# Patient Record
Sex: FEMALE | Race: WHITE | Employment: FULL TIME | URBAN - METROPOLITAN AREA
[De-identification: names, ages, dates, MRNs, and addresses within clinical notes are randomized per-mention and may not be internally consistent; named-entity substitution may affect disease eponyms.]

---

## 2018-12-14 ENCOUNTER — HOSPITAL ENCOUNTER (EMERGENCY)
Age: 43
Discharge: HOME | End: 2018-12-14
Payer: MEDICAID

## 2018-12-14 VITALS
WEIGHT: 293 LBS | BODY MASS INDEX: 47.09 KG/M2 | RESPIRATION RATE: 16 BRPM | SYSTOLIC BLOOD PRESSURE: 175 MMHG | HEIGHT: 66 IN | HEART RATE: 79 BPM | TEMPERATURE: 98.1 F | OXYGEN SATURATION: 96 % | DIASTOLIC BLOOD PRESSURE: 76 MMHG

## 2018-12-14 DIAGNOSIS — I10 ESSENTIAL HYPERTENSION: ICD-10-CM

## 2018-12-14 DIAGNOSIS — L02.91 ABSCESS: Primary | ICD-10-CM

## 2018-12-14 PROCEDURE — 99282 EMERGENCY DEPT VISIT SF MDM: CPT

## 2018-12-14 PROCEDURE — 6370000000 HC RX 637 (ALT 250 FOR IP): Performed by: PHYSICIAN ASSISTANT

## 2018-12-14 PROCEDURE — 10060 I&D ABSCESS SIMPLE/SINGLE: CPT

## 2018-12-14 RX ORDER — IBUPROFEN 800 MG/1
800 TABLET ORAL ONCE
Status: COMPLETED | OUTPATIENT
Start: 2018-12-14 | End: 2018-12-14

## 2018-12-14 RX ORDER — CLONIDINE HYDROCHLORIDE 0.1 MG/1
0.1 TABLET ORAL ONCE
Status: COMPLETED | OUTPATIENT
Start: 2018-12-14 | End: 2018-12-14

## 2018-12-14 RX ORDER — IBUPROFEN 800 MG/1
800 TABLET ORAL EVERY 8 HOURS PRN
Qty: 21 TABLET | Refills: 0 | Status: SHIPPED | OUTPATIENT
Start: 2018-12-14 | End: 2018-12-19

## 2018-12-14 RX ORDER — SULFAMETHOXAZOLE AND TRIMETHOPRIM 800; 160 MG/1; MG/1
1 TABLET ORAL 2 TIMES DAILY
Qty: 20 TABLET | Refills: 0 | Status: SHIPPED | OUTPATIENT
Start: 2018-12-14 | End: 2018-12-19

## 2018-12-14 RX ORDER — SULFAMETHOXAZOLE AND TRIMETHOPRIM 800; 160 MG/1; MG/1
1 TABLET ORAL ONCE
Status: COMPLETED | OUTPATIENT
Start: 2018-12-14 | End: 2018-12-14

## 2018-12-14 RX ORDER — CEPHALEXIN 500 MG/1
500 CAPSULE ORAL ONCE
Status: COMPLETED | OUTPATIENT
Start: 2018-12-14 | End: 2018-12-14

## 2018-12-14 RX ORDER — CEPHALEXIN 500 MG/1
500 CAPSULE ORAL 4 TIMES DAILY
Qty: 40 CAPSULE | Refills: 0 | Status: SHIPPED | OUTPATIENT
Start: 2018-12-14 | End: 2018-12-19

## 2018-12-14 RX ADMIN — IBUPROFEN 800 MG: 800 TABLET ORAL at 20:28

## 2018-12-14 RX ADMIN — CLONIDINE HYDROCHLORIDE 0.1 MG: 0.1 TABLET ORAL at 20:58

## 2018-12-14 RX ADMIN — CEPHALEXIN 500 MG: 500 CAPSULE ORAL at 20:28

## 2018-12-14 RX ADMIN — SULFAMETHOXAZOLE AND TRIMETHOPRIM 1 TABLET: 800; 160 TABLET ORAL at 20:28

## 2018-12-14 ASSESSMENT — PAIN DESCRIPTION - PAIN TYPE: TYPE: ACUTE PAIN

## 2018-12-14 ASSESSMENT — PAIN DESCRIPTION - LOCATION: LOCATION: FLANK

## 2018-12-14 ASSESSMENT — PAIN DESCRIPTION - ORIENTATION: ORIENTATION: RIGHT

## 2018-12-14 ASSESSMENT — PAIN DESCRIPTION - FREQUENCY: FREQUENCY: CONTINUOUS

## 2018-12-14 ASSESSMENT — PAIN DESCRIPTION - ONSET: ONSET: SUDDEN

## 2018-12-14 ASSESSMENT — PAIN SCALES - GENERAL
PAINLEVEL_OUTOF10: 9
PAINLEVEL_OUTOF10: 5

## 2018-12-15 NOTE — ED PROVIDER NOTES
[facility]  HPI:  18, Time: 8:19 PM         Nettie Sales is a 44 y.o. female presenting to the ED for  Abscess right lateral abdomen , beginning 2 Days  ago. The complaint has been persistent, mild in severity, and worsened by nothing. Patient comes in with complaint of abscess of the right lateral abdomen patient denies any fever chills. She states she is attempting to squeeze the area over the last couple of days. Patient denies any recent illness no sore throat cough no fever chills no chest pain and headache lightheaded or dizziness      Review of Systems:   Pertinent positives and negatives are stated within HPI, all other systems reviewed and are negative.          --------------------------------------------- PAST HISTORY ---------------------------------------------  Past Medical History:  has a past medical history of Acute renal injury ([facility] [address] Anxiety; Arthritis; Asthma; Depression; Diabetes mellitus ([facility] [address] GERD (gastroesophageal reflux disease); Glaucoma; Hypertension; Hypothyroidism; Irritable bowel syndrome; Obesity; Obstructive sleep apnea; Pneumonia; Polycystic ovarian syndrome; and Spinal headache. Past Surgical History:  has a past surgical history that includes Tonsillectomy (); Cholecystectomy, laparoscopic ();  section (); Foot surgery (); Ovary removal (); Dental surgery (10/06/2011); bernadette and bso (cervix removed) (3/19/2013); hernia repair (9/3/13); Hysterectomy; Echo Complete (2014); and Knee cartilage surgery. Social History:  reports that she has never smoked. She has never used smokeless tobacco. She reports that she does not drink alcohol or use drugs.     Family History: family history includes Asthma in her brother, mother, and sister; Cancer in her father and paternal grandfather; Depression in her father, maternal grandmother, and paternal grandmother; Diabetes in her brother and mother; Heart Disease in her father and maternal

## 2018-12-15 NOTE — ED NOTES
FIRST PROVIDER CONTACT ASSESSMENT NOTE      Department of Emergency Medicine   12/14/18  7:26 PM    Chief Complaint: Abscess (right side x2 days)      History of Present Illness:    Brien Ramirez is a 44 y.o. female who presents to the ED by private car for left side abscess x2 days tht is painful. Has had before. Has not tried to rah herself. Is diabetic. Has HTN and is on her meds. Focused Screening Exam:  Constitutional:  Alert, appears stated age and is in no distress.       *ALLERGIES*     Percocet [oxycodone-acetaminophen] and Tape [adhesive tape]     Vitals:    12/14/18 1926   BP: (!) 199/98   Pulse: 86   Resp: 16   Temp: 98.1 °F (36.7 °C)   TempSrc: Oral   SpO2: 96%   Weight: (!) 350 lb (158.8 kg)   Height: 5' 6\" (1.676 m)       Initial Plan of Care:  Initiate Treatment-Testing, Proceed toTreatment Area When Bed Available for ED Attending/MLP to Continue Care    -----------------END OF FIRST PROVIDER CONTACT ASSESSMENT NOTE--------------  Electronically signed by GEOVANI Guerrero CNP   DD: 12/14/18       GEOVANI Guerrero CNP  12/14/18 1929

## 2018-12-15 NOTE — ED NOTES
Bed: 29  Expected date:   Expected time:   Means of arrival:   Comments:  protocols     Sandee Kapoor RN  12/14/18 1929

## 2018-12-17 ENCOUNTER — HOSPITAL ENCOUNTER (EMERGENCY)
Age: 43
Discharge: LEFT BEFORE DISCHARGE | End: 2018-12-17
Payer: MEDICAID

## 2018-12-17 VITALS
RESPIRATION RATE: 16 BRPM | OXYGEN SATURATION: 97 % | WEIGHT: 235 LBS | DIASTOLIC BLOOD PRESSURE: 100 MMHG | HEART RATE: 79 BPM | HEIGHT: 66 IN | BODY MASS INDEX: 37.77 KG/M2 | SYSTOLIC BLOOD PRESSURE: 238 MMHG | TEMPERATURE: 98.3 F

## 2018-12-17 PROCEDURE — 99282 EMERGENCY DEPT VISIT SF MDM: CPT

## 2018-12-17 ASSESSMENT — PAIN DESCRIPTION - PAIN TYPE: TYPE: ACUTE PAIN

## 2018-12-17 ASSESSMENT — PAIN SCALES - WONG BAKER: WONGBAKER_NUMERICALRESPONSE: 2

## 2018-12-17 ASSESSMENT — PAIN DESCRIPTION - LOCATION: LOCATION: ABDOMEN

## 2018-12-19 ENCOUNTER — OFFICE VISIT (OUTPATIENT)
Dept: FAMILY MEDICINE CLINIC | Age: 43
End: 2018-12-19
Payer: MEDICAID

## 2018-12-19 VITALS
DIASTOLIC BLOOD PRESSURE: 107 MMHG | SYSTOLIC BLOOD PRESSURE: 189 MMHG | RESPIRATION RATE: 18 BRPM | WEIGHT: 293 LBS | BODY MASS INDEX: 47.09 KG/M2 | HEIGHT: 66 IN | HEART RATE: 74 BPM

## 2018-12-19 DIAGNOSIS — F33.9 EPISODE OF RECURRENT MAJOR DEPRESSIVE DISORDER, UNSPECIFIED DEPRESSION EPISODE SEVERITY (HCC): Chronic | ICD-10-CM

## 2018-12-19 DIAGNOSIS — L02.211 ABSCESS OF ABDOMINAL WALL: ICD-10-CM

## 2018-12-19 DIAGNOSIS — I10 ESSENTIAL HYPERTENSION: ICD-10-CM

## 2018-12-19 DIAGNOSIS — E11.8 TYPE 2 DIABETES MELLITUS WITH COMPLICATION, UNSPECIFIED WHETHER LONG TERM INSULIN USE: Primary | ICD-10-CM

## 2018-12-19 DIAGNOSIS — L98.9 SKIN LESION OF HAND: ICD-10-CM

## 2018-12-19 DIAGNOSIS — Z13.31 POSITIVE DEPRESSION SCREENING: ICD-10-CM

## 2018-12-19 PROBLEM — S02.5XXA FRACTURE OF TOOTH: Status: ACTIVE | Noted: 2018-12-19

## 2018-12-19 PROBLEM — D22.9 ATYPICAL NEVUS: Status: ACTIVE | Noted: 2018-12-19

## 2018-12-19 PROBLEM — K91.5 POSTCHOLECYSTECTOMY SYNDROME: Status: ACTIVE | Noted: 2018-12-19

## 2018-12-19 PROBLEM — I25.10 ARTERIOSCLEROSIS OF CORONARY ARTERY: Status: ACTIVE | Noted: 2018-12-19

## 2018-12-19 PROBLEM — J45.909 ASTHMA: Status: ACTIVE | Noted: 2018-12-19

## 2018-12-19 PROBLEM — T39.95XA ANALGESIC OVERUSE HEADACHE: Status: ACTIVE | Noted: 2018-12-19

## 2018-12-19 PROBLEM — S02.5XXA FRACTURE OF TOOTH: Status: RESOLVED | Noted: 2018-12-19 | Resolved: 2018-12-19

## 2018-12-19 PROBLEM — G44.40 ANALGESIC OVERUSE HEADACHE: Status: RESOLVED | Noted: 2018-12-19 | Resolved: 2018-12-19

## 2018-12-19 PROBLEM — A49.02 INFECTION WITH METHICILLIN-RESISTANT STAPHYLOCOCCUS AUREUS: Status: ACTIVE | Noted: 2018-12-19

## 2018-12-19 PROBLEM — G44.40 ANALGESIC OVERUSE HEADACHE: Status: ACTIVE | Noted: 2018-12-19

## 2018-12-19 PROBLEM — K91.5 POSTCHOLECYSTECTOMY SYNDROME: Status: RESOLVED | Noted: 2018-12-19 | Resolved: 2018-12-19

## 2018-12-19 PROBLEM — E55.9 UNSPECIFIED VITAMIN D DEFICIENCY: Status: ACTIVE | Noted: 2018-12-19

## 2018-12-19 PROBLEM — T39.95XA ANALGESIC OVERUSE HEADACHE: Status: RESOLVED | Noted: 2018-12-19 | Resolved: 2018-12-19

## 2018-12-19 LAB — HBA1C MFR BLD: 6.4 %

## 2018-12-19 PROCEDURE — 83036 HEMOGLOBIN GLYCOSYLATED A1C: CPT | Performed by: FAMILY MEDICINE

## 2018-12-19 PROCEDURE — G0444 DEPRESSION SCREEN ANNUAL: HCPCS | Performed by: FAMILY MEDICINE

## 2018-12-19 PROCEDURE — 99203 OFFICE O/P NEW LOW 30 MIN: CPT | Performed by: FAMILY MEDICINE

## 2018-12-19 PROCEDURE — G8431 POS CLIN DEPRES SCRN F/U DOC: HCPCS | Performed by: FAMILY MEDICINE

## 2018-12-19 RX ORDER — SULFAMETHOXAZOLE AND TRIMETHOPRIM 800; 160 MG/1; MG/1
1 TABLET ORAL 2 TIMES DAILY
COMMUNITY
End: 2019-01-11

## 2018-12-19 RX ORDER — LISINOPRIL 40 MG/1
40 TABLET ORAL DAILY
Qty: 30 TABLET | Refills: 3 | Status: SHIPPED | OUTPATIENT
Start: 2018-12-19 | End: 2019-01-11

## 2018-12-19 ASSESSMENT — PATIENT HEALTH QUESTIONNAIRE - PHQ9
7. TROUBLE CONCENTRATING ON THINGS, SUCH AS READING THE NEWSPAPER OR WATCHING TELEVISION: 3
4. FEELING TIRED OR HAVING LITTLE ENERGY: 3
2. FEELING DOWN, DEPRESSED OR HOPELESS: 3
8. MOVING OR SPEAKING SO SLOWLY THAT OTHER PEOPLE COULD HAVE NOTICED. OR THE OPPOSITE, BEING SO FIGETY OR RESTLESS THAT YOU HAVE BEEN MOVING AROUND A LOT MORE THAN USUAL: 0
6. FEELING BAD ABOUT YOURSELF - OR THAT YOU ARE A FAILURE OR HAVE LET YOURSELF OR YOUR FAMILY DOWN: 3
SUM OF ALL RESPONSES TO PHQ QUESTIONS 1-9: 21
5. POOR APPETITE OR OVEREATING: 3
1. LITTLE INTEREST OR PLEASURE IN DOING THINGS: 3
SUM OF ALL RESPONSES TO PHQ9 QUESTIONS 1 & 2: 6
10. IF YOU CHECKED OFF ANY PROBLEMS, HOW DIFFICULT HAVE THESE PROBLEMS MADE IT FOR YOU TO DO YOUR WORK, TAKE CARE OF THINGS AT HOME, OR GET ALONG WITH OTHER PEOPLE: 3
9. THOUGHTS THAT YOU WOULD BE BETTER OFF DEAD, OR OF HURTING YOURSELF: 0
3. TROUBLE FALLING OR STAYING ASLEEP: 3
SUM OF ALL RESPONSES TO PHQ QUESTIONS 1-9: 21

## 2018-12-19 ASSESSMENT — ENCOUNTER SYMPTOMS
DIARRHEA: 0
CONSTIPATION: 0
BLOOD IN STOOL: 0
VOMITING: 0
ABDOMINAL PAIN: 0
COUGH: 0
SHORTNESS OF BREATH: 0
NAUSEA: 0

## 2018-12-19 NOTE — PROGRESS NOTES
Chief Complaint   Patient presents with   Desi Hodge Doctor    Diabetes     unable to afford any of her medications, not taking     Hypertension    Depression     PHQ-21       HPI:  The patient is a 44 y.o. female who presented to the office today to establish care. Primarily here to have an abscess evaluated. Has multiple problems but states that she does not have insurance and has no money to pay for medications. She states that she would only be able to take samples because she does not have the funds for additional medication.     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, \"I ran two [employer]  - she is very frustrated with her former boss because he made her lay off people which was very emotionally stressing for her   - has 15year old boy who she states will not have fortino due to lack of funds, this stresses her  Sleep: 1-3 hours  Interests: none  Guilty: yes  Energy: low  Concentration: yes  Appetite: increased  Psychomotor: yes  Suicidal ideation: no    Hypertension  - 20 years  - Lisinopril 40 mg BID; was still high at the time  - headaches  - blurry vision; blames glaucoma    Diabetes Mellitus  - insulin since 2014  - stopped insulin 2 years ago   -  HbA1c: 6.4%    Social stressors  - doesn't have money  - has  with CHF and she spends the money on him   - has not worked with  before     Abscess  - started last week  - kept getting bigger  - I&D in ED and was started on Keflex and Bactrim, still taking, 3 more days   - no change in size  - tender   - no fever but possibly some chills     Rash on left arm over pinky and arm  - tried lotion  - utter cream  - 6 months       Past Medical History:   Diagnosis Date    Acute renal injury ([facility] [address] 9/4/2013    instructed to call if any new symptoms develop prior to next visit.        Lore Shen M.D

## 2018-12-21 ENCOUNTER — OFFICE VISIT (OUTPATIENT)
Dept: FAMILY MEDICINE CLINIC | Age: 43
End: 2018-12-21
Payer: MEDICAID

## 2018-12-21 VITALS
BODY MASS INDEX: 47.09 KG/M2 | HEART RATE: 75 BPM | WEIGHT: 293 LBS | RESPIRATION RATE: 18 BRPM | HEIGHT: 66 IN | SYSTOLIC BLOOD PRESSURE: 198 MMHG | DIASTOLIC BLOOD PRESSURE: 111 MMHG

## 2018-12-21 DIAGNOSIS — L02.211 ABDOMINAL WALL ABSCESS: Primary | ICD-10-CM

## 2018-12-21 PROCEDURE — 10060 I&D ABSCESS SIMPLE/SINGLE: CPT | Performed by: FAMILY MEDICINE

## 2019-01-11 ENCOUNTER — HOSPITAL ENCOUNTER (OUTPATIENT)
Age: 44
Discharge: HOME | End: 2019-01-13
Payer: MEDICAID

## 2019-01-11 ENCOUNTER — OFFICE VISIT (OUTPATIENT)
Dept: FAMILY MEDICINE CLINIC | Age: 44
End: 2019-01-11
Payer: MEDICAID

## 2019-01-11 VITALS
HEART RATE: 81 BPM | SYSTOLIC BLOOD PRESSURE: 166 MMHG | DIASTOLIC BLOOD PRESSURE: 85 MMHG | WEIGHT: 293 LBS | OXYGEN SATURATION: 98 % | BODY MASS INDEX: 47.09 KG/M2 | RESPIRATION RATE: 18 BRPM | HEIGHT: 66 IN

## 2019-01-11 DIAGNOSIS — E03.9 HYPOTHYROIDISM, UNSPECIFIED TYPE: Chronic | ICD-10-CM

## 2019-01-11 DIAGNOSIS — R00.2 PALPITATION: ICD-10-CM

## 2019-01-11 DIAGNOSIS — E11.65 TYPE 2 DIABETES MELLITUS WITH HYPERGLYCEMIA, WITHOUT LONG-TERM CURRENT USE OF INSULIN (HCC): ICD-10-CM

## 2019-01-11 DIAGNOSIS — I10 ESSENTIAL HYPERTENSION: ICD-10-CM

## 2019-01-11 DIAGNOSIS — I10 ESSENTIAL HYPERTENSION: Primary | ICD-10-CM

## 2019-01-11 DIAGNOSIS — F41.8 DEPRESSION WITH ANXIETY: Chronic | ICD-10-CM

## 2019-01-11 DIAGNOSIS — R07.9 EXERTIONAL CHEST PAIN: ICD-10-CM

## 2019-01-11 DIAGNOSIS — L40.9 PSORIASIS: ICD-10-CM

## 2019-01-11 LAB
ALBUMIN SERPL-MCNC: 4.2 G/DL (ref 3.5–5.2)
ALP BLD-CCNC: 92 U/L (ref 35–104)
ALT SERPL-CCNC: 24 U/L (ref 0–32)
ANION GAP SERPL CALCULATED.3IONS-SCNC: 14 MMOL/L (ref 7–16)
APPEARANCE FLUID: NORMAL
AST SERPL-CCNC: 16 U/L (ref 0–31)
BILIRUB SERPL-MCNC: 0.6 MG/DL (ref 0–1.2)
BILIRUBIN, POC: NORMAL
BLOOD URINE, POC: NORMAL
BUN BLDV-MCNC: 13 MG/DL (ref 6–20)
CALCIUM SERPL-MCNC: 9.1 MG/DL (ref 8.6–10.2)
CHLORIDE BLD-SCNC: 100 MMOL/L (ref 98–107)
CHOLESTEROL, TOTAL: 224 MG/DL (ref 0–199)
CLARITY, POC: CLEAR
CO2: 29 MMOL/L (ref 22–29)
COLOR, POC: YELLOW
CREAT SERPL-MCNC: 0.8 MG/DL (ref 0.5–1)
CREATININE URINE POCT: NORMAL
GFR AFRICAN AMERICAN: >60
GFR NON-AFRICAN AMERICAN: >60 ML/MIN/1.73
GLUCOSE BLD-MCNC: 108 MG/DL
GLUCOSE BLD-MCNC: 115 MG/DL (ref 74–99)
GLUCOSE URINE, POC: NORMAL
HDLC SERPL-MCNC: 65 MG/DL
KETONES, POC: NORMAL
LDL CHOLESTEROL CALCULATED: 135 MG/DL (ref 0–99)
LEUKOCYTE EST, POC: NORMAL
MICROALBUMIN/CREAT 24H UR: NORMAL MG/G{CREAT}
MICROALBUMIN/CREAT UR-RTO: NORMAL
NITRITE, POC: NORMAL
PH, POC: 5.5
POTASSIUM SERPL-SCNC: 3.8 MMOL/L (ref 3.5–5)
PROTEIN, POC: NORMAL
SODIUM BLD-SCNC: 143 MMOL/L (ref 132–146)
SPECIFIC GRAVITY, POC: 1.02
TOTAL PROTEIN: 7.7 G/DL (ref 6.4–8.3)
TRIGL SERPL-MCNC: 121 MG/DL (ref 0–149)
TSH SERPL DL<=0.05 MIU/L-ACNC: 5.69 UIU/ML (ref 0.27–4.2)
UROBILINOGEN, POC: 0.2
VLDLC SERPL CALC-MCNC: 24 MG/DL

## 2019-01-11 PROCEDURE — 82962 GLUCOSE BLOOD TEST: CPT | Performed by: FAMILY MEDICINE

## 2019-01-11 PROCEDURE — 99213 OFFICE O/P EST LOW 20 MIN: CPT | Performed by: FAMILY MEDICINE

## 2019-01-11 PROCEDURE — 84443 ASSAY THYROID STIM HORMONE: CPT

## 2019-01-11 PROCEDURE — 80061 LIPID PANEL: CPT

## 2019-01-11 PROCEDURE — 81002 URINALYSIS NONAUTO W/O SCOPE: CPT | Performed by: FAMILY MEDICINE

## 2019-01-11 PROCEDURE — 93000 ELECTROCARDIOGRAM COMPLETE: CPT | Performed by: FAMILY MEDICINE

## 2019-01-11 PROCEDURE — 82044 UR ALBUMIN SEMIQUANTITATIVE: CPT | Performed by: FAMILY MEDICINE

## 2019-01-11 PROCEDURE — 80053 COMPREHEN METABOLIC PANEL: CPT

## 2019-01-11 RX ORDER — CHLORTHALIDONE 25 MG/1
25 TABLET ORAL DAILY
Qty: 30 TABLET | Refills: 0 | Status: SHIPPED | OUTPATIENT
Start: 2019-01-11 | End: 2019-02-07 | Stop reason: SDUPTHER

## 2019-01-11 RX ORDER — TRIAMCINOLONE ACETONIDE 5 MG/G
CREAM TOPICAL
Qty: 45 G | Refills: 0 | Status: SHIPPED | OUTPATIENT
Start: 2019-01-11 | End: 2019-03-04 | Stop reason: SDUPTHER

## 2019-01-11 ASSESSMENT — ENCOUNTER SYMPTOMS
VOMITING: 0
COUGH: 0
SHORTNESS OF BREATH: 1
NAUSEA: 0
CONSTIPATION: 0
ABDOMINAL PAIN: 0
BLOOD IN STOOL: 0
DIARRHEA: 0

## 2019-01-14 ENCOUNTER — TELEPHONE (OUTPATIENT)
Dept: FAMILY MEDICINE CLINIC | Age: 44
End: 2019-01-14

## 2019-01-14 DIAGNOSIS — E03.9 HYPOTHYROIDISM, UNSPECIFIED TYPE: Primary | ICD-10-CM

## 2019-01-14 RX ORDER — LEVOTHYROXINE SODIUM 0.05 MG/1
50 TABLET ORAL DAILY
Qty: 30 TABLET | Refills: 2 | Status: SHIPPED | OUTPATIENT
Start: 2019-01-14 | End: 2019-03-04 | Stop reason: SDUPTHER

## 2019-01-18 ENCOUNTER — PATIENT MESSAGE (OUTPATIENT)
Dept: FAMILY MEDICINE CLINIC | Age: 44
End: 2019-01-18

## 2019-01-18 DIAGNOSIS — E11.65 TYPE 2 DIABETES MELLITUS WITH HYPERGLYCEMIA, WITHOUT LONG-TERM CURRENT USE OF INSULIN (HCC): Primary | ICD-10-CM

## 2019-01-19 RX ORDER — GLUCOSAMINE HCL/CHONDROITIN SU 500-400 MG
CAPSULE ORAL
Qty: 100 STRIP | Refills: 3 | Status: SHIPPED | OUTPATIENT
Start: 2019-01-19 | End: 2019-08-12

## 2019-01-19 RX ORDER — LANCETS 30 GAUGE
EACH MISCELLANEOUS
Qty: 100 EACH | Refills: 3 | Status: SHIPPED | OUTPATIENT
Start: 2019-01-19 | End: 2019-08-12

## 2019-01-19 RX ORDER — BLOOD PRESSURE TEST KIT
KIT MISCELLANEOUS
Qty: 100 EACH | Refills: 3 | Status: SHIPPED | OUTPATIENT
Start: 2019-01-19 | End: 2019-08-12

## 2019-01-19 RX ORDER — BLOOD-GLUCOSE METER
1 KIT MISCELLANEOUS DAILY
Qty: 1 KIT | Refills: 0 | Status: SHIPPED | OUTPATIENT
Start: 2019-01-19 | End: 2019-08-12

## 2019-02-07 DIAGNOSIS — I10 ESSENTIAL HYPERTENSION: ICD-10-CM

## 2019-02-07 RX ORDER — CHLORTHALIDONE 25 MG/1
TABLET ORAL
Qty: 30 TABLET | Refills: 0 | Status: SHIPPED | OUTPATIENT
Start: 2019-02-07 | End: 2019-03-04 | Stop reason: SDUPTHER

## 2019-02-07 RX ORDER — CHLORTHALIDONE 25 MG/1
25 TABLET ORAL DAILY
Qty: 30 TABLET | Refills: 0 | Status: SHIPPED | OUTPATIENT
Start: 2019-02-07 | End: 2019-02-07

## 2019-02-20 ENCOUNTER — HOSPITAL ENCOUNTER (OUTPATIENT)
Dept: NON INVASIVE DIAGNOSTICS | Age: 44
Discharge: HOME | End: 2019-02-20
Payer: MEDICAID

## 2019-02-20 DIAGNOSIS — I10 ESSENTIAL HYPERTENSION: ICD-10-CM

## 2019-02-20 DIAGNOSIS — R00.2 PALPITATION: ICD-10-CM

## 2019-02-20 DIAGNOSIS — R07.9 EXERTIONAL CHEST PAIN: ICD-10-CM

## 2019-02-20 LAB
LV EF: 60 %
LVEF MODALITY: NORMAL

## 2019-02-20 PROCEDURE — 93306 TTE W/DOPPLER COMPLETE: CPT

## 2019-03-04 ENCOUNTER — OFFICE VISIT (OUTPATIENT)
Dept: FAMILY MEDICINE CLINIC | Age: 44
End: 2019-03-04
Payer: MEDICAID

## 2019-03-04 VITALS
SYSTOLIC BLOOD PRESSURE: 143 MMHG | OXYGEN SATURATION: 96 % | HEIGHT: 66 IN | RESPIRATION RATE: 18 BRPM | DIASTOLIC BLOOD PRESSURE: 106 MMHG | HEART RATE: 79 BPM | WEIGHT: 293 LBS | BODY MASS INDEX: 47.09 KG/M2

## 2019-03-04 DIAGNOSIS — I10 ESSENTIAL HYPERTENSION: ICD-10-CM

## 2019-03-04 DIAGNOSIS — L40.9 PSORIASIS: ICD-10-CM

## 2019-03-04 DIAGNOSIS — E03.9 HYPOTHYROIDISM, UNSPECIFIED TYPE: ICD-10-CM

## 2019-03-04 DIAGNOSIS — F41.8 DEPRESSION WITH ANXIETY: Chronic | ICD-10-CM

## 2019-03-04 DIAGNOSIS — E11.8 TYPE 2 DIABETES MELLITUS WITH COMPLICATION, WITHOUT LONG-TERM CURRENT USE OF INSULIN (HCC): Primary | ICD-10-CM

## 2019-03-04 LAB — HBA1C MFR BLD: 7.7 %

## 2019-03-04 PROCEDURE — 1036F TOBACCO NON-USER: CPT | Performed by: FAMILY MEDICINE

## 2019-03-04 PROCEDURE — 99213 OFFICE O/P EST LOW 20 MIN: CPT | Performed by: FAMILY MEDICINE

## 2019-03-04 PROCEDURE — 3045F PR MOST RECENT HEMOGLOBIN A1C LEVEL 7.0-9.0%: CPT | Performed by: FAMILY MEDICINE

## 2019-03-04 PROCEDURE — 83036 HEMOGLOBIN GLYCOSYLATED A1C: CPT | Performed by: FAMILY MEDICINE

## 2019-03-04 PROCEDURE — G8427 DOCREV CUR MEDS BY ELIG CLIN: HCPCS | Performed by: FAMILY MEDICINE

## 2019-03-04 PROCEDURE — G8417 CALC BMI ABV UP PARAM F/U: HCPCS | Performed by: FAMILY MEDICINE

## 2019-03-04 PROCEDURE — G8599 NO ASA/ANTIPLAT THER USE RNG: HCPCS | Performed by: FAMILY MEDICINE

## 2019-03-04 PROCEDURE — 2022F DILAT RTA XM EVC RTNOPTHY: CPT | Performed by: FAMILY MEDICINE

## 2019-03-04 PROCEDURE — G8484 FLU IMMUNIZE NO ADMIN: HCPCS | Performed by: FAMILY MEDICINE

## 2019-03-04 RX ORDER — LEVOTHYROXINE SODIUM 0.05 MG/1
50 TABLET ORAL DAILY
Qty: 30 TABLET | Refills: 2 | Status: SHIPPED | OUTPATIENT
Start: 2019-03-04 | End: 2019-08-19

## 2019-03-04 RX ORDER — CHLORTHALIDONE 25 MG/1
TABLET ORAL
Qty: 30 TABLET | Refills: 3 | Status: SHIPPED | OUTPATIENT
Start: 2019-03-04 | End: 2019-07-08 | Stop reason: ALTCHOICE

## 2019-03-04 ASSESSMENT — ENCOUNTER SYMPTOMS: CHEST TIGHTNESS: 1

## 2019-03-05 RX ORDER — TRIAMCINOLONE ACETONIDE 5 MG/G
CREAM TOPICAL
Qty: 45 G | Refills: 0 | Status: SHIPPED
Start: 2019-03-05 | End: 2020-06-16 | Stop reason: SDUPTHER

## 2019-03-12 ENCOUNTER — TELEPHONE (OUTPATIENT)
Dept: FAMILY MEDICINE CLINIC | Age: 44
End: 2019-03-12

## 2019-03-12 DIAGNOSIS — F33.1 MODERATE EPISODE OF RECURRENT MAJOR DEPRESSIVE DISORDER (HCC): Primary | ICD-10-CM

## 2019-03-16 ENCOUNTER — HOSPITAL ENCOUNTER (EMERGENCY)
Age: 44
Discharge: HOME | End: 2019-03-16
Payer: MEDICAID

## 2019-03-16 VITALS
SYSTOLIC BLOOD PRESSURE: 204 MMHG | BODY MASS INDEX: 47.09 KG/M2 | WEIGHT: 293 LBS | DIASTOLIC BLOOD PRESSURE: 120 MMHG | RESPIRATION RATE: 18 BRPM | HEART RATE: 81 BPM | OXYGEN SATURATION: 95 % | HEIGHT: 66 IN | TEMPERATURE: 98.4 F

## 2019-03-16 DIAGNOSIS — H00.033 EYELID CELLULITIS, RIGHT: ICD-10-CM

## 2019-03-16 DIAGNOSIS — H00.031 ABSCESS OF RIGHT UPPER EYELID: Primary | ICD-10-CM

## 2019-03-16 PROCEDURE — 99283 EMERGENCY DEPT VISIT LOW MDM: CPT

## 2019-03-16 PROCEDURE — 6370000000 HC RX 637 (ALT 250 FOR IP)

## 2019-03-16 PROCEDURE — 6370000000 HC RX 637 (ALT 250 FOR IP): Performed by: PHYSICIAN ASSISTANT

## 2019-03-16 RX ORDER — FLUCONAZOLE 150 MG/1
150 TABLET ORAL ONCE
Qty: 1 TABLET | Refills: 0 | Status: SHIPPED | OUTPATIENT
Start: 2019-03-16 | End: 2019-03-16

## 2019-03-16 RX ORDER — CEPHALEXIN 500 MG/1
500 CAPSULE ORAL 4 TIMES DAILY
Qty: 28 CAPSULE | Refills: 0 | Status: SHIPPED | OUTPATIENT
Start: 2019-03-16 | End: 2019-07-08

## 2019-03-16 RX ORDER — POLYMYXIN B SULFATE AND TRIMETHOPRIM 1; 10000 MG/ML; [USP'U]/ML
1 SOLUTION OPHTHALMIC EVERY 4 HOURS
Qty: 10 ML | Refills: 0 | Status: SHIPPED | OUTPATIENT
Start: 2019-03-16 | End: 2019-03-26

## 2019-03-16 RX ORDER — ERYTHROMYCIN 5 MG/G
OINTMENT OPHTHALMIC
Qty: 1 TUBE | Refills: 0 | Status: SHIPPED | OUTPATIENT
Start: 2019-03-16 | End: 2019-03-16 | Stop reason: SINTOL

## 2019-03-16 RX ORDER — TETRACAINE HYDROCHLORIDE 5 MG/ML
SOLUTION OPHTHALMIC
Status: COMPLETED
Start: 2019-03-16 | End: 2019-03-16

## 2019-03-16 RX ORDER — CEPHALEXIN 500 MG/1
500 CAPSULE ORAL ONCE
Status: COMPLETED | OUTPATIENT
Start: 2019-03-16 | End: 2019-03-16

## 2019-03-16 RX ADMIN — TETRACAINE HYDROCHLORIDE: 5 SOLUTION OPHTHALMIC at 19:20

## 2019-03-16 RX ADMIN — CEPHALEXIN 500 MG: 500 CAPSULE ORAL at 19:59

## 2019-03-16 ASSESSMENT — PAIN SCALES - GENERAL: PAINLEVEL_OUTOF10: 8

## 2019-05-03 ENCOUNTER — APPOINTMENT (OUTPATIENT)
Dept: GENERAL RADIOLOGY | Age: 44
End: 2019-05-03
Payer: MEDICAID

## 2019-05-03 ENCOUNTER — HOSPITAL ENCOUNTER (EMERGENCY)
Age: 44
Discharge: HOME | End: 2019-05-03
Attending: EMERGENCY MEDICINE
Payer: MEDICAID

## 2019-05-03 VITALS
TEMPERATURE: 98.8 F | HEIGHT: 66 IN | OXYGEN SATURATION: 96 % | RESPIRATION RATE: 18 BRPM | DIASTOLIC BLOOD PRESSURE: 96 MMHG | HEART RATE: 78 BPM | BODY MASS INDEX: 47.09 KG/M2 | WEIGHT: 293 LBS | SYSTOLIC BLOOD PRESSURE: 194 MMHG

## 2019-05-03 DIAGNOSIS — I10 ESSENTIAL HYPERTENSION: ICD-10-CM

## 2019-05-03 DIAGNOSIS — M77.31 HEEL SPUR, RIGHT: ICD-10-CM

## 2019-05-03 DIAGNOSIS — L03.213 PRESEPTAL CELLULITIS OF LEFT EYE: Primary | ICD-10-CM

## 2019-05-03 PROCEDURE — 6370000000 HC RX 637 (ALT 250 FOR IP): Performed by: STUDENT IN AN ORGANIZED HEALTH CARE EDUCATION/TRAINING PROGRAM

## 2019-05-03 PROCEDURE — 99283 EMERGENCY DEPT VISIT LOW MDM: CPT

## 2019-05-03 PROCEDURE — 73630 X-RAY EXAM OF FOOT: CPT

## 2019-05-03 RX ORDER — IBUPROFEN 800 MG/1
800 TABLET ORAL ONCE
Status: COMPLETED | OUTPATIENT
Start: 2019-05-03 | End: 2019-05-03

## 2019-05-03 RX ORDER — IBUPROFEN 800 MG/1
TABLET ORAL
Status: DISCONTINUED
Start: 2019-05-03 | End: 2019-05-03 | Stop reason: HOSPADM

## 2019-05-03 RX ORDER — CLONIDINE HYDROCHLORIDE 0.1 MG/1
0.1 TABLET ORAL ONCE
Status: COMPLETED | OUTPATIENT
Start: 2019-05-03 | End: 2019-05-03

## 2019-05-03 RX ORDER — CLINDAMYCIN HYDROCHLORIDE 150 MG/1
450 CAPSULE ORAL 3 TIMES DAILY
Qty: 90 CAPSULE | Refills: 0 | Status: SHIPPED | OUTPATIENT
Start: 2019-05-03 | End: 2019-05-13

## 2019-05-03 RX ORDER — IBUPROFEN 800 MG/1
800 TABLET ORAL EVERY 8 HOURS PRN
Qty: 21 TABLET | Refills: 0 | Status: SHIPPED | OUTPATIENT
Start: 2019-05-03 | End: 2019-08-12

## 2019-05-03 RX ORDER — CLINDAMYCIN HYDROCHLORIDE 150 MG/1
450 CAPSULE ORAL ONCE
Status: COMPLETED | OUTPATIENT
Start: 2019-05-03 | End: 2019-05-03

## 2019-05-03 RX ORDER — FLUCONAZOLE 150 MG/1
150 TABLET ORAL ONCE
Qty: 2 TABLET | Refills: 0 | Status: SHIPPED | OUTPATIENT
Start: 2019-05-03 | End: 2019-05-03

## 2019-05-03 RX ORDER — POLYMYXIN B SULFATE AND TRIMETHOPRIM 1; 10000 MG/ML; [USP'U]/ML
1 SOLUTION OPHTHALMIC EVERY 4 HOURS
Qty: 1 BOTTLE | Refills: 0 | Status: SHIPPED | OUTPATIENT
Start: 2019-05-03 | End: 2019-05-13

## 2019-05-03 RX ADMIN — CLINDAMYCIN HYDROCHLORIDE 450 MG: 150 CAPSULE ORAL at 19:19

## 2019-05-03 RX ADMIN — CLONIDINE HYDROCHLORIDE 0.1 MG: 0.1 TABLET ORAL at 19:18

## 2019-05-03 RX ADMIN — IBUPROFEN 800 MG: 800 TABLET, FILM COATED ORAL at 19:18

## 2019-05-03 ASSESSMENT — ENCOUNTER SYMPTOMS
SINUS PRESSURE: 0
COLOR CHANGE: 0
RHINORRHEA: 0
DIARRHEA: 0
NAUSEA: 0
SHORTNESS OF BREATH: 0
COUGH: 0
SINUS PAIN: 0
FACIAL SWELLING: 1
VOMITING: 0
ABDOMINAL PAIN: 0
BACK PAIN: 0
SORE THROAT: 0

## 2019-05-03 ASSESSMENT — PAIN DESCRIPTION - ORIENTATION: ORIENTATION: RIGHT

## 2019-05-03 ASSESSMENT — PAIN DESCRIPTION - PAIN TYPE: TYPE: ACUTE PAIN

## 2019-05-03 ASSESSMENT — PAIN SCALES - GENERAL
PAINLEVEL_OUTOF10: 5
PAINLEVEL_OUTOF10: 5

## 2019-05-03 ASSESSMENT — PAIN DESCRIPTION - LOCATION: LOCATION: FOOT

## 2019-05-03 NOTE — ED PROVIDER NOTES
appearance. She does not have a sickly appearance. She does not appear ill. No distress. HENT:   Head: Normocephalic and atraumatic. Right Ear: Hearing and external ear normal.   Left Ear: Hearing and external ear normal.   Nose: Nose normal.   Mouth/Throat: Oropharynx is clear and moist and mucous membranes are normal. No oropharyngeal exudate, posterior oropharyngeal edema, posterior oropharyngeal erythema or tonsillar abscesses. Eyes: Pupils are equal, round, and reactive to light. Conjunctivae and EOM are normal. Right eye exhibits no discharge. Left eye exhibits no discharge. No scleral icterus. Neck: Trachea normal, normal range of motion, full passive range of motion without pain and phonation normal. Neck supple. No JVD present. No tracheal tenderness present. No tracheal deviation present. No thyroid mass and no thyromegaly present. Cardiovascular: Normal rate, regular rhythm, S1 normal, S2 normal, normal heart sounds and intact distal pulses. Exam reveals no gallop, no S3, no S4, no distant heart sounds and no friction rub. No murmur heard. Pulses:       Radial pulses are 2+ on the right side, and 2+ on the left side. Dorsalis pedis pulses are 2+ on the right side, and 2+ on the left side. Pulmonary/Chest: Effort normal and breath sounds normal. No accessory muscle usage or stridor. No tachypnea. No respiratory distress. She has no decreased breath sounds. She has no wheezes. She has no rhonchi. She has no rales. She exhibits no tenderness. Abdominal: Soft. Bowel sounds are normal. She exhibits no distension and no mass. There is no tenderness. There is no rigidity, no rebound and no guarding. Musculoskeletal: Normal range of motion. She exhibits no edema, tenderness or deformity. Lymphadenopathy:     She has no cervical adenopathy. Neurological: She is alert and oriented to person, place, and time. Skin: Skin is warm and dry. Capillary refill takes less than 2 seconds. No rash noted. She is not diaphoretic. No erythema. No pallor. Psychiatric: She has a normal mood and affect. Her speech is normal and behavior is normal. Judgment and thought content normal. Cognition and memory are normal.   Nursing note and vitals reviewed. Procedures    MDM  Patient presents to the ED for Left eyelid swelling and pain, right heel pain, and elevated blood pressure. Differential diagnoses included but not limited to Preseptal cellulitis, orbital cellulitis, blepharitis, conjunctivitis, heel fracture, musculoskeletal pain, essential hypertension. Workup in the ED revealed Elevated blood pressure, swelling of the upper and lower eyelids on the left eye consistent with preseptal cellulitis, x-ray findings consistent with a bone spur of the right heel. Patient was given Oral clindamycin, Motrin, and clonidine for their symptoms with mild improvement. Patient continues to be non-toxic on re-evaluation. Findings were discussed with the patient and reasons to immediately return to the ED were articulated to them. She was given prescriptions for oral clindamycin,  Polytrim drops, and Motrin as needed for pain. They will follow-up with their PMD, Podiatry, ophthalmology. --------------------------------------------- PAST HISTORY ---------------------------------------------  Past Medical History:  has a past medical history of Acute renal injury ([facility] [address] Analgesic overuse headache, Anxiety, Arthritis, Asthma, Depression, Diabetes mellitus ([facility] [address] Fracture of tooth, GERD (gastroesophageal reflux disease), Glaucoma, Hypertension, Hypertensive urgency, Hypothyroidism, Irritable bowel syndrome, Obesity, Obstructive sleep apnea, Pneumonia, Polycystic ovarian syndrome, Postcholecystectomy syndrome, Spinal headache, and Suicidal ideation. Past Surgical History:  has a past surgical history that includes Tonsillectomy (); Cholecystectomy, laparoscopic ();  section ();  Foot surgery (2003); Ovary removal (2006); Dental surgery (10/06/2011); bernadette and bso (cervix removed) (3/19/2013); hernia repair (9/3/13); Hysterectomy; Echo Complete (1/14/2014); and Knee cartilage surgery. Social History:  reports that she has never smoked. She has never used smokeless tobacco. She reports that she does not drink alcohol or use drugs. Family History: family history includes Asthma in her brother, mother, and sister; Cancer in her father and paternal grandfather; Depression in her father, maternal grandmother, and paternal grandmother; Diabetes in her brother and mother; Heart Disease in her father and maternal grandfather; High Blood Pressure in her father, maternal grandmother, mother, and paternal grandmother; Mental Illness in her maternal grandmother; Thyroid Disease in her maternal grandmother and sister. The patients home medications have been reviewed. Allergies: Percocet [oxycodone-acetaminophen]; Metformin and related; and Tape [adhesive tape]    -------------------------------------------------- RESULTS -------------------------------------------------  Labs:  No results found for this visit on 05/03/19. Radiology:  XR FOOT RIGHT (MIN 3 VIEWS)   Final Result      Plantar calcaneal spur. No acute fracture is identified.          ------------------------- NURSING NOTES AND VITALS REVIEWED ---------------------------  Date / Time Roomed:  5/3/2019  5:19 PM  ED Bed Assignment:  [id number]    The nursing notes within the ED encounter and vital signs as below have been reviewed.    BP (!) 194/96   Pulse 78   Temp 98.8 °F (37.1 °C) (Oral)   Resp 18   Ht 5' 6\" (1.676 m)   Wt (!) 350 lb (158.8 kg)   LMP 10/01/2012 (Approximate) Comment: 2013  SpO2 96%   BMI 56.49 kg/m²   Oxygen Saturation Interpretation: Normal      ------------------------------------------ PROGRESS NOTES ------------------------------------------  8:12 PM  I have spoken with the patient and discussed

## 2019-05-04 ENCOUNTER — HOSPITAL ENCOUNTER (EMERGENCY)
Age: 44
Discharge: HOME | End: 2019-05-04
Attending: EMERGENCY MEDICINE
Payer: MEDICAID

## 2019-05-04 VITALS
RESPIRATION RATE: 16 BRPM | OXYGEN SATURATION: 97 % | WEIGHT: 293 LBS | HEART RATE: 80 BPM | SYSTOLIC BLOOD PRESSURE: 180 MMHG | HEIGHT: 66 IN | TEMPERATURE: 98.9 F | DIASTOLIC BLOOD PRESSURE: 98 MMHG | BODY MASS INDEX: 47.09 KG/M2

## 2019-05-04 DIAGNOSIS — H00.036 EYELID CELLULITIS, LEFT: Primary | ICD-10-CM

## 2019-05-04 PROCEDURE — 99283 EMERGENCY DEPT VISIT LOW MDM: CPT

## 2019-05-04 ASSESSMENT — PAIN - FUNCTIONAL ASSESSMENT: PAIN_FUNCTIONAL_ASSESSMENT: ACTIVITIES ARE NOT PREVENTED

## 2019-05-04 ASSESSMENT — PAIN DESCRIPTION - DESCRIPTORS: DESCRIPTORS: DULL;SHARP

## 2019-05-04 ASSESSMENT — PAIN DESCRIPTION - FREQUENCY: FREQUENCY: CONTINUOUS

## 2019-05-04 ASSESSMENT — PAIN DESCRIPTION - ORIENTATION: ORIENTATION: LEFT

## 2019-05-04 ASSESSMENT — PAIN DESCRIPTION - ONSET: ONSET: SUDDEN

## 2019-05-04 ASSESSMENT — PAIN DESCRIPTION - LOCATION: LOCATION: EYE

## 2019-05-04 ASSESSMENT — PAIN SCALES - GENERAL: PAINLEVEL_OUTOF10: 7

## 2019-05-04 ASSESSMENT — PAIN DESCRIPTION - PAIN TYPE: TYPE: ACUTE PAIN

## 2019-07-08 ENCOUNTER — HOSPITAL ENCOUNTER (EMERGENCY)
Age: 44
Discharge: HOME | End: 2019-07-08
Attending: EMERGENCY MEDICINE
Payer: MEDICAID

## 2019-07-08 VITALS
TEMPERATURE: 98.8 F | HEART RATE: 85 BPM | WEIGHT: 293 LBS | SYSTOLIC BLOOD PRESSURE: 212 MMHG | OXYGEN SATURATION: 95 % | HEIGHT: 66 IN | BODY MASS INDEX: 47.09 KG/M2 | DIASTOLIC BLOOD PRESSURE: 100 MMHG | RESPIRATION RATE: 18 BRPM

## 2019-07-08 DIAGNOSIS — I10 HYPERTENSION, UNSPECIFIED TYPE: ICD-10-CM

## 2019-07-08 DIAGNOSIS — L03.032 ABSCESS OR CELLULITIS OF TOE, LEFT: Primary | ICD-10-CM

## 2019-07-08 DIAGNOSIS — L02.612 ABSCESS OR CELLULITIS OF TOE, LEFT: Primary | ICD-10-CM

## 2019-07-08 PROCEDURE — 99283 EMERGENCY DEPT VISIT LOW MDM: CPT

## 2019-07-08 PROCEDURE — 6370000000 HC RX 637 (ALT 250 FOR IP): Performed by: EMERGENCY MEDICINE

## 2019-07-08 PROCEDURE — 10060 I&D ABSCESS SIMPLE/SINGLE: CPT

## 2019-07-08 RX ORDER — CEPHALEXIN 500 MG/1
500 CAPSULE ORAL ONCE
Status: COMPLETED | OUTPATIENT
Start: 2019-07-08 | End: 2019-07-08

## 2019-07-08 RX ORDER — SULFAMETHOXAZOLE AND TRIMETHOPRIM 800; 160 MG/1; MG/1
2 TABLET ORAL 2 TIMES DAILY
Qty: 28 TABLET | Refills: 0 | Status: SHIPPED | OUTPATIENT
Start: 2019-07-08 | End: 2019-07-15

## 2019-07-08 RX ORDER — CEPHALEXIN 500 MG/1
500 CAPSULE ORAL EVERY 6 HOURS
Qty: 28 CAPSULE | Refills: 0 | Status: SHIPPED | OUTPATIENT
Start: 2019-07-08 | End: 2019-07-15

## 2019-07-08 RX ORDER — LIDOCAINE HYDROCHLORIDE 10 MG/ML
INJECTION, SOLUTION INFILTRATION; PERINEURAL
Status: DISCONTINUED
Start: 2019-07-08 | End: 2019-07-09 | Stop reason: HOSPADM

## 2019-07-08 RX ORDER — SULFAMETHOXAZOLE AND TRIMETHOPRIM 800; 160 MG/1; MG/1
2 TABLET ORAL ONCE
Status: COMPLETED | OUTPATIENT
Start: 2019-07-08 | End: 2019-07-08

## 2019-07-08 RX ORDER — CHLORTHALIDONE 25 MG/1
25 TABLET ORAL DAILY
Qty: 14 TABLET | Refills: 0 | Status: SHIPPED | OUTPATIENT
Start: 2019-07-08 | End: 2019-08-12

## 2019-07-08 RX ORDER — FLUCONAZOLE 150 MG/1
150 TABLET ORAL ONCE
Status: COMPLETED | OUTPATIENT
Start: 2019-07-08 | End: 2019-07-08

## 2019-07-08 RX ADMIN — CEPHALEXIN 500 MG: 500 CAPSULE ORAL at 21:56

## 2019-07-08 RX ADMIN — SULFAMETHOXAZOLE AND TRIMETHOPRIM 2 TABLET: 800; 160 TABLET ORAL at 21:56

## 2019-07-08 RX ADMIN — FLUCONAZOLE 150 MG: 150 TABLET ORAL at 21:56

## 2019-07-08 ASSESSMENT — PAIN DESCRIPTION - PAIN TYPE: TYPE: ACUTE PAIN

## 2019-07-08 ASSESSMENT — PAIN SCALES - GENERAL: PAINLEVEL_OUTOF10: 8

## 2019-07-09 NOTE — ED PROVIDER NOTES
discharge, no induration, no streaking up leg, palpable DP and PT pulses, ROM and sensation intact, compartments soft  Skin: warm and dry without rash  Neurologic: GCS 15,  Psych: Normal Affect      ------------------------------ ED COURSE/MEDICAL DECISION MAKING----------------------  Medications   lidocaine 1 % injection (has no administration in time range)   fluconazole (DIFLUCAN) tablet 150 mg (150 mg Oral Given 7/8/19 2156)   sulfamethoxazole-trimethoprim (BACTRIM DS;SEPTRA DS) 800-160 MG per tablet 2 tablet (2 tablets Oral Given 7/8/19 2156)   cephALEXin (KEFLEX) capsule 500 mg (500 mg Oral Given 7/8/19 2156)     Procedures:  PROCEDURE  7/8/19       Time: 9:40pm    INCISION AND DRAINAGE  Risks, benefits and alternatives (for applicable procedures below) described. Performed By: Vitor More MD.    Indication: Abscess  Informed consent obtained: The patient provided verbal consent for this procedure. .  Prep: The skin was cleansed with povidone iodine and draped in a sterile fashion. Anesthetic: The wound area was anesthetized with Lidocaine 1% without epinephrine. Incision: Soft tissue abscess of left great toe was Incised by scalpal and minimal bloody and purulent fluid was drained. A wound culture was not obtained. The wound  was not irrigated and was not packed with iodoform gauze. The wound was then covered with a sterile dressing. Patient tolerated the procedure well. Medical Decision Making/ED COURSE:    Patient is a 59-year-old female presenting for wound check to left great toe. Patient refusing labs and admission. Patient appeared to have paronychia, so drained with minimal bloody and purulent fluid. Given oral antibiotics, bactrim and keflex, due to toe cellulitis extending onto foot. Patient would like to attempt outpatient treatment with po antibiotics. Given podiatry referral. Strict ED return precautions discussed.       ED Course as of Jul 08 2226 Mon Jul 08, 2019 2130 Patient refusing labs. States she cannot be admitted due to work. [JA]   2143 Patient requesting treatment for yeast infection because she states she gets yeast infections when she takes antibiotics. Diflucan ordered. [JA]   2149 Patient hypertensive in the Ed. She is noncompliant with her medications. She does not have any antihypertensives at home. No headaches, dizziness, or neuro deficits. Will write her a prescription. Advised close follow up with her PCP for BP recheck. [BE]   5667 Patient left ED before BP recheck. [JA]      ED Course User Index  [JA] Taty Sumner MD           Counseling: The emergency provider has spoken with the patient and discussed todays results, in addition to providing specific details for the plan of care and counseling regarding the diagnosis and prognosis. Questions are answered at this time and they are agreeable with the plan.      --------------------------------- IMPRESSION AND DISPOSITION ---------------------------------    IMPRESSION  1. Abscess or cellulitis of toe, left    2. Hypertension, unspecified type        DISPOSITION  Disposition: Discharge to home  Patient condition is stable      NOTE: This report was transcribed using voice recognition software.  Every effort was made to ensure accuracy; however, inadvertent computerized transcription errors may be present       Taty Sumner MD  07/08/19 0478

## 2019-08-12 ENCOUNTER — APPOINTMENT (OUTPATIENT)
Dept: GENERAL RADIOLOGY | Age: 44
DRG: 247 | End: 2019-08-12
Payer: MEDICAID

## 2019-08-12 ENCOUNTER — HOSPITAL ENCOUNTER (INPATIENT)
Age: 44
LOS: 3 days | Discharge: HOME | DRG: 247 | End: 2019-08-15
Attending: EMERGENCY MEDICINE | Admitting: FAMILY MEDICINE
Payer: MEDICAID

## 2019-08-12 ENCOUNTER — APPOINTMENT (OUTPATIENT)
Dept: CT IMAGING | Age: 44
DRG: 247 | End: 2019-08-12
Payer: MEDICAID

## 2019-08-12 DIAGNOSIS — K56.609 SMALL BOWEL OBSTRUCTION (HCC): Primary | ICD-10-CM

## 2019-08-12 DIAGNOSIS — R82.71 BACTERIURIA: ICD-10-CM

## 2019-08-12 DIAGNOSIS — R10.84 GENERALIZED ABDOMINAL PAIN: ICD-10-CM

## 2019-08-12 DIAGNOSIS — I10 ACCELERATED HYPERTENSION: ICD-10-CM

## 2019-08-12 LAB
ALBUMIN SERPL-MCNC: 4 G/DL (ref 3.5–5.2)
ALP BLD-CCNC: 99 U/L (ref 35–104)
ALT SERPL-CCNC: 38 U/L (ref 0–32)
AMORPHOUS: ABNORMAL
ANION GAP SERPL CALCULATED.3IONS-SCNC: 12 MMOL/L (ref 7–16)
AST SERPL-CCNC: 30 U/L (ref 0–31)
BACTERIA: ABNORMAL /HPF
BASOPHILS ABSOLUTE: 0.05 E9/L (ref 0–0.2)
BASOPHILS RELATIVE PERCENT: 0.4 % (ref 0–2)
BILIRUB SERPL-MCNC: 0.8 MG/DL (ref 0–1.2)
BILIRUBIN URINE: ABNORMAL
BLOOD, URINE: NEGATIVE
BUN BLDV-MCNC: 12 MG/DL (ref 6–20)
CALCIUM SERPL-MCNC: 8.8 MG/DL (ref 8.6–10.2)
CHLORIDE BLD-SCNC: 98 MMOL/L (ref 98–107)
CHP ED QC CHECK: NORMAL
CLARITY: CLEAR
CO2: 28 MMOL/L (ref 22–29)
COLOR: YELLOW
CREAT SERPL-MCNC: 0.7 MG/DL (ref 0.5–1)
EKG ATRIAL RATE: 84 BPM
EKG P AXIS: 46 DEGREES
EKG P-R INTERVAL: 164 MS
EKG Q-T INTERVAL: 412 MS
EKG QRS DURATION: 84 MS
EKG QTC CALCULATION (BAZETT): 486 MS
EKG R AXIS: -10 DEGREES
EKG T AXIS: 50 DEGREES
EKG VENTRICULAR RATE: 84 BPM
EOSINOPHILS ABSOLUTE: 0.03 E9/L (ref 0.05–0.5)
EOSINOPHILS RELATIVE PERCENT: 0.2 % (ref 0–6)
GFR AFRICAN AMERICAN: >60
GFR NON-AFRICAN AMERICAN: >60 ML/MIN/1.73
GLUCOSE BLD-MCNC: 221 MG/DL
GLUCOSE BLD-MCNC: 221 MG/DL (ref 74–99)
GLUCOSE URINE: 100 MG/DL
HCT VFR BLD CALC: 47.4 % (ref 34–48)
HEMOGLOBIN: 15.8 G/DL (ref 11.5–15.5)
IMMATURE GRANULOCYTES #: 0.1 E9/L
IMMATURE GRANULOCYTES %: 0.8 % (ref 0–5)
KETONES, URINE: NEGATIVE MG/DL
LACTIC ACID: 2.8 MMOL/L (ref 0.5–2.2)
LEUKOCYTE ESTERASE, URINE: NEGATIVE
LIPASE: 13 U/L (ref 13–60)
LYMPHOCYTES ABSOLUTE: 1.36 E9/L (ref 1.5–4)
LYMPHOCYTES RELATIVE PERCENT: 10.9 % (ref 20–42)
MCH RBC QN AUTO: 30.4 PG (ref 26–35)
MCHC RBC AUTO-ENTMCNC: 33.3 % (ref 32–34.5)
MCV RBC AUTO: 91.3 FL (ref 80–99.9)
MONOCYTES ABSOLUTE: 0.45 E9/L (ref 0.1–0.95)
MONOCYTES RELATIVE PERCENT: 3.6 % (ref 2–12)
NEUTROPHILS ABSOLUTE: 10.53 E9/L (ref 1.8–7.3)
NEUTROPHILS RELATIVE PERCENT: 84.1 % (ref 43–80)
NITRITE, URINE: NEGATIVE
PDW BLD-RTO: 13.1 FL (ref 11.5–15)
PH UA: 5.5 (ref 5–9)
PLATELET # BLD: 240 E9/L (ref 130–450)
PMV BLD AUTO: 11.8 FL (ref 7–12)
POTASSIUM REFLEX MAGNESIUM: 4.3 MMOL/L (ref 3.5–5)
PROTEIN UA: 30 MG/DL
RBC # BLD: 5.19 E12/L (ref 3.5–5.5)
RBC UA: ABNORMAL /HPF (ref 0–2)
REASON FOR REJECTION: NORMAL
REJECTED TEST: NORMAL
SODIUM BLD-SCNC: 138 MMOL/L (ref 132–146)
SPECIFIC GRAVITY UA: >=1.03 (ref 1–1.03)
TOTAL PROTEIN: 7.2 G/DL (ref 6.4–8.3)
UROBILINOGEN, URINE: 1 E.U./DL
WBC # BLD: 12.5 E9/L (ref 4.5–11.5)
WBC UA: ABNORMAL /HPF (ref 0–5)

## 2019-08-12 PROCEDURE — 6360000002 HC RX W HCPCS: Performed by: EMERGENCY MEDICINE

## 2019-08-12 PROCEDURE — 6360000004 HC RX CONTRAST MEDICATION: Performed by: RADIOLOGY

## 2019-08-12 PROCEDURE — 80053 COMPREHEN METABOLIC PANEL: CPT

## 2019-08-12 PROCEDURE — 2580000003 HC RX 258: Performed by: STUDENT IN AN ORGANIZED HEALTH CARE EDUCATION/TRAINING PROGRAM

## 2019-08-12 PROCEDURE — 96372 THER/PROPH/DIAG INJ SC/IM: CPT

## 2019-08-12 PROCEDURE — 93005 ELECTROCARDIOGRAM TRACING: CPT | Performed by: EMERGENCY MEDICINE

## 2019-08-12 PROCEDURE — 81001 URINALYSIS AUTO W/SCOPE: CPT

## 2019-08-12 PROCEDURE — 74018 RADEX ABDOMEN 1 VIEW: CPT

## 2019-08-12 PROCEDURE — 74177 CT ABD & PELVIS W/CONTRAST: CPT

## 2019-08-12 PROCEDURE — 6370000000 HC RX 637 (ALT 250 FOR IP): Performed by: STUDENT IN AN ORGANIZED HEALTH CARE EDUCATION/TRAINING PROGRAM

## 2019-08-12 PROCEDURE — 96376 TX/PRO/DX INJ SAME DRUG ADON: CPT

## 2019-08-12 PROCEDURE — 2500000003 HC RX 250 WO HCPCS: Performed by: STUDENT IN AN ORGANIZED HEALTH CARE EDUCATION/TRAINING PROGRAM

## 2019-08-12 PROCEDURE — 99285 EMERGENCY DEPT VISIT HI MDM: CPT

## 2019-08-12 PROCEDURE — 1200000000 HC SEMI PRIVATE

## 2019-08-12 PROCEDURE — 2580000003 HC RX 258: Performed by: EMERGENCY MEDICINE

## 2019-08-12 PROCEDURE — 6360000002 HC RX W HCPCS: Performed by: STUDENT IN AN ORGANIZED HEALTH CARE EDUCATION/TRAINING PROGRAM

## 2019-08-12 PROCEDURE — 93010 ELECTROCARDIOGRAM REPORT: CPT | Performed by: INTERNAL MEDICINE

## 2019-08-12 PROCEDURE — 85025 COMPLETE CBC W/AUTO DIFF WBC: CPT

## 2019-08-12 PROCEDURE — 2580000003 HC RX 258: Performed by: RADIOLOGY

## 2019-08-12 PROCEDURE — 6360000002 HC RX W HCPCS: Performed by: FAMILY MEDICINE

## 2019-08-12 PROCEDURE — 83605 ASSAY OF LACTIC ACID: CPT

## 2019-08-12 PROCEDURE — 96374 THER/PROPH/DIAG INJ IV PUSH: CPT

## 2019-08-12 PROCEDURE — 96375 TX/PRO/DX INJ NEW DRUG ADDON: CPT

## 2019-08-12 PROCEDURE — 83690 ASSAY OF LIPASE: CPT

## 2019-08-12 RX ORDER — LABETALOL HYDROCHLORIDE 5 MG/ML
20 INJECTION, SOLUTION INTRAVENOUS ONCE
Status: COMPLETED | OUTPATIENT
Start: 2019-08-12 | End: 2019-08-12

## 2019-08-12 RX ORDER — MORPHINE SULFATE 4 MG/ML
4 INJECTION, SOLUTION INTRAMUSCULAR; INTRAVENOUS ONCE
Status: COMPLETED | OUTPATIENT
Start: 2019-08-12 | End: 2019-08-12

## 2019-08-12 RX ORDER — MORPHINE SULFATE 2 MG/ML
2 INJECTION, SOLUTION INTRAMUSCULAR; INTRAVENOUS EVERY 4 HOURS PRN
Status: DISCONTINUED | OUTPATIENT
Start: 2019-08-12 | End: 2019-08-15

## 2019-08-12 RX ORDER — LABETALOL HYDROCHLORIDE 5 MG/ML
10 INJECTION, SOLUTION INTRAVENOUS EVERY 4 HOURS PRN
Status: DISCONTINUED | OUTPATIENT
Start: 2019-08-12 | End: 2019-08-15 | Stop reason: HOSPADM

## 2019-08-12 RX ORDER — ONDANSETRON 2 MG/ML
4 INJECTION INTRAMUSCULAR; INTRAVENOUS EVERY 6 HOURS PRN
Status: DISCONTINUED | OUTPATIENT
Start: 2019-08-12 | End: 2019-08-15 | Stop reason: HOSPADM

## 2019-08-12 RX ORDER — IBUPROFEN 800 MG/1
800 TABLET ORAL EVERY 8 HOURS PRN
COMMUNITY
End: 2019-12-23 | Stop reason: ALTCHOICE

## 2019-08-12 RX ORDER — 0.9 % SODIUM CHLORIDE 0.9 %
1000 INTRAVENOUS SOLUTION INTRAVENOUS ONCE
Status: COMPLETED | OUTPATIENT
Start: 2019-08-12 | End: 2019-08-12

## 2019-08-12 RX ORDER — AMLODIPINE BESYLATE 5 MG/1
5 TABLET ORAL DAILY
Status: DISCONTINUED | OUTPATIENT
Start: 2019-08-12 | End: 2019-08-15

## 2019-08-12 RX ORDER — PROMETHAZINE HYDROCHLORIDE 25 MG/ML
12.5 INJECTION, SOLUTION INTRAMUSCULAR; INTRAVENOUS ONCE
Status: COMPLETED | OUTPATIENT
Start: 2019-08-12 | End: 2019-08-12

## 2019-08-12 RX ORDER — SODIUM CHLORIDE 0.9 % (FLUSH) 0.9 %
10 SYRINGE (ML) INJECTION PRN
Status: DISCONTINUED | OUTPATIENT
Start: 2019-08-12 | End: 2019-08-15 | Stop reason: HOSPADM

## 2019-08-12 RX ORDER — ONDANSETRON 2 MG/ML
4 INJECTION INTRAMUSCULAR; INTRAVENOUS ONCE
Status: COMPLETED | OUTPATIENT
Start: 2019-08-12 | End: 2019-08-12

## 2019-08-12 RX ORDER — SODIUM CHLORIDE 9 MG/ML
INJECTION, SOLUTION INTRAVENOUS CONTINUOUS
Status: DISCONTINUED | OUTPATIENT
Start: 2019-08-12 | End: 2019-08-15 | Stop reason: HOSPADM

## 2019-08-12 RX ORDER — LEVOTHYROXINE SODIUM 0.05 MG/1
50 TABLET ORAL DAILY
Status: DISCONTINUED | OUTPATIENT
Start: 2019-08-12 | End: 2019-08-15 | Stop reason: HOSPADM

## 2019-08-12 RX ORDER — ONDANSETRON 2 MG/ML
4 INJECTION INTRAMUSCULAR; INTRAVENOUS EVERY 6 HOURS PRN
Status: CANCELLED | OUTPATIENT
Start: 2019-08-12

## 2019-08-12 RX ORDER — SODIUM CHLORIDE 0.9 % (FLUSH) 0.9 %
SYRINGE (ML) INJECTION
Status: DISPENSED
Start: 2019-08-12 | End: 2019-08-12

## 2019-08-12 RX ORDER — CHLORTHALIDONE 25 MG/1
25 TABLET ORAL DAILY
Status: DISCONTINUED | OUTPATIENT
Start: 2019-08-12 | End: 2019-08-14

## 2019-08-12 RX ORDER — TRIAMCINOLONE ACETONIDE 1 MG/G
CREAM TOPICAL 2 TIMES DAILY
Status: DISCONTINUED | OUTPATIENT
Start: 2019-08-12 | End: 2019-08-15 | Stop reason: HOSPADM

## 2019-08-12 RX ORDER — SODIUM CHLORIDE 0.9 % (FLUSH) 0.9 %
10 SYRINGE (ML) INJECTION EVERY 12 HOURS SCHEDULED
Status: DISCONTINUED | OUTPATIENT
Start: 2019-08-12 | End: 2019-08-15 | Stop reason: HOSPADM

## 2019-08-12 RX ADMIN — ONDANSETRON 4 MG: 2 INJECTION INTRAMUSCULAR; INTRAVENOUS at 07:21

## 2019-08-12 RX ADMIN — HYDROMORPHONE HYDROCHLORIDE 0.5 MG: 1 INJECTION, SOLUTION INTRAMUSCULAR; INTRAVENOUS; SUBCUTANEOUS at 15:11

## 2019-08-12 RX ADMIN — HYDROMORPHONE HYDROCHLORIDE 0.5 MG: 1 INJECTION, SOLUTION INTRAMUSCULAR; INTRAVENOUS; SUBCUTANEOUS at 12:21

## 2019-08-12 RX ADMIN — PROMETHAZINE HYDROCHLORIDE 12.5 MG: 25 INJECTION INTRAMUSCULAR; INTRAVENOUS at 12:21

## 2019-08-12 RX ADMIN — ONDANSETRON 4 MG: 2 INJECTION INTRAMUSCULAR; INTRAVENOUS at 22:25

## 2019-08-12 RX ADMIN — MORPHINE SULFATE 4 MG: 4 INJECTION, SOLUTION INTRAMUSCULAR; INTRAVENOUS at 07:21

## 2019-08-12 RX ADMIN — LABETALOL HYDROCHLORIDE 20 MG: 5 INJECTION INTRAVENOUS at 19:43

## 2019-08-12 RX ADMIN — SODIUM CHLORIDE 1000 ML: 9 INJECTION, SOLUTION INTRAVENOUS at 07:20

## 2019-08-12 RX ADMIN — MORPHINE SULFATE 4 MG: 4 INJECTION, SOLUTION INTRAMUSCULAR; INTRAVENOUS at 09:39

## 2019-08-12 RX ADMIN — IOPAMIDOL 110 ML: 755 INJECTION, SOLUTION INTRAVENOUS at 13:06

## 2019-08-12 RX ADMIN — Medication 10 ML: at 13:06

## 2019-08-12 RX ADMIN — TRIAMCINOLONE ACETONIDE: 1 CREAM TOPICAL at 23:11

## 2019-08-12 RX ADMIN — SODIUM CHLORIDE: 9 INJECTION, SOLUTION INTRAVENOUS at 20:07

## 2019-08-12 RX ADMIN — MORPHINE SULFATE 2 MG: 2 INJECTION, SOLUTION INTRAMUSCULAR; INTRAVENOUS at 20:08

## 2019-08-12 RX ADMIN — Medication 10 ML: at 20:07

## 2019-08-12 ASSESSMENT — PAIN DESCRIPTION - PROGRESSION
CLINICAL_PROGRESSION: GRADUALLY WORSENING
CLINICAL_PROGRESSION: RAPIDLY IMPROVING
CLINICAL_PROGRESSION: GRADUALLY WORSENING

## 2019-08-12 ASSESSMENT — PAIN SCALES - GENERAL
PAINLEVEL_OUTOF10: 0
PAINLEVEL_OUTOF10: 8
PAINLEVEL_OUTOF10: 7
PAINLEVEL_OUTOF10: 8
PAINLEVEL_OUTOF10: 8

## 2019-08-12 ASSESSMENT — PAIN DESCRIPTION - DESCRIPTORS
DESCRIPTORS: ACHING;DISCOMFORT;SHARP
DESCRIPTORS: ACHING;DISCOMFORT;SHARP

## 2019-08-12 ASSESSMENT — ENCOUNTER SYMPTOMS
BACK PAIN: 0
ABDOMINAL PAIN: 1
SHORTNESS OF BREATH: 0
DIARRHEA: 0
SINUS PAIN: 0
NAUSEA: 1
SORE THROAT: 0
COUGH: 0
CHEST TIGHTNESS: 0
VOMITING: 1

## 2019-08-12 ASSESSMENT — PAIN - FUNCTIONAL ASSESSMENT
PAIN_FUNCTIONAL_ASSESSMENT: PREVENTS OR INTERFERES SOME ACTIVE ACTIVITIES AND ADLS
PAIN_FUNCTIONAL_ASSESSMENT: PREVENTS OR INTERFERES SOME ACTIVE ACTIVITIES AND ADLS
PAIN_FUNCTIONAL_ASSESSMENT: ACTIVITIES ARE NOT PREVENTED

## 2019-08-12 ASSESSMENT — PAIN DESCRIPTION - ONSET
ONSET: ON-GOING
ONSET: ON-GOING

## 2019-08-12 ASSESSMENT — PAIN DESCRIPTION - FREQUENCY
FREQUENCY: INTERMITTENT
FREQUENCY: INTERMITTENT

## 2019-08-12 ASSESSMENT — PAIN DESCRIPTION - LOCATION
LOCATION: ABDOMEN
LOCATION: ABDOMEN

## 2019-08-12 ASSESSMENT — PAIN DESCRIPTION - PAIN TYPE
TYPE: ACUTE PAIN
TYPE: ACUTE PAIN

## 2019-08-12 NOTE — ED NOTES
Name: Oscar Turner  : 1975  MRN: [id number]    Date: 2019    Benefits of immediately proceeding with Radiology exam outweigh the risks and therefore the following is being waived:  +hysterectomy  [x] Pregnancy test    [] Protocol for Iodine allergy    [] MRI questionnaire    [] BUN/Creatinine        DO Zoe Rosales DO  19 1210

## 2019-08-12 NOTE — LETTER
[facility]  [address] [place], [place] [id number]  Phone: [phone]             August 15, 2019    Patient: Fawad Hanks   YOB: 1975   Date of Visit: 8/12/2019       To Whom It May Concern:    Gume Hickman was seen and treated in our facility  beginning 8/12/2019 until 8/15/19.        Sincerely,       Divya Lemos RN         Signature:__________________________________

## 2019-08-12 NOTE — ED NOTES
Cleansed for bowel incontinence, complete bed change and gown changed.       Jannet Hirsch RN  08/12/19 3566

## 2019-08-12 NOTE — CONSULTS
Use    Smoking status: Never Smoker    Smokeless tobacco: Never Used   Substance Use Topics    Alcohol use: No     Comment: no alcohol since age 26;  drinks 2-3 glasses Coke/Pepsi daily & 2-3 glasses of iced tea daily     Drug use: Never         PHYSICAL EXAM:    Vitals:    08/12/19 1430   BP: (!) 194/106   Pulse: 88   Resp:    Temp:    SpO2:      GENERAL:  NAD. A&Ox3. Hirsutism  HEAD:  Normocephalic, atraumatic. EYES:   No scleral icterus. LUNGS:  No increased work of breathing. CARDIOVASCULAR: RR  ABDOMEN:  Obese. Softly-distended, mild to moderate diffuse tenderness. No guarding, rigidity, rebound. EXTREMITIES:   MAEx4. Atraumatic. No LE edema. SKIN:  Warm and dry    LABS:    CBC  Recent Labs     08/12/19  0715   WBC 12.5*   HGB 15.8*   HCT 47.4        BMP  Recent Labs     08/12/19  0843      K 4.3   CL 98   CO2 28   BUN 12   CREATININE 0.7   CALCIUM 8.8     Liver Function  Recent Labs     08/12/19  0843   LIPASE 13   BILITOT 0.8   AST 30   ALT 38*   ALKPHOS 99   PROT 7.2   LABALBU 4.0     No results for input(s): LACTATE in the last 72 hours. No results for input(s): INR, PTT in the last 72 hours.     Invalid input(s): PT      ASSESSMENT:  38 y.o. female with partial small bowel obstruction likely 2/2 to adhesive disease    PLAN:    NPO  NG tube  Monitor abdominal exam  IVF  Pain and nausea control    Electronically signed by Kathleen Marin MD on 8/12/19 at 3:26 PM

## 2019-08-12 NOTE — H&P
[address]  History and Physical      CHIEF COMPLAINT:  Abdominal pain and vomiting    History of Present Illness: Sera Rey is 37 yo F with PMHx of HTN, Hypothyroidism, DM2, IBS, morbid obesity, depression, and anxiety who presented to ED c/o of abdominal pain and vomiting. She states that her abdominal cramps started at 4pm yesterday after she had some food. Its localized mostly in mid to LLQ abdomen. Stabbing and sharp pain, 10/10, constant. She mentions that she was up all night vomiting multiple times, first was all her food then it was bile like green vomit. She reports that everyone in her family ate the same food and no one is sick. She has never had any symptoms like this in the past. Feels hot but denies fever, chest pain, SOB, dysuria, dizziness, or diarrhea. Pt reports that she hasn't been taking any of her meds for her medical condition because she had ran out of meds and hasn't had time to go see Dr. Jameson Ramirez because she's been very busy with work. In the ED, she was hypertensive at 197/83 and high 205/106. Labs show lactic acid 2.8, leukocytosis 12.5 BMP wnl, Glucose 221, liver panel wnl ALT slightly elevated 38, CT abdomen showed hepatosplenomegaly with small ascites, SBO with dilated proximal small bowel loop and collapased distal small bowel loop and colon. She received zofran, morphine, promethazine, dilaudid, and IVF.       Past Medical History:   Diagnosis Date    Acute renal injury ([facility] [address] 9/4/2013    Analgesic overuse headache 12/19/2018    Anxiety     Arthritis     Asthma     Depression 3/19/2013    Diabetes mellitus ([facility] [address]     A1C=6.7 on 1/14/14    Fracture of tooth 12/19/2018    GERD (gastroesophageal reflux disease)     Glaucoma     Hypertension     Hypertensive urgency 3/19/2013    Hypothyroidism     Irritable bowel syndrome     Obesity     Obstructive sleep apnea     Pneumonia     Polycystic ovarian syndrome     Postcholecystectomy syndrome 2018    Spinal headache     Suicidal ideation 3/18/2016         Past Surgical History:   Procedure Laterality Date     SECTION      Dr. Keshia Loaiza, [address], [place]      [facility] DENTAL SURGERY  10/06/2011    4 extractions    ECHO COMPLETE  2014         FOOT SURGERY      RECONSTRUCTION LEFT FOOT, Dr. Aquiles Scott, [address]  9/3/13    incisional hernia repair with mesh    HYSTERECTOMY      KNEE CARTILAGE SURGERY      OVARY REMOVAL      left due to polycystic ovary, Dr. Kunal Emmanuel, [address]  3/19/2013    Attempted [facility];RSO, Dr. Luis Gallegos, [address], [address]  [address]       Medications Prior to Admission:    Not in a hospital admission. Allergies:    Percocet [oxycodone-acetaminophen]; Metformin and related; and Tape [adhesive tape]    Social History:    reports that she has never smoked. She has never used smokeless tobacco. She reports that she does not drink alcohol or use drugs. Family History:   family history includes Asthma in her brother, mother, and sister; Cancer in her father and paternal grandfather; Depression in her father, maternal grandmother, and paternal grandmother; Diabetes in her brother and mother; Heart Disease in her father and maternal grandfather; High Blood Pressure in her father, maternal grandmother, mother, and paternal grandmother; Mental Illness in her maternal grandmother; Thyroid Disease in her maternal grandmother and sister. REVIEW OF SYSTEMS:  As above in the HPI, otherwise negative    PHYSICAL EXAM:    Vitals:  BP (!) 184/89   Pulse 89   Temp 97.9 °F (36.6 °C) (Oral)   Resp 18   Ht 5' 6\" (1.676 m)   Wt (!) 350 lb (158.8 kg)   LMP 10/01/2012 (Approximate) Comment:   SpO2 96%   BMI 56.49 kg/m²     General:  Awake, alert, oriented X 3. Morbidly obese No apparent distress. HEENT:  Normocephalic, atraumatic.   Pupils equal, with a small amount of ascites concerning for liver   disease. Small bowel obstruction with  dilated proximal small bowel loops and   collapsed distal small bowel loops and colon. .      Partial visualization of the appendix with slight prominence of the   base of the appendix. There is no significant inflammatory changes. Under proper clinical setting, early acute appendicitis is a   consideration. If clinically indicated, repeat study in 1 or 2 days   may be considered. The above findings were telephoned to the ED physician.       ALERT:  THIS IS AN ABNORMAL REPORT                    ASSESSMENT:      Active Hospital Problems    Diagnosis Date Noted    SBO (small bowel obstruction) ([facility] [address] [K56.609] 08/12/2019       PLAN:  Abdominal pain and vomiting  - pt admitted inpatient for SBO  - Surgery aware and following  - conservative management: NPO, NG tube placed  - Zofran/promethazine prn for nausea/vomit  - monitor vitas  - IVF  - lactic acid 2.8 on admission, repeat tomorrow  - morphine 2 mg q4hr prn for pain    Uncontrolled HTN  - One dose of Labetalol 20 mg ordered while in ED  - added Norvasc 5 mg  - labetalol 10mg with parameters  - monitor vitals      DVT ppx  - Lovenox 40 mg                Please note that over 50 minutes was spent in evaluating the patient, review of records and results, discussion with staff/family, etc.    Zechariah Sawyer MD  5:22 PM  8/12/2019

## 2019-08-12 NOTE — ED PROVIDER NOTES
hernia repair (9/3/13); Hysterectomy; Echo Complete (1/14/2014); and Knee cartilage surgery. Social History:  reports that she has never smoked. She has never used smokeless tobacco. She reports that she does not drink alcohol or use drugs. Family History: family history includes Asthma in her brother, mother, and sister; Cancer in her father and paternal grandfather; Depression in her father, maternal grandmother, and paternal grandmother; Diabetes in her brother and mother; Heart Disease in her father and maternal grandfather; High Blood Pressure in her father, maternal grandmother, mother, and paternal grandmother; Mental Illness in her maternal grandmother; Thyroid Disease in her maternal grandmother and sister. The patients home medications have been reviewed. Allergies: Percocet [oxycodone-acetaminophen];  Metformin and related; and Tape [adhesive tape]    ------------------------------------------------ RESULTS ---------------------------------------------------    LABS:  Results for orders placed or performed during the hospital encounter of 08/12/19   Lactic Acid, Plasma   Result Value Ref Range    Lactic Acid 2.8 (H) 0.5 - 2.2 mmol/L   CBC Auto Differential   Result Value Ref Range    WBC 12.5 (H) 4.5 - 11.5 E9/L    RBC 5.19 3.50 - 5.50 E12/L    Hemoglobin 15.8 (H) 11.5 - 15.5 g/dL    Hematocrit 47.4 34.0 - 48.0 %    MCV 91.3 80.0 - 99.9 fL    MCH 30.4 26.0 - 35.0 pg    MCHC 33.3 32.0 - 34.5 %    RDW 13.1 11.5 - 15.0 fL    Platelets 757 780 - 286 E9/L    MPV 11.8 7.0 - 12.0 fL    Neutrophils % 84.1 (H) 43.0 - 80.0 %    Immature Granulocytes % 0.8 0.0 - 5.0 %    Lymphocytes % 10.9 (L) 20.0 - 42.0 %    Monocytes % 3.6 2.0 - 12.0 %    Eosinophils % 0.2 0.0 - 6.0 %    Basophils % 0.4 0.0 - 2.0 %    Neutrophils # 10.53 (H) 1.80 - 7.30 E9/L    Immature Granulocytes # 0.10 E9/L    Lymphocytes # 1.36 (L) 1.50 - 4.00 E9/L    Monocytes # 0.45 0.10 - 0.95 E9/L    Eosinophils # 0.03 (L) 0.05 - 0.50 E9/L

## 2019-08-13 ENCOUNTER — APPOINTMENT (OUTPATIENT)
Dept: GENERAL RADIOLOGY | Age: 44
DRG: 247 | End: 2019-08-13
Payer: MEDICAID

## 2019-08-13 LAB
ALBUMIN SERPL-MCNC: 3.5 G/DL (ref 3.5–5.2)
ALP BLD-CCNC: 95 U/L (ref 35–104)
ALT SERPL-CCNC: 44 U/L (ref 0–32)
ANION GAP SERPL CALCULATED.3IONS-SCNC: 11 MMOL/L (ref 7–16)
ANION GAP SERPL CALCULATED.3IONS-SCNC: 13 MMOL/L (ref 7–16)
AST SERPL-CCNC: 28 U/L (ref 0–31)
BILIRUB SERPL-MCNC: 0.8 MG/DL (ref 0–1.2)
BILIRUBIN DIRECT: 0.3 MG/DL (ref 0–0.3)
BILIRUBIN, INDIRECT: 0.5 MG/DL (ref 0–1)
BUN BLDV-MCNC: 14 MG/DL (ref 6–20)
BUN BLDV-MCNC: 16 MG/DL (ref 6–20)
CALCIUM SERPL-MCNC: 8.5 MG/DL (ref 8.6–10.2)
CALCIUM SERPL-MCNC: 8.7 MG/DL (ref 8.6–10.2)
CHLORIDE BLD-SCNC: 99 MMOL/L (ref 98–107)
CHLORIDE BLD-SCNC: 99 MMOL/L (ref 98–107)
CO2: 28 MMOL/L (ref 22–29)
CO2: 30 MMOL/L (ref 22–29)
CREAT SERPL-MCNC: 0.8 MG/DL (ref 0.5–1)
CREAT SERPL-MCNC: 0.8 MG/DL (ref 0.5–1)
GFR AFRICAN AMERICAN: >60
GFR AFRICAN AMERICAN: >60
GFR NON-AFRICAN AMERICAN: >60 ML/MIN/1.73
GFR NON-AFRICAN AMERICAN: >60 ML/MIN/1.73
GLUCOSE BLD-MCNC: 171 MG/DL (ref 74–99)
GLUCOSE BLD-MCNC: 244 MG/DL (ref 74–99)
HBA1C MFR BLD: 7.7 % (ref 4–5.6)
HCT VFR BLD CALC: 42.8 % (ref 34–48)
HEMOGLOBIN: 13.8 G/DL (ref 11.5–15.5)
LACTIC ACID: 2.7 MMOL/L (ref 0.5–2.2)
LACTIC ACID: 4.4 MMOL/L (ref 0.5–2.2)
LACTIC ACID: 5.9 MMOL/L (ref 0.5–2.2)
MAGNESIUM: 2.1 MG/DL (ref 1.6–2.6)
MCH RBC QN AUTO: 30.9 PG (ref 26–35)
MCHC RBC AUTO-ENTMCNC: 32.2 % (ref 32–34.5)
MCV RBC AUTO: 95.7 FL (ref 80–99.9)
METER GLUCOSE: 141 MG/DL (ref 74–99)
PDW BLD-RTO: 13.6 FL (ref 11.5–15)
PLATELET # BLD: 210 E9/L (ref 130–450)
PMV BLD AUTO: 11.4 FL (ref 7–12)
POTASSIUM REFLEX MAGNESIUM: 3.7 MMOL/L (ref 3.5–5)
POTASSIUM SERPL-SCNC: 3.6 MMOL/L (ref 3.5–5)
RBC # BLD: 4.47 E12/L (ref 3.5–5.5)
SODIUM BLD-SCNC: 140 MMOL/L (ref 132–146)
SODIUM BLD-SCNC: 140 MMOL/L (ref 132–146)
TOTAL PROTEIN: 6.7 G/DL (ref 6.4–8.3)
WBC # BLD: 10.3 E9/L (ref 4.5–11.5)

## 2019-08-13 PROCEDURE — 6360000002 HC RX W HCPCS: Performed by: FAMILY MEDICINE

## 2019-08-13 PROCEDURE — 6360000002 HC RX W HCPCS: Performed by: STUDENT IN AN ORGANIZED HEALTH CARE EDUCATION/TRAINING PROGRAM

## 2019-08-13 PROCEDURE — 1200000000 HC SEMI PRIVATE

## 2019-08-13 PROCEDURE — 36415 COLL VENOUS BLD VENIPUNCTURE: CPT

## 2019-08-13 PROCEDURE — 2500000003 HC RX 250 WO HCPCS: Performed by: STUDENT IN AN ORGANIZED HEALTH CARE EDUCATION/TRAINING PROGRAM

## 2019-08-13 PROCEDURE — 80048 BASIC METABOLIC PNL TOTAL CA: CPT

## 2019-08-13 PROCEDURE — 2580000003 HC RX 258: Performed by: FAMILY MEDICINE

## 2019-08-13 PROCEDURE — 99254 IP/OBS CNSLTJ NEW/EST MOD 60: CPT | Performed by: SURGERY

## 2019-08-13 PROCEDURE — 83036 HEMOGLOBIN GLYCOSYLATED A1C: CPT

## 2019-08-13 PROCEDURE — 2580000003 HC RX 258: Performed by: STUDENT IN AN ORGANIZED HEALTH CARE EDUCATION/TRAINING PROGRAM

## 2019-08-13 PROCEDURE — 80076 HEPATIC FUNCTION PANEL: CPT

## 2019-08-13 PROCEDURE — 83605 ASSAY OF LACTIC ACID: CPT

## 2019-08-13 PROCEDURE — 82962 GLUCOSE BLOOD TEST: CPT

## 2019-08-13 PROCEDURE — 6360000004 HC RX CONTRAST MEDICATION: Performed by: RADIOLOGY

## 2019-08-13 PROCEDURE — 74250 X-RAY XM SM INT 1CNTRST STD: CPT

## 2019-08-13 PROCEDURE — 85027 COMPLETE CBC AUTOMATED: CPT

## 2019-08-13 PROCEDURE — 80053 COMPREHEN METABOLIC PANEL: CPT

## 2019-08-13 PROCEDURE — 99223 1ST HOSP IP/OBS HIGH 75: CPT | Performed by: FAMILY MEDICINE

## 2019-08-13 PROCEDURE — 83735 ASSAY OF MAGNESIUM: CPT

## 2019-08-13 RX ORDER — NICOTINE POLACRILEX 4 MG
15 LOZENGE BUCCAL PRN
Status: DISCONTINUED | OUTPATIENT
Start: 2019-08-13 | End: 2019-08-15 | Stop reason: HOSPADM

## 2019-08-13 RX ORDER — 0.9 % SODIUM CHLORIDE 0.9 %
500 INTRAVENOUS SOLUTION INTRAVENOUS ONCE
Status: COMPLETED | OUTPATIENT
Start: 2019-08-13 | End: 2019-08-13

## 2019-08-13 RX ORDER — DEXTROSE MONOHYDRATE 25 G/50ML
12.5 INJECTION, SOLUTION INTRAVENOUS PRN
Status: DISCONTINUED | OUTPATIENT
Start: 2019-08-13 | End: 2019-08-15 | Stop reason: HOSPADM

## 2019-08-13 RX ORDER — 0.9 % SODIUM CHLORIDE 0.9 %
1000 INTRAVENOUS SOLUTION INTRAVENOUS ONCE
Status: COMPLETED | OUTPATIENT
Start: 2019-08-13 | End: 2019-08-13

## 2019-08-13 RX ORDER — DEXTROSE MONOHYDRATE 50 MG/ML
100 INJECTION, SOLUTION INTRAVENOUS PRN
Status: DISCONTINUED | OUTPATIENT
Start: 2019-08-13 | End: 2019-08-15 | Stop reason: HOSPADM

## 2019-08-13 RX ADMIN — SODIUM CHLORIDE 500 ML: 9 INJECTION, SOLUTION INTRAVENOUS at 19:23

## 2019-08-13 RX ADMIN — MORPHINE SULFATE 2 MG: 2 INJECTION, SOLUTION INTRAMUSCULAR; INTRAVENOUS at 03:21

## 2019-08-13 RX ADMIN — DIATRIZOATE MEGLUMINE AND DIATRIZOATE SODIUM 240 ML: 660; 100 LIQUID ORAL; RECTAL at 11:00

## 2019-08-13 RX ADMIN — SODIUM CHLORIDE 1000 ML: 9 INJECTION, SOLUTION INTRAVENOUS at 06:49

## 2019-08-13 RX ADMIN — MORPHINE SULFATE 2 MG: 2 INJECTION, SOLUTION INTRAMUSCULAR; INTRAVENOUS at 19:22

## 2019-08-13 RX ADMIN — ONDANSETRON 4 MG: 2 INJECTION INTRAMUSCULAR; INTRAVENOUS at 12:39

## 2019-08-13 RX ADMIN — Medication 10 ML: at 18:24

## 2019-08-13 RX ADMIN — MORPHINE SULFATE 2 MG: 2 INJECTION, SOLUTION INTRAMUSCULAR; INTRAVENOUS at 09:33

## 2019-08-13 RX ADMIN — TRIAMCINOLONE ACETONIDE: 1 CREAM TOPICAL at 20:20

## 2019-08-13 RX ADMIN — LABETALOL HYDROCHLORIDE 10 MG: 5 INJECTION INTRAVENOUS at 09:33

## 2019-08-13 RX ADMIN — MORPHINE SULFATE 2 MG: 2 INJECTION, SOLUTION INTRAMUSCULAR; INTRAVENOUS at 14:58

## 2019-08-13 ASSESSMENT — PAIN SCALES - GENERAL
PAINLEVEL_OUTOF10: 0
PAINLEVEL_OUTOF10: 8
PAINLEVEL_OUTOF10: 7
PAINLEVEL_OUTOF10: 8

## 2019-08-13 ASSESSMENT — PAIN DESCRIPTION - LOCATION
LOCATION: ABDOMEN
LOCATION: ABDOMEN

## 2019-08-13 ASSESSMENT — PAIN DESCRIPTION - ORIENTATION: ORIENTATION: LOWER;MID

## 2019-08-13 ASSESSMENT — PAIN DESCRIPTION - FREQUENCY
FREQUENCY: INTERMITTENT
FREQUENCY: INTERMITTENT

## 2019-08-13 ASSESSMENT — PAIN DESCRIPTION - ONSET
ONSET: GRADUAL
ONSET: ON-GOING

## 2019-08-13 ASSESSMENT — PAIN DESCRIPTION - PAIN TYPE
TYPE: ACUTE PAIN
TYPE: ACUTE PAIN

## 2019-08-13 ASSESSMENT — PAIN DESCRIPTION - PROGRESSION
CLINICAL_PROGRESSION: GRADUALLY WORSENING
CLINICAL_PROGRESSION: GRADUALLY WORSENING

## 2019-08-13 ASSESSMENT — PAIN DESCRIPTION - DESCRIPTORS
DESCRIPTORS: ACHING;DISCOMFORT;SHARP
DESCRIPTORS: ACHING;CRAMPING;DISCOMFORT

## 2019-08-13 NOTE — PROGRESS NOTES
GENERAL SURGERY  DAILY PROGRESS NOTE  8/13/2019    Chief Complaint:  Chief Complaint   Patient presents with    Emesis     Emesis and lower abd pain x 2 days     Abdominal Pain       Subjective:  Feels better. Having bowel function. +Flatus, +BM. Less pain. Objective:  BP (!) 152/90 Comment: manual  Pulse 78   Temp 97.9 °F (36.6 °C) (Axillary)   Resp 16   Ht 5' 6\" (1.676 m)   Wt (!) 350 lb (158.8 kg)   LMP 10/01/2012 (Approximate) Comment: 2013  SpO2 93%   BMI 56.49 kg/m²     GENERAL:  NAD. A&Ox3. LUNGS:  No increased work of breathing. CARDIOVASCULAR: RR  ABDOMEN:  Obese. Soft, non-distended, non-tender. No guarding, rigidity, rebound. NGT w 700cc gastric output. Assessment/Plan:  38 y.o. female with partial SBO likely 2/2 to adhesive disease from prior abdominal surgeries    Now having bowel function, improving  Lactic Acid remains elevated -- IVF Bolus and repeat  Likely SBFT today    Jhony Andrade DO  Resident, PGY-2  8/13/2019  6:42 AM  Attending Physician Statement:  I have examined the patient, reviewed the record, and discussed the case with the surgical team.  I agree with the assessment and plan with the following additions, corrections, and changes. Small bowel series reviewed. Contrast within the colon in slightly over 1 hour. Patient having copious bowel movements. Pain improved. Remove NG tube. Begin liquids and advance as tolerated. Maday Fine DO, FACS    NOTE: This report was transcribed using voice recognition software. Every effort was made to ensure accuracy; however, inadvertent computerized transcription errors may be present.

## 2019-08-13 NOTE — PLAN OF CARE
Problem: Nausea/Vomiting:  Goal: Absence of nausea/vomiting  8/13/2019 0057 by Rohan Marques, RN  Outcome: Met This Shift  8/12/2019 1523 by Helga Paniagua RN  Outcome: Ongoing

## 2019-08-13 NOTE — PROGRESS NOTES
[facility] Quality Flow/Interdisciplinary Rounds Progress Note        Quality Flow Rounds held on August 13, 2019    Disciplines Attending:  Bedside Nurse, ,  and Nursing Unit Leadership    Eliana Doan was admitted on 8/12/2019  6:53 AM    Anticipated Discharge Date:  Expected Discharge Date: 08/15/19    Disposition:    Eric Score:  Eric Scale Score: 20    Readmission Risk              Risk of Unplanned Readmission:        11           Discussed patient goal for the day, patient clinical progression, and barriers to discharge.   The following Goal(s) of the Day/Commitment(s) have been identified:  Discharge [address] [place]  August 13, 2019

## 2019-08-14 LAB
ANION GAP SERPL CALCULATED.3IONS-SCNC: 8 MMOL/L (ref 7–16)
BUN BLDV-MCNC: 9 MG/DL (ref 6–20)
CALCIUM SERPL-MCNC: 8.4 MG/DL (ref 8.6–10.2)
CHLORIDE BLD-SCNC: 101 MMOL/L (ref 98–107)
CO2: 29 MMOL/L (ref 22–29)
CREAT SERPL-MCNC: 0.7 MG/DL (ref 0.5–1)
GFR AFRICAN AMERICAN: >60
GFR NON-AFRICAN AMERICAN: >60 ML/MIN/1.73
GLUCOSE BLD-MCNC: 187 MG/DL (ref 74–99)
HCT VFR BLD CALC: 39.8 % (ref 34–48)
HEMOGLOBIN: 13 G/DL (ref 11.5–15.5)
LACTIC ACID: 1.7 MMOL/L (ref 0.5–2.2)
MCH RBC QN AUTO: 31.3 PG (ref 26–35)
MCHC RBC AUTO-ENTMCNC: 32.7 % (ref 32–34.5)
MCV RBC AUTO: 95.9 FL (ref 80–99.9)
METER GLUCOSE: 144 MG/DL (ref 74–99)
METER GLUCOSE: 146 MG/DL (ref 74–99)
METER GLUCOSE: 147 MG/DL (ref 74–99)
PDW BLD-RTO: 13.5 FL (ref 11.5–15)
PLATELET # BLD: 180 E9/L (ref 130–450)
PMV BLD AUTO: 11.3 FL (ref 7–12)
POTASSIUM SERPL-SCNC: 3.8 MMOL/L (ref 3.5–5)
RBC # BLD: 4.15 E12/L (ref 3.5–5.5)
SODIUM BLD-SCNC: 138 MMOL/L (ref 132–146)
WBC # BLD: 8.5 E9/L (ref 4.5–11.5)

## 2019-08-14 PROCEDURE — 80048 BASIC METABOLIC PNL TOTAL CA: CPT

## 2019-08-14 PROCEDURE — 85027 COMPLETE CBC AUTOMATED: CPT

## 2019-08-14 PROCEDURE — 6370000000 HC RX 637 (ALT 250 FOR IP): Performed by: STUDENT IN AN ORGANIZED HEALTH CARE EDUCATION/TRAINING PROGRAM

## 2019-08-14 PROCEDURE — 99231 SBSQ HOSP IP/OBS SF/LOW 25: CPT | Performed by: FAMILY MEDICINE

## 2019-08-14 PROCEDURE — 83605 ASSAY OF LACTIC ACID: CPT

## 2019-08-14 PROCEDURE — 6360000002 HC RX W HCPCS: Performed by: STUDENT IN AN ORGANIZED HEALTH CARE EDUCATION/TRAINING PROGRAM

## 2019-08-14 PROCEDURE — 99232 SBSQ HOSP IP/OBS MODERATE 35: CPT | Performed by: SURGERY

## 2019-08-14 PROCEDURE — 82962 GLUCOSE BLOOD TEST: CPT

## 2019-08-14 PROCEDURE — 1200000000 HC SEMI PRIVATE

## 2019-08-14 PROCEDURE — 2580000003 HC RX 258: Performed by: STUDENT IN AN ORGANIZED HEALTH CARE EDUCATION/TRAINING PROGRAM

## 2019-08-14 PROCEDURE — 6370000000 HC RX 637 (ALT 250 FOR IP): Performed by: FAMILY MEDICINE

## 2019-08-14 PROCEDURE — 36415 COLL VENOUS BLD VENIPUNCTURE: CPT

## 2019-08-14 PROCEDURE — 6360000002 HC RX W HCPCS: Performed by: FAMILY MEDICINE

## 2019-08-14 RX ORDER — LISINOPRIL 20 MG/1
20 TABLET ORAL DAILY
Status: DISCONTINUED | OUTPATIENT
Start: 2019-08-15 | End: 2019-08-15

## 2019-08-14 RX ORDER — HYDROCHLOROTHIAZIDE 12.5 MG/1
12.5 TABLET ORAL DAILY
Status: DISCONTINUED | OUTPATIENT
Start: 2019-08-15 | End: 2019-08-15

## 2019-08-14 RX ORDER — PROMETHAZINE HYDROCHLORIDE 25 MG/ML
12.5 INJECTION, SOLUTION INTRAMUSCULAR; INTRAVENOUS EVERY 6 HOURS PRN
Status: DISCONTINUED | OUTPATIENT
Start: 2019-08-14 | End: 2019-08-15

## 2019-08-14 RX ORDER — HYDRALAZINE HYDROCHLORIDE 20 MG/ML
10 INJECTION INTRAMUSCULAR; INTRAVENOUS EVERY 6 HOURS PRN
Status: DISCONTINUED | OUTPATIENT
Start: 2019-08-14 | End: 2019-08-15 | Stop reason: HOSPADM

## 2019-08-14 RX ADMIN — INSULIN LISPRO 2 UNITS: 100 INJECTION, SOLUTION INTRAVENOUS; SUBCUTANEOUS at 09:28

## 2019-08-14 RX ADMIN — MORPHINE SULFATE 2 MG: 2 INJECTION, SOLUTION INTRAMUSCULAR; INTRAVENOUS at 12:42

## 2019-08-14 RX ADMIN — CHLORTHALIDONE 25 MG: 25 TABLET ORAL at 09:22

## 2019-08-14 RX ADMIN — PROMETHAZINE HYDROCHLORIDE 12.5 MG: 25 INJECTION INTRAMUSCULAR; INTRAVENOUS at 16:33

## 2019-08-14 RX ADMIN — AMLODIPINE BESYLATE 5 MG: 5 TABLET ORAL at 09:35

## 2019-08-14 RX ADMIN — PROMETHAZINE HYDROCHLORIDE 12.5 MG: 25 INJECTION INTRAMUSCULAR; INTRAVENOUS at 09:22

## 2019-08-14 RX ADMIN — MORPHINE SULFATE 2 MG: 2 INJECTION, SOLUTION INTRAMUSCULAR; INTRAVENOUS at 21:41

## 2019-08-14 RX ADMIN — INSULIN LISPRO 1 UNITS: 100 INJECTION, SOLUTION INTRAVENOUS; SUBCUTANEOUS at 21:46

## 2019-08-14 RX ADMIN — ONDANSETRON 4 MG: 2 INJECTION INTRAMUSCULAR; INTRAVENOUS at 06:12

## 2019-08-14 RX ADMIN — Medication 10 ML: at 09:23

## 2019-08-14 RX ADMIN — LEVOTHYROXINE SODIUM 50 MCG: 50 TABLET ORAL at 09:22

## 2019-08-14 RX ADMIN — TRIAMCINOLONE ACETONIDE: 1 CREAM TOPICAL at 09:22

## 2019-08-14 RX ADMIN — INSULIN LISPRO 2 UNITS: 100 INJECTION, SOLUTION INTRAVENOUS; SUBCUTANEOUS at 16:32

## 2019-08-14 RX ADMIN — TRIAMCINOLONE ACETONIDE: 1 CREAM TOPICAL at 21:55

## 2019-08-14 RX ADMIN — Medication 10 ML: at 21:41

## 2019-08-14 RX ADMIN — INSULIN LISPRO 2 UNITS: 100 INJECTION, SOLUTION INTRAVENOUS; SUBCUTANEOUS at 12:41

## 2019-08-14 RX ADMIN — MORPHINE SULFATE 2 MG: 2 INJECTION, SOLUTION INTRAMUSCULAR; INTRAVENOUS at 06:12

## 2019-08-14 RX ADMIN — ENOXAPARIN SODIUM 40 MG: 40 INJECTION SUBCUTANEOUS at 09:22

## 2019-08-14 ASSESSMENT — PAIN DESCRIPTION - DESCRIPTORS
DESCRIPTORS: ACHING;DISCOMFORT
DESCRIPTORS: ACHING;SORE;DISCOMFORT

## 2019-08-14 ASSESSMENT — PAIN DESCRIPTION - ORIENTATION
ORIENTATION: LEFT;MID
ORIENTATION: MID

## 2019-08-14 ASSESSMENT — PAIN SCALES - GENERAL
PAINLEVEL_OUTOF10: 7
PAINLEVEL_OUTOF10: 7
PAINLEVEL_OUTOF10: 0
PAINLEVEL_OUTOF10: 0
PAINLEVEL_OUTOF10: 7

## 2019-08-14 ASSESSMENT — PAIN DESCRIPTION - ONSET: ONSET: ON-GOING

## 2019-08-14 ASSESSMENT — PAIN DESCRIPTION - FREQUENCY: FREQUENCY: INTERMITTENT

## 2019-08-14 ASSESSMENT — PAIN DESCRIPTION - LOCATION
LOCATION: ABDOMEN
LOCATION: ABDOMEN

## 2019-08-14 ASSESSMENT — PAIN DESCRIPTION - PROGRESSION: CLINICAL_PROGRESSION: GRADUALLY WORSENING

## 2019-08-14 ASSESSMENT — PAIN DESCRIPTION - PAIN TYPE
TYPE: ACUTE PAIN
TYPE: ACUTE PAIN

## 2019-08-14 ASSESSMENT — PAIN - FUNCTIONAL ASSESSMENT
PAIN_FUNCTIONAL_ASSESSMENT: ACTIVITIES ARE NOT PREVENTED
PAIN_FUNCTIONAL_ASSESSMENT: ACTIVITIES ARE NOT PREVENTED

## 2019-08-14 NOTE — PROGRESS NOTES
Non-African American >60 >=60 mL/min/1.73    GFR African American >60     Calcium 8.7 8.6 - 10.2 mg/dL   Lactic Acid, Plasma    Collection Time: 08/13/19  6:20 PM   Result Value Ref Range    Lactic Acid 4.4 (HH) 0.5 - 2.2 mmol/L   POCT Glucose    Collection Time: 08/13/19  8:18 PM   Result Value Ref Range    Meter Glucose 141 (H) 74 - 99 mg/dL   CBC    Collection Time: 08/14/19  6:20 AM   Result Value Ref Range    WBC 8.5 4.5 - 11.5 E9/L    RBC 4.15 3.50 - 5.50 E12/L    Hemoglobin 13.0 11.5 - 15.5 g/dL    Hematocrit 39.8 34.0 - 48.0 %    MCV 95.9 80.0 - 99.9 fL    MCH 31.3 26.0 - 35.0 pg    MCHC 32.7 32.0 - 34.5 %    RDW 13.5 11.5 - 15.0 fL    Platelets 422 253 - 488 E9/L    MPV 11.3 7.0 - 12.0 fL   Basic Metabolic Panel    Collection Time: 08/14/19  6:20 AM   Result Value Ref Range    Sodium 138 132 - 146 mmol/L    Potassium 3.8 3.5 - 5.0 mmol/L    Chloride 101 98 - 107 mmol/L    CO2 29 22 - 29 mmol/L    Anion Gap 8 7 - 16 mmol/L    Glucose 187 (H) 74 - 99 mg/dL    BUN 9 6 - 20 mg/dL    CREATININE 0.7 0.5 - 1.0 mg/dL    GFR Non-African American >60 >=60 mL/min/1.73    GFR African American >60     Calcium 8.4 (L) 8.6 - 10.2 mg/dL   Lactic Acid, Plasma    Collection Time: 08/14/19  6:20 AM   Result Value Ref Range    Lactic Acid 1.7 0.5 - 2.2 mmol/L     FL SMALL BOWEL FOLLOW THROUGH ONLY   Final Result   Unremarkable small bowel follow-through study. No evidence   of small bowel obstruction. The exam has been dictated and signed by Sharon Baker PA-C. Aries Sheets MD, Radiologist, have reviewed the images and   report and concur with these findings. XR ABDOMEN FOR NG/OG/NE TUBE PLACEMENT   Final Result   Enteric catheter tip projecting over the expected region of the   stomach. CT ABDOMEN PELVIS W IV CONTRAST Additional Contrast? None   Final Result    IMPRESSION:   Hepatosplenomegaly with a small amount of ascites concerning for liver   disease.       Small bowel obstruction with dilated proximal small bowel loops and   collapsed distal small bowel loops and colon. .      Partial visualization of the appendix with slight prominence of the   base of the appendix. There is no significant inflammatory changes. Under proper clinical setting, early acute appendicitis is a   consideration. If clinically indicated, repeat study in 1 or 2 days   may be considered. The above findings were telephoned to the ED physician.       ALERT:  THIS IS AN ABNORMAL REPORT                  Assessment:    Active Hospital Problems    Diagnosis Date Noted    Accelerated hypertension [I10]     Generalized abdominal pain [R10.84]     Small bowel obstruction ([address] [K56.609] 08/12/2019       Plan:    Abdominal pain and vomiting  - pt admitted inpatient for SBO  - Zofran/promethazine prn for nausea/vomit  - monitor vitas  - IVF  - lactic acid back to normal 1.7 today  - morphine 2 mg q4hr prn for pain  - General surgery following, SBFT showed no SBO     Uncontrolled HTN  - One dose of Labetalol 20 mg ordered while in ED  - /88  - added Norvasc 5 mg  - resume home med Chlorthalidone 25 mg daily   - labetalol 10mg with parameters  - monitor vitals     Hypothyroidism  - Synthroid 50mc po daily    Depression  - Zoloft 50 mg po daily     DVT ppx  - denied Lovenox 40 mg  - states she is ambulating around in her room    Electronically signed by Teodoro Stahl MD on 8/14/2019 at 7:18 AM

## 2019-08-14 NOTE — PLAN OF CARE
Problem: Pain:  Goal: Pain level will decrease  Description  Pain level will decrease     Pain level will decrease  Outcome: Met This Shift  Goal: Control of acute pain  Description  Control of acute pain  Outcome: Met This Shift     Problem: Falls - Risk of:  Goal: Will remain free from falls  Description  Will remain free from falls  Outcome: Met This Shift  Goal: Absence of physical injury  Description  Absence of physical injury  Outcome: Met This Shift     Problem: Pain:  Goal: Pain level will decrease  Description  Pain level will decrease     Pain level will decrease  Outcome: Met This Shift  Goal: Control of acute pain  Description  Control of acute pain  Outcome: Met This Shift     Problem: Pain:  Goal: Pain level will decrease  Description  Pain level will decrease     Pain level will decrease  Outcome: Met This Shift

## 2019-08-15 VITALS
DIASTOLIC BLOOD PRESSURE: 79 MMHG | WEIGHT: 293 LBS | SYSTOLIC BLOOD PRESSURE: 185 MMHG | RESPIRATION RATE: 16 BRPM | BODY MASS INDEX: 47.09 KG/M2 | OXYGEN SATURATION: 90 % | HEIGHT: 66 IN | HEART RATE: 78 BPM | TEMPERATURE: 98.1 F

## 2019-08-15 PROBLEM — K56.609 SMALL BOWEL OBSTRUCTION (HCC): Status: RESOLVED | Noted: 2019-08-12 | Resolved: 2019-08-15

## 2019-08-15 LAB
ANION GAP SERPL CALCULATED.3IONS-SCNC: 10 MMOL/L (ref 7–16)
BUN BLDV-MCNC: 6 MG/DL (ref 6–20)
CALCIUM SERPL-MCNC: 8.6 MG/DL (ref 8.6–10.2)
CHLORIDE BLD-SCNC: 97 MMOL/L (ref 98–107)
CO2: 31 MMOL/L (ref 22–29)
CREAT SERPL-MCNC: 0.7 MG/DL (ref 0.5–1)
GFR AFRICAN AMERICAN: >60
GFR NON-AFRICAN AMERICAN: >60 ML/MIN/1.73
GLUCOSE BLD-MCNC: 167 MG/DL (ref 74–99)
HCT VFR BLD CALC: 38.8 % (ref 34–48)
HEMOGLOBIN: 12.3 G/DL (ref 11.5–15.5)
MCH RBC QN AUTO: 29.9 PG (ref 26–35)
MCHC RBC AUTO-ENTMCNC: 31.7 % (ref 32–34.5)
MCV RBC AUTO: 94.2 FL (ref 80–99.9)
METER GLUCOSE: 158 MG/DL (ref 74–99)
PDW BLD-RTO: 13.1 FL (ref 11.5–15)
PLATELET # BLD: 177 E9/L (ref 130–450)
PMV BLD AUTO: 11.2 FL (ref 7–12)
POTASSIUM SERPL-SCNC: 3.4 MMOL/L (ref 3.5–5)
RBC # BLD: 4.12 E12/L (ref 3.5–5.5)
SODIUM BLD-SCNC: 138 MMOL/L (ref 132–146)
WBC # BLD: 8.8 E9/L (ref 4.5–11.5)

## 2019-08-15 PROCEDURE — 6360000002 HC RX W HCPCS: Performed by: STUDENT IN AN ORGANIZED HEALTH CARE EDUCATION/TRAINING PROGRAM

## 2019-08-15 PROCEDURE — 6360000002 HC RX W HCPCS: Performed by: FAMILY MEDICINE

## 2019-08-15 PROCEDURE — 2580000003 HC RX 258: Performed by: FAMILY MEDICINE

## 2019-08-15 PROCEDURE — 82962 GLUCOSE BLOOD TEST: CPT

## 2019-08-15 PROCEDURE — 80048 BASIC METABOLIC PNL TOTAL CA: CPT

## 2019-08-15 PROCEDURE — 99232 SBSQ HOSP IP/OBS MODERATE 35: CPT | Performed by: SURGERY

## 2019-08-15 PROCEDURE — 2580000003 HC RX 258: Performed by: STUDENT IN AN ORGANIZED HEALTH CARE EDUCATION/TRAINING PROGRAM

## 2019-08-15 PROCEDURE — 6370000000 HC RX 637 (ALT 250 FOR IP): Performed by: STUDENT IN AN ORGANIZED HEALTH CARE EDUCATION/TRAINING PROGRAM

## 2019-08-15 PROCEDURE — 85027 COMPLETE CBC AUTOMATED: CPT

## 2019-08-15 PROCEDURE — 99232 SBSQ HOSP IP/OBS MODERATE 35: CPT | Performed by: FAMILY MEDICINE

## 2019-08-15 PROCEDURE — 6370000000 HC RX 637 (ALT 250 FOR IP): Performed by: FAMILY MEDICINE

## 2019-08-15 PROCEDURE — 36415 COLL VENOUS BLD VENIPUNCTURE: CPT

## 2019-08-15 RX ORDER — MORPHINE SULFATE 2 MG/ML
2 INJECTION, SOLUTION INTRAMUSCULAR; INTRAVENOUS EVERY 6 HOURS PRN
Status: DISCONTINUED | OUTPATIENT
Start: 2019-08-15 | End: 2019-08-15 | Stop reason: HOSPADM

## 2019-08-15 RX ORDER — POTASSIUM CHLORIDE 7.45 MG/ML
10 INJECTION INTRAVENOUS
Status: DISCONTINUED | OUTPATIENT
Start: 2019-08-15 | End: 2019-08-15

## 2019-08-15 RX ORDER — LISINOPRIL AND HYDROCHLOROTHIAZIDE 25; 20 MG/1; MG/1
1 TABLET ORAL DAILY
Qty: 90 TABLET | Refills: 1 | Status: SHIPPED | OUTPATIENT
Start: 2019-08-15 | End: 2020-07-28 | Stop reason: SDUPTHER

## 2019-08-15 RX ORDER — AMLODIPINE BESYLATE 10 MG/1
10 TABLET ORAL DAILY
Status: DISCONTINUED | OUTPATIENT
Start: 2019-08-16 | End: 2019-08-15 | Stop reason: HOSPADM

## 2019-08-15 RX ORDER — LISINOPRIL 20 MG/1
20 TABLET ORAL DAILY
Status: DISCONTINUED | OUTPATIENT
Start: 2019-08-16 | End: 2019-08-15 | Stop reason: HOSPADM

## 2019-08-15 RX ORDER — HYDROCHLOROTHIAZIDE 25 MG/1
25 TABLET ORAL DAILY
Status: DISCONTINUED | OUTPATIENT
Start: 2019-08-16 | End: 2019-08-15 | Stop reason: HOSPADM

## 2019-08-15 RX ORDER — PROCHLORPERAZINE EDISYLATE 5 MG/ML
10 INJECTION INTRAMUSCULAR; INTRAVENOUS EVERY 6 HOURS PRN
Status: DISCONTINUED | OUTPATIENT
Start: 2019-08-15 | End: 2019-08-15 | Stop reason: HOSPADM

## 2019-08-15 RX ORDER — AMLODIPINE BESYLATE 10 MG/1
10 TABLET ORAL DAILY
Qty: 30 TABLET | Refills: 3 | Status: SHIPPED | OUTPATIENT
Start: 2019-08-16 | End: 2021-03-02 | Stop reason: SDUPTHER

## 2019-08-15 RX ORDER — POTASSIUM CHLORIDE 20 MEQ/1
40 TABLET, EXTENDED RELEASE ORAL ONCE
Status: COMPLETED | OUTPATIENT
Start: 2019-08-15 | End: 2019-08-15

## 2019-08-15 RX ADMIN — SODIUM CHLORIDE 75 ML/HR: 9 INJECTION, SOLUTION INTRAVENOUS at 00:58

## 2019-08-15 RX ADMIN — INSULIN LISPRO 2 UNITS: 100 INJECTION, SOLUTION INTRAVENOUS; SUBCUTANEOUS at 12:15

## 2019-08-15 RX ADMIN — POTASSIUM CHLORIDE 40 MEQ: 20 TABLET, EXTENDED RELEASE ORAL at 14:50

## 2019-08-15 RX ADMIN — PROMETHAZINE HYDROCHLORIDE 12.5 MG: 25 INJECTION INTRAMUSCULAR; INTRAVENOUS at 00:58

## 2019-08-15 RX ADMIN — LEVOTHYROXINE SODIUM 50 MCG: 50 TABLET ORAL at 06:03

## 2019-08-15 RX ADMIN — LISINOPRIL 20 MG: 20 TABLET ORAL at 07:48

## 2019-08-15 RX ADMIN — MORPHINE SULFATE 2 MG: 2 INJECTION, SOLUTION INTRAMUSCULAR; INTRAVENOUS at 07:46

## 2019-08-15 RX ADMIN — INSULIN LISPRO 2 UNITS: 100 INJECTION, SOLUTION INTRAVENOUS; SUBCUTANEOUS at 07:47

## 2019-08-15 RX ADMIN — AMLODIPINE BESYLATE 5 MG: 5 TABLET ORAL at 07:48

## 2019-08-15 RX ADMIN — ONDANSETRON 4 MG: 2 INJECTION INTRAMUSCULAR; INTRAVENOUS at 06:10

## 2019-08-15 RX ADMIN — Medication 10 ML: at 11:40

## 2019-08-15 RX ADMIN — HYDROCHLOROTHIAZIDE 12.5 MG: 12.5 TABLET ORAL at 07:47

## 2019-08-15 RX ADMIN — TRIAMCINOLONE ACETONIDE: 1 CREAM TOPICAL at 07:47

## 2019-08-15 ASSESSMENT — PAIN DESCRIPTION - DESCRIPTORS: DESCRIPTORS: ACHING;DISCOMFORT

## 2019-08-15 ASSESSMENT — PAIN DESCRIPTION - ORIENTATION: ORIENTATION: MID

## 2019-08-15 ASSESSMENT — PAIN DESCRIPTION - ONSET: ONSET: GRADUAL

## 2019-08-15 ASSESSMENT — PAIN DESCRIPTION - PAIN TYPE: TYPE: ACUTE PAIN

## 2019-08-15 ASSESSMENT — PAIN SCALES - GENERAL: PAINLEVEL_OUTOF10: 7

## 2019-08-15 ASSESSMENT — PAIN - FUNCTIONAL ASSESSMENT: PAIN_FUNCTIONAL_ASSESSMENT: PREVENTS OR INTERFERES SOME ACTIVE ACTIVITIES AND ADLS

## 2019-08-15 ASSESSMENT — PAIN DESCRIPTION - FREQUENCY: FREQUENCY: CONTINUOUS

## 2019-08-15 ASSESSMENT — PAIN DESCRIPTION - LOCATION: LOCATION: ABDOMEN

## 2019-08-15 ASSESSMENT — PAIN DESCRIPTION - PROGRESSION: CLINICAL_PROGRESSION: GRADUALLY WORSENING

## 2019-08-15 NOTE — PROGRESS NOTES
CLINICAL PHARMACY NOTE: MEDS TO [address] Select Patient?: yes  Total # of Prescriptions Filled: 2   The following medications were delivered to the patient:  · Amlodipine besylate 10mg  · Lisinopril- hydrochloroth 20-25mg  Total # of Interventions Completed: 4  Time Spent (min): 45    Additional Documentation:

## 2019-08-15 NOTE — PROGRESS NOTES
[facility]  Progress Note    Subjective:    Patient states she feels nauseous this morning. She states she was hungry during midnight and ate some mashed potatoes. Denies any vomiting this morning. Had vomited yesterday morning. None during the day time. Hasn't had any BM since yesterday and is still passing flatus. Denies any chest pain and SOB. Past medical, surgical, family and social history were reviewed, non-contributory, and unchanged unless otherwise stated. Objective:    BP (!) 160/80   Pulse 85   Temp 98.5 °F (36.9 °C) (Oral)   Resp 17   Ht 5' 6\" (1.676 m)   Wt (!) 357 lb 3.2 oz (162 kg)   LMP 10/01/2012 (Approximate) Comment: 2013  SpO2 95%   BMI 57.65 kg/m²     Heart:  RRR, no murmurs, gallops, or rubs. Lungs:  CTA bilaterally, no wheeze, rales or rhonchi  Abd: bowel sounds present, mildly tender diffusely, nondistended, no masses  Extrem:  No clubbing, cyanosis, or edema    Recent Results (from the past 24 hour(s))   POCT Glucose    Collection Time: 08/14/19 12:24 PM   Result Value Ref Range    Meter Glucose 146 (H) 74 - 99 mg/dL   POCT Glucose    Collection Time: 08/14/19  4:25 PM   Result Value Ref Range    Meter Glucose 144 (H) 74 - 99 mg/dL   POCT Glucose    Collection Time: 08/14/19  9:44 PM   Result Value Ref Range    Meter Glucose 147 (H) 74 - 99 mg/dL     FL SMALL BOWEL FOLLOW THROUGH ONLY   Final Result   Unremarkable small bowel follow-through study. No evidence   of small bowel obstruction. The exam has been dictated and signed by Pauline Alegria PA-C. Jun García MD, Radiologist, have reviewed the images and   report and concur with these findings. XR ABDOMEN FOR NG/OG/NE TUBE PLACEMENT   Final Result   Enteric catheter tip projecting over the expected region of the   stomach.             CT ABDOMEN PELVIS W IV CONTRAST Additional Contrast? None   Final Result    IMPRESSION:   Hepatosplenomegaly with a small amount of

## 2019-08-16 ENCOUNTER — TELEPHONE (OUTPATIENT)
Dept: FAMILY MEDICINE CLINIC | Age: 44
End: 2019-08-16

## 2019-08-16 DIAGNOSIS — R11.0 NAUSEA: Primary | ICD-10-CM

## 2019-08-16 RX ORDER — ONDANSETRON 4 MG/1
4 TABLET, FILM COATED ORAL 3 TIMES DAILY PRN
Qty: 30 TABLET | Refills: 0 | Status: SHIPPED | OUTPATIENT
Start: 2019-08-16 | End: 2019-12-23 | Stop reason: ALTCHOICE

## 2019-08-16 NOTE — TELEPHONE ENCOUNTER
Patient called in and stated that she just got out of the hospital for a small bowel obstruction. She stated that she is very nauseous and would like to know if you would be willing to send something in for her?     Patient has an appt on 8-20 to f/u

## 2019-08-19 ENCOUNTER — OFFICE VISIT (OUTPATIENT)
Dept: FAMILY MEDICINE CLINIC | Age: 44
End: 2019-08-19
Payer: MEDICAID

## 2019-08-19 VITALS
HEART RATE: 92 BPM | WEIGHT: 293 LBS | OXYGEN SATURATION: 96 % | BODY MASS INDEX: 47.09 KG/M2 | HEIGHT: 66 IN | DIASTOLIC BLOOD PRESSURE: 88 MMHG | RESPIRATION RATE: 18 BRPM | SYSTOLIC BLOOD PRESSURE: 114 MMHG

## 2019-08-19 DIAGNOSIS — E11.8 TYPE 2 DIABETES MELLITUS WITH COMPLICATION, WITHOUT LONG-TERM CURRENT USE OF INSULIN (HCC): ICD-10-CM

## 2019-08-19 DIAGNOSIS — E03.9 HYPOTHYROIDISM, UNSPECIFIED TYPE: ICD-10-CM

## 2019-08-19 DIAGNOSIS — K56.609 SBO (SMALL BOWEL OBSTRUCTION) (HCC): Primary | ICD-10-CM

## 2019-08-19 DIAGNOSIS — I10 ESSENTIAL HYPERTENSION: ICD-10-CM

## 2019-08-19 PROCEDURE — 1111F DSCHRG MED/CURRENT MED MERGE: CPT | Performed by: FAMILY MEDICINE

## 2019-08-19 PROCEDURE — 99213 OFFICE O/P EST LOW 20 MIN: CPT | Performed by: FAMILY MEDICINE

## 2019-08-19 RX ORDER — BLOOD-GLUCOSE METER
1 EACH MISCELLANEOUS 4 TIMES DAILY
Qty: 1 KIT | Refills: 0 | Status: SHIPPED | OUTPATIENT
Start: 2019-08-19 | End: 2021-03-02 | Stop reason: SDUPTHER

## 2019-08-19 ASSESSMENT — PATIENT HEALTH QUESTIONNAIRE - PHQ9
SUM OF ALL RESPONSES TO PHQ QUESTIONS 1-9: 0
1. LITTLE INTEREST OR PLEASURE IN DOING THINGS: 0
SUM OF ALL RESPONSES TO PHQ QUESTIONS 1-9: 0
SUM OF ALL RESPONSES TO PHQ9 QUESTIONS 1 & 2: 0
2. FEELING DOWN, DEPRESSED OR HOPELESS: 0

## 2019-12-23 ENCOUNTER — HOSPITAL ENCOUNTER (EMERGENCY)
Age: 44
Discharge: HOME | End: 2019-12-23
Attending: EMERGENCY MEDICINE
Payer: MEDICAID

## 2019-12-23 ENCOUNTER — HOSPITAL ENCOUNTER (EMERGENCY)
Age: 44
Discharge: LEFT BEFORE DISCHARGE | End: 2019-12-23
Payer: MEDICAID

## 2019-12-23 VITALS — RESPIRATION RATE: 16 BRPM | OXYGEN SATURATION: 97 % | TEMPERATURE: 97.8 F | HEART RATE: 84 BPM

## 2019-12-23 VITALS
OXYGEN SATURATION: 99 % | HEIGHT: 66 IN | DIASTOLIC BLOOD PRESSURE: 82 MMHG | RESPIRATION RATE: 16 BRPM | TEMPERATURE: 98.3 F | WEIGHT: 293 LBS | SYSTOLIC BLOOD PRESSURE: 146 MMHG | HEART RATE: 88 BPM | BODY MASS INDEX: 47.09 KG/M2

## 2019-12-23 DIAGNOSIS — L03.319 CELLULITIS AND ABSCESS OF TRUNK: Primary | ICD-10-CM

## 2019-12-23 DIAGNOSIS — L02.219 CELLULITIS AND ABSCESS OF TRUNK: Primary | ICD-10-CM

## 2019-12-23 PROCEDURE — 6370000000 HC RX 637 (ALT 250 FOR IP): Performed by: EMERGENCY MEDICINE

## 2019-12-23 PROCEDURE — 99282 EMERGENCY DEPT VISIT SF MDM: CPT

## 2019-12-23 RX ORDER — IBUPROFEN 800 MG/1
800 TABLET ORAL ONCE
Status: COMPLETED | OUTPATIENT
Start: 2019-12-23 | End: 2019-12-23

## 2019-12-23 RX ORDER — IBUPROFEN 800 MG/1
800 TABLET ORAL EVERY 8 HOURS PRN
Qty: 30 TABLET | Refills: 1 | Status: SHIPPED | OUTPATIENT
Start: 2019-12-23 | End: 2021-05-18 | Stop reason: SDUPTHER

## 2019-12-23 RX ORDER — FLUCONAZOLE 150 MG/1
TABLET ORAL
Qty: 4 TABLET | Refills: 0 | Status: SHIPPED | OUTPATIENT
Start: 2019-12-23 | End: 2020-04-21 | Stop reason: SDUPTHER

## 2019-12-23 RX ORDER — SULFAMETHOXAZOLE AND TRIMETHOPRIM 800; 160 MG/1; MG/1
2 TABLET ORAL 2 TIMES DAILY
Qty: 40 TABLET | Refills: 0 | Status: SHIPPED | OUTPATIENT
Start: 2019-12-23 | End: 2019-12-26 | Stop reason: SDDI

## 2019-12-23 RX ORDER — SULFAMETHOXAZOLE AND TRIMETHOPRIM 800; 160 MG/1; MG/1
1 TABLET ORAL ONCE
Status: COMPLETED | OUTPATIENT
Start: 2019-12-23 | End: 2019-12-23

## 2019-12-23 RX ORDER — CEPHALEXIN 500 MG/1
500 CAPSULE ORAL ONCE
Status: COMPLETED | OUTPATIENT
Start: 2019-12-23 | End: 2019-12-23

## 2019-12-23 RX ORDER — CEPHALEXIN 500 MG/1
500 CAPSULE ORAL 4 TIMES DAILY
Qty: 40 CAPSULE | Refills: 0 | Status: SHIPPED | OUTPATIENT
Start: 2019-12-23 | End: 2019-12-26 | Stop reason: SDDI

## 2019-12-23 RX ADMIN — CEPHALEXIN 500 MG: 500 CAPSULE ORAL at 18:59

## 2019-12-23 RX ADMIN — IBUPROFEN 800 MG: 800 TABLET, FILM COATED ORAL at 18:59

## 2019-12-23 RX ADMIN — SULFAMETHOXAZOLE AND TRIMETHOPRIM 1 TABLET: 800; 160 TABLET ORAL at 18:59

## 2019-12-23 ASSESSMENT — PAIN DESCRIPTION - LOCATION: LOCATION: ABDOMEN

## 2019-12-23 ASSESSMENT — PAIN DESCRIPTION - PAIN TYPE: TYPE: ACUTE PAIN

## 2019-12-23 ASSESSMENT — PAIN SCALES - GENERAL: PAINLEVEL_OUTOF10: 6

## 2019-12-23 ASSESSMENT — PAIN DESCRIPTION - ORIENTATION: ORIENTATION: LEFT

## 2019-12-23 ASSESSMENT — PAIN DESCRIPTION - DESCRIPTORS: DESCRIPTORS: CONSTANT

## 2019-12-26 ENCOUNTER — HOSPITAL ENCOUNTER (EMERGENCY)
Age: 44
Discharge: HOME | End: 2019-12-26
Payer: MEDICAID

## 2019-12-26 VITALS
BODY MASS INDEX: 47.09 KG/M2 | OXYGEN SATURATION: 99 % | SYSTOLIC BLOOD PRESSURE: 116 MMHG | HEART RATE: 78 BPM | WEIGHT: 293 LBS | RESPIRATION RATE: 16 BRPM | TEMPERATURE: 97.6 F | HEIGHT: 66 IN | DIASTOLIC BLOOD PRESSURE: 71 MMHG

## 2019-12-26 DIAGNOSIS — L02.219 CELLULITIS AND ABSCESS OF TRUNK: Primary | ICD-10-CM

## 2019-12-26 DIAGNOSIS — L03.319 CELLULITIS AND ABSCESS OF TRUNK: Primary | ICD-10-CM

## 2019-12-26 PROCEDURE — 2500000003 HC RX 250 WO HCPCS: Performed by: PHYSICIAN ASSISTANT

## 2019-12-26 PROCEDURE — 96372 THER/PROPH/DIAG INJ SC/IM: CPT

## 2019-12-26 PROCEDURE — 99282 EMERGENCY DEPT VISIT SF MDM: CPT

## 2019-12-26 RX ORDER — CLINDAMYCIN HYDROCHLORIDE 150 MG/1
450 CAPSULE ORAL 3 TIMES DAILY
Qty: 90 CAPSULE | Refills: 0 | Status: SHIPPED | OUTPATIENT
Start: 2019-12-26 | End: 2019-12-26 | Stop reason: SDUPTHER

## 2019-12-26 RX ORDER — CLINDAMYCIN HYDROCHLORIDE 150 MG/1
450 CAPSULE ORAL 3 TIMES DAILY
Qty: 90 CAPSULE | Refills: 0 | Status: SHIPPED | OUTPATIENT
Start: 2019-12-26 | End: 2020-01-05

## 2019-12-26 RX ORDER — CLINDAMYCIN PHOSPHATE 150 MG/ML
600 INJECTION, SOLUTION INTRAVENOUS ONCE
Status: COMPLETED | OUTPATIENT
Start: 2019-12-26 | End: 2019-12-26

## 2019-12-26 RX ADMIN — CLINDAMYCIN PHOSPHATE 600 MG: 150 INJECTION, SOLUTION INTRAMUSCULAR; INTRAVENOUS at 18:03

## 2019-12-26 ASSESSMENT — PAIN SCALES - GENERAL: PAINLEVEL_OUTOF10: 6

## 2019-12-26 ASSESSMENT — PAIN DESCRIPTION - PAIN TYPE: TYPE: ACUTE PAIN

## 2019-12-26 ASSESSMENT — PAIN DESCRIPTION - LOCATION: LOCATION: ARM;BREAST

## 2019-12-26 ASSESSMENT — PAIN DESCRIPTION - ORIENTATION: ORIENTATION: LEFT

## 2019-12-27 ENCOUNTER — OFFICE VISIT (OUTPATIENT)
Dept: FAMILY MEDICINE CLINIC | Age: 44
End: 2019-12-27
Payer: MEDICAID

## 2019-12-27 VITALS
OXYGEN SATURATION: 98 % | RESPIRATION RATE: 18 BRPM | WEIGHT: 293 LBS | HEIGHT: 66 IN | DIASTOLIC BLOOD PRESSURE: 112 MMHG | TEMPERATURE: 98.6 F | BODY MASS INDEX: 47.09 KG/M2 | HEART RATE: 81 BPM | SYSTOLIC BLOOD PRESSURE: 192 MMHG

## 2019-12-27 DIAGNOSIS — L03.313 CELLULITIS OF CHEST WALL: Primary | ICD-10-CM

## 2019-12-27 DIAGNOSIS — J45.20 MILD INTERMITTENT ASTHMA WITHOUT COMPLICATION: ICD-10-CM

## 2019-12-27 DIAGNOSIS — I10 ESSENTIAL HYPERTENSION: ICD-10-CM

## 2019-12-27 PROCEDURE — 1036F TOBACCO NON-USER: CPT | Performed by: FAMILY MEDICINE

## 2019-12-27 PROCEDURE — G8598 ASA/ANTIPLAT THER USED: HCPCS | Performed by: FAMILY MEDICINE

## 2019-12-27 PROCEDURE — G8484 FLU IMMUNIZE NO ADMIN: HCPCS | Performed by: FAMILY MEDICINE

## 2019-12-27 PROCEDURE — G8417 CALC BMI ABV UP PARAM F/U: HCPCS | Performed by: FAMILY MEDICINE

## 2019-12-27 PROCEDURE — G8427 DOCREV CUR MEDS BY ELIG CLIN: HCPCS | Performed by: FAMILY MEDICINE

## 2019-12-27 PROCEDURE — 99213 OFFICE O/P EST LOW 20 MIN: CPT | Performed by: FAMILY MEDICINE

## 2019-12-27 ASSESSMENT — ENCOUNTER SYMPTOMS
ABDOMINAL PAIN: 0
CONSTIPATION: 0
NAUSEA: 0
PHOTOPHOBIA: 0
VOMITING: 0
COUGH: 0
COLOR CHANGE: 1
DIARRHEA: 0
SHORTNESS OF BREATH: 0

## 2020-04-07 ENCOUNTER — TELEPHONE (OUTPATIENT)
Dept: FAMILY MEDICINE CLINIC | Age: 45
End: 2020-04-07

## 2020-04-07 RX ORDER — ALBUTEROL SULFATE 90 UG/1
2 AEROSOL, METERED RESPIRATORY (INHALATION) EVERY 4 HOURS PRN
Qty: 1 INHALER | Refills: 1 | Status: SHIPPED
Start: 2020-04-07 | End: 2021-06-22

## 2020-04-07 NOTE — TELEPHONE ENCOUNTER
FYI Patient LM stating she has a cough, wheezing, mild dyspnea and general fatigue. Denies any fever. Called patient back to get detailed report of symptoms and phone went to [facility] to call the office back.

## 2020-04-21 ENCOUNTER — E-VISIT (OUTPATIENT)
Dept: FAMILY MEDICINE CLINIC | Age: 45
End: 2020-04-21
Payer: MEDICAID

## 2020-04-21 PROCEDURE — 99421 OL DIG E/M SVC 5-10 MIN: CPT | Performed by: NURSE PRACTITIONER

## 2020-04-21 RX ORDER — FLUCONAZOLE 150 MG/1
150 TABLET ORAL ONCE
Qty: 1 TABLET | Refills: 0 | Status: SHIPPED | OUTPATIENT
Start: 2020-04-21 | End: 2020-04-21

## 2020-05-05 ENCOUNTER — E-VISIT (OUTPATIENT)
Dept: FAMILY MEDICINE CLINIC | Age: 45
End: 2020-05-05
Payer: MEDICAID

## 2020-05-05 PROCEDURE — 99421 OL DIG E/M SVC 5-10 MIN: CPT | Performed by: FAMILY MEDICINE

## 2020-05-05 RX ORDER — FLUCONAZOLE 150 MG/1
150 TABLET ORAL
Qty: 3 TABLET | Refills: 0 | Status: SHIPPED | OUTPATIENT
Start: 2020-05-05 | End: 2021-02-17

## 2020-05-27 ENCOUNTER — E-VISIT (OUTPATIENT)
Dept: INTERNAL MEDICINE CLINIC | Age: 45
End: 2020-05-27
Payer: MEDICAID

## 2020-05-27 PROCEDURE — 99422 OL DIG E/M SVC 11-20 MIN: CPT | Performed by: INTERNAL MEDICINE

## 2020-05-27 RX ORDER — METRONIDAZOLE 7.5 MG/G
1 GEL VAGINAL 2 TIMES DAILY
Qty: 1 TUBE | Refills: 0 | Status: SHIPPED | OUTPATIENT
Start: 2020-05-27 | End: 2020-06-01

## 2020-06-05 ENCOUNTER — TELEPHONE (OUTPATIENT)
Dept: FAMILY MEDICINE CLINIC | Age: 45
End: 2020-06-05

## 2020-06-16 ENCOUNTER — E-VISIT (OUTPATIENT)
Dept: FAMILY MEDICINE CLINIC | Age: 45
End: 2020-06-16

## 2020-06-18 RX ORDER — TRIAMCINOLONE ACETONIDE 5 MG/G
CREAM TOPICAL
Qty: 45 G | Refills: 0 | Status: SHIPPED
Start: 2020-06-18 | End: 2022-04-05

## 2020-07-18 LAB — DIABETIC RETINOPATHY: NORMAL

## 2020-07-27 ENCOUNTER — TELEPHONE (OUTPATIENT)
Dept: FAMILY MEDICINE CLINIC | Age: 45
End: 2020-07-27

## 2020-07-28 RX ORDER — LISINOPRIL AND HYDROCHLOROTHIAZIDE 25; 20 MG/1; MG/1
1 TABLET ORAL DAILY
Qty: 30 TABLET | Refills: 0 | Status: SHIPPED | OUTPATIENT
Start: 2020-07-28 | End: 2021-03-02 | Stop reason: SDUPTHER

## 2020-07-28 NOTE — TELEPHONE ENCOUNTER
Spoke to patient, she gave verbal understanding   appt scheduled for 8/13 - with you - made as a 30 min visit to allow enough time to review everything.

## 2020-09-04 NOTE — PROGRESS NOTES
Post-Discharge Transitional Care Management Services or Hospital Follow Up      Clemente Agustin   YOB: 1975    Date of Office Visit:  8/19/2019  Date of Hospital Admission: 8/12/19  Date of Hospital Discharge: 8/15/19  Readmission Risk Score(high >=14%.  Medium >=10%):Readmission Risk Score: 13      Care management risk score Rising risk (score 2-5) and Complex Care (Scores >=6): 10     Non face to face  following discharge, date last encounter closed (first attempt may have been earlier): *No documented post hospital discharge outreach found in the last 14 days *No documented post hospital discharge outreach found in the last 14 days    Call initiated 2 business days of discharge: *No response recorded in the last 14 days     Patient Active Problem List   Diagnosis    Obesity    Hypothyroidism    Depression with anxiety    Glaucoma    ALO (obstructive sleep apnea)    Type 2 diabetes mellitus with hyperglycemia, without long-term current use of insulin ([facility] [address]    Hematoma following angiography    Asthma    Arteriosclerosis of coronary artery    Atypical nevus    Infection with methicillin-resistant Staphylococcus aureus    Unspecified vitamin D deficiency    Essential hypertension    Accelerated hypertension    Generalized abdominal pain       Allergies   Allergen Reactions    Percocet [Oxycodone-Acetaminophen] Itching    Metformin And Related      GI side effects     Tape [Adhesive Tape] Itching       Medications listed as ordered at the time of discharge from hospital   Eliana Romero   Home Medication Instructions PIERCE:    Printed on:08/19/19 2426   Medication Information                      amLODIPine (NORVASC) 10 MG tablet  Take 1 tablet by mouth daily             Blood Glucose Monitoring Suppl (ONE TOUCH ULTRA 2) w/Device KIT  1 kit by Does not apply route 4 times daily             blood glucose test strips (ASCENSIA AUTODISC VI;ONE TOUCH ULTRA TEST VI) strip  1 each by In Vitro Number Of Specimens Per Diagnosis: 1

## 2020-11-03 PROBLEM — I10 ESSENTIAL HYPERTENSION: Status: RESOLVED | Noted: 2018-12-19 | Resolved: 2020-11-03

## 2021-02-17 ENCOUNTER — APPOINTMENT (OUTPATIENT)
Dept: CT IMAGING | Age: 46
End: 2021-02-17
Payer: MEDICAID

## 2021-02-17 ENCOUNTER — HOSPITAL ENCOUNTER (EMERGENCY)
Age: 46
Discharge: HOME | End: 2021-02-17
Payer: MEDICAID

## 2021-02-17 VITALS
HEIGHT: 66 IN | TEMPERATURE: 97.2 F | BODY MASS INDEX: 47.09 KG/M2 | SYSTOLIC BLOOD PRESSURE: 228 MMHG | HEART RATE: 76 BPM | DIASTOLIC BLOOD PRESSURE: 140 MMHG | OXYGEN SATURATION: 98 % | WEIGHT: 293 LBS | RESPIRATION RATE: 14 BRPM

## 2021-02-17 DIAGNOSIS — K52.9 GASTROENTERITIS: Primary | ICD-10-CM

## 2021-02-17 LAB
ALBUMIN SERPL-MCNC: 3.9 G/DL (ref 3.5–5.2)
ALP BLD-CCNC: 107 U/L (ref 35–104)
ALT SERPL-CCNC: 18 U/L (ref 0–32)
ANION GAP SERPL CALCULATED.3IONS-SCNC: 5 MMOL/L (ref 7–16)
AST SERPL-CCNC: 14 U/L (ref 0–31)
BACTERIA: NORMAL /HPF
BASOPHILS ABSOLUTE: 0.03 E9/L (ref 0–0.2)
BASOPHILS RELATIVE PERCENT: 0.3 % (ref 0–2)
BILIRUB SERPL-MCNC: 0.7 MG/DL (ref 0–1.2)
BILIRUBIN URINE: NEGATIVE
BLOOD, URINE: NEGATIVE
BUN BLDV-MCNC: 11 MG/DL (ref 6–20)
CALCIUM SERPL-MCNC: 8.6 MG/DL (ref 8.6–10.2)
CHLORIDE BLD-SCNC: 99 MMOL/L (ref 98–107)
CHP ED QC CHECK: YES
CLARITY: CLEAR
CO2: 34 MMOL/L (ref 22–29)
COLOR: YELLOW
CREAT SERPL-MCNC: 0.8 MG/DL (ref 0.5–1)
EOSINOPHILS ABSOLUTE: 0.06 E9/L (ref 0.05–0.5)
EOSINOPHILS RELATIVE PERCENT: 0.7 % (ref 0–6)
GFR AFRICAN AMERICAN: >60
GFR NON-AFRICAN AMERICAN: >60 ML/MIN/1.73
GLUCOSE BLD-MCNC: 121 MG/DL
GLUCOSE BLD-MCNC: 142 MG/DL (ref 74–99)
GLUCOSE URINE: NEGATIVE MG/DL
HCT VFR BLD CALC: 43 % (ref 34–48)
HEMOGLOBIN: 14.6 G/DL (ref 11.5–15.5)
IMMATURE GRANULOCYTES #: 0.03 E9/L
IMMATURE GRANULOCYTES %: 0.3 % (ref 0–5)
KETONES, URINE: NEGATIVE MG/DL
LEUKOCYTE ESTERASE, URINE: NEGATIVE
LIPASE: 13 U/L (ref 13–60)
LYMPHOCYTES ABSOLUTE: 2.2 E9/L (ref 1.5–4)
LYMPHOCYTES RELATIVE PERCENT: 25.6 % (ref 20–42)
MCH RBC QN AUTO: 30.5 PG (ref 26–35)
MCHC RBC AUTO-ENTMCNC: 34 % (ref 32–34.5)
MCV RBC AUTO: 90 FL (ref 80–99.9)
METER GLUCOSE: 121 MG/DL (ref 74–99)
MONOCYTES ABSOLUTE: 0.51 E9/L (ref 0.1–0.95)
MONOCYTES RELATIVE PERCENT: 5.9 % (ref 2–12)
NEUTROPHILS ABSOLUTE: 5.76 E9/L (ref 1.8–7.3)
NEUTROPHILS RELATIVE PERCENT: 67.2 % (ref 43–80)
NITRITE, URINE: NEGATIVE
PDW BLD-RTO: 12.7 FL (ref 11.5–15)
PH UA: 6 (ref 5–9)
PLATELET # BLD: 218 E9/L (ref 130–450)
PMV BLD AUTO: 11.4 FL (ref 7–12)
POTASSIUM REFLEX MAGNESIUM: 3.7 MMOL/L (ref 3.5–5)
PROTEIN UA: NEGATIVE MG/DL
RBC # BLD: 4.78 E12/L (ref 3.5–5.5)
RBC UA: NORMAL /HPF (ref 0–2)
SODIUM BLD-SCNC: 138 MMOL/L (ref 132–146)
SPECIFIC GRAVITY UA: 1.02 (ref 1–1.03)
TOTAL PROTEIN: 7 G/DL (ref 6.4–8.3)
UROBILINOGEN, URINE: 0.2 E.U./DL
WBC # BLD: 8.6 E9/L (ref 4.5–11.5)
WBC UA: NORMAL /HPF (ref 0–5)

## 2021-02-17 PROCEDURE — 81001 URINALYSIS AUTO W/SCOPE: CPT

## 2021-02-17 PROCEDURE — 74177 CT ABD & PELVIS W/CONTRAST: CPT

## 2021-02-17 PROCEDURE — 85025 COMPLETE CBC W/AUTO DIFF WBC: CPT

## 2021-02-17 PROCEDURE — 82962 GLUCOSE BLOOD TEST: CPT

## 2021-02-17 PROCEDURE — 2580000003 HC RX 258: Performed by: PHYSICIAN ASSISTANT

## 2021-02-17 PROCEDURE — 83690 ASSAY OF LIPASE: CPT

## 2021-02-17 PROCEDURE — 6360000004 HC RX CONTRAST MEDICATION: Performed by: RADIOLOGY

## 2021-02-17 PROCEDURE — 99284 EMERGENCY DEPT VISIT MOD MDM: CPT

## 2021-02-17 PROCEDURE — 96374 THER/PROPH/DIAG INJ IV PUSH: CPT

## 2021-02-17 PROCEDURE — 87088 URINE BACTERIA CULTURE: CPT

## 2021-02-17 PROCEDURE — 36415 COLL VENOUS BLD VENIPUNCTURE: CPT

## 2021-02-17 PROCEDURE — 6360000002 HC RX W HCPCS: Performed by: PHYSICIAN ASSISTANT

## 2021-02-17 PROCEDURE — 80053 COMPREHEN METABOLIC PANEL: CPT

## 2021-02-17 RX ORDER — ONDANSETRON 4 MG/1
4 TABLET, ORALLY DISINTEGRATING ORAL 3 TIMES DAILY PRN
Qty: 21 TABLET | Refills: 0 | Status: SHIPPED | OUTPATIENT
Start: 2021-02-17

## 2021-02-17 RX ORDER — KETOROLAC TROMETHAMINE 30 MG/ML
30 INJECTION, SOLUTION INTRAMUSCULAR; INTRAVENOUS ONCE
Status: COMPLETED | OUTPATIENT
Start: 2021-02-17 | End: 2021-02-17

## 2021-02-17 RX ORDER — DICYCLOMINE HYDROCHLORIDE 10 MG/1
10 CAPSULE ORAL 4 TIMES DAILY
Qty: 20 CAPSULE | Refills: 0 | Status: SHIPPED | OUTPATIENT
Start: 2021-02-17 | End: 2021-05-18 | Stop reason: ALTCHOICE

## 2021-02-17 RX ORDER — 0.9 % SODIUM CHLORIDE 0.9 %
1000 INTRAVENOUS SOLUTION INTRAVENOUS ONCE
Status: COMPLETED | OUTPATIENT
Start: 2021-02-17 | End: 2021-02-17

## 2021-02-17 RX ADMIN — IOPAMIDOL 75 ML: 755 INJECTION, SOLUTION INTRAVENOUS at 14:34

## 2021-02-17 RX ADMIN — SODIUM CHLORIDE 1000 ML: 9 INJECTION, SOLUTION INTRAVENOUS at 12:33

## 2021-02-17 RX ADMIN — KETOROLAC TROMETHAMINE 30 MG: 30 INJECTION, SOLUTION INTRAMUSCULAR at 12:28

## 2021-02-17 ASSESSMENT — PAIN DESCRIPTION - DESCRIPTORS: DESCRIPTORS: DISCOMFORT;DULL

## 2021-02-17 ASSESSMENT — PAIN DESCRIPTION - PROGRESSION
CLINICAL_PROGRESSION: NOT CHANGED
CLINICAL_PROGRESSION: NOT CHANGED

## 2021-02-17 ASSESSMENT — PAIN DESCRIPTION - LOCATION
LOCATION: ABDOMEN

## 2021-02-17 ASSESSMENT — PAIN DESCRIPTION - FREQUENCY: FREQUENCY: CONTINUOUS

## 2021-02-17 ASSESSMENT — PAIN DESCRIPTION - PAIN TYPE
TYPE: ACUTE PAIN
TYPE: ACUTE PAIN

## 2021-02-17 ASSESSMENT — PAIN SCALES - GENERAL
PAINLEVEL_OUTOF10: 5

## 2021-02-17 NOTE — ED NOTES
Waiting for cmp to be resulted (was sent down to [facility] d/t our analyzer being down).      Claven Schirmer, RAMA  02/17/21 9335

## 2021-02-17 NOTE — ED NOTES
Pt states had bm x2 while here-initial was small and hard, 2nd bm was large amt loose brown in color which pt states is her normal bm-no change in pain after bm.      Fely Garcia, RAMA  02/17/21 4065

## 2021-02-17 NOTE — ED PROVIDER NOTES
[facility]    HPI:  2/17/21, Time: 12:13 PM JOELLEN Kessler is a 40 y.o. female presenting to the ED for abdominal pain, beginning 2 days ago. The complaint has been intermittent, moderate in severity, and worsened by standing, walking. Patient reports 2 days ago while at work, she was walking and had an episode of urinary incontinence. She did not feel an urge to go before she had this. States only one episode on Monday. Yesterday, she had urinary urgency all throughout the day but no episodes of incontinence. Did have mild suprapubic pain at that time. Denies any dysuria. She woke up today however, and her abdominal pain has worsened. She reports that it started on the right side and now has radiated through her entire abdomen. She did have a normal bowel movement yesterday without any difficulty. Urinary symptoms have resolved today. Denies any cervical, thoracic, or lumbar back pain currently or injuries previously. Denies any bowel incontinence. Denies any saddle paresthesias or weakness in lower extremities. No pain, numbness in the lower extremities as well. She reports that she was slightly nauseous this morning however attributes that to a migraine that she also has today. No vomiting or diarrhea. She does state that her  had a stomach illness last week with similar symptoms. She has been afebrile without recent travel. Patient denies all other symptoms at this time.     Review of Systems:   A complete review of systems was performed and pertinent positives and negatives are stated within HPI, all other systems reviewed and are negative.          --------------------------------------------- PAST HISTORY ---------------------------------------------  Past Medical History:  has a past medical history of Acute renal injury ([facility] [address] Analgesic overuse headache, Anxiety, Arthritis, Asthma, Depression, Diabetes mellitus ([facility] [address] Fracture of tooth, GERD (gastroesophageal 1.005 - 1.030    Blood, Urine Negative Negative    pH, UA 6.0 5.0 - 9.0    Protein, UA Negative Negative mg/dL    Urobilinogen, Urine 0.2 <2.0 E.U./dL    Nitrite, Urine Negative Negative    Leukocyte Esterase, Urine Negative Negative    WBC, UA NONE 0 - 5 /HPF    RBC, UA NONE 0 - 2 /HPF    Bacteria, UA NONE SEEN None Seen /HPF   CBC Auto Differential   Result Value Ref Range    WBC 8.6 4.5 - 11.5 E9/L    RBC 4.78 3.50 - 5.50 E12/L    Hemoglobin 14.6 11.5 - 15.5 g/dL    Hematocrit 43.0 34.0 - 48.0 %    MCV 90.0 80.0 - 99.9 fL    MCH 30.5 26.0 - 35.0 pg    MCHC 34.0 32.0 - 34.5 %    RDW 12.7 11.5 - 15.0 fL    Platelets 529 752 - 340 E9/L    MPV 11.4 7.0 - 12.0 fL    Neutrophils % 67.2 43.0 - 80.0 %    Immature Granulocytes % 0.3 0.0 - 5.0 %    Lymphocytes % 25.6 20.0 - 42.0 %    Monocytes % 5.9 2.0 - 12.0 %    Eosinophils % 0.7 0.0 - 6.0 %    Basophils % 0.3 0.0 - 2.0 %    Neutrophils Absolute 5.76 1.80 - 7.30 E9/L    Immature Granulocytes # 0.03 E9/L    Lymphocytes Absolute 2.20 1.50 - 4.00 E9/L    Monocytes Absolute 0.51 0.10 - 0.95 E9/L    Eosinophils Absolute 0.06 0.05 - 0.50 E9/L    Basophils Absolute 0.03 0.00 - 0.20 E9/L   Comprehensive Metabolic Panel w/ Reflex to MG   Result Value Ref Range    Sodium 138 132 - 146 mmol/L    Potassium reflex Magnesium 3.7 3.5 - 5.0 mmol/L    Chloride 99 98 - 107 mmol/L    CO2 34 (H) 22 - 29 mmol/L    Anion Gap 5 (L) 7 - 16 mmol/L    Glucose 142 (H) 74 - 99 mg/dL    BUN 11 6 - 20 mg/dL    CREATININE 0.8 0.5 - 1.0 mg/dL    GFR Non-African American >60 >=60 mL/min/1.73    GFR African American >60     Calcium 8.6 8.6 - 10.2 mg/dL    Total Protein 7.0 6.4 - 8.3 g/dL    Albumin 3.9 3.5 - 5.2 g/dL    Total Bilirubin 0.7 0.0 - 1.2 mg/dL    Alkaline Phosphatase 107 (H) 35 - 104 U/L    ALT 18 0 - 32 U/L    AST 14 0 - 31 U/L   Lipase   Result Value Ref Range    Lipase 13 13 - 60 U/L   POCT glucose   Result Value Ref Range    Glucose 121 mg/dL    QC OK?  yes    POCT Glucose   Result Value has not improved her symptoms yet however she did have a bowel movement which did slightly improve her abdominal pain. We will continue to reassess. [MS]   2021 Reassessed patient. Remained stable. Reports symptom improvement. Abdomen remains soft and nontender. Discussed lab work and imaging results with the patient. Advised to do believe this is likely due to to the stomach virus that her  had last week. Will prescribe pain and nausea medication for symptom management at home. Encouraged good oral hydration and bland foods over the next several days. She is to follow-up with her family doctor and return to the emergency department any new or worsening symptoms. Patient voiced understanding and is agreeable to the above treatment plan. [MS]      ED Course User Index  [MS] Ole Avina          Counseling: The emergency provider has spoken with the patient and discussed todays results, in addition to providing specific details for the plan of care and counseling regarding the diagnosis and prognosis. Questions are answered at this time and they are agreeable with the plan.      --------------------------------- IMPRESSION AND DISPOSITION ---------------------------------    IMPRESSION  1. Gastroenteritis        DISPOSITION  Disposition: Discharge to home  Patient condition is stable      NOTE: This report was transcribed using voice recognition software.  Every effort was made to ensure accuracy; however, inadvertent computerized transcription errors may be present        Ole Avina  02/17/21 [id number]

## 2021-02-19 LAB — URINE CULTURE, ROUTINE: NORMAL

## 2021-03-02 ENCOUNTER — OFFICE VISIT (OUTPATIENT)
Dept: FAMILY MEDICINE CLINIC | Age: 46
End: 2021-03-02
Payer: MEDICAID

## 2021-03-02 VITALS
HEIGHT: 66 IN | HEART RATE: 81 BPM | BODY MASS INDEX: 47.09 KG/M2 | WEIGHT: 293 LBS | DIASTOLIC BLOOD PRESSURE: 103 MMHG | SYSTOLIC BLOOD PRESSURE: 172 MMHG | RESPIRATION RATE: 18 BRPM | OXYGEN SATURATION: 98 % | TEMPERATURE: 97.4 F

## 2021-03-02 DIAGNOSIS — E11.65 TYPE 2 DIABETES MELLITUS WITH HYPERGLYCEMIA, WITHOUT LONG-TERM CURRENT USE OF INSULIN (HCC): Primary | ICD-10-CM

## 2021-03-02 DIAGNOSIS — I10 ESSENTIAL HYPERTENSION: ICD-10-CM

## 2021-03-02 DIAGNOSIS — L40.9 PSORIASIS, UNSPECIFIED: ICD-10-CM

## 2021-03-02 DIAGNOSIS — E03.9 HYPOTHYROIDISM, UNSPECIFIED TYPE: ICD-10-CM

## 2021-03-02 DIAGNOSIS — E11.8 TYPE 2 DIABETES MELLITUS WITH COMPLICATION, WITHOUT LONG-TERM CURRENT USE OF INSULIN (HCC): ICD-10-CM

## 2021-03-02 LAB
CHP ED QC CHECK: NORMAL
GLUCOSE BLD-MCNC: 145 MG/DL
HBA1C MFR BLD: 7 %

## 2021-03-02 PROCEDURE — 1036F TOBACCO NON-USER: CPT | Performed by: STUDENT IN AN ORGANIZED HEALTH CARE EDUCATION/TRAINING PROGRAM

## 2021-03-02 PROCEDURE — 82962 GLUCOSE BLOOD TEST: CPT | Performed by: STUDENT IN AN ORGANIZED HEALTH CARE EDUCATION/TRAINING PROGRAM

## 2021-03-02 PROCEDURE — G8417 CALC BMI ABV UP PARAM F/U: HCPCS | Performed by: STUDENT IN AN ORGANIZED HEALTH CARE EDUCATION/TRAINING PROGRAM

## 2021-03-02 PROCEDURE — 99213 OFFICE O/P EST LOW 20 MIN: CPT | Performed by: STUDENT IN AN ORGANIZED HEALTH CARE EDUCATION/TRAINING PROGRAM

## 2021-03-02 PROCEDURE — G8427 DOCREV CUR MEDS BY ELIG CLIN: HCPCS | Performed by: STUDENT IN AN ORGANIZED HEALTH CARE EDUCATION/TRAINING PROGRAM

## 2021-03-02 PROCEDURE — 2022F DILAT RTA XM EVC RTNOPTHY: CPT | Performed by: STUDENT IN AN ORGANIZED HEALTH CARE EDUCATION/TRAINING PROGRAM

## 2021-03-02 PROCEDURE — G8484 FLU IMMUNIZE NO ADMIN: HCPCS | Performed by: STUDENT IN AN ORGANIZED HEALTH CARE EDUCATION/TRAINING PROGRAM

## 2021-03-02 PROCEDURE — 3051F HG A1C>EQUAL 7.0%<8.0%: CPT | Performed by: STUDENT IN AN ORGANIZED HEALTH CARE EDUCATION/TRAINING PROGRAM

## 2021-03-02 PROCEDURE — 83036 HEMOGLOBIN GLYCOSYLATED A1C: CPT | Performed by: STUDENT IN AN ORGANIZED HEALTH CARE EDUCATION/TRAINING PROGRAM

## 2021-03-02 RX ORDER — WEIGH SCALE
1 MISCELLANEOUS MISCELLANEOUS 2 TIMES DAILY
Qty: 1 EACH | Refills: 0 | Status: SHIPPED | OUTPATIENT
Start: 2021-03-02 | End: 2021-09-28

## 2021-03-02 RX ORDER — INSULIN GLARGINE 100 [IU]/ML
10 INJECTION, SOLUTION SUBCUTANEOUS NIGHTLY
Qty: 5 PEN | Refills: 0 | Status: SHIPPED | OUTPATIENT
Start: 2021-03-02 | End: 2021-05-18 | Stop reason: SDUPTHER

## 2021-03-02 RX ORDER — GLUCOSAMINE HCL/CHONDROITIN SU 500-400 MG
CAPSULE ORAL
Qty: 100 STRIP | Refills: 0 | Status: SHIPPED | OUTPATIENT
Start: 2021-03-02 | End: 2021-05-18 | Stop reason: SDUPTHER

## 2021-03-02 RX ORDER — MOMETASONE FUROATE 1 MG/G
CREAM TOPICAL
Qty: 50 G | Refills: 2 | Status: SHIPPED | OUTPATIENT
Start: 2021-03-02 | End: 2021-05-18 | Stop reason: SDUPTHER

## 2021-03-02 RX ORDER — LISINOPRIL AND HYDROCHLOROTHIAZIDE 25; 20 MG/1; MG/1
1 TABLET ORAL DAILY
Qty: 30 TABLET | Refills: 3 | Status: SHIPPED
Start: 2021-03-02 | End: 2021-05-18

## 2021-03-02 RX ORDER — BLOOD-GLUCOSE METER
1 EACH MISCELLANEOUS 2 TIMES DAILY
Qty: 1 KIT | Refills: 0 | Status: SHIPPED
Start: 2021-03-02 | End: 2021-03-04

## 2021-03-02 RX ORDER — LANCETS 30 GAUGE
1 EACH MISCELLANEOUS DAILY
Qty: 100 EACH | Refills: 5 | Status: SHIPPED | OUTPATIENT
Start: 2021-03-02 | End: 2022-01-14 | Stop reason: SDUPTHER

## 2021-03-02 RX ORDER — AMLODIPINE BESYLATE 10 MG/1
10 TABLET ORAL DAILY
Qty: 30 TABLET | Refills: 3 | Status: SHIPPED
Start: 2021-03-02 | End: 2021-05-18

## 2021-03-02 NOTE — PROGRESS NOTES
S: 40 y.o. female with   Chief Complaint   Patient presents with   [employer] ED Follow-up     2/24 DUE TO GASTROENTERITIS; hasn't taken any medications in over a month       DM, has not been taking insulin  HTN - not taking BP meds. O: VS:  height is 5' 6\" (1.676 m) and weight is 336 lb (152.4 kg) (abnormal). Her temporal temperature is 97.4 °F (36.3 °C). Her blood pressure is 172/103 (abnormal) and her pulse is 81. Her respiration is 18 and oxygen saturation is 98%. BP Readings from Last 3 Encounters:   03/02/21 (!) 172/103   02/17/21 (!) 228/140   12/27/19 (!) 192/112     See resident note      Impression/Plan:   1) HTN -  on the risks of not treating BP - close follow up  2) DM2 - intolerant of metformin will keep close follow up - hopefully with [address] logs        Health Maintenance Due   Topic Date Due    Hepatitis C screen  Never done    HIV screen  Never done    Hepatitis B vaccine (1 of 3 - Risk 3-dose series) Never done    Diabetic foot exam  04/13/2017    Diabetic microalbuminuria test  01/11/2020    Lipid screen  01/11/2020    TSH testing  01/11/2020    Flu vaccine (1) 09/01/2020         Attending Physician Statement  I have discussed the case, including pertinent history and exam findings with the resident. I agree with the documented assessment and plan.       Kathraina Murillo MD

## 2021-03-02 NOTE — PROGRESS NOTES
[facility]  Family Medicine Residency Program  Phone: [phone]  Fax: [phone]    Patient:  eDmi Romero 40 y.o. female                                 Date of Service: 3/2/21                            Chiefcomplaint:   Chief Complaint   Patient presents with   [place] ED Follow-up     2/24 DUE TO GASTROENTERITIS; hasn't taken any medications in over a month         History of Present Illness: The patient is a 40 y.o. female  presented to the clinic : [facility] ED visit FU    -40 y.o. female presented  to the ED on 2/18  for abdominal pain, beginning 2 days ago. The pain was  intermittent, moderate in severity, and worsened by standing, walking.  -she has  had urinary urgency all throughout the day . Denies any dysuria. Urinalysis done in ED was normal  -Renal function and electrolytes were normal, white cell count and hemoglobin was within normal  -CT abdomen and pelvis with IV contrast did not find any obvious abnormality.    -Patient does not have any abdominal pain/urinary symptoms/costovertebral tenderness today    -She is also patient with diabetes and used to take insulin Lantus 10 units but has not had taken insulin for long time. No meds at home.    -Patient is known patient hypertension used to take amlodipine and Prinzide but has not has taken them. She is willing to take it from today, today's blood pressure was 190/100, but she denied headache, palpitation, nausea vomiting, but admits minimum shortness of breath with exertion    -We will be checking A1c and POCT glucose today; random glucose 145 and  A1C 7.0    -She has had psoriatic lesion on dorsum of left hand, used to use Kenalog cream in the past    -Patient denied any chest pain, increased shortness of breath, fever chills, abdominal distention,    Review of Systems:   Review of Systems   Constitutional: Negative. Negative for chills and fever. HENT: Negative. Negative for congestion, sore throat and tinnitus. Eyes: Negative. Negative for pain and redness. Respiratory: Negative. Negative for chest tightness and shortness of breath. Cardiovascular: Negative. Negative for chest pain, palpitations and leg swelling. Gastrointestinal: Negative. Genitourinary: Negative. Negative for dysuria. Musculoskeletal: Negative. Skin: Negative for rash (Dorsum of left hand). Allergic/Immunologic: Negative. Neurological: Negative. Negative for dizziness, syncope and weakness. Hematological: Negative. Psychiatric/Behavioral: Negative. The patient is not nervous/anxious.         Past Medical History:      Diagnosis Date    Acute renal injury ([facility] [address] 2013    Analgesic overuse headache 2018    Anxiety     Arthritis     Asthma     Depression 3/19/2013    Diabetes mellitus ([facility] [address]     A1C=6.7 on 14    Fracture of tooth 2018    GERD (gastroesophageal reflux disease)     Glaucoma     Hypertension     Hypertensive urgency 3/19/2013    Hypothyroidism     Irritable bowel syndrome     Obesity     Obstructive sleep apnea     Pneumonia     Polycystic ovarian syndrome     Postcholecystectomy syndrome 2018    Spinal headache     Suicidal ideation 3/18/2016       Past Surgical History:        Procedure Laterality Date     SECTION      Dr. Aleta Hercules, [address], [place]      [facility]  10/06/2011    4 extractions    ECHO COMPLETE  2014         FOOT SURGERY  2003    RECONSTRUCTION LEFT FOOT, Dr. Francisco Holland, [address]  9/3/13    incisional hernia repair with mesh    HYSTERECTOMY      KNEE CARTILAGE SURGERY      OVARY REMOVAL  2006    left due to polycystic ovary, Dr. Santino Wells, [address]  3/19/2013    Attempted Paola Redder BRYANT;RSO, Dr. Robert Pina, [facility] [address]         Allergies:    Percocet [oxycodone-acetaminophen], Metformin and related, and Tape [adhesive tape]    Social History:   Social History     Socioeconomic History    Marital status:      Spouse name: Not on file    Number of children: 1    Years of education: 12    Highest education level: Not on file   Occupational History    Not on file   Social Needs    Financial resource strain: Not on file    Food insecurity     Worry: Not on file     Inability: Not on file    Transportation needs     Medical: Not on file     Non-medical: Not on file   Tobacco Use    Smoking status: Never Smoker    Smokeless tobacco: Never Used   Substance and Sexual Activity    Alcohol use: No     Comment: no alcohol since age 26;  drinks 2-3 glasses Coke/Pepsi daily & 2-3 glasses of iced tea daily     Drug use: Never    Sexual activity: Not Currently     Partners: Male   Lifestyle    Physical activity     Days per week: Not on file     Minutes per session: Not on file    Stress: Not on file   Relationships    Social connections     Talks on phone: Not on file     Gets together: Not on file     Attends [employer] service: Not on file     Active member of club or organization: Not on file     Attends meetings of clubs or organizations: Not on file     Relationship status: Not on file    Intimate partner violence     Fear of current or ex partner: Not on file     Emotionally abused: Not on file     Physically abused: Not on file     Forced sexual activity: Not on file   Other Topics Concern    Not on file   Social History Narrative    Not on file        Family History:       Problem Relation Age of Onset    Asthma Mother     Diabetes Mother     High Blood Pressure Mother     High Blood Pressure Father     Heart Disease Father     Cancer Father     Depression Father     Diabetes Brother     Asthma Brother     Thyroid Disease Sister     Asthma Sister     Depression Maternal Grandmother     High Blood Pressure Maternal Grandmother     Mental Illness Maternal Grandmother     Thyroid Disease Maternal Grandmother     Heart Disease Maternal Grandfather     Depression Paternal Grandmother     High Blood Pressure Paternal Grandmother     Cancer Paternal Grandfather        BP Readings from Last 3 Encounters:   03/02/21 (!) 172/103   02/17/21 (!) 228/140   12/27/19 (!) 192/112       Physical Exam:    Vitals: BP (!) 172/103   Pulse 81   Temp 97.4 °F (36.3 °C) (Temporal)   Resp 18   Ht 5' 6\" (1.676 m)   Wt (!) 336 lb (152.4 kg)   LMP 10/01/2012 (Approximate) Comment: 2013  SpO2 98%   BMI 54.23 kg/m²   General Appearance: obese , awake, alert, oriented, no acute distress  HEENT: Normocephalic,atraumatic. PERRL, EOM's intact, EAC without erythemaor swelling, no pallor or icterus. Neck: Supple, symmetrical, trachea midline. No JVD. Chest wall/Lung: Clear to auscultation bilaterally,  respirations unlabored. No ronchi/wheezing/rales  Heart[de-identified]  Regular rate and rhythm, S1and S2 normal, no murmur, rub or gallop. Abdomen: Soft, non-tender, bowel sounds normoactive, no masses, no organomegaly  Extremities:  Extremities normal, atraumatic, no cyanosis. non pitting  edema. Skin: Skin color, texture, turgor normal, no rashes or lesions  Musculokeletal: ROM grossly normal in all joints of extremities, no obvious joint swelling. Lymph nodes: no lymph node enlargement appreciated  Neurologic:   Alert&Oriented. Normal gait and coordination  No focal neurological deficits appreaciated         Psychiatric: has a normal mood and affect. Behavior is normal.       Assessment and Plan:         Eliana was seen today for ed follow-up. Diagnoses and all orders for this visit:    Type 2 diabetes mellitus with hyperglycemia, without long-term current use of insulin (HCC)  -     POCT Glucose  -     POCT glycosylated hemoglobin (Hb A1C)  -     LIPID PANEL; Future  -     TSH; Future  -     BASIC METABOLIC PANEL;  Future  -     MICROALBUMIN / CREATININE URINE RATIO; Future    -She is patient with diabetes and used to take insulin Lantus 10 units (ELOCON) 0.1 % cream; Apply topically daily.  -     Blood Pressure Monitoring (BLOOD PRESSURE MONITOR/L CUFF) MISC; 1 Device by Does not apply route 2 times daily  -     blood glucose monitor strips; Test 2 times a day & as needed for symptoms of irregular blood glucose. Dispense sufficient amount for indicated testing frequency plus additional to accommodate PRN testing needs. -     Lancets MISC; 1 each by Does not apply route daily    Medical Decision Making  Number and Complexity of Problems Addressed:   Stable Chronic Illnesses: Type 2 diabetes, essential hypertension, hypothyroid, psoriasis  Level of Problems Addressed:  Low (2+ self-limited or minor problems / 1 stable chronic illness / 1 acute, uncomplicated illness or injury)  Amount and/or Complexity of Data to be Reviewed and Analyzed: Moderate (1 of 3 Categories) Category 1 (Any three of the following, can duplicate except historian) Same as Limited Category 1 plus Assessment Requiring Independent Historian / Category 2 Independent Interpretation of Tests (not separately reported) / Category 3 Discussion of Management or Test Interpretation with External Physician  Risk of Complications and/or Morbidity or Mortality of Patient Management:   Moderate risk of morbidity from additional diagnostic testing or treatment (ex. prescription drug management / minor surgery with risk factors / elective major surgery without risk factors / diagnosis or treatment significantly limited by social determinants of health)  Today's visit has a medical decision making level of Moderate. I encourage further reading and education about your health conditions. Information on many healthconditions is provided by the American Academy of Family [employer]icians: https://familydoctor. org/  Please bring any questions to me at your next visit. Return to Office: Return in about 2 weeks (around 3/16/2021).     Medication List:    Current Outpatient Medications   Medication Sig Dispense Refill    amLODIPine (NORVASC) 10 MG tablet Take 1 tablet by mouth daily 30 tablet 3    lisinopril-hydroCHLOROthiazide (PRINZIDE;ZESTORETIC) 20-25 MG per tablet Take 1 tablet by mouth daily 30 tablet 3    mometasone (ELOCON) 0.1 % cream Apply topically daily. 50 g 2    Blood Pressure Monitoring (BLOOD PRESSURE MONITOR/L CUFF) MISC 1 Device by Does not apply route 2 times daily 1 each 0    blood glucose monitor strips Test 2 times a day & as needed for symptoms of irregular blood glucose. Dispense sufficient amount for indicated testing frequency plus additional to accommodate PRN testing needs. 100 strip 0    Lancets MISC 1 each by Does not apply route daily 100 each 5    insulin glargine (BASAGLAR KWIKPEN) 100 UNIT/ML injection pen Inject 10 Units into the skin nightly 5 pen 0    Blood Glucose Monitoring Suppl (ONE TOUCH ULTRA 2) w/Device KIT USE DAILY AS DIRECTED AS NEEDED  1 kit 0    dicyclomine (BENTYL) 10 MG capsule Take 1 capsule by mouth 4 times daily (Patient not taking: Reported on 3/2/2021) 20 capsule 0    ondansetron (ZOFRAN-ODT) 4 MG disintegrating tablet Take 1 tablet by mouth 3 times daily as needed for Nausea or Vomiting (Patient not taking: Reported on 3/2/2021) 21 tablet 0    triamcinolone (ARISTOCORT) 0.5 % cream APPLY TOPICALLY TO AFFECTED AREAS TWICE DAILY (Patient not taking: Reported on 3/2/2021) 45 g 0    albuterol sulfate HFA (PROVENTIL HFA) 108 (90 Base) MCG/ACT inhaler Inhale 2 puffs into the lungs every 4 hours as needed for Wheezing (Patient not taking: Reported on 3/2/2021) 1 Inhaler 1    mupirocin (BACTROBAN) 2 % ointment Apply topically 3 times daily. (Patient not taking: Reported on 3/2/2021) 30 g 0    ibuprofen (IBU) 800 MG tablet Take 1 tablet by mouth every 8 hours as needed for Pain Take with food.  (Patient not taking: Reported on 3/2/2021) 30 tablet 1    ONE TOUCH LANCETS MISC 1 each by Does not apply route 4 times daily (Patient not taking: Reported on 3/2/2021) 120 each 3    blood glucose test strips (ASCENSIA AUTODISC VI;ONE TOUCH ULTRA TEST VI) strip 1 each by In Vitro route 4 times daily As needed. (Patient not taking: Reported on 3/2/2021) 100 each 3    Insulin Pen Needle 31G X 6 MM MISC 1 each by Does not apply route daily (Patient not taking: Reported on 3/2/2021) 100 each 3     No current facility-administered medications for this visit. Teddy Grider MD       This document may have been prepared at least partiallythrough the use of voice recognition software. Although effort is taken to assure the accuracy of this document, it is possible that grammatical, syntax,  or spelling errors may occur.

## 2021-03-04 DIAGNOSIS — E11.8 TYPE 2 DIABETES MELLITUS WITH COMPLICATION, WITHOUT LONG-TERM CURRENT USE OF INSULIN (HCC): ICD-10-CM

## 2021-03-04 RX ORDER — BLOOD-GLUCOSE METER
EACH MISCELLANEOUS
Qty: 1 KIT | Refills: 0 | Status: SHIPPED | OUTPATIENT
Start: 2021-03-04 | End: 2021-09-28

## 2021-03-04 ASSESSMENT — ENCOUNTER SYMPTOMS
EYE REDNESS: 0
GASTROINTESTINAL NEGATIVE: 1
EYE PAIN: 0
ALLERGIC/IMMUNOLOGIC NEGATIVE: 1
CHEST TIGHTNESS: 0
EYES NEGATIVE: 1
SHORTNESS OF BREATH: 0
RESPIRATORY NEGATIVE: 1
SORE THROAT: 0

## 2021-03-08 DIAGNOSIS — E11.8 TYPE 2 DIABETES MELLITUS WITH COMPLICATION, WITHOUT LONG-TERM CURRENT USE OF INSULIN (HCC): Primary | ICD-10-CM

## 2021-03-08 NOTE — TELEPHONE ENCOUNTER
Last Appointment   3/2/2021  Next Appointment  3/30/2021    Patient needs pen needles for the insulin.   Order pending drs approval.

## 2021-03-28 ENCOUNTER — IMMUNIZATION (OUTPATIENT)
Dept: PRIMARY CARE CLINIC | Age: 46
End: 2021-03-28
Payer: MEDICAID

## 2021-03-28 PROCEDURE — 0001A COVID-19, PFIZER VACCINE 30MCG/0.3ML DOSE: CPT | Performed by: INTERNAL MEDICINE

## 2021-03-28 PROCEDURE — 91300 COVID-19, PFIZER VACCINE 30MCG/0.3ML DOSE: CPT | Performed by: INTERNAL MEDICINE

## 2021-04-29 ENCOUNTER — IMMUNIZATION (OUTPATIENT)
Dept: PRIMARY CARE CLINIC | Age: 46
End: 2021-04-29
Payer: MEDICAID

## 2021-04-29 PROCEDURE — 91300 COVID-19, PFIZER VACCINE 30MCG/0.3ML DOSE: CPT

## 2021-04-29 PROCEDURE — 0002A COVID-19, PFIZER VACCINE 30MCG/0.3ML DOSE: CPT

## 2021-05-18 ENCOUNTER — OFFICE VISIT (OUTPATIENT)
Dept: FAMILY MEDICINE CLINIC | Age: 46
End: 2021-05-18
Payer: MEDICAID

## 2021-05-18 ENCOUNTER — HOSPITAL ENCOUNTER (OUTPATIENT)
Age: 46
Discharge: HOME | End: 2021-05-18
Payer: MEDICAID

## 2021-05-18 VITALS
TEMPERATURE: 98 F | HEIGHT: 66 IN | BODY MASS INDEX: 47.09 KG/M2 | DIASTOLIC BLOOD PRESSURE: 100 MMHG | WEIGHT: 293 LBS | HEART RATE: 89 BPM | OXYGEN SATURATION: 98 % | SYSTOLIC BLOOD PRESSURE: 172 MMHG | RESPIRATION RATE: 16 BRPM

## 2021-05-18 DIAGNOSIS — I10 ESSENTIAL HYPERTENSION: ICD-10-CM

## 2021-05-18 DIAGNOSIS — E11.8 TYPE 2 DIABETES MELLITUS WITH COMPLICATION, WITHOUT LONG-TERM CURRENT USE OF INSULIN (HCC): ICD-10-CM

## 2021-05-18 DIAGNOSIS — E11.65 TYPE 2 DIABETES MELLITUS WITH HYPERGLYCEMIA, WITHOUT LONG-TERM CURRENT USE OF INSULIN (HCC): ICD-10-CM

## 2021-05-18 DIAGNOSIS — E03.9 HYPOTHYROIDISM, UNSPECIFIED TYPE: ICD-10-CM

## 2021-05-18 DIAGNOSIS — Z01.818 PRE-OP EXAMINATION: Primary | ICD-10-CM

## 2021-05-18 DIAGNOSIS — L91.8 SKIN TAG: ICD-10-CM

## 2021-05-18 LAB
ANION GAP SERPL CALCULATED.3IONS-SCNC: 10 MMOL/L (ref 7–16)
BUN BLDV-MCNC: 14 MG/DL (ref 6–20)
CALCIUM SERPL-MCNC: 9 MG/DL (ref 8.6–10.2)
CHLORIDE BLD-SCNC: 99 MMOL/L (ref 98–107)
CHOLESTEROL, TOTAL: 203 MG/DL (ref 0–199)
CO2: 28 MMOL/L (ref 22–29)
CREAT SERPL-MCNC: 0.8 MG/DL (ref 0.5–1)
CREATININE URINE: 106 MG/DL (ref 29–226)
GFR AFRICAN AMERICAN: >60
GFR NON-AFRICAN AMERICAN: >60 ML/MIN/1.73
GLUCOSE BLD-MCNC: 163 MG/DL (ref 74–99)
HDLC SERPL-MCNC: 56 MG/DL
LDL CHOLESTEROL CALCULATED: 126 MG/DL (ref 0–99)
MICROALBUMIN UR-MCNC: <12 MG/L
MICROALBUMIN/CREAT UR-RTO: ABNORMAL (ref 0–30)
POTASSIUM SERPL-SCNC: 4.2 MMOL/L (ref 3.5–5)
SODIUM BLD-SCNC: 137 MMOL/L (ref 132–146)
TRIGL SERPL-MCNC: 104 MG/DL (ref 0–149)
TSH SERPL DL<=0.05 MIU/L-ACNC: 2.86 UIU/ML (ref 0.27–4.2)
VLDLC SERPL CALC-MCNC: 21 MG/DL

## 2021-05-18 PROCEDURE — G8417 CALC BMI ABV UP PARAM F/U: HCPCS | Performed by: STUDENT IN AN ORGANIZED HEALTH CARE EDUCATION/TRAINING PROGRAM

## 2021-05-18 PROCEDURE — 2022F DILAT RTA XM EVC RTNOPTHY: CPT | Performed by: STUDENT IN AN ORGANIZED HEALTH CARE EDUCATION/TRAINING PROGRAM

## 2021-05-18 PROCEDURE — 80061 LIPID PANEL: CPT

## 2021-05-18 PROCEDURE — 82570 ASSAY OF URINE CREATININE: CPT

## 2021-05-18 PROCEDURE — 82044 UR ALBUMIN SEMIQUANTITATIVE: CPT

## 2021-05-18 PROCEDURE — 1036F TOBACCO NON-USER: CPT | Performed by: STUDENT IN AN ORGANIZED HEALTH CARE EDUCATION/TRAINING PROGRAM

## 2021-05-18 PROCEDURE — 80048 BASIC METABOLIC PNL TOTAL CA: CPT

## 2021-05-18 PROCEDURE — 99213 OFFICE O/P EST LOW 20 MIN: CPT | Performed by: STUDENT IN AN ORGANIZED HEALTH CARE EDUCATION/TRAINING PROGRAM

## 2021-05-18 PROCEDURE — 84443 ASSAY THYROID STIM HORMONE: CPT

## 2021-05-18 PROCEDURE — G8427 DOCREV CUR MEDS BY ELIG CLIN: HCPCS | Performed by: STUDENT IN AN ORGANIZED HEALTH CARE EDUCATION/TRAINING PROGRAM

## 2021-05-18 PROCEDURE — 3051F HG A1C>EQUAL 7.0%<8.0%: CPT | Performed by: STUDENT IN AN ORGANIZED HEALTH CARE EDUCATION/TRAINING PROGRAM

## 2021-05-18 PROCEDURE — 36415 COLL VENOUS BLD VENIPUNCTURE: CPT

## 2021-05-18 RX ORDER — MOMETASONE FUROATE 1 MG/G
CREAM TOPICAL
Qty: 50 G | Refills: 3 | Status: SHIPPED | OUTPATIENT
Start: 2021-05-18 | End: 2021-12-07 | Stop reason: SDUPTHER

## 2021-05-18 RX ORDER — LISINOPRIL 40 MG/1
40 TABLET ORAL DAILY
Qty: 90 TABLET | Refills: 1 | Status: SHIPPED | OUTPATIENT
Start: 2021-05-18 | End: 2021-11-01 | Stop reason: SDUPTHER

## 2021-05-18 RX ORDER — HYDROCHLOROTHIAZIDE 25 MG/1
25 TABLET ORAL EVERY MORNING
Qty: 30 TABLET | Refills: 5 | Status: SHIPPED | OUTPATIENT
Start: 2021-05-18 | End: 2021-09-28 | Stop reason: ALTCHOICE

## 2021-05-18 RX ORDER — LISINOPRIL AND HYDROCHLOROTHIAZIDE 20; 12.5 MG/1; MG/1
2 TABLET ORAL DAILY
Qty: 180 TABLET | Refills: 1 | Status: CANCELLED | OUTPATIENT
Start: 2021-05-18

## 2021-05-18 RX ORDER — IBUPROFEN 800 MG/1
800 TABLET ORAL EVERY 8 HOURS PRN
Qty: 30 TABLET | Refills: 1 | Status: SHIPPED
Start: 2021-05-18 | End: 2021-05-26

## 2021-05-18 RX ORDER — FLUCONAZOLE 150 MG/1
150 TABLET ORAL
Qty: 3 TABLET | Refills: 1 | Status: SHIPPED | OUTPATIENT
Start: 2021-05-18 | End: 2022-04-05

## 2021-05-18 RX ORDER — GLUCOSAMINE HCL/CHONDROITIN SU 500-400 MG
CAPSULE ORAL
Qty: 100 STRIP | Refills: 3 | Status: SHIPPED | OUTPATIENT
Start: 2021-05-18 | End: 2021-12-01 | Stop reason: SDUPTHER

## 2021-05-18 RX ORDER — INSULIN GLARGINE 100 [IU]/ML
10 INJECTION, SOLUTION SUBCUTANEOUS NIGHTLY
Qty: 5 PEN | Refills: 3 | Status: SHIPPED | OUTPATIENT
Start: 2021-05-18 | End: 2021-09-28 | Stop reason: ALTCHOICE

## 2021-05-18 RX ORDER — AMLODIPINE BESYLATE 10 MG/1
10 TABLET ORAL DAILY
Qty: 30 TABLET | Refills: 3 | Status: SHIPPED | OUTPATIENT
Start: 2021-05-18 | End: 2021-10-18 | Stop reason: SDUPTHER

## 2021-05-18 ASSESSMENT — ENCOUNTER SYMPTOMS
DIARRHEA: 0
ALLERGIC/IMMUNOLOGIC NEGATIVE: 1
NAUSEA: 0
GASTROINTESTINAL NEGATIVE: 1
EYE REDNESS: 0
EYES NEGATIVE: 1
RESPIRATORY NEGATIVE: 1
CHEST TIGHTNESS: 0
SHORTNESS OF BREATH: 0

## 2021-05-18 NOTE — PROGRESS NOTES
[facility]  Family Medicine Residency Program  Phone: [phone]  Fax: [phone]    Patient:  Roc Yusuf 40 y.o. female                                 Date of Service: 5/18/21                            Chiefcomplaint:   Chief Complaint   Patient presents with   Jes Manual Pre-op Exam     Dental surgery scheduled for 6/5 w/Dr. Mango Tony         History of Present Illness: The patient is a 40 y.o. female  presented to the clinic ; preop evaluation for dental extraction next month, under local anesthesia,  -She has had surgery in the past, no anesthesia complication noted , not on any blood thinner  -She denied any chest pain, shortness of breath, is able to walk  -Denied any leg swelling, No history of any heart issues in the past  -She is known patient with essential hypertension on Prinzide and amlodipine, the blood pressure is elevated most of the time,- today's BP  172/100  -She is asking blood pressure measuring machine  -A1C 7.0 which was  done in march 2021. Review of Systems:   Review of Systems   Constitutional: Negative. Negative for fatigue and fever. HENT: Negative. Negative for congestion. Eyes: Negative. Negative for redness. Respiratory: Negative. Negative for chest tightness and shortness of breath. Cardiovascular: Negative. Gastrointestinal: Negative. Negative for diarrhea and nausea. Endocrine: Negative. Genitourinary: Negative. Negative for dysuria and hematuria. Musculoskeletal: Negative. Skin: Negative for pallor and rash. Allergic/Immunologic: Negative. Neurological: Negative. Negative for dizziness, syncope and light-headedness. Hematological: Negative. Psychiatric/Behavioral: Negative.         Past Medical History:      Diagnosis Date    Acute renal injury ([facility] [address] 9/4/2013    Analgesic overuse headache 12/19/2018    Anxiety     Arthritis     Asthma     Depression 3/19/2013    Diabetes mellitus ([facility] [address]     A1C=6.7 on 14    Fracture of tooth 2018    GERD (gastroesophageal reflux disease)     Glaucoma     Hypertension     Hypertensive urgency 3/19/2013    Hypothyroidism     Irritable bowel syndrome     Obesity     Obstructive sleep apnea     Pneumonia     Polycystic ovarian syndrome     Postcholecystectomy syndrome 2018    Spinal headache     Suicidal ideation 3/18/2016       Past Surgical History:        Procedure Laterality Date     SECTION      Dr. Salvador Champion, [address], LAPAROSCOPIC      [address] SURGERY  10/06/2011    4 extractions    ECHO COMPLETE  2014         FOOT SURGERY      RECONSTRUCTION LEFT FOOT, Dr. Danae Prajapati, [address]  9/3/13    incisional hernia repair with mesh    HYSTERECTOMY      KNEE CARTILAGE SURGERY      OVARY REMOVAL      left due to polycystic ovary, Dr. Hollice Collet, [facility] AND BSO  3/19/2013    Attempted Franci Berry [facility];RSO, Dr. Jackie Saxena, [facility] [address] [facility] [facility] [address]         Allergies:    Percocet [oxycodone-acetaminophen], Metformin and related, and Tape Sasabe Gaetano tape]    Social History:   Social History     Socioeconomic History    Marital status:      Spouse name: Not on file    Number of children: 1    Years of education: 12    Highest education level: Not on file   Occupational History    Not on file   Tobacco Use    Smoking status: Never Smoker    Smokeless tobacco: Never Used   Vaping Use    Vaping Use: Never used   Substance and Sexual Activity    Alcohol use: No     Comment: no alcohol since age 26;  drinks 2-3 glasses Coke/Pepsi daily & 2-3 glasses of iced tea daily     Drug use: Never    Sexual activity: Not Currently     Partners: Male   Other Topics Concern    Not on file   Social History Narrative    Not on file     Social Determinants of Health     Financial Resource Strain:     Difficulty of Paying Living Expenses:    Food Insecurity:     Worried About Running Out of Food in the Last Year:    [address] in the Last Year:    Transportation Needs:     Lack of Transportation (Medical):  Lack of Transportation (Non-Medical):    Physical Activity:     Days of Exercise per Week:     Minutes of Exercise per Session:    Stress:     Feeling of Stress :    Social Connections:     Frequency of Communication with Friends and Family:     Frequency of Social Gatherings with Friends and Family:     Attends L[employer] Services:     Active Member of Clubs or Organizations:     Attends Club or Organization Meetings:     Marital Status:    Intimate Partner Violence:     Fear of Current or Ex-Partner:     Emotionally Abused:     Physically Abused:     Sexually Abused:         Family History:       Problem Relation Age of Onset    Asthma Mother     Diabetes Mother     High Blood Pressure Mother     High Blood Pressure Father     Heart Disease Father     Cancer Father     Depression Father     Diabetes Brother     Asthma Brother     Thyroid Disease Sister     Asthma Sister     Depression Maternal Grandmother     High Blood Pressure Maternal Grandmother     Mental Illness Maternal Grandmother     Thyroid Disease Maternal Grandmother     Heart Disease Maternal Grandfather     Depression Paternal Grandmother     High Blood Pressure Paternal Grandmother     Cancer Paternal Grandfather        BP Readings from Last 3 Encounters:   05/18/21 (!) 172/100   03/02/21 (!) 172/103   02/17/21 (!) 228/140       Physical Exam:    Vitals: BP (!) 172/100 (Site: Left Upper Arm, Position: Sitting, Cuff Size: Large Adult)   Pulse 89   Temp 98 °F (36.7 °C) (Temporal)   Resp 16   Ht 5' 6\" (1.676 m)   Wt (!) 343 lb (155.6 kg)   LMP 10/01/2012 (Approximate) Comment: 2013  SpO2 98%   BMI 55.36 kg/m²   General Appearance: obese  awake, alert, oriented, no acute distress  HEENT: Normocephalic,atraumatic.  PERRL, EOM's intact, EAC without erythemaor swelling, no pallor or icterus. Neck: Supple, symmetrical, trachea midline. No JVD. Chest wall/Lung: Clear to auscultation bilaterally,  respirations unlabored. No ronchi/wheezing/rales  Heart[de-identified]  Regular rate and rhythm, S1and S2 normal, no murmur, rub or gallop. Abdomen: Soft, non-tender, bowel sounds normoactive, no masses, no organomegaly  Extremities:  Extremities normal, atraumatic, no cyanosis. no edema. Skin: Skin color, texture, turgor normal, no rashes or lesions  Musculokeletal: ROM grossly normal in all joints of extremities, no obvious joint swelling. Lymph nodes: no lymph node enlargement appreciated  Neurologic:   Alert&Oriented. Normal gait and coordination  No focal neurological deficits appreaciated         Psychiatric: has a normal mood and affect. Behavior is normal.       Assessment and Plan:     The 10-year ASCVD risk score (Radha Mayfield., et al., 2013) is: 3.8%    Values used to calculate the score:      Age: 40 years      Sex: Female      Is Non- : No      Diabetic: Yes      Tobacco smoker: No      Systolic Blood Pressure: 517 mmHg      Is BP treated: Yes      HDL Cholesterol: 56 mg/dL      Total Cholesterol: 203 mg/dL    Eliana was seen today for pre-op exam.    Diagnoses and all orders for this visit:    -Preop examination  - patient came for pre op clearance for dental extraction  - has had surgery in the past , no complication with anaesthesia  - not on blood thinner  - Recent labs were normal except slightly high lipid panel  - Mets score >4   _ no cardiac issues  - Patient comes under low risk group.  -  -Skin tag  - on right upper back   - advised to schedule for procedure on Tuesday afternoon. Type 2 diabetes mellitus with complication, without long-term current use of insulin (HCC)  - latest A1C 7.0  - continue the same meds.     Essentail HTN  - BP elevated, today's BP  172/100  - we have increased the dose of lisinopril 40 mg with Hydrochlorothiazide 25 mg and amlodipine 10      Pre-Operative Risk assessment using 2014 ACC/AHA guidelines     Emergent procedure No  Active Cardiac Condition No (decompensated HF, Arrhythmia, MI <3 weeks, severe valve disease)  Risk Level of Procedure Low Risk (endoscopy, superficial skin, breast, ambulatory, or cataract, etc.)  Revised Cardiac Risk Index Risk factors: None  Measurement of Exercise Tolerance before Surgery >4 Yes    According to the 2014 ACC/AHA pre-operative risk assessment guidelines Eliana Webster is a low risk for major cardiac complications during a low risk procedure and may continue as planned. Specific medication recommendations are listed below. Medications recommended to continue should be taken with a sip of water even when NPO. Further recommendations from consultants: None    Medication Recommendations:  As usual     Medical Decision Making  Number and Complexity of Problems Addressed:   Self-Limited or Minor Problems: Preop evaluation for dental procedure, type 2 diabetes, skin tag and Acute Uncomplicated Illnesses or Injuries: Preop evaluation  Level of Problems Addressed: Moderate (1+ chronic illness with exacerbation, progression, or side effects of treatment / 2+ stable chronic illnesses / 1 undiagnosed new problem with uncertain prognosis / 1 acute illness with systemic symptoms / 1 acute complicated injury)  Amount and/or Complexity of Data to be Reviewed and Analyzed:     Moderate (1 of 3 Categories) Category 1 (Any three of the following, can duplicate except historian) Same as Limited Category 1 plus Assessment Requiring Independent Historian / Category 2 Independent Interpretation of Tests (not separately reported) / Category 3 Discussion of Management or Test Interpretation with External Physician  Risk of Complications and/or Morbidity or Mortality of Patient Management:   Moderate risk of morbidity from additional diagnostic testing or treatment (ex. prescription drug management / minor surgery with risk factors / elective major surgery without risk factors / diagnosis or treatment significantly limited by social determinants of health)  Today's visit has a medical decision making level of Moderate. I encourage further reading and education about your health conditions. Information on many healthconditions is provided by the American Academy of Family Physicians: https://familydoctor. org/  Please bring any questions to me at your next visit. Return to Office: No follow-ups on file. Medication List:    Current Outpatient Medications   Medication Sig Dispense Refill    Insulin Pen Needle 32G X 6 MM MISC 1 each by Does not apply route nightly 100 each 0    Blood Glucose Monitoring Suppl (ONE TOUCH ULTRA 2) w/Device KIT USE DAILY AS DIRECTED AS NEEDED  1 kit 0    amLODIPine (NORVASC) 10 MG tablet Take 1 tablet by mouth daily 30 tablet 3    lisinopril-hydroCHLOROthiazide (PRINZIDE;ZESTORETIC) 20-25 MG per tablet Take 1 tablet by mouth daily 30 tablet 3    mometasone (ELOCON) 0.1 % cream Apply topically daily. 50 g 2    Blood Pressure Monitoring (BLOOD PRESSURE MONITOR/L CUFF) MISC 1 Device by Does not apply route 2 times daily 1 each 0    blood glucose monitor strips Test 2 times a day & as needed for symptoms of irregular blood glucose. Dispense sufficient amount for indicated testing frequency plus additional to accommodate PRN testing needs. 100 strip 0    Lancets MISC 1 each by Does not apply route daily 100 each 5    insulin glargine (BASAGLAR KWIKPEN) 100 UNIT/ML injection pen Inject 10 Units into the skin nightly 5 pen 0    ondansetron (ZOFRAN-ODT) 4 MG disintegrating tablet Take 1 tablet by mouth 3 times daily as needed for Nausea or Vomiting 21 tablet 0    triamcinolone (ARISTOCORT) 0.5 % cream APPLY TOPICALLY TO AFFECTED AREAS TWICE DAILY 45 g 0    mupirocin (BACTROBAN) 2 % ointment Apply topically 3 times daily.  30 g 0    ibuprofen (IBU) 800 MG tablet Take 1 tablet by mouth every 8 hours as needed for Pain Take with food. 30 tablet 1    albuterol sulfate HFA (PROVENTIL HFA) 108 (90 Base) MCG/ACT inhaler Inhale 2 puffs into the lungs every 4 hours as needed for Wheezing (Patient not taking: Reported on 3/2/2021) 1 Inhaler 1     No current facility-administered medications for this visit. Lord Sabi MD       This document may have been prepared at least partiallythrough the use of voice recognition software. Although effort is taken to assure the accuracy of this document, it is possible that grammatical, syntax,  or spelling errors may occur.

## 2021-05-26 RX ORDER — IBUPROFEN 800 MG/1
800 TABLET ORAL EVERY 8 HOURS PRN
Qty: 30 TABLET | Refills: 1 | Status: SHIPPED
Start: 2021-05-26 | End: 2021-06-28

## 2021-06-11 VITALS
WEIGHT: 293 LBS | OXYGEN SATURATION: 95 % | RESPIRATION RATE: 18 BRPM | HEART RATE: 67 BPM | HEIGHT: 66 IN | DIASTOLIC BLOOD PRESSURE: 82 MMHG | BODY MASS INDEX: 47.09 KG/M2 | TEMPERATURE: 98.5 F | SYSTOLIC BLOOD PRESSURE: 194 MMHG

## 2021-06-11 PROCEDURE — 99284 EMERGENCY DEPT VISIT MOD MDM: CPT

## 2021-06-11 ASSESSMENT — PAIN DESCRIPTION - FREQUENCY: FREQUENCY: CONTINUOUS

## 2021-06-11 ASSESSMENT — PAIN DESCRIPTION - LOCATION: LOCATION: MOUTH

## 2021-06-11 ASSESSMENT — PAIN SCALES - GENERAL: PAINLEVEL_OUTOF10: 6

## 2021-06-11 ASSESSMENT — PAIN DESCRIPTION - ORIENTATION: ORIENTATION: RIGHT;LOWER

## 2021-06-11 ASSESSMENT — PAIN DESCRIPTION - DESCRIPTORS: DESCRIPTORS: ACHING

## 2021-06-11 ASSESSMENT — PAIN DESCRIPTION - PAIN TYPE: TYPE: ACUTE PAIN

## 2021-06-12 ENCOUNTER — HOSPITAL ENCOUNTER (EMERGENCY)
Age: 46
Discharge: HOME | End: 2021-06-12
Payer: MEDICAID

## 2021-06-12 DIAGNOSIS — K08.89 PAIN, DENTAL: Primary | ICD-10-CM

## 2021-06-12 PROCEDURE — 6370000000 HC RX 637 (ALT 250 FOR IP)

## 2021-06-12 RX ORDER — OXYCODONE HYDROCHLORIDE AND ACETAMINOPHEN 5; 325 MG/1; MG/1
1 TABLET ORAL EVERY 6 HOURS PRN
Qty: 20 TABLET | Refills: 0 | Status: SHIPPED | OUTPATIENT
Start: 2021-06-12 | End: 2021-06-17

## 2021-06-12 RX ORDER — CLINDAMYCIN HYDROCHLORIDE 150 MG/1
300 CAPSULE ORAL ONCE
Status: COMPLETED | OUTPATIENT
Start: 2021-06-12 | End: 2021-06-12

## 2021-06-12 RX ORDER — OXYCODONE HYDROCHLORIDE AND ACETAMINOPHEN 5; 325 MG/1; MG/1
TABLET ORAL
Status: COMPLETED
Start: 2021-06-12 | End: 2021-06-12

## 2021-06-12 RX ORDER — CLINDAMYCIN HYDROCHLORIDE 150 MG/1
CAPSULE ORAL
Status: COMPLETED
Start: 2021-06-12 | End: 2021-06-12

## 2021-06-12 RX ORDER — CLINDAMYCIN HYDROCHLORIDE 150 MG/1
150 CAPSULE ORAL 3 TIMES DAILY
Qty: 30 CAPSULE | Refills: 0 | Status: SHIPPED | OUTPATIENT
Start: 2021-06-12 | End: 2021-06-22

## 2021-06-12 RX ORDER — OXYCODONE HYDROCHLORIDE AND ACETAMINOPHEN 5; 325 MG/1; MG/1
1 TABLET ORAL ONCE
Status: COMPLETED | OUTPATIENT
Start: 2021-06-12 | End: 2021-06-12

## 2021-06-12 RX ORDER — CLINDAMYCIN HYDROCHLORIDE 150 MG/1
CAPSULE ORAL
Status: DISCONTINUED
Start: 2021-06-12 | End: 2021-06-12 | Stop reason: HOSPADM

## 2021-06-12 RX ADMIN — OXYCODONE HYDROCHLORIDE AND ACETAMINOPHEN 1 TABLET: 5; 325 TABLET ORAL at 00:37

## 2021-06-12 RX ADMIN — CLINDAMYCIN HYDROCHLORIDE 300 MG: 150 CAPSULE ORAL at 00:36

## 2021-06-12 ASSESSMENT — PAIN SCALES - GENERAL: PAINLEVEL_OUTOF10: 7

## 2021-06-12 NOTE — ED PROVIDER NOTES
grandmother; Diabetes in her brother and mother; Heart Disease in her father and maternal grandfather; High Blood Pressure in her father, maternal grandmother, mother, and paternal grandmother; Mental Illness in her maternal grandmother; Thyroid Disease in her maternal grandmother and sister. The patients home medications have been reviewed. Allergies: Percocet [oxycodone-acetaminophen], Metformin and related, and Tape [adhesive tape]    -------------------------------------------------- RESULTS -------------------------------------------------  All laboratory and radiology results have been personally reviewed by myself   LABS:  No results found for this visit on 06/12/21. RADIOLOGY:  Interpreted by Radiologist.  No orders to display       ------------------------- NURSING NOTES AND VITALS REVIEWED ---------------------------   The nursing notes within the ED encounter and vital signs as below have been reviewed. BP (!) 194/82   Pulse 67   Temp 98.5 °F (36.9 °C) (Temporal)   Resp 18   Ht 5' 6\" (1.676 m)   Wt (!) 350 lb (158.8 kg)   LMP 10/01/2012 (Approximate) Comment: 2013  SpO2 95%   BMI 56.49 kg/m²   Oxygen Saturation Interpretation: Normal      ---------------------------------------------------PHYSICAL EXAM--------------------------------------      Constitutional/General: Alert and oriented x3, well appearing, non toxic in NAD  Head: Normocephalic and atraumatic  Eyes: PERRL, EOMI  Mouth: Oropharynx clear, handling secretions, no trismus tooth extraction #28 and 31. There appears to be granulating tissue with slight swelling of the gumline. No abscess present no bone exposure noted. Is no erythema the pharynx no tonsillar swelling or exudate uvula is midline  Neck: Supple, full ROM, no adenopathy  Pulmonary: Lungs clear to auscultation bilaterally, no wheezes, rales, or rhonchi. Not in respiratory distress  Cardiovascular:  Regular rate and rhythm, no murmurs, gallops, or rubs.  2+ distal pulses  Abdomen: Soft, non tender, non distended,   Extremities: Moves all extremities x 4. Warm and well perfused  Skin: warm and dry without rash  Neurologic: GCS 15,  Psych: Normal Affect      ------------------------------ ED COURSE/MEDICAL DECISION MAKING----------------------  Medications   clindamycin (CLEOCIN) capsule 300 mg (300 mg Oral Given 6/12/21 0036)   oxyCODONE-acetaminophen (PERCOCET) 5-325 MG per tablet 1 tablet (1 tablet Oral Given 6/12/21 0037)         ED COURSE:       Medical Decision Making:   Patient had recent tooth extraction was seen on Tuesday diagnosed with dry socket. She had taken a course of amoxicillin with no improvement. Patient is taking a half of Percocet as needed. She will be discharged with clindamycin. She has 1 Percocet remaining will give additional pain medicine patient follow-up with dentist on Monday    Counseling: The emergency provider has spoken with the patient and discussed todays results, in addition to providing specific details for the plan of care and counseling regarding the diagnosis and prognosis. Questions are answered at this time and they are agreeable with the plan.      --------------------------------- IMPRESSION AND DISPOSITION ---------------------------------    IMPRESSION  1. Pain, dental        DISPOSITION  Disposition: Discharge to home  Patient condition is good      NOTE: This report was transcribed using voice recognition software.  Every effort was made to ensure accuracy; however, inadvertent computerized transcription errors may be present     Geoff Vermama  06/12/21 1805

## 2021-06-22 ENCOUNTER — HOSPITAL ENCOUNTER (EMERGENCY)
Age: 46
Discharge: HOME | End: 2021-06-22
Attending: EMERGENCY MEDICINE
Payer: MEDICAID

## 2021-06-22 ENCOUNTER — APPOINTMENT (OUTPATIENT)
Dept: GENERAL RADIOLOGY | Age: 46
End: 2021-06-22
Payer: MEDICAID

## 2021-06-22 VITALS
WEIGHT: 293 LBS | SYSTOLIC BLOOD PRESSURE: 158 MMHG | DIASTOLIC BLOOD PRESSURE: 103 MMHG | TEMPERATURE: 96.9 F | HEART RATE: 71 BPM | RESPIRATION RATE: 18 BRPM | HEIGHT: 66 IN | OXYGEN SATURATION: 97 % | BODY MASS INDEX: 47.09 KG/M2

## 2021-06-22 DIAGNOSIS — R07.9 CHEST PAIN, UNSPECIFIED TYPE: Primary | ICD-10-CM

## 2021-06-22 LAB
ALBUMIN SERPL-MCNC: 3.6 G/DL (ref 3.5–5.2)
ALP BLD-CCNC: 76 U/L (ref 35–104)
ALT SERPL-CCNC: 34 U/L (ref 0–32)
ANION GAP SERPL CALCULATED.3IONS-SCNC: 9 MMOL/L (ref 7–16)
AST SERPL-CCNC: 17 U/L (ref 0–31)
BASOPHILS ABSOLUTE: 0.02 E9/L (ref 0–0.2)
BASOPHILS RELATIVE PERCENT: 0.3 % (ref 0–2)
BILIRUB SERPL-MCNC: 0.5 MG/DL (ref 0–1.2)
BUN BLDV-MCNC: 10 MG/DL (ref 6–20)
CALCIUM SERPL-MCNC: 8.2 MG/DL (ref 8.6–10.2)
CHLORIDE BLD-SCNC: 103 MMOL/L (ref 98–107)
CO2: 26 MMOL/L (ref 22–29)
CREAT SERPL-MCNC: 0.6 MG/DL (ref 0.5–1)
EKG ATRIAL RATE: 75 BPM
EKG P AXIS: 60 DEGREES
EKG P-R INTERVAL: 174 MS
EKG Q-T INTERVAL: 424 MS
EKG QRS DURATION: 88 MS
EKG QTC CALCULATION (BAZETT): 473 MS
EKG R AXIS: -11 DEGREES
EKG T AXIS: 37 DEGREES
EKG VENTRICULAR RATE: 75 BPM
EOSINOPHILS ABSOLUTE: 0.09 E9/L (ref 0.05–0.5)
EOSINOPHILS RELATIVE PERCENT: 1.3 % (ref 0–6)
GFR AFRICAN AMERICAN: >60
GFR NON-AFRICAN AMERICAN: >60 ML/MIN/1.73
GLUCOSE BLD-MCNC: 217 MG/DL (ref 74–99)
HCT VFR BLD CALC: 36.8 % (ref 34–48)
HEMOGLOBIN: 12.1 G/DL (ref 11.5–15.5)
IMMATURE GRANULOCYTES #: 0.03 E9/L
IMMATURE GRANULOCYTES %: 0.4 % (ref 0–5)
LIPASE: 27 U/L (ref 13–60)
LYMPHOCYTES ABSOLUTE: 1.84 E9/L (ref 1.5–4)
LYMPHOCYTES RELATIVE PERCENT: 27.3 % (ref 20–42)
MCH RBC QN AUTO: 30.3 PG (ref 26–35)
MCHC RBC AUTO-ENTMCNC: 32.9 % (ref 32–34.5)
MCV RBC AUTO: 92.2 FL (ref 80–99.9)
METER GLUCOSE: 139 MG/DL (ref 74–99)
MONOCYTES ABSOLUTE: 0.55 E9/L (ref 0.1–0.95)
MONOCYTES RELATIVE PERCENT: 8.2 % (ref 2–12)
NEUTROPHILS ABSOLUTE: 4.21 E9/L (ref 1.8–7.3)
NEUTROPHILS RELATIVE PERCENT: 62.5 % (ref 43–80)
PDW BLD-RTO: 13.2 FL (ref 11.5–15)
PLATELET # BLD: 159 E9/L (ref 130–450)
PMV BLD AUTO: 11.8 FL (ref 7–12)
POTASSIUM SERPL-SCNC: 3.5 MMOL/L (ref 3.5–5)
PRO-BNP: 35 PG/ML (ref 0–125)
RBC # BLD: 3.99 E12/L (ref 3.5–5.5)
SODIUM BLD-SCNC: 138 MMOL/L (ref 132–146)
TOTAL PROTEIN: 6.1 G/DL (ref 6.4–8.3)
TROPONIN, HIGH SENSITIVITY: 10 NG/L (ref 0–9)
TROPONIN, HIGH SENSITIVITY: 9 NG/L (ref 0–9)
WBC # BLD: 6.7 E9/L (ref 4.5–11.5)

## 2021-06-22 PROCEDURE — 85025 COMPLETE CBC W/AUTO DIFF WBC: CPT

## 2021-06-22 PROCEDURE — 99285 EMERGENCY DEPT VISIT HI MDM: CPT

## 2021-06-22 PROCEDURE — 93005 ELECTROCARDIOGRAM TRACING: CPT | Performed by: EMERGENCY MEDICINE

## 2021-06-22 PROCEDURE — 96372 THER/PROPH/DIAG INJ SC/IM: CPT

## 2021-06-22 PROCEDURE — 80053 COMPREHEN METABOLIC PANEL: CPT

## 2021-06-22 PROCEDURE — 83690 ASSAY OF LIPASE: CPT

## 2021-06-22 PROCEDURE — 83880 ASSAY OF NATRIURETIC PEPTIDE: CPT

## 2021-06-22 PROCEDURE — 6370000000 HC RX 637 (ALT 250 FOR IP): Performed by: EMERGENCY MEDICINE

## 2021-06-22 PROCEDURE — 93010 ELECTROCARDIOGRAM REPORT: CPT | Performed by: INTERNAL MEDICINE

## 2021-06-22 PROCEDURE — 36415 COLL VENOUS BLD VENIPUNCTURE: CPT

## 2021-06-22 PROCEDURE — 71045 X-RAY EXAM CHEST 1 VIEW: CPT

## 2021-06-22 PROCEDURE — C9113 INJ PANTOPRAZOLE SODIUM, VIA: HCPCS | Performed by: EMERGENCY MEDICINE

## 2021-06-22 PROCEDURE — 84484 ASSAY OF TROPONIN QUANT: CPT

## 2021-06-22 PROCEDURE — 96374 THER/PROPH/DIAG INJ IV PUSH: CPT

## 2021-06-22 PROCEDURE — 6360000002 HC RX W HCPCS: Performed by: EMERGENCY MEDICINE

## 2021-06-22 PROCEDURE — 82962 GLUCOSE BLOOD TEST: CPT

## 2021-06-22 RX ORDER — PANTOPRAZOLE SODIUM 40 MG/10ML
40 INJECTION, POWDER, LYOPHILIZED, FOR SOLUTION INTRAVENOUS ONCE
Status: COMPLETED | OUTPATIENT
Start: 2021-06-22 | End: 2021-06-22

## 2021-06-22 RX ORDER — HYDROXYZINE PAMOATE 50 MG/1
50 CAPSULE ORAL 3 TIMES DAILY PRN
Qty: 21 CAPSULE | Refills: 0 | Status: SHIPPED | OUTPATIENT
Start: 2021-06-22 | End: 2021-06-29

## 2021-06-22 RX ORDER — PANTOPRAZOLE SODIUM 40 MG/1
40 TABLET, DELAYED RELEASE ORAL DAILY
Qty: 10 TABLET | Refills: 0 | Status: SHIPPED | OUTPATIENT
Start: 2021-06-22 | End: 2021-07-10 | Stop reason: ALTCHOICE

## 2021-06-22 RX ORDER — HYDROXYZINE HYDROCHLORIDE 50 MG/ML
50 INJECTION, SOLUTION INTRAMUSCULAR ONCE
Status: COMPLETED | OUTPATIENT
Start: 2021-06-22 | End: 2021-06-22

## 2021-06-22 RX ORDER — LORAZEPAM 1 MG/1
2 TABLET ORAL ONCE
Status: COMPLETED | OUTPATIENT
Start: 2021-06-22 | End: 2021-06-22

## 2021-06-22 RX ADMIN — PANTOPRAZOLE SODIUM 40 MG: 40 INJECTION, POWDER, FOR SOLUTION INTRAVENOUS at 08:14

## 2021-06-22 RX ADMIN — HYDROXYZINE HYDROCHLORIDE 50 MG: 50 INJECTION, SOLUTION INTRAMUSCULAR at 08:14

## 2021-06-22 RX ADMIN — MAGNESIUM HYDROXIDE/ALUMINUM HYDROXICE/SIMETHICONE: 120; 1200; 1200 SUSPENSION ORAL at 08:14

## 2021-06-22 RX ADMIN — LORAZEPAM 2 MG: 1 TABLET ORAL at 14:13

## 2021-06-22 ASSESSMENT — PAIN DESCRIPTION - PAIN TYPE: TYPE: ACUTE PAIN

## 2021-06-22 ASSESSMENT — PAIN SCALES - GENERAL
PAINLEVEL_OUTOF10: 4
PAINLEVEL_OUTOF10: 7

## 2021-06-22 ASSESSMENT — PAIN DESCRIPTION - DESCRIPTORS: DESCRIPTORS: STABBING

## 2021-06-22 ASSESSMENT — PAIN DESCRIPTION - LOCATION
LOCATION: CHEST
LOCATION: CHEST

## 2021-06-22 NOTE — ED NOTES
Fed low sugar diet, given ativan for treatment of ongoing panic.   A/ox4 and in verbal agreement with plan to d/c     Tatyana Whitt RN  06/22/21 [id number]

## 2021-06-22 NOTE — ED PROVIDER NOTES
Department of Emergency Medicine   ED  Provider Note  Admit Date/RoomTime: 2021  7:54 AM  ED Room:           History of Present Illness:  21, Time: 1:56 PM EDT  Chief Complaint   Patient presents with    Panic Attack     chest pain that woke her up this am, states that she's going through P.O. Amelia Fuentes is a 40 y.o. female presenting to the ED for chest pain. Patient woke up with a burning sensation substernally, at 4 AM, nothing makes it better or worse, does not rate anywhere. She has not had this in the past.  Denies any cardiac history. Denies any PE risk factors. She says she is going under a lot of stress. She also mentions that she just had several teeth extracted about a week ago, she is on high-dose ibuprofen. She denies any fever, chills, nausea, vomiting, change in bowel or bladder, cough, sputum, paresthesias, back pain, shortness of breath, or any other symptoms or complaints. Review of Systems:   Pertinent positives and negatives are stated within HPI, all other systems reviewed and are negative.        --------------------------------------------- PAST HISTORY ---------------------------------------------  Past Medical History:  has a past medical history of Acute renal injury ([facility] [address] Analgesic overuse headache, Anxiety, Arthritis, Asthma, Depression, Diabetes mellitus ([facility] [address] Fracture of tooth, GERD (gastroesophageal reflux disease), Glaucoma, Hypertension, Hypertensive urgency, Hypothyroidism, Irritable bowel syndrome, Obesity, Obstructive sleep apnea, Pneumonia, Polycystic ovarian syndrome, Postcholecystectomy syndrome, Spinal headache, and Suicidal ideation. Past Surgical History:  has a past surgical history that includes Tonsillectomy (); Cholecystectomy, laparoscopic ();  section (); Foot surgery (); Ovary removal ();  Dental surgery (10/06/2011); bernadette and bso (cervix removed) (3/19/2013); hernia repair (9/3/13); Hysterectomy; Echo Complete (1/14/2014); and Knee cartilage surgery. Social History:  reports that she has never smoked. She has never used smokeless tobacco. She reports that she does not drink alcohol and does not use drugs. Family History: family history includes Asthma in her brother, mother, and sister; Cancer in her father and paternal grandfather; Depression in her father, maternal grandmother, and paternal grandmother; Diabetes in her brother and mother; Heart Disease in her father and maternal grandfather; High Blood Pressure in her father, maternal grandmother, mother, and paternal grandmother; Mental Illness in her maternal grandmother; Thyroid Disease in her maternal grandmother and sister. . Unless otherwise noted, family history is non contributory    The patients home medications have been reviewed. Allergies: Percocet [oxycodone-acetaminophen], Metformin and related, and Tape [adhesive tape]        ---------------------------------------------------PHYSICAL EXAM--------------------------------------    Constitutional/General: Alert and oriented x3  Head: Normocephalic and atraumatic  Eyes: PERRL, EOMI, sclera non icteric  Mouth: Oropharynx clear, handling secretions, no trismus, no asymmetry of the posterior oropharynx or uvular edema  Neck: Supple, full ROM, no stridor, no meningeal signs  Respiratory: Lungs clear to auscultation bilaterally, no wheezes, rales, or rhonchi. Not in respiratory distress  Cardiovascular:  Regular rate. Regular rhythm. 2+ distal pulses. Equal extremity pulses. Chest: No chest wall tenderness  GI:  Abdomen Soft, with mild epigastric tenderness, Non distended. No rebound, guarding, or rigidity. No pulsatile masses. Musculoskeletal: Moves all extremities x 4. Warm and well perfused, no clubbing, cyanosis, or edema. Capillary refill <3 seconds  Integument: skin warm and dry. No rashes.    Neurologic: GCS 15, no focal deficits, symmetric strength 5/5 in the upper and lower extremities bilaterally  Psychiatric: Normal Affect          -------------------------------------------------- RESULTS -------------------------------------------------  I have personally reviewed all laboratory and imaging results for this patient. Results are listed below.      LABS: (Lab results interpreted by me)  Results for orders placed or performed during the hospital encounter of 06/22/21   CBC Auto Differential   Result Value Ref Range    WBC 6.7 4.5 - 11.5 E9/L    RBC 3.99 3.50 - 5.50 E12/L    Hemoglobin 12.1 11.5 - 15.5 g/dL    Hematocrit 36.8 34.0 - 48.0 %    MCV 92.2 80.0 - 99.9 fL    MCH 30.3 26.0 - 35.0 pg    MCHC 32.9 32.0 - 34.5 %    RDW 13.2 11.5 - 15.0 fL    Platelets 064 696 - 312 E9/L    MPV 11.8 7.0 - 12.0 fL    Neutrophils % 62.5 43.0 - 80.0 %    Immature Granulocytes % 0.4 0.0 - 5.0 %    Lymphocytes % 27.3 20.0 - 42.0 %    Monocytes % 8.2 2.0 - 12.0 %    Eosinophils % 1.3 0.0 - 6.0 %    Basophils % 0.3 0.0 - 2.0 %    Neutrophils Absolute 4.21 1.80 - 7.30 E9/L    Immature Granulocytes # 0.03 E9/L    Lymphocytes Absolute 1.84 1.50 - 4.00 E9/L    Monocytes Absolute 0.55 0.10 - 0.95 E9/L    Eosinophils Absolute 0.09 0.05 - 0.50 E9/L    Basophils Absolute 0.02 0.00 - 0.20 E9/L   Comprehensive Metabolic Panel   Result Value Ref Range    Sodium 138 132 - 146 mmol/L    Potassium 3.5 3.5 - 5.0 mmol/L    Chloride 103 98 - 107 mmol/L    CO2 26 22 - 29 mmol/L    Anion Gap 9 7 - 16 mmol/L    Glucose 217 (H) 74 - 99 mg/dL    BUN 10 6 - 20 mg/dL    CREATININE 0.6 0.5 - 1.0 mg/dL    GFR Non-African American >60 >=60 mL/min/1.73    GFR African American >60     Calcium 8.2 (L) 8.6 - 10.2 mg/dL    Total Protein 6.1 (L) 6.4 - 8.3 g/dL    Albumin 3.6 3.5 - 5.2 g/dL    Total Bilirubin 0.5 0.0 - 1.2 mg/dL    Alkaline Phosphatase 76 35 - 104 U/L    ALT 34 (H) 0 - 32 U/L    AST 17 0 - 31 U/L   Troponin   Result Value Ref Range    Troponin, High Sensitivity 10 (H) 0 - 9 ng/L   Brain Natriuretic Peptide Result Value Ref Range    Pro-BNP 35 0 - 125 pg/mL   Lipase   Result Value Ref Range    Lipase 27 13 - 60 U/L   Troponin   Result Value Ref Range    Troponin, High Sensitivity 9 0 - 9 ng/L   POCT Glucose   Result Value Ref Range    Meter Glucose 139 (H) 74 - 99 mg/dL   EKG 12 Lead   Result Value Ref Range    Ventricular Rate 75 BPM    Atrial Rate 75 BPM    P-R Interval 174 ms    QRS Duration 88 ms    Q-T Interval 424 ms    QTc Calculation (Bazett) 473 ms    P Axis 60 degrees    R Axis -11 degrees    T Axis 37 degrees   ,       RADIOLOGY:  Interpreted by Radiologist unless otherwise specified  XR CHEST PORTABLE   Final Result   Cardiomegaly. There are no findings of failure or pneumonia. EKG Interpretation  Interpreted by emergency department physician, Dr. Kirk Baez   Sinus rhythm, rate 75, no STEMI      ------------------------- NURSING NOTES AND VITALS REVIEWED ---------------------------   The nursing notes within the ED encounter and vital signs as below have been reviewed by myself  BP (!) 158/103   Pulse 71   Temp 96.9 °F (36.1 °C) (Temporal)   Resp 18   Ht 5' 6\" (1.676 m)   Wt (!) 330 lb (149.7 kg)   LMP 10/01/2012 (Approximate) Comment: 2013  SpO2 97%   BMI 53.26 kg/m²     Oxygen Saturation Interpretation: Normal    The patients available past medical records and past encounters were reviewed. ------------------------------ ED COURSE/MEDICAL DECISION MAKING----------------------  Medications   LORazepam (ATIVAN) tablet 2 mg (has no administration in time range)   aluminum & magnesium hydroxide-simethicone (MAALOX) 30 mL, lidocaine viscous hcl (XYLOCAINE) 5 mL (GI COCKTAIL) ( Oral Given 6/22/21 0814)   hydrOXYzine (VISTARIL) injection 50 mg (50 mg Intramuscular Given 6/22/21 0814)   pantoprazole (PROTONIX) injection 40 mg (40 mg Intravenous Given 6/22/21 0814)           The cardiac monitor revealed Sinus with a heart rate in the 80s as interpreted by me.  The cardiac monitor was

## 2021-06-28 ENCOUNTER — APPOINTMENT (OUTPATIENT)
Dept: GENERAL RADIOLOGY | Age: 46
End: 2021-06-28
Payer: MEDICAID

## 2021-06-28 ENCOUNTER — HOSPITAL ENCOUNTER (EMERGENCY)
Age: 46
Discharge: HOME | End: 2021-06-28
Attending: EMERGENCY MEDICINE
Payer: MEDICAID

## 2021-06-28 ENCOUNTER — APPOINTMENT (OUTPATIENT)
Dept: CT IMAGING | Age: 46
End: 2021-06-28
Payer: MEDICAID

## 2021-06-28 VITALS
OXYGEN SATURATION: 95 % | BODY MASS INDEX: 47.09 KG/M2 | HEART RATE: 76 BPM | SYSTOLIC BLOOD PRESSURE: 164 MMHG | DIASTOLIC BLOOD PRESSURE: 88 MMHG | TEMPERATURE: 98.2 F | WEIGHT: 293 LBS | RESPIRATION RATE: 18 BRPM | HEIGHT: 66 IN

## 2021-06-28 DIAGNOSIS — R07.9 CHEST PAIN, UNSPECIFIED TYPE: Primary | ICD-10-CM

## 2021-06-28 DIAGNOSIS — L91.8 SKIN TAG: ICD-10-CM

## 2021-06-28 LAB
ALBUMIN SERPL-MCNC: 4.3 G/DL (ref 3.5–5.2)
ALP BLD-CCNC: 95 U/L (ref 35–104)
ALT SERPL-CCNC: 33 U/L (ref 0–32)
ANION GAP SERPL CALCULATED.3IONS-SCNC: 11 MMOL/L (ref 7–16)
AST SERPL-CCNC: 22 U/L (ref 0–31)
BASOPHILS ABSOLUTE: 0.02 E9/L (ref 0–0.2)
BASOPHILS RELATIVE PERCENT: 0.3 % (ref 0–2)
BILIRUB SERPL-MCNC: 0.7 MG/DL (ref 0–1.2)
BUN BLDV-MCNC: 11 MG/DL (ref 6–20)
CALCIUM SERPL-MCNC: 9.4 MG/DL (ref 8.6–10.2)
CHLORIDE BLD-SCNC: 102 MMOL/L (ref 98–107)
CO2: 27 MMOL/L (ref 22–29)
CREAT SERPL-MCNC: 0.8 MG/DL (ref 0.5–1)
D DIMER: 244 NG/ML DDU
EOSINOPHILS ABSOLUTE: 0.11 E9/L (ref 0.05–0.5)
EOSINOPHILS RELATIVE PERCENT: 1.6 % (ref 0–6)
GFR AFRICAN AMERICAN: >60
GFR NON-AFRICAN AMERICAN: >60 ML/MIN/1.73
GLUCOSE BLD-MCNC: 151 MG/DL (ref 74–99)
HCG, URINE, POC: NEGATIVE
HCT VFR BLD CALC: 43.1 % (ref 34–48)
HEMOGLOBIN: 14.5 G/DL (ref 11.5–15.5)
IMMATURE GRANULOCYTES #: 0.03 E9/L
IMMATURE GRANULOCYTES %: 0.4 % (ref 0–5)
LYMPHOCYTES ABSOLUTE: 2.59 E9/L (ref 1.5–4)
LYMPHOCYTES RELATIVE PERCENT: 37.3 % (ref 20–42)
Lab: NORMAL
MAGNESIUM: 1.6 MG/DL (ref 1.6–2.6)
MCH RBC QN AUTO: 31.5 PG (ref 26–35)
MCHC RBC AUTO-ENTMCNC: 33.6 % (ref 32–34.5)
MCV RBC AUTO: 93.7 FL (ref 80–99.9)
MONOCYTES ABSOLUTE: 0.51 E9/L (ref 0.1–0.95)
MONOCYTES RELATIVE PERCENT: 7.3 % (ref 2–12)
NEGATIVE QC PASS/FAIL: NORMAL
NEUTROPHILS ABSOLUTE: 3.68 E9/L (ref 1.8–7.3)
NEUTROPHILS RELATIVE PERCENT: 53.1 % (ref 43–80)
PDW BLD-RTO: 13.3 FL (ref 11.5–15)
PLATELET # BLD: 208 E9/L (ref 130–450)
PMV BLD AUTO: 11.1 FL (ref 7–12)
POSITIVE QC PASS/FAIL: NORMAL
POTASSIUM REFLEX MAGNESIUM: 3.4 MMOL/L (ref 3.5–5)
RBC # BLD: 4.6 E12/L (ref 3.5–5.5)
SODIUM BLD-SCNC: 140 MMOL/L (ref 132–146)
TOTAL PROTEIN: 7.4 G/DL (ref 6.4–8.3)
TROPONIN, HIGH SENSITIVITY: 13 NG/L (ref 0–9)
TROPONIN, HIGH SENSITIVITY: 13 NG/L (ref 0–9)
TROPONIN, HIGH SENSITIVITY: 16 NG/L (ref 0–9)
WBC # BLD: 6.9 E9/L (ref 4.5–11.5)

## 2021-06-28 PROCEDURE — 6360000004 HC RX CONTRAST MEDICATION: Performed by: RADIOLOGY

## 2021-06-28 PROCEDURE — 71275 CT ANGIOGRAPHY CHEST: CPT

## 2021-06-28 PROCEDURE — 85378 FIBRIN DEGRADE SEMIQUANT: CPT

## 2021-06-28 PROCEDURE — 84484 ASSAY OF TROPONIN QUANT: CPT

## 2021-06-28 PROCEDURE — 99285 EMERGENCY DEPT VISIT HI MDM: CPT

## 2021-06-28 PROCEDURE — 85025 COMPLETE CBC W/AUTO DIFF WBC: CPT

## 2021-06-28 PROCEDURE — 6370000000 HC RX 637 (ALT 250 FOR IP): Performed by: EMERGENCY MEDICINE

## 2021-06-28 PROCEDURE — 83735 ASSAY OF MAGNESIUM: CPT

## 2021-06-28 PROCEDURE — 6360000002 HC RX W HCPCS: Performed by: EMERGENCY MEDICINE

## 2021-06-28 PROCEDURE — 96374 THER/PROPH/DIAG INJ IV PUSH: CPT

## 2021-06-28 PROCEDURE — 71046 X-RAY EXAM CHEST 2 VIEWS: CPT

## 2021-06-28 PROCEDURE — 6360000002 HC RX W HCPCS: Performed by: STUDENT IN AN ORGANIZED HEALTH CARE EDUCATION/TRAINING PROGRAM

## 2021-06-28 PROCEDURE — 80053 COMPREHEN METABOLIC PANEL: CPT

## 2021-06-28 PROCEDURE — 2500000003 HC RX 250 WO HCPCS: Performed by: STUDENT IN AN ORGANIZED HEALTH CARE EDUCATION/TRAINING PROGRAM

## 2021-06-28 PROCEDURE — 96375 TX/PRO/DX INJ NEW DRUG ADDON: CPT

## 2021-06-28 PROCEDURE — 93005 ELECTROCARDIOGRAM TRACING: CPT | Performed by: STUDENT IN AN ORGANIZED HEALTH CARE EDUCATION/TRAINING PROGRAM

## 2021-06-28 RX ORDER — ONDANSETRON 2 MG/ML
4 INJECTION INTRAMUSCULAR; INTRAVENOUS ONCE
Status: COMPLETED | OUTPATIENT
Start: 2021-06-28 | End: 2021-06-28

## 2021-06-28 RX ORDER — SODIUM CHLORIDE 0.9 % (FLUSH) 0.9 %
SYRINGE (ML) INJECTION
Status: DISCONTINUED
Start: 2021-06-28 | End: 2021-06-29 | Stop reason: HOSPADM

## 2021-06-28 RX ORDER — KETOROLAC TROMETHAMINE 15 MG/ML
15 INJECTION, SOLUTION INTRAMUSCULAR; INTRAVENOUS ONCE
Status: COMPLETED | OUTPATIENT
Start: 2021-06-28 | End: 2021-06-28

## 2021-06-28 RX ORDER — IBUPROFEN 600 MG/1
600 TABLET ORAL 3 TIMES DAILY PRN
Qty: 30 TABLET | Refills: 0 | Status: SHIPPED | OUTPATIENT
Start: 2021-06-28 | End: 2022-04-05

## 2021-06-28 RX ORDER — POTASSIUM CHLORIDE 20 MEQ/1
40 TABLET, EXTENDED RELEASE ORAL ONCE
Status: COMPLETED | OUTPATIENT
Start: 2021-06-28 | End: 2021-06-28

## 2021-06-28 RX ADMIN — IOPAMIDOL 75 ML: 755 INJECTION, SOLUTION INTRAVENOUS at 21:12

## 2021-06-28 RX ADMIN — ONDANSETRON 4 MG: 2 INJECTION INTRAMUSCULAR; INTRAVENOUS at 19:04

## 2021-06-28 RX ADMIN — KETOROLAC TROMETHAMINE 15 MG: 15 INJECTION, SOLUTION INTRAMUSCULAR; INTRAVENOUS at 20:20

## 2021-06-28 RX ADMIN — FAMOTIDINE 20 MG: 10 INJECTION INTRAVENOUS at 19:05

## 2021-06-28 RX ADMIN — POTASSIUM CHLORIDE 40 MEQ: 1500 TABLET, EXTENDED RELEASE ORAL at 20:20

## 2021-06-28 ASSESSMENT — ENCOUNTER SYMPTOMS
ABDOMINAL PAIN: 0
COUGH: 0
NAUSEA: 1
VOMITING: 0
SHORTNESS OF BREATH: 1

## 2021-06-28 ASSESSMENT — PAIN SCALES - GENERAL
PAINLEVEL_OUTOF10: 5
PAINLEVEL_OUTOF10: 5

## 2021-06-28 ASSESSMENT — HEART SCORE: ECG: 1

## 2021-06-28 ASSESSMENT — PAIN DESCRIPTION - PAIN TYPE: TYPE: ACUTE PAIN

## 2021-06-28 ASSESSMENT — PAIN DESCRIPTION - LOCATION: LOCATION: CHEST

## 2021-06-28 NOTE — ED PROVIDER NOTES
HPI   78-year-old female patient presented to emergency department for chest pain. 6 days ago patient was seen downtown and had lab work at that time which was negative. Patient was discharged. While downtown she had received GI cocktail as well as Ativan, Vistaril with improvement of symptoms. Last echo in 2019 showing EF of 60% and some left ventricular hypertrophy. Patient says today she is experiencing a pressure sensation, nonradiating, worsened with exertion. 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 She says this also worsened her pain. Patient does have some mild associated shortness of breath but no pleuritic pain and while in the waiting room developed some mild nausea. No fevers, chills, urinary complaints, leg swelling, diarrhea. She has past history of lower extremity DVT 3 years ago. Not on any blood thinners currently. Review of Systems   Constitutional: Negative for diaphoresis and fever. HENT: Negative for congestion. Respiratory: Positive for shortness of breath. Negative for cough. Cardiovascular: Positive for chest pain. Negative for leg swelling. Gastrointestinal: Positive for nausea. Negative for abdominal pain and vomiting. Genitourinary: Negative for difficulty urinating and dysuria. Neurological: Negative for dizziness. All other systems reviewed and are negative. Physical Exam  Vitals and nursing note reviewed. Constitutional:       General: She is not in acute distress. Appearance: She is obese. She is not ill-appearing. HENT:      Head: Normocephalic and atraumatic. Mouth/Throat:      Pharynx: Oropharynx is clear. Comments: Poor dentition. Eyes:      General: No scleral icterus. Conjunctiva/sclera: Conjunctivae normal.   Cardiovascular:      Rate and Rhythm: Normal rate and regular rhythm. Pulses: Normal pulses.       Heart sounds: Normal heart sounds. Pulmonary:      Effort: Pulmonary effort is normal. No respiratory distress. Breath sounds: Normal breath sounds. Chest:      Chest wall: No tenderness. Abdominal:      General: Abdomen is flat. Bowel sounds are normal. There is no distension. Palpations: Abdomen is soft. Tenderness: There is no abdominal tenderness. Musculoskeletal:         General: Normal range of motion. Cervical back: Normal range of motion. No tenderness. Right lower leg: No edema. Left lower leg: No edema. Skin:     General: Skin is warm and dry. Neurological:      General: No focal deficit present. Mental Status: She is alert. Procedures     MDM   18-year-old female patient presented to emergency department chief complaint of chest pain. This is the second time patient is returning to emergency department for chest pain. Today she had delta troponin initially 13 and then 16. Do not think that patient is having acute coronary syndrome at this time. Positive D-dimer so we proceeded with a CTA of her chest which was negative for any pulmonary embolism. She does have some EKG changes. No electrolyte abnormalities or anemia noted. Patient has a HEAR score of 5. She will need to follow-up with primary care and potentially get an outpatient stress test.  Patient counseled to return to the emergency department if she develops pain again or pain worsens. Currently patient is pain free. She also had some left sided trapezius pain. Which is also improved. Patient also has multiple skin tags would like referral for a surgeon for removal in the future. EKG: This EKG is signed and interpreted by me. Rate:96  Rhythm: sinus  Interpretation: T wave flattening in V6.  2 inversions in aVL and slight flattening of ST segment.     Comparison: stable as compared to patient's most recent EKG, new T wave flattening in V6 and T wave inversion in aVL    ED Course as of Jun 28 2321 grandfather; High Blood Pressure in her father, maternal grandmother, mother, and paternal grandmother; Mental Illness in her maternal grandmother; Thyroid Disease in her maternal grandmother and sister. The patients home medications have been reviewed.     Allergies: Percocet [oxycodone-acetaminophen], Metformin and related, and Tape [adhesive tape]    -------------------------------------------------- RESULTS -------------------------------------------------  Labs:  Results for orders placed or performed during the hospital encounter of 06/28/21   CBC Auto Differential   Result Value Ref Range    WBC 6.9 4.5 - 11.5 E9/L    RBC 4.60 3.50 - 5.50 E12/L    Hemoglobin 14.5 11.5 - 15.5 g/dL    Hematocrit 43.1 34.0 - 48.0 %    MCV 93.7 80.0 - 99.9 fL    MCH 31.5 26.0 - 35.0 pg    MCHC 33.6 32.0 - 34.5 %    RDW 13.3 11.5 - 15.0 fL    Platelets 923 479 - 783 E9/L    MPV 11.1 7.0 - 12.0 fL    Neutrophils % 53.1 43.0 - 80.0 %    Immature Granulocytes % 0.4 0.0 - 5.0 %    Lymphocytes % 37.3 20.0 - 42.0 %    Monocytes % 7.3 2.0 - 12.0 %    Eosinophils % 1.6 0.0 - 6.0 %    Basophils % 0.3 0.0 - 2.0 %    Neutrophils Absolute 3.68 1.80 - 7.30 E9/L    Immature Granulocytes # 0.03 E9/L    Lymphocytes Absolute 2.59 1.50 - 4.00 E9/L    Monocytes Absolute 0.51 0.10 - 0.95 E9/L    Eosinophils Absolute 0.11 0.05 - 0.50 E9/L    Basophils Absolute 0.02 0.00 - 0.20 E9/L   Comprehensive Metabolic Panel w/ Reflex to MG   Result Value Ref Range    Sodium 140 132 - 146 mmol/L    Potassium reflex Magnesium 3.4 (L) 3.5 - 5.0 mmol/L    Chloride 102 98 - 107 mmol/L    CO2 27 22 - 29 mmol/L    Anion Gap 11 7 - 16 mmol/L    Glucose 151 (H) 74 - 99 mg/dL    BUN 11 6 - 20 mg/dL    CREATININE 0.8 0.5 - 1.0 mg/dL    GFR Non-African American >60 >=60 mL/min/1.73    GFR African American >60     Calcium 9.4 8.6 - 10.2 mg/dL    Total Protein 7.4 6.4 - 8.3 g/dL    Albumin 4.3 3.5 - 5.2 g/dL    Total Bilirubin 0.7 0.0 - 1.2 mg/dL    Alkaline Phosphatase 95 35 - 104 U/L    ALT 33 (H) 0 - 32 U/L    AST 22 0 - 31 U/L   Troponin   Result Value Ref Range    Troponin, High Sensitivity 13 (H) 0 - 9 ng/L   D-Dimer, Quantitative   Result Value Ref Range    D-Dimer, Quant 244 ng/mL DDU   Magnesium   Result Value Ref Range    Magnesium 1.6 1.6 - 2.6 mg/dL   Troponin   Result Value Ref Range    Troponin, High Sensitivity 13 (H) 0 - 9 ng/L   Troponin   Result Value Ref Range    Troponin, High Sensitivity 16 (H) 0 - 9 ng/L   POC Pregnancy Urine Qual   Result Value Ref Range    HCG, Urine, POC Negative Negative    Lot Number [id number]     Positive QC Pass/Fail Pass     Negative QC Pass/Fail Pass    EKG 12 Lead   Result Value Ref Range    Ventricular Rate 96 BPM    Atrial Rate 96 BPM    P-R Interval 174 ms    QRS Duration 90 ms    Q-T Interval 364 ms    QTc Calculation (Bazett) 459 ms    P Axis 45 degrees    R Axis -8 degrees    T Axis 58 degrees       Radiology:  CTA PULMONARY W CONTRAST   Final Result   No evidence of pulmonary embolism or acute pulmonary abnormality. XR CHEST (2 VW)   Final Result   No acute process. ------------------------- NURSING NOTES AND VITALS REVIEWED ---------------------------  Date / Time Roomed:  6/28/2021  6:28 PM  ED Bed Assignment:  04/04    The nursing notes within the ED encounter and vital signs as below have been reviewed. BP (!) 164/88   Pulse 76   Temp 98.2 °F (36.8 °C) (Oral)   Resp 18   Ht 5' 6\" (1.676 m)   Wt (!) 330 lb (149.7 kg)   LMP 10/01/2012 (Approximate) Comment: 2013  SpO2 95%   BMI 53.26 kg/m²   Oxygen Saturation Interpretation: normal      ------------------------------------------ PROGRESS NOTES ------------------------------------------  11:22 PM EDT  I have spoken with the patient and discussed todays results, in addition to providing specific details for the plan of care and counseling regarding the diagnosis and prognosis.   Their questions are answered at this time and they are agreeable with the plan. I discussed at length with them reasons for immediate return here for re evaluation. They will followup with their primary care physician by calling their office       --------------------------------- ADDITIONAL PROVIDER NOTES ---------------------------------  At this time the patient is without objective evidence of an acute process requiring hospitalization or inpatient management. They have remained hemodynamically stable throughout their entire ED visit and are stable for discharge with outpatient follow-up. The plan has been discussed in detail and they are aware of the specific conditions for emergent return, as well as the importance of follow-up. New Prescriptions    IBUPROFEN (ADVIL;MOTRIN) 600 MG TABLET    Take 1 tablet by mouth 3 times daily as needed for Pain       Diagnosis:  1. Chest pain, unspecified type    2. Skin tag        Disposition:  Patient's disposition: Discharge to home  Patient's condition is stable.        Anita Figueroa DO  Resident  06/28/21 [id number]

## 2021-06-29 LAB
EKG ATRIAL RATE: 96 BPM
EKG P AXIS: 45 DEGREES
EKG P-R INTERVAL: 174 MS
EKG Q-T INTERVAL: 364 MS
EKG QRS DURATION: 90 MS
EKG QTC CALCULATION (BAZETT): 459 MS
EKG R AXIS: -8 DEGREES
EKG T AXIS: 58 DEGREES
EKG VENTRICULAR RATE: 96 BPM

## 2021-07-08 NOTE — ED PROVIDER NOTES
ED Attending  CC: Catia       Department of Emergency Medicine   ED  Provider Note  Admit Date/RoomTime: 2019  1:06 PM  ED Room: 35/35  Chief Complaint:   Eye Problem (left eye swelling, was seen here yesterday for same, pt states \"my boss told me if I don't get it drained today, I'm fired\")    History of Present Illness   Source of history provided by:  patient. History/Exam Limitations: none. Kirstin Goodwin is a 38 y.o. old female presenting to the emergency department by private vehicle, with gradual onset, worsening eyelid redness and swelling to the left lids. States she has a history of chronic size but does not see an eye specialist for this significant other stating that she has seen Dr. Marixa Norwood in the past for her eyes. Patient stating she was here yesterday and evaluated for this eye swelling and they did not drain the area. She states she went to work today and her boss told her that she needed it drained or she was given be fired. Denies any fevers. Denies any decreased vision, painful or decreased extraocular movements. There is been no eye drainage. She denies any foreign body present. Mechanism:     []  FB Exposure     []  Chemical Exposure     []  Direct Trauma     []  High Speed Machinery Use     []  Welding      Circumstances:    []  Contact Lens Use     []  Recent URI Sx's     []  Spontaneous Onset     []  Close Contact w/similar Sx's     []  Work Related     History of:     []   Glaucoma     []   Recent Eye Surgery     ROS    Pertinent positives and negatives are stated within HPI, all other systems reviewed and are negative. Past Surgical History:  has a past surgical history that includes Tonsillectomy (); Cholecystectomy, laparoscopic ();  section (); Foot surgery (); Ovary removal (); Dental surgery (10/06/2011); bernadette and bso (cervix removed) (3/19/2013); hernia repair (9/3/13); Hysterectomy; Echo Complete (2014); and Knee cartilage surgery.   Social History:  reports that she has never smoked. She has never used smokeless tobacco. She reports that she does not drink alcohol or use drugs. Family History: family history includes Asthma in her brother, mother, and sister; Cancer in her father and paternal grandfather; Depression in her father, maternal grandmother, and paternal grandmother; Diabetes in her brother and mother; Heart Disease in her father and maternal grandfather; High Blood Pressure in her father, maternal grandmother, mother, and paternal grandmother; Mental Illness in her maternal grandmother; Thyroid Disease in her maternal grandmother and sister. Allergies: Percocet [oxycodone-acetaminophen]; Metformin and related; and Tape [adhesive tape]    Physical Exam           ED Triage Vitals [05/04/19 1257]   BP Temp Temp Source Pulse Resp SpO2 Height Weight   (!) 180/98 98.9 °F (37.2 °C) Oral 80 16 97 % 5' 6\" (1.676 m) (!) 350 lb (158.8 kg)      Oxygen Saturation Interpretation: Normal.    Constitutional:  Alert, development consistent with age. HENT:  NC/NT. Airway patent. Neck:  Normal ROM. Supple. Eyes:         Pupils: equal, round, reactive to light and accommodation. Eyelids: Left upper and lower Swelling/redness: Moderate to the upper lid with mild to the lower medial region. Conjunctiva: Bilateral normal.          Sclera: Bilateral non-icteric . Cornea: Bilateral normal.       EOM:  Intact Bilaterally. Fundoscopic:       Visual Acuity:  Within Normal Limit. Integument:  No rashes, erythema present, unless noted elsewhere. Lymphatics: No lymphangitis or adenopathy noted. Neurological:  Oriented. Motor functions intact. Lab / Imaging Results   (All laboratory and radiology results have been personally reviewed by myself)  Labs:  No results found for this visit on 05/04/19. Imaging: All Radiology results interpreted by Radiologist unless otherwise noted.   No orders to display       ED Course / Medical Decision Making   Medications - No data to display       Consult(s):   Sarabjit Venegas spoke to on-call physician at [facility] and they state that she can continue on the treatment she was placed on yesterday and follow-up with them on Monday morning in the office. Procedure(s):   none    MDM:   Patient presenting for eyelid redness that began 1 week ago. She states she has a history of styes and normally they give drained but it was not last night. Upon examination, no stye was present. Upper eyelid erythema and swelling were noted with no focal area of swelling or tenderness. Patient was advised to continue taking the antibiotic given to her yesterday in [facility] morning. She understood. Counseling: The emergency provider has spoken with the patient and discussed todays results, in addition to providing specific details for the plan of care and counseling regarding the diagnosis and prognosis. Questions are answered at this time and they are agreeable with the plan. Assessment      1. Eyelid cellulitis, left      Plan   Discharge to home  Patient condition is stable    New Medications     Discharge Medication List as of 5/4/2019  3:34 PM        Electronically signed by Liat Tripathi PA-C   DD: 5/4/19  **This report was transcribed using voice recognition software. Every effort was made to ensure accuracy; however, inadvertent computerized transcription errors may be present.   END OF ED PROVIDER NOTE        Shawna Sanchez PA-C  05/04/19 8192 glasses

## 2021-07-10 ENCOUNTER — HOSPITAL ENCOUNTER (EMERGENCY)
Age: 46
Discharge: HOME | End: 2021-07-10
Attending: EMERGENCY MEDICINE
Payer: MEDICAID

## 2021-07-10 ENCOUNTER — APPOINTMENT (OUTPATIENT)
Dept: GENERAL RADIOLOGY | Age: 46
End: 2021-07-10
Payer: MEDICAID

## 2021-07-10 VITALS
SYSTOLIC BLOOD PRESSURE: 187 MMHG | BODY MASS INDEX: 47.09 KG/M2 | RESPIRATION RATE: 16 BRPM | DIASTOLIC BLOOD PRESSURE: 90 MMHG | HEIGHT: 66 IN | HEART RATE: 73 BPM | WEIGHT: 293 LBS | OXYGEN SATURATION: 97 % | TEMPERATURE: 97.1 F

## 2021-07-10 DIAGNOSIS — R10.12 ABDOMINAL PAIN, LEFT UPPER QUADRANT: Primary | ICD-10-CM

## 2021-07-10 LAB
ANION GAP SERPL CALCULATED.3IONS-SCNC: 6 MMOL/L (ref 7–16)
BASOPHILS ABSOLUTE: 0.04 E9/L (ref 0–0.2)
BASOPHILS RELATIVE PERCENT: 0.6 % (ref 0–2)
BUN BLDV-MCNC: 10 MG/DL (ref 6–20)
CALCIUM SERPL-MCNC: 8.7 MG/DL (ref 8.6–10.2)
CHLORIDE BLD-SCNC: 104 MMOL/L (ref 98–107)
CO2: 30 MMOL/L (ref 22–29)
CREAT SERPL-MCNC: 0.8 MG/DL (ref 0.5–1)
EKG ATRIAL RATE: 73 BPM
EKG P AXIS: 56 DEGREES
EKG P-R INTERVAL: 170 MS
EKG Q-T INTERVAL: 424 MS
EKG QRS DURATION: 88 MS
EKG QTC CALCULATION (BAZETT): 467 MS
EKG R AXIS: 2 DEGREES
EKG T AXIS: 45 DEGREES
EKG VENTRICULAR RATE: 73 BPM
EOSINOPHILS ABSOLUTE: 0.12 E9/L (ref 0.05–0.5)
EOSINOPHILS RELATIVE PERCENT: 1.8 % (ref 0–6)
GFR AFRICAN AMERICAN: >60
GFR NON-AFRICAN AMERICAN: >60 ML/MIN/1.73
GLUCOSE BLD-MCNC: 141 MG/DL (ref 74–99)
HCT VFR BLD CALC: 37.1 % (ref 34–48)
HEMOGLOBIN: 12.5 G/DL (ref 11.5–15.5)
IMMATURE GRANULOCYTES #: 0.03 E9/L
IMMATURE GRANULOCYTES %: 0.5 % (ref 0–5)
LIPASE: 16 U/L (ref 13–60)
LYMPHOCYTES ABSOLUTE: 2.26 E9/L (ref 1.5–4)
LYMPHOCYTES RELATIVE PERCENT: 34.8 % (ref 20–42)
MCH RBC QN AUTO: 31.3 PG (ref 26–35)
MCHC RBC AUTO-ENTMCNC: 33.7 % (ref 32–34.5)
MCV RBC AUTO: 92.8 FL (ref 80–99.9)
MONOCYTES ABSOLUTE: 0.52 E9/L (ref 0.1–0.95)
MONOCYTES RELATIVE PERCENT: 8 % (ref 2–12)
NEUTROPHILS ABSOLUTE: 3.53 E9/L (ref 1.8–7.3)
NEUTROPHILS RELATIVE PERCENT: 54.3 % (ref 43–80)
PDW BLD-RTO: 13.2 FL (ref 11.5–15)
PLATELET # BLD: 187 E9/L (ref 130–450)
PMV BLD AUTO: 11 FL (ref 7–12)
POTASSIUM SERPL-SCNC: 4 MMOL/L (ref 3.5–5)
RBC # BLD: 4 E12/L (ref 3.5–5.5)
SODIUM BLD-SCNC: 140 MMOL/L (ref 132–146)
TROPONIN, HIGH SENSITIVITY: 11 NG/L (ref 0–9)
TROPONIN, HIGH SENSITIVITY: 11 NG/L (ref 0–9)
WBC # BLD: 6.5 E9/L (ref 4.5–11.5)

## 2021-07-10 PROCEDURE — 6370000000 HC RX 637 (ALT 250 FOR IP): Performed by: EMERGENCY MEDICINE

## 2021-07-10 PROCEDURE — 85025 COMPLETE CBC W/AUTO DIFF WBC: CPT

## 2021-07-10 PROCEDURE — 80048 BASIC METABOLIC PNL TOTAL CA: CPT

## 2021-07-10 PROCEDURE — 93010 ELECTROCARDIOGRAM REPORT: CPT | Performed by: INTERNAL MEDICINE

## 2021-07-10 PROCEDURE — 99284 EMERGENCY DEPT VISIT MOD MDM: CPT

## 2021-07-10 PROCEDURE — 93005 ELECTROCARDIOGRAM TRACING: CPT | Performed by: EMERGENCY MEDICINE

## 2021-07-10 PROCEDURE — 83690 ASSAY OF LIPASE: CPT

## 2021-07-10 PROCEDURE — 71045 X-RAY EXAM CHEST 1 VIEW: CPT

## 2021-07-10 PROCEDURE — 84484 ASSAY OF TROPONIN QUANT: CPT

## 2021-07-10 RX ORDER — SUCRALFATE 1 G/1
1 TABLET ORAL 4 TIMES DAILY
Qty: 40 TABLET | Refills: 0 | Status: SHIPPED | OUTPATIENT
Start: 2021-07-10 | End: 2022-04-05

## 2021-07-10 RX ORDER — PANTOPRAZOLE SODIUM 40 MG/1
40 TABLET, DELAYED RELEASE ORAL ONCE
Status: COMPLETED | OUTPATIENT
Start: 2021-07-10 | End: 2021-07-10

## 2021-07-10 RX ORDER — PANTOPRAZOLE SODIUM 40 MG/1
40 TABLET, DELAYED RELEASE ORAL DAILY
Qty: 10 TABLET | Refills: 0 | Status: SHIPPED | OUTPATIENT
Start: 2021-07-10 | End: 2021-07-10 | Stop reason: ALTCHOICE

## 2021-07-10 RX ADMIN — PANTOPRAZOLE SODIUM 40 MG: 40 TABLET, DELAYED RELEASE ORAL at 14:44

## 2021-07-10 RX ADMIN — LIDOCAINE HYDROCHLORIDE: 20 SOLUTION ORAL; TOPICAL at 12:37

## 2021-07-10 ASSESSMENT — ENCOUNTER SYMPTOMS
SORE THROAT: 0
NAUSEA: 1
COUGH: 0
SINUS PRESSURE: 0
VOMITING: 0
DIARRHEA: 0
ABDOMINAL PAIN: 1
EYE DISCHARGE: 0
EYE PAIN: 0
BACK PAIN: 0
SHORTNESS OF BREATH: 0
BELCHING: 0
ABDOMINAL DISTENTION: 0
EYE REDNESS: 0
WHEEZING: 0

## 2021-07-10 NOTE — ED PROVIDER NOTES
78-year-old female presents to the emergency department with left upper quadrant and left chest pain intermittent for the last 2-1/2 weeks. She has been seen twice in the emergency department and diagnosed both times with GERD. She had a CT of the chest done on 20 June and was prescribed Pepcid. She also described Protonix. She states she is not been in to see her PCP. She states she is continued to be an issue and is concerned. She is a history of diabetes obstructive sleep apnea and morbid obesity. The history is provided by the patient. Abdominal Pain  Pain location:  LUQ  Pain quality: aching    Pain radiates to:  Chest  Pain severity:  Moderate  Onset quality:  Gradual  Duration:  3 weeks  Timing:  Intermittent  Progression:  Waxing and waning  Chronicity:  New  Relieved by:  Nothing  Worsened by:  Nothing  Ineffective treatments:  None tried  Associated symptoms: chest pain and nausea    Associated symptoms: no anorexia, no belching, no chills, no cough, no diarrhea, no dysuria, no fatigue, no fever, no hematuria, no shortness of breath, no sore throat and no vomiting         Review of Systems   Constitutional: Negative for chills, fatigue and fever. HENT: Negative for ear pain, sinus pressure and sore throat. Eyes: Negative for pain, discharge and redness. Respiratory: Negative for cough, shortness of breath and wheezing. Cardiovascular: Positive for chest pain. Gastrointestinal: Positive for abdominal pain and nausea. Negative for abdominal distention, anorexia, diarrhea and vomiting. Genitourinary: Negative for dysuria, frequency and hematuria. Musculoskeletal: Negative for arthralgias and back pain. Skin: Negative for rash and wound. Neurological: Negative for weakness and headaches. Hematological: Negative for adenopathy. All other systems reviewed and are negative. Physical Exam  Constitutional:       Appearance: She is well-developed. She is obese.    HENT: Head: Normocephalic and atraumatic. Cardiovascular:      Rate and Rhythm: Normal rate and regular rhythm. Heart sounds: Normal heart sounds. Pulmonary:      Effort: Pulmonary effort is normal.      Breath sounds: Normal breath sounds. Chest:       Abdominal:      General: Abdomen is flat. Bowel sounds are normal.      Palpations: Abdomen is soft. Tenderness: There is no abdominal tenderness. Skin:     General: Skin is warm. Capillary Refill: Capillary refill takes less than 2 seconds. Neurological:      General: No focal deficit present. Mental Status: She is alert and oriented to person, place, and time. Procedures     MDM  Number of Diagnoses or Management Options  Abdominal pain, left upper quadrant  Diagnosis management comments: Patient seen and examined. Labs and imaging were ordered. Initial troponin mildly elevated but similar to previous troponins. Delta troponin was ordered. Patient did have some improvement with GI cocktail. Asher troponin was found to be reassuring. Patient also some reproducible tenderness to the chest which is I suspect makes her less likely to have ACS. If she is felt to be low risk for PE. Patient is felt safer discharge recommendation to follow-up with primary care physician and possibly have EGD performed. ED Course as of Jul 12 0613   Sat Jul 10, 2021   1332 EKG: This EKG is signed and interpreted by the EP. Time: 12:51  Rate: 73  Rhythm: Sinus  Interpretation: NSR  Comparison: stable as compared to patient's most recent EKG      [CF]   1333 Patient says her abdominal pain is somewhat improved with GI cocktail. I suspect this is more likely GI issue rather than a cardiac issue.     [CF]      ED Course User Index  [CF] Timbo Rincon DO        --------------------------------------------- PAST HISTORY ---------------------------------------------  Past Medical History:  has a past medical history of Acute renal injury [facility]), Analgesic overuse headache, Anxiety, Arthritis, Asthma, Depression, Diabetes mellitus ([address] Fracture of tooth, GERD (gastroesophageal reflux disease), Glaucoma, Hypertension, Hypertensive urgency, Hypothyroidism, Irritable bowel syndrome, Obesity, Obstructive sleep apnea, Pneumonia, Polycystic ovarian syndrome, Postcholecystectomy syndrome, Spinal headache, and Suicidal ideation. Past Surgical History:  has a past surgical history that includes Tonsillectomy (); Cholecystectomy, laparoscopic ();  section (); Foot surgery (); Ovary removal (); Dental surgery (10/06/2011); bernadette and bso (cervix removed) (3/19/2013); hernia repair (9/3/13); Hysterectomy; Echo Complete (2014); and Knee cartilage surgery. Social History:  reports that she has never smoked. She has never used smokeless tobacco. She reports that she does not drink alcohol and does not use drugs. Family History: family history includes Asthma in her brother, mother, and sister; Cancer in her father and paternal grandfather; Depression in her father, maternal grandmother, and paternal grandmother; Diabetes in her brother and mother; Heart Disease in her father and maternal grandfather; High Blood Pressure in her father, maternal grandmother, mother, and paternal grandmother; Mental Illness in her maternal grandmother; Thyroid Disease in her maternal grandmother and sister. The patients home medications have been reviewed.     Allergies: Percocet [oxycodone-acetaminophen], Metformin and related, and Tape [adhesive tape]    -------------------------------------------------- RESULTS -------------------------------------------------  Labs:  Results for orders placed or performed during the hospital encounter of 07/10/21   CBC auto differential   Result Value Ref Range    WBC 6.5 4.5 - 11.5 E9/L    RBC 4.00 3.50 - 5.50 E12/L    Hemoglobin 12.5 11.5 - 15.5 g/dL    Hematocrit 37.1 34.0 - 48.0 %    MCV 92.8 80.0 - 99.9 fL    MCH 31.3 26.0 - 35.0 pg    MCHC 33.7 32.0 - 34.5 %    RDW 13.2 11.5 - 15.0 fL    Platelets 946 311 - 063 E9/L    MPV 11.0 7.0 - 12.0 fL    Neutrophils % 54.3 43.0 - 80.0 %    Immature Granulocytes % 0.5 0.0 - 5.0 %    Lymphocytes % 34.8 20.0 - 42.0 %    Monocytes % 8.0 2.0 - 12.0 %    Eosinophils % 1.8 0.0 - 6.0 %    Basophils % 0.6 0.0 - 2.0 %    Neutrophils Absolute 3.53 1.80 - 7.30 E9/L    Immature Granulocytes # 0.03 E9/L    Lymphocytes Absolute 2.26 1.50 - 4.00 E9/L    Monocytes Absolute 0.52 0.10 - 0.95 E9/L    Eosinophils Absolute 0.12 0.05 - 0.50 E9/L    Basophils Absolute 0.04 0.00 - 0.20 F3/M   Basic Metabolic Panel   Result Value Ref Range    Sodium 140 132 - 146 mmol/L    Potassium 4.0 3.5 - 5.0 mmol/L    Chloride 104 98 - 107 mmol/L    CO2 30 (H) 22 - 29 mmol/L    Anion Gap 6 (L) 7 - 16 mmol/L    Glucose 141 (H) 74 - 99 mg/dL    BUN 10 6 - 20 mg/dL    CREATININE 0.8 0.5 - 1.0 mg/dL    GFR Non-African American >60 >=60 mL/min/1.73    GFR African American >60     Calcium 8.7 8.6 - 10.2 mg/dL   Troponin   Result Value Ref Range    Troponin, High Sensitivity 11 (H) 0 - 9 ng/L   Lipase   Result Value Ref Range    Lipase 16 13 - 60 U/L   Troponin   Result Value Ref Range    Troponin, High Sensitivity 11 (H) 0 - 9 ng/L   EKG 12 Lead   Result Value Ref Range    Ventricular Rate 73 BPM    Atrial Rate 73 BPM    P-R Interval 170 ms    QRS Duration 88 ms    Q-T Interval 424 ms    QTc Calculation (Bazett) 467 ms    P Axis 56 degrees    R Axis 2 degrees    T Axis 45 degrees       Radiology:  XR CHEST PORTABLE   Final Result   1. There is no acute cardiopulmonary disease.             ------------------------- NURSING NOTES AND VITALS REVIEWED ---------------------------  Date / Time Roomed:  7/10/2021 12:20 PM  ED Bed Assignment:  17/17    The nursing notes within the ED encounter and vital signs as below have been reviewed.    BP (!) 187/90   Pulse 73   Temp 97.1 °F (36.2 °C)   Resp 16   Ht 5' 6\" (1.676 m)   Wt (!) 330 lb (149.7 kg)   LMP 10/01/2012 (Approximate) Comment: 2013  SpO2 97%   BMI 53.26 kg/m²   Oxygen Saturation Interpretation: Normal      ------------------------------------------ PROGRESS NOTES ------------------------------------------  I have spoken with the patient and discussed todays results, in addition to providing specific details for the plan of care and counseling regarding the diagnosis and prognosis. Their questions are answered at this time and they are agreeable with the plan. I discussed at length with them reasons for immediate return here for re evaluation. They will followup with their primary care physician by calling their office tomorrow. --------------------------------- ADDITIONAL PROVIDER NOTES ---------------------------------  At this time the patient is without objective evidence of an acute process requiring hospitalization or inpatient management. They have remained hemodynamically stable throughout their entire ED visit and are stable for discharge with outpatient follow-up. The plan has been discussed in detail and they are aware of the specific conditions for emergent return, as well as the importance of follow-up. Discharge Medication List as of 7/10/2021  2:10 PM      START taking these medications    Details   sucralfate (CARAFATE) 1 GM tablet Take 1 tablet by mouth 4 times daily, Disp-40 tablet, R-0Print             Diagnosis:  1. Abdominal pain, left upper quadrant        Disposition:  Patient's disposition: Discharge to home  Patient's condition is stable.        Gideon Duran DO  07/12/21 [id number]

## 2021-07-10 NOTE — LETTER
5 [facility] Emergency Department  [address] [place] [address]  Phone: [phone]             July 10, 2021    Patient: Kurtis Connell   YOB: 1975   Date of Visit: 7/10/2021       To Whom It May Concern:    Dia Lima was seen and treated in our emergency department on 7/10/2021. She may return to work on 07/12/2021.       Sincerely,             Signature:__________________________________

## 2021-07-14 ENCOUNTER — NURSE TRIAGE (OUTPATIENT)
Dept: OTHER | Facility: CLINIC | Age: 46
End: 2021-07-14

## 2021-07-14 ENCOUNTER — TELEPHONE (OUTPATIENT)
Dept: FAMILY MEDICINE CLINIC | Age: 46
End: 2021-07-14

## 2021-07-14 ENCOUNTER — HOSPITAL ENCOUNTER (OUTPATIENT)
Age: 46
Setting detail: OBSERVATION
Discharge: HOME | End: 2021-07-15
Attending: EMERGENCY MEDICINE | Admitting: FAMILY MEDICINE
Payer: MEDICAID

## 2021-07-14 ENCOUNTER — APPOINTMENT (OUTPATIENT)
Dept: GENERAL RADIOLOGY | Age: 46
End: 2021-07-14
Payer: MEDICAID

## 2021-07-14 DIAGNOSIS — R07.9 CHEST PAIN, UNSPECIFIED TYPE: Primary | ICD-10-CM

## 2021-07-14 DIAGNOSIS — I10 POORLY-CONTROLLED HYPERTENSION: ICD-10-CM

## 2021-07-14 LAB
ALBUMIN SERPL-MCNC: 3.8 G/DL (ref 3.5–5.2)
ALP BLD-CCNC: 92 U/L (ref 35–104)
ALT SERPL-CCNC: 32 U/L (ref 0–32)
ANION GAP SERPL CALCULATED.3IONS-SCNC: 10 MMOL/L (ref 7–16)
AST SERPL-CCNC: 22 U/L (ref 0–31)
BASOPHILS ABSOLUTE: 0.04 E9/L (ref 0–0.2)
BASOPHILS RELATIVE PERCENT: 0.5 % (ref 0–2)
BETA-HYDROXYBUTYRATE: 0.13 MMOL/L (ref 0.02–0.27)
BILIRUB SERPL-MCNC: 0.6 MG/DL (ref 0–1.2)
BUN BLDV-MCNC: 8 MG/DL (ref 6–20)
CALCIUM SERPL-MCNC: 8.9 MG/DL (ref 8.6–10.2)
CHLORIDE BLD-SCNC: 101 MMOL/L (ref 98–107)
CO2: 29 MMOL/L (ref 22–29)
CREAT SERPL-MCNC: 0.8 MG/DL (ref 0.5–1)
D DIMER: 243 NG/ML DDU
EOSINOPHILS ABSOLUTE: 0.11 E9/L (ref 0.05–0.5)
EOSINOPHILS RELATIVE PERCENT: 1.4 % (ref 0–6)
GFR AFRICAN AMERICAN: >60
GFR NON-AFRICAN AMERICAN: >60 ML/MIN/1.73
GLUCOSE BLD-MCNC: 156 MG/DL (ref 74–99)
HCT VFR BLD CALC: 39 % (ref 34–48)
HEMOGLOBIN: 13.4 G/DL (ref 11.5–15.5)
IMMATURE GRANULOCYTES #: 0.07 E9/L
IMMATURE GRANULOCYTES %: 0.9 % (ref 0–5)
LACTIC ACID: 1.5 MMOL/L (ref 0.5–2.2)
LYMPHOCYTES ABSOLUTE: 3 E9/L (ref 1.5–4)
LYMPHOCYTES RELATIVE PERCENT: 37.2 % (ref 20–42)
MCH RBC QN AUTO: 31.2 PG (ref 26–35)
MCHC RBC AUTO-ENTMCNC: 34.4 % (ref 32–34.5)
MCV RBC AUTO: 90.9 FL (ref 80–99.9)
MONOCYTES ABSOLUTE: 0.58 E9/L (ref 0.1–0.95)
MONOCYTES RELATIVE PERCENT: 7.2 % (ref 2–12)
NEUTROPHILS ABSOLUTE: 4.27 E9/L (ref 1.8–7.3)
NEUTROPHILS RELATIVE PERCENT: 52.8 % (ref 43–80)
PDW BLD-RTO: 13.3 FL (ref 11.5–15)
PH VENOUS: 7.44 (ref 7.35–7.45)
PLATELET # BLD: 179 E9/L (ref 130–450)
PMV BLD AUTO: 11.9 FL (ref 7–12)
POTASSIUM SERPL-SCNC: 3.6 MMOL/L (ref 3.5–5)
PRO-BNP: 84 PG/ML (ref 0–125)
RBC # BLD: 4.29 E12/L (ref 3.5–5.5)
SODIUM BLD-SCNC: 140 MMOL/L (ref 132–146)
TOTAL PROTEIN: 6.6 G/DL (ref 6.4–8.3)
TROPONIN, HIGH SENSITIVITY: 9 NG/L (ref 0–9)
WBC # BLD: 8.1 E9/L (ref 4.5–11.5)

## 2021-07-14 PROCEDURE — 96375 TX/PRO/DX INJ NEW DRUG ADDON: CPT

## 2021-07-14 PROCEDURE — 82010 KETONE BODYS QUAN: CPT

## 2021-07-14 PROCEDURE — 84484 ASSAY OF TROPONIN QUANT: CPT

## 2021-07-14 PROCEDURE — 36415 COLL VENOUS BLD VENIPUNCTURE: CPT

## 2021-07-14 PROCEDURE — 93005 ELECTROCARDIOGRAM TRACING: CPT | Performed by: EMERGENCY MEDICINE

## 2021-07-14 PROCEDURE — 96374 THER/PROPH/DIAG INJ IV PUSH: CPT

## 2021-07-14 PROCEDURE — 83605 ASSAY OF LACTIC ACID: CPT

## 2021-07-14 PROCEDURE — 6370000000 HC RX 637 (ALT 250 FOR IP): Performed by: EMERGENCY MEDICINE

## 2021-07-14 PROCEDURE — 85378 FIBRIN DEGRADE SEMIQUANT: CPT

## 2021-07-14 PROCEDURE — 2580000003 HC RX 258: Performed by: EMERGENCY MEDICINE

## 2021-07-14 PROCEDURE — 83880 ASSAY OF NATRIURETIC PEPTIDE: CPT

## 2021-07-14 PROCEDURE — 80053 COMPREHEN METABOLIC PANEL: CPT

## 2021-07-14 PROCEDURE — 99285 EMERGENCY DEPT VISIT HI MDM: CPT

## 2021-07-14 PROCEDURE — 82800 BLOOD PH: CPT

## 2021-07-14 PROCEDURE — 85025 COMPLETE CBC W/AUTO DIFF WBC: CPT

## 2021-07-14 PROCEDURE — 71045 X-RAY EXAM CHEST 1 VIEW: CPT

## 2021-07-14 PROCEDURE — 6360000002 HC RX W HCPCS: Performed by: EMERGENCY MEDICINE

## 2021-07-14 PROCEDURE — G0378 HOSPITAL OBSERVATION PER HR: HCPCS

## 2021-07-14 RX ORDER — ASPIRIN 81 MG/1
162 TABLET, CHEWABLE ORAL ONCE
Status: COMPLETED | OUTPATIENT
Start: 2021-07-14 | End: 2021-07-14

## 2021-07-14 RX ORDER — MORPHINE SULFATE 10 MG/ML
10 INJECTION, SOLUTION INTRAMUSCULAR; INTRAVENOUS ONCE
Status: COMPLETED | OUTPATIENT
Start: 2021-07-14 | End: 2021-07-14

## 2021-07-14 RX ORDER — ONDANSETRON 2 MG/ML
4 INJECTION INTRAMUSCULAR; INTRAVENOUS ONCE
Status: COMPLETED | OUTPATIENT
Start: 2021-07-14 | End: 2021-07-14

## 2021-07-14 RX ORDER — NITROGLYCERIN 0.4 MG/1
0.4 TABLET SUBLINGUAL EVERY 5 MIN PRN
Status: DISCONTINUED | OUTPATIENT
Start: 2021-07-14 | End: 2021-07-15 | Stop reason: HOSPADM

## 2021-07-14 RX ORDER — 0.9 % SODIUM CHLORIDE 0.9 %
1000 INTRAVENOUS SOLUTION INTRAVENOUS ONCE
Status: COMPLETED | OUTPATIENT
Start: 2021-07-14 | End: 2021-07-14

## 2021-07-14 RX ADMIN — NITROGLYCERIN 0.4 MG: 0.4 TABLET, ORALLY DISINTEGRATING SUBLINGUAL at 20:20

## 2021-07-14 RX ADMIN — SODIUM CHLORIDE 1000 ML: 9 INJECTION, SOLUTION INTRAVENOUS at 19:53

## 2021-07-14 RX ADMIN — ASPIRIN 81 MG CHEWABLE TABLET 162 MG: 81 TABLET CHEWABLE at 19:56

## 2021-07-14 RX ADMIN — ONDANSETRON 4 MG: 2 INJECTION INTRAMUSCULAR; INTRAVENOUS at 19:57

## 2021-07-14 RX ADMIN — NITROGLYCERIN 0.4 MG: 0.4 TABLET, ORALLY DISINTEGRATING SUBLINGUAL at 20:02

## 2021-07-14 RX ADMIN — NITROGLYCERIN 0.4 MG: 0.4 TABLET, ORALLY DISINTEGRATING SUBLINGUAL at 20:33

## 2021-07-14 RX ADMIN — MORPHINE SULFATE 10 MG: 10 INJECTION INTRAVENOUS at 19:58

## 2021-07-14 ASSESSMENT — PAIN DESCRIPTION - PROGRESSION: CLINICAL_PROGRESSION: GRADUALLY WORSENING

## 2021-07-14 ASSESSMENT — PAIN DESCRIPTION - FREQUENCY: FREQUENCY: CONTINUOUS

## 2021-07-14 ASSESSMENT — PAIN DESCRIPTION - PAIN TYPE: TYPE: ACUTE PAIN

## 2021-07-14 ASSESSMENT — PAIN DESCRIPTION - ONSET: ONSET: ON-GOING

## 2021-07-14 ASSESSMENT — PAIN DESCRIPTION - ORIENTATION: ORIENTATION: MID

## 2021-07-14 ASSESSMENT — PAIN SCALES - GENERAL
PAINLEVEL_OUTOF10: 6
PAINLEVEL_OUTOF10: 8

## 2021-07-14 ASSESSMENT — PAIN DESCRIPTION - LOCATION: LOCATION: CHEST

## 2021-07-14 ASSESSMENT — PAIN DESCRIPTION - DESCRIPTORS: DESCRIPTORS: HEAVINESS

## 2021-07-14 ASSESSMENT — HEART SCORE: ECG: 1

## 2021-07-14 NOTE — ED PROVIDER NOTES
laparoscopic ();  section (); Foot surgery (); Ovary removal (); Dental surgery (10/06/2011); bernadette and bso (cervix removed) (3/19/2013); hernia repair (9/3/13); Hysterectomy; Echo Complete (2014); and Knee cartilage surgery. Social History:  reports that she has never smoked. She has never used smokeless tobacco. She reports that she does not drink alcohol and does not use drugs. Family History: family history includes Asthma in her brother, mother, and sister; Cancer in her father and paternal grandfather; Depression in her father, maternal grandmother, and paternal grandmother; Diabetes in her brother and mother; Heart Disease in her father and maternal grandfather; High Blood Pressure in her father, maternal grandmother, mother, and paternal grandmother; Mental Illness in her maternal grandmother; Thyroid Disease in her maternal grandmother and sister. . Unless otherwise noted, family history is non contributory    The patients home medications have been reviewed. Allergies: Percocet [oxycodone-acetaminophen], Metformin and related, and Tape [adhesive tape]    I have reviewed the past medical history, past surgical history, social history, and family history    ---------------------------------------------------PHYSICAL EXAM--------------------------------------    Constitutional/General: Alert and oriented x3 well-developed well-nourished moderate distress  Head: Normocephalic and atraumatic  Eyes: PERRL, EOMI, sclera non icteric  ENT: Oropharynx clear, handling secretions, no trismus, no asymmetry of the posterior oropharynx or uvular edema  Neck: Supple, full ROM, no stridor, no meningeal signs  Respiratory: Lungs clear to auscultation bilaterally, no wheezes, rales, or rhonchi. Not in respiratory distress  Cardiovascular:  Regular rate. Regular rhythm. No murmurs, no gallops, no rubs. 2+ distal pulses. Equal extremity pulses.    Chest: No chest wall tenderness; PMI nondisplaced  Gastrointestinal:  Abdomen Soft, Non tender, Non distended. No rebound, guarding, or rigidity. No pulsatile masses. Musculoskeletal: Moves all extremities x 4. Warm and well perfused, no clubbing, no cyanosis, no edema. Capillary refill <3 seconds. No calf tenderness no cords. No clinical signs of DVT  Skin: skin warm and dry. No rashes. Neurologic: GCS 15, no focal deficits, symmetric strength 5/5 in the upper and lower extremities bilaterally  Psychiatric: Normal Affect      -------------------------------------------------- RESULTS -------------------------------------------------  I have personally reviewed all laboratory and imaging results for this patient. Results are listed below.      LABS: (Lab results interpreted by me)  Results for orders placed or performed during the hospital encounter of 07/14/21   Brain Natriuretic Peptide   Result Value Ref Range    Pro-BNP 84 0 - 125 pg/mL   D-Dimer, Quantitative   Result Value Ref Range    D-Dimer, Quant 243 ng/mL DDU   CBC Auto Differential   Result Value Ref Range    WBC 8.1 4.5 - 11.5 E9/L    RBC 4.29 3.50 - 5.50 E12/L    Hemoglobin 13.4 11.5 - 15.5 g/dL    Hematocrit 39.0 34.0 - 48.0 %    MCV 90.9 80.0 - 99.9 fL    MCH 31.2 26.0 - 35.0 pg    MCHC 34.4 32.0 - 34.5 %    RDW 13.3 11.5 - 15.0 fL    Platelets 711 831 - 080 E9/L    MPV 11.9 7.0 - 12.0 fL    Neutrophils % 52.8 43.0 - 80.0 %    Immature Granulocytes % 0.9 0.0 - 5.0 %    Lymphocytes % 37.2 20.0 - 42.0 %    Monocytes % 7.2 2.0 - 12.0 %    Eosinophils % 1.4 0.0 - 6.0 %    Basophils % 0.5 0.0 - 2.0 %    Neutrophils Absolute 4.27 1.80 - 7.30 E9/L    Immature Granulocytes # 0.07 E9/L    Lymphocytes Absolute 3.00 1.50 - 4.00 E9/L    Monocytes Absolute 0.58 0.10 - 0.95 E9/L    Eosinophils Absolute 0.11 0.05 - 0.50 E9/L    Basophils Absolute 0.04 0.00 - 0.20 E9/L   Comprehensive Metabolic Panel   Result Value Ref Range    Sodium 140 132 - 146 mmol/L    Potassium 3.6 3.5 - 5.0 mmol/L Chloride 101 98 - 107 mmol/L    CO2 29 22 - 29 mmol/L    Anion Gap 10 7 - 16 mmol/L    Glucose 156 (H) 74 - 99 mg/dL    BUN 8 6 - 20 mg/dL    CREATININE 0.8 0.5 - 1.0 mg/dL    GFR Non-African American >60 >=60 mL/min/1.73    GFR African American >60     Calcium 8.9 8.6 - 10.2 mg/dL    Total Protein 6.6 6.4 - 8.3 g/dL    Albumin 3.8 3.5 - 5.2 g/dL    Total Bilirubin 0.6 0.0 - 1.2 mg/dL    Alkaline Phosphatase 92 35 - 104 U/L    ALT 32 0 - 32 U/L    AST 22 0 - 31 U/L   Lactic Acid, Plasma   Result Value Ref Range    Lactic Acid 1.5 0.5 - 2.2 mmol/L   Beta-Hydroxybutyrate   Result Value Ref Range    Beta-Hydroxybutyrate 0.13 0.02 - 0.27 mmol/L   PH, VENOUS   Result Value Ref Range    pH, Gen 7.44 7.35 - 7.45   Troponin   Result Value Ref Range    Troponin, High Sensitivity 9 0 - 9 ng/L   EKG 12 Lead   Result Value Ref Range    Ventricular Rate 81 BPM    Atrial Rate 81 BPM    P-R Interval 170 ms    QRS Duration 86 ms    Q-T Interval 422 ms    QTc Calculation (Bazett) 490 ms    P Axis 52 degrees    R Axis 0 degrees    T Axis 43 degrees   ,       RADIOLOGY:  Interpreted by Radiologist unless otherwise specified  XR CHEST PORTABLE   Final Result   No acute process. ------------------------- NURSING NOTES AND VITALS REVIEWED ---------------------------  The nursing notes within the ED encounter and vital signs as below have been reviewed by myself  BP (!) 174/87   Pulse 76   Temp 97.8 °F (36.6 °C) (Temporal)   Resp 15   Ht 5' 6\" (1.676 m)   Wt (!) 350 lb (158.8 kg)   LMP 10/01/2012 (Approximate) Comment: 2013  SpO2 98%   BMI 56.49 kg/m²   Oxygen Saturation Interpretation: Normal  The patients available past medical records and past encounters were reviewed.         ------------------------------ ED COURSE/MEDICAL DECISION MAKING----------------------  Medications   nitroGLYCERIN (NITROSTAT) SL tablet 0.4 mg (0.4 mg Sublingual Given 7/14/21 2033)   aspirin chewable tablet 162 mg (162 mg Oral Given 7/14/21 1956)   0.9 % sodium chloride bolus (1,000 mLs Intravenous New Bag 7/14/21 1953)   morphine (PF) injection 10 mg (10 mg Intravenous Given 7/14/21 1958)   ondansetron (ZOFRAN) injection 4 mg (4 mg Intravenous Given 7/14/21 1957)       The cardiac monitor revealed sinus rhythm with a heart rate in the 80s s as interpreted by me. The cardiac monitor was ordered secondary to the patient's chief complaint and to monitor the patient for dysrhythmia. CPT [id number]. Oxygen Saturation Interpretation: 98% % on room air. Normal    MEDICAL DECISION MAKING:  Differential Diagnoses:  ACS/MI, chest wall strain, pericarditis, pneumonia, pneumothorax, aortic dissection to name a few. Patient has a HEAR Score = 6 points, Moderate-High Risk. Patient's blood pressure was markedly elevated and I feel the patient could be at risk for ACS despite her normal initial troponin T. Patient had recent CTA pulmonary study on 6/28/2021 and her screening D-dimer result today was less than the screening D-dimer on that date. The patient has no tachypnea nor tachycardia to suggest development of a clinically significant PE over this short time course. Patient's chest x-ray did not show any focal infiltrates nor any evidence of pneumothorax nor any mediastinal abnormalities to suggest aortic dissection. Additionally the patient not had any sharp stabbing pain radiating to the abdomen nor back. EKG #1:  Interpreted by emergency department physician unless otherwise noted. Time:  19:00    Rate: 80 bpm  Rhythm: Sinus rhythm. Interpretation: Normal sinus rhythm. + LAE, Poor R Wave progression. Abnormal EKG  Comparison: Today's ECG is unchanged from previous tracings.     The HEAR Risk Score for Acute Coronary Syndrome  Age <46 years, 55-63, 65+  ?  1   > 2 Risk Factors for CAD?*, 1-2, 0  2   History: slight, moderate, highly suspicious  2   EKG: Normal, nonspecific repolarization changes, ST depression   1        Total HEART Score:  6 Moderate-High Risk Score     *Risk factors as follows:                  - History/Family history of CAD    - Hypertension      - Hyperlipidemia     - Diabetes mellitus  - Obesity    Predicts 6-week risk of major adverse cardiac event. Low Score (0-3 points), risk of MACE of 0.9-1.7%. Moderate Score (4-6 points), risk of MACE of 12-16.6%. High Score (7-10 points), risk of MACE of 50-65%. Serial Re-examination:  7/14/21   7:21 PM EDT          Vital Signs:   Vitals:    07/14/21 1958 07/14/21 2019 07/14/21 2032 07/14/21 2036   BP: (!) 227/100 (!) 171/100 (!) 191/101 (!) 174/87   Pulse: 74 76     Resp: 15 15     Temp:       TempSrc:       SpO2: 98% 98%     Weight:       Height:         Card/Pulm:  Rhythm: normal rate. Heart Sounds: no murmurs, gallops, or rubs. clear to auscultation, no wheezes or rales and unlabored breathing. Capillary Refill: normal.  Radial Pulse:  present 2+. Skin:  Dry. This patient's ED course included: a personal history and physicial examination, re-evaluation prior to disposition, multiple bedside re-evaluations, IV medications and cardiac monitoring  This patient has remained hemodynamically stable and improved during their ED course. Consultations:  Spoke w/ Dr. Lainey Tavarez @ approx. 22:19 hours and she accepted the patient ADM Telemetry    Counseling: The emergency provider has spoken with the patient and spouse/SO and discussed todays results, in addition to providing specific details for the plan of care and counseling regarding the diagnosis and prognosis. Questions are answered at this time and they are agreeable with the plan.     --------------------------------- IMPRESSION AND DISPOSITION ---------------------------------    IMPRESSION  1. Chest pain, unspecified type    2. Poorly-controlled hypertension        DISPOSITION  Disposition: Admit to telemetry  Patient condition is stable        NOTE: This report was transcribed using voice recognition software.  Every effort was made to ensure accuracy; however, inadvertent computerized transcription errors may be present        Derrell Rodriguez MD  07/14/21 [id number]

## 2021-07-14 NOTE — TELEPHONE ENCOUNTER
Reason for Disposition  Leeann Caller has already spoken with another triager and has no further questions. Protocols used: NO CONTACT OR DUPLICATE CONTACT CALL-ADULT-AH    Received call from Alma Nunez at [facility] with [employer] Complaint. Brief description of triage: N/A - pt has been seen with same symptoms multiple times in ER .  states they would like a referral to the cardiologist. This RN contacted the [facility], spoke to [facility] about 's request - she states the patient already called and spoke to the office this morning. [facility] states the referral will be discussed at the pt's scheduled appointment on this Monday with the doctor - information relayed to pt and pt's  and they verbalize understanding. This RN instructed pt to go to ER with new or worsening symptoms . Triage indicates for patient to : N/A (see above)    Care advice provided, patient verbalizes understanding; denies any other questions or concerns; instructed to call back for any new or worsening symptoms. Attention Provider: Thank you for allowing me to participate in the care of your patient. The patient was connected to triage in response to information provided to the [facility]. Please do not respond through this encounter as the response is not directed to a shared pool.

## 2021-07-14 NOTE — TELEPHONE ENCOUNTER
Patient called in with complaints that she would like to see cardiology. She has an appointment on 7/19 for a skin tag. I advised patient that she has been in the ED several times and that we could do an ED f/u sooner. She was upset saying they just keep telling her it's a GI issue and she knows her body and its her heart. She said she had already called the ambulance and declined sooner appt. Also stated she would have her  take her to [place] because nobody is listening to her. Patient said she might as well keep her appointment on Monday and hung up.

## 2021-07-14 NOTE — TELEPHONE ENCOUNTER
Pt's  calling wife has  Pressure on her heart. Heart pain No avail appts . Transferred to [facility].

## 2021-07-15 VITALS
BODY MASS INDEX: 47.09 KG/M2 | SYSTOLIC BLOOD PRESSURE: 159 MMHG | RESPIRATION RATE: 16 BRPM | TEMPERATURE: 97.8 F | WEIGHT: 293 LBS | HEART RATE: 76 BPM | HEIGHT: 66 IN | DIASTOLIC BLOOD PRESSURE: 86 MMHG | OXYGEN SATURATION: 96 %

## 2021-07-15 PROBLEM — E66.01 MORBID OBESITY WITH BMI OF 50.0-59.9, ADULT (HCC): Status: ACTIVE | Noted: 2021-07-15

## 2021-07-15 PROBLEM — R60.0 BILATERAL LOWER EXTREMITY EDEMA: Status: ACTIVE | Noted: 2021-07-15

## 2021-07-15 PROBLEM — R07.9 CHEST PAIN IN ADULT: Status: ACTIVE | Noted: 2021-07-15

## 2021-07-15 PROBLEM — E28.2 PCOS (POLYCYSTIC OVARIAN SYNDROME): Status: ACTIVE | Noted: 2021-07-15

## 2021-07-15 PROBLEM — I89.0 LYMPHEDEMA OF BOTH LOWER EXTREMITIES: Status: ACTIVE | Noted: 2021-07-15

## 2021-07-15 PROBLEM — E87.70 VOLUME OVERLOAD: Status: ACTIVE | Noted: 2021-07-15

## 2021-07-15 LAB
ANION GAP SERPL CALCULATED.3IONS-SCNC: 10 MMOL/L (ref 7–16)
BUN BLDV-MCNC: 9 MG/DL (ref 6–20)
CALCIUM SERPL-MCNC: 8.3 MG/DL (ref 8.6–10.2)
CHLORIDE BLD-SCNC: 100 MMOL/L (ref 98–107)
CHOLESTEROL, TOTAL: 204 MG/DL (ref 0–199)
CO2: 28 MMOL/L (ref 22–29)
CREAT SERPL-MCNC: 0.7 MG/DL (ref 0.5–1)
EKG ATRIAL RATE: 69 BPM
EKG ATRIAL RATE: 81 BPM
EKG P AXIS: 52 DEGREES
EKG P AXIS: 54 DEGREES
EKG P-R INTERVAL: 166 MS
EKG P-R INTERVAL: 170 MS
EKG Q-T INTERVAL: 422 MS
EKG Q-T INTERVAL: 468 MS
EKG QRS DURATION: 86 MS
EKG QRS DURATION: 90 MS
EKG QTC CALCULATION (BAZETT): 490 MS
EKG QTC CALCULATION (BAZETT): 501 MS
EKG R AXIS: 0 DEGREES
EKG R AXIS: 13 DEGREES
EKG T AXIS: 11 DEGREES
EKG T AXIS: 43 DEGREES
EKG VENTRICULAR RATE: 69 BPM
EKG VENTRICULAR RATE: 81 BPM
GFR AFRICAN AMERICAN: >60
GFR NON-AFRICAN AMERICAN: >60 ML/MIN/1.73
GLUCOSE BLD-MCNC: 200 MG/DL (ref 74–99)
HBA1C MFR BLD: 6.8 % (ref 4–5.6)
HDLC SERPL-MCNC: 51 MG/DL
INR BLD: 1.1
LDL CHOLESTEROL CALCULATED: 134 MG/DL (ref 0–99)
METER GLUCOSE: 154 MG/DL (ref 74–99)
METER GLUCOSE: 184 MG/DL (ref 74–99)
METER GLUCOSE: 190 MG/DL (ref 74–99)
POTASSIUM REFLEX MAGNESIUM: 3.8 MMOL/L (ref 3.5–5)
PROTHROMBIN TIME: 11.6 SEC (ref 9.3–12.4)
SODIUM BLD-SCNC: 138 MMOL/L (ref 132–146)
T4 FREE: 1.25 NG/DL (ref 0.93–1.7)
TRIGL SERPL-MCNC: 96 MG/DL (ref 0–149)
TROPONIN, HIGH SENSITIVITY: 10 NG/L (ref 0–9)
TSH SERPL DL<=0.05 MIU/L-ACNC: 3.48 UIU/ML (ref 0.27–4.2)
VLDLC SERPL CALC-MCNC: 19 MG/DL

## 2021-07-15 PROCEDURE — 96372 THER/PROPH/DIAG INJ SC/IM: CPT

## 2021-07-15 PROCEDURE — 80061 LIPID PANEL: CPT

## 2021-07-15 PROCEDURE — 84484 ASSAY OF TROPONIN QUANT: CPT

## 2021-07-15 PROCEDURE — APPSS60 APP SPLIT SHARED TIME 46-60 MINUTES: Performed by: NURSE PRACTITIONER

## 2021-07-15 PROCEDURE — 96376 TX/PRO/DX INJ SAME DRUG ADON: CPT

## 2021-07-15 PROCEDURE — 84443 ASSAY THYROID STIM HORMONE: CPT

## 2021-07-15 PROCEDURE — 85610 PROTHROMBIN TIME: CPT

## 2021-07-15 PROCEDURE — 93005 ELECTROCARDIOGRAM TRACING: CPT | Performed by: EMERGENCY MEDICINE

## 2021-07-15 PROCEDURE — 2580000003 HC RX 258: Performed by: FAMILY MEDICINE

## 2021-07-15 PROCEDURE — 6360000002 HC RX W HCPCS: Performed by: EMERGENCY MEDICINE

## 2021-07-15 PROCEDURE — 80048 BASIC METABOLIC PNL TOTAL CA: CPT

## 2021-07-15 PROCEDURE — 93010 ELECTROCARDIOGRAM REPORT: CPT | Performed by: INTERNAL MEDICINE

## 2021-07-15 PROCEDURE — 36415 COLL VENOUS BLD VENIPUNCTURE: CPT

## 2021-07-15 PROCEDURE — G0378 HOSPITAL OBSERVATION PER HR: HCPCS

## 2021-07-15 PROCEDURE — 82962 GLUCOSE BLOOD TEST: CPT

## 2021-07-15 PROCEDURE — 99245 OFF/OP CONSLTJ NEW/EST HI 55: CPT | Performed by: INTERNAL MEDICINE

## 2021-07-15 PROCEDURE — 6360000002 HC RX W HCPCS: Performed by: FAMILY MEDICINE

## 2021-07-15 PROCEDURE — 83036 HEMOGLOBIN GLYCOSYLATED A1C: CPT

## 2021-07-15 PROCEDURE — 6370000000 HC RX 637 (ALT 250 FOR IP): Performed by: EMERGENCY MEDICINE

## 2021-07-15 PROCEDURE — 84439 ASSAY OF FREE THYROXINE: CPT

## 2021-07-15 PROCEDURE — 6370000000 HC RX 637 (ALT 250 FOR IP): Performed by: FAMILY MEDICINE

## 2021-07-15 PROCEDURE — 96375 TX/PRO/DX INJ NEW DRUG ADDON: CPT

## 2021-07-15 RX ORDER — ONDANSETRON 2 MG/ML
4 INJECTION INTRAMUSCULAR; INTRAVENOUS EVERY 6 HOURS PRN
Status: DISCONTINUED | OUTPATIENT
Start: 2021-07-15 | End: 2021-07-15 | Stop reason: HOSPADM

## 2021-07-15 RX ORDER — POLYETHYLENE GLYCOL 3350 17 G/17G
17 POWDER, FOR SOLUTION ORAL DAILY PRN
Status: DISCONTINUED | OUTPATIENT
Start: 2021-07-15 | End: 2021-07-15 | Stop reason: HOSPADM

## 2021-07-15 RX ORDER — SODIUM CHLORIDE 0.9 % (FLUSH) 0.9 %
5-40 SYRINGE (ML) INJECTION EVERY 12 HOURS SCHEDULED
Status: DISCONTINUED | OUTPATIENT
Start: 2021-07-15 | End: 2021-07-15 | Stop reason: HOSPADM

## 2021-07-15 RX ORDER — NICOTINE POLACRILEX 4 MG
15 LOZENGE BUCCAL PRN
Status: DISCONTINUED | OUTPATIENT
Start: 2021-07-15 | End: 2021-07-15 | Stop reason: HOSPADM

## 2021-07-15 RX ORDER — HYDRALAZINE HYDROCHLORIDE 25 MG/1
25 TABLET, FILM COATED ORAL EVERY 8 HOURS SCHEDULED
Qty: 90 TABLET | Refills: 3 | Status: ON HOLD | OUTPATIENT
Start: 2021-07-15 | End: 2022-04-12 | Stop reason: HOSPADM

## 2021-07-15 RX ORDER — DEXTROSE MONOHYDRATE 50 MG/ML
100 INJECTION, SOLUTION INTRAVENOUS PRN
Status: DISCONTINUED | OUTPATIENT
Start: 2021-07-15 | End: 2021-07-15 | Stop reason: HOSPADM

## 2021-07-15 RX ORDER — ASPIRIN 81 MG/1
81 TABLET, CHEWABLE ORAL DAILY
Status: DISCONTINUED | OUTPATIENT
Start: 2021-07-16 | End: 2021-07-15 | Stop reason: HOSPADM

## 2021-07-15 RX ORDER — CLONIDINE HYDROCHLORIDE 0.1 MG/1
0.2 TABLET ORAL ONCE
Status: COMPLETED | OUTPATIENT
Start: 2021-07-15 | End: 2021-07-15

## 2021-07-15 RX ORDER — CHLORHEXIDINE GLUCONATE 0.12 MG/ML
15 RINSE ORAL 2 TIMES DAILY
Status: DISCONTINUED | OUTPATIENT
Start: 2021-07-15 | End: 2021-07-15 | Stop reason: HOSPADM

## 2021-07-15 RX ORDER — HYDRALAZINE HYDROCHLORIDE 25 MG/1
25 TABLET, FILM COATED ORAL EVERY 8 HOURS SCHEDULED
Status: DISCONTINUED | OUTPATIENT
Start: 2021-07-15 | End: 2021-07-15 | Stop reason: HOSPADM

## 2021-07-15 RX ORDER — INSULIN GLARGINE 100 [IU]/ML
10 INJECTION, SOLUTION SUBCUTANEOUS NIGHTLY
Status: DISCONTINUED | OUTPATIENT
Start: 2021-07-15 | End: 2021-07-15 | Stop reason: HOSPADM

## 2021-07-15 RX ORDER — LISINOPRIL 10 MG/1
40 TABLET ORAL DAILY
Status: DISCONTINUED | OUTPATIENT
Start: 2021-07-15 | End: 2021-07-15 | Stop reason: HOSPADM

## 2021-07-15 RX ORDER — ONDANSETRON 4 MG/1
4 TABLET, ORALLY DISINTEGRATING ORAL EVERY 8 HOURS PRN
Status: DISCONTINUED | OUTPATIENT
Start: 2021-07-15 | End: 2021-07-15 | Stop reason: HOSPADM

## 2021-07-15 RX ORDER — SODIUM CHLORIDE 0.9 % (FLUSH) 0.9 %
5-40 SYRINGE (ML) INJECTION PRN
Status: DISCONTINUED | OUTPATIENT
Start: 2021-07-15 | End: 2021-07-15 | Stop reason: HOSPADM

## 2021-07-15 RX ORDER — DEXTROSE MONOHYDRATE 25 G/50ML
12.5 INJECTION, SOLUTION INTRAVENOUS PRN
Status: DISCONTINUED | OUTPATIENT
Start: 2021-07-15 | End: 2021-07-15 | Stop reason: HOSPADM

## 2021-07-15 RX ORDER — SODIUM CHLORIDE 9 MG/ML
25 INJECTION, SOLUTION INTRAVENOUS PRN
Status: DISCONTINUED | OUTPATIENT
Start: 2021-07-15 | End: 2021-07-15 | Stop reason: HOSPADM

## 2021-07-15 RX ORDER — LORAZEPAM 2 MG/ML
2 INJECTION INTRAMUSCULAR ONCE
Status: COMPLETED | OUTPATIENT
Start: 2021-07-15 | End: 2021-07-15

## 2021-07-15 RX ORDER — FUROSEMIDE 10 MG/ML
40 INJECTION INTRAMUSCULAR; INTRAVENOUS 2 TIMES DAILY
Status: DISCONTINUED | OUTPATIENT
Start: 2021-07-15 | End: 2021-07-15 | Stop reason: HOSPADM

## 2021-07-15 RX ORDER — LORAZEPAM 2 MG/ML
2 INJECTION INTRAMUSCULAR ONCE
Status: DISCONTINUED | OUTPATIENT
Start: 2021-07-15 | End: 2021-07-15

## 2021-07-15 RX ORDER — HYDROCHLOROTHIAZIDE 25 MG/1
25 TABLET ORAL EVERY MORNING
Status: DISCONTINUED | OUTPATIENT
Start: 2021-07-15 | End: 2021-07-15

## 2021-07-15 RX ORDER — SUCRALFATE 1 G/1
1 TABLET ORAL 4 TIMES DAILY
Status: DISCONTINUED | OUTPATIENT
Start: 2021-07-15 | End: 2021-07-15 | Stop reason: HOSPADM

## 2021-07-15 RX ADMIN — HYDRALAZINE HYDROCHLORIDE 25 MG: 25 TABLET, FILM COATED ORAL at 04:22

## 2021-07-15 RX ADMIN — SUCRALFATE 1 G: 1 TABLET ORAL at 09:09

## 2021-07-15 RX ADMIN — LISINOPRIL 40 MG: 10 TABLET ORAL at 09:09

## 2021-07-15 RX ADMIN — 0.12% CHLORHEXIDINE GLUCONATE 15 ML: 1.2 RINSE ORAL at 09:10

## 2021-07-15 RX ADMIN — LORAZEPAM 2 MG: 2 INJECTION INTRAMUSCULAR; INTRAVENOUS at 00:56

## 2021-07-15 RX ADMIN — ENOXAPARIN SODIUM 40 MG: 40 INJECTION SUBCUTANEOUS at 09:10

## 2021-07-15 RX ADMIN — SODIUM CHLORIDE, PRESERVATIVE FREE 10 ML: 5 INJECTION INTRAVENOUS at 09:11

## 2021-07-15 RX ADMIN — CLONIDINE HYDROCHLORIDE 0.2 MG: 0.1 TABLET ORAL at 00:51

## 2021-07-15 RX ADMIN — FUROSEMIDE 40 MG: 40 INJECTION, SOLUTION INTRAMUSCULAR; INTRAVENOUS at 09:10

## 2021-07-15 RX ADMIN — FUROSEMIDE 40 MG: 40 INJECTION, SOLUTION INTRAMUSCULAR; INTRAVENOUS at 04:21

## 2021-07-15 RX ADMIN — INSULIN LISPRO 2 UNITS: 100 INJECTION, SOLUTION INTRAVENOUS; SUBCUTANEOUS at 06:22

## 2021-07-15 ASSESSMENT — PAIN SCALES - GENERAL: PAINLEVEL_OUTOF10: 4

## 2021-07-15 ASSESSMENT — PAIN DESCRIPTION - PAIN TYPE: TYPE: ACUTE PAIN

## 2021-07-15 NOTE — CONSULTS
Patient seen and examined. Chart, labs, imaging studies, EKG and rhythm strips reviewed. Full consult to follow. Consulted for CP and CHF    IMPRESSION:  1. Chronic non cardiac CP with history of multiple stress's and TTE's last stress in 2016 was nonischemic, [facility] in 2014 with minimal disease [facility]). Last TTE in 2019 unremarkable (EF 60%). No ischemic EKG changes and negative troponin. 2. Patient is not in CHF  3. HTN  4. HLD  5. T2DM insulin requiring HgbA1c 7.0 in 3/2/2021  6. Morbid Obesity BMI 56.4  7. ALO unable to tolerate C-pap  8. BLE lymphedema (previously had been going to lymphedema clinic) stopped approximately 2 years ago  9. Anxiety/Depression/panic attacks  10. Hx hypothyroidism previously on synthroid TSH 2.860 on 5/2021  11. PCOS  12. Hx cervical carcinoma s/p BRYANT/BSO in 2013    PLAN:   1. Discontinue IV Lasix  2. Continue home medications  3. No plans for additional cardiac testing  4. Maybe discharged home from cardiology   5. Cardiology to sign off.      Electronically signed by Dannielle Macedo MD on 7/15/2021 at 9:07 AM

## 2021-07-15 NOTE — CONSULTS
156-->200, p-BNP 85, troponin 9-->10, t Chol 204, HDL 51, , triglycerides 96, LFT's WNL, HgbA1c 6.8, TSH 3.480, Free T4 1.25, CBC WNL, D-dimer 243, INR 1.1. No clinical s/s of CHF. Currently rates CP a 3 out of 10 Current /83 HR 70's SR      Please note: past medical records were reviewed per electronic medical record (EMR) - see detailed reports under Past Medical/ Surgical History. Past Medical History:    1. Lifelong non smoker  2. Morbid obesity. BMI 58 - 2016. MI 57.2 on 7/15/2021  3. PCOS  4. Spinal headache. 5. Hx Hypothyroidism previously on synthroid. TSH 2.860 on 2021  6. Hypertension. 7. T2DM insulin requiring HgbA1c 7.0 on 3/2/2021  8. GERD. 9. Anxiety/Depression. Hx suicidal ideations  10. ALO non compliant with C-pap  11. Hysterectomy (BRYANT/RSO) 2013. s/p  LSO  12. Allergy to Percocet. 13. No prior history of CAD or PAD. 14. Echo, Dr. Roldan Johnson, 2012. Technically difficult study. Normal wall motion, EF 70%. No significant valvular heart disease. 15. Lexiscan MPS, 2012 (Dr. Roldan Johnson). Normal exam.  EF 64%. 16. Multiple ER visits, 2013 for chest pain. CBC, BMP, troponin negative. CTA chest revealed motion artifact, no PE. No dissection. Fatty infiltration of liver. 16.  I, Para I with an uncomplicated pregnancy in about , subsequent hysterectomy in . 25. Tonsillectomy in , lap-cholecystectomy ,  , foot surgery , herniorrhaphy 2013. 19. Admission [id number], 2014 with atypical chest pain. 20. Cardiac cath, right femoral approach, Dr. Suzette Monroe, [facility] 2014. Minimal CAD. Normal LV. AngioSeal was used to close the site. 21. ER visit, [facility] for right groin pain following her cardiac cath. Lower extremity arterial Doppler, 2014 revealed a hematoma with no pseudoaneurysm and triphasic waveforms distally. 22. Echo, 2014. EF 70%. Stage I diastolic dysfunction.   Otherwise normal study.  23. Presentation [facility], 06/09/2015 with atypical chest pain. EKG normal.  Cardiac biomarkers negative. No further cardiac workup performed. 24. Presentation [facility], 07/23/2016 with palpitations and chest pain. Enzymes negative. EKG no acute changes. 25. Stress perfusion, 07/23/2016. No perfusion defects. EF 70%. 26. 2/2019 TTE Dr Rosenthal Asp: EF 60%. Stage I DD. No VHD. 27. 6/2021 Teeth extractions  28. 6/22/2021 [facility]-ED CP (burning)/Panic attack. Troponin 10-->9, p-BNP 35. CXR no CHF or infiltrates. /103. Received GI Cocktail. Vistaril and Ativan--> discharged to home with script for Protonix. 29. 6/28/2021 Seen At [facility] ED for CP pressure sensation worsened with exertion (AC not working in [place]) which made CP worse. Troponin 13 x 2-->16. /88. CTA Pulmonary negative for PE. Discharged home with script for ibuprofen. 30. [facility] ED 7/10/2021 LUQ and CP intermittent last 2 1/2 weeks. Troponin 11-->11. /90. Received GI cocktail with improvement. Discharged to home with script for Carafate. Medications Prior to admit:  Prior to Admission medications    Medication Sig Start Date End Date Taking? Authorizing Provider   sucralfate (CARAFATE) 1 GM tablet Take 1 tablet by mouth 4 times daily 7/10/21  Yes Dean Rust, DO   ibuprofen (ADVIL;MOTRIN) 600 MG tablet Take 1 tablet by mouth 3 times daily as needed for Pain 6/28/21  Yes Ar Palomares, DO   insulin glargine (BASAGLAR KWIKPEN) 100 UNIT/ML injection pen Inject 10 Units into the skin nightly 5/18/21  Yes Daphne Frias MD   Insulin Pen Needle 32G X 6 MM MISC 1 each by Does not apply route nightly 5/18/21  Yes Sylvester Coker MD   amLODIPine (NORVASC) 10 MG tablet Take 1 tablet by mouth daily 5/18/21  Yes Sylvester Coker MD   blood glucose monitor strips Test 2 times a day & as needed for symptoms of irregular blood glucose.  Dispense sufficient amount for indicated testing frequency plus additional to accommodate PRN testing needs. 5/18/21  Yes Mitesh Huitron MD   mometasone (ELOCON) 0.1 % cream Apply topically daily. 5/18/21  Yes Mitesh Huitron MD   fluconazole (DIFLUCAN) 150 MG tablet Take 1 tablet by mouth every 72 hours 5/18/21  Yes Mitesh Huitron MD   lisinopril (PRINIVIL;ZESTRIL) 40 MG tablet Take 1 tablet by mouth daily 5/18/21  Yes Mitesh Huitron MD   hydroCHLOROthiazide (HYDRODIURIL) 25 MG tablet Take 1 tablet by mouth every morning 5/18/21  Yes Mitesh Huitron MD   Blood Glucose Monitoring Suppl (ONE TOUCH ULTRA 2) w/Device KIT USE DAILY AS DIRECTED AS NEEDED  3/4/21  Yes Mitesh Huitron MD   Blood Pressure Monitoring (BLOOD PRESSURE MONITOR/L CUFF) MISC 1 Device by Does not apply route 2 times daily 3/2/21  Yes Mitesh Huitron MD   Lancets MISC 1 each by Does not apply route daily 3/2/21  Yes Mitesh Huitron MD   ondansetron (ZOFRAN-ODT) 4 MG disintegrating tablet Take 1 tablet by mouth 3 times daily as needed for Nausea or Vomiting 2/17/21  Yes CRYSTAL Pack   triamcinolone (ARISTOCORT) 0.5 % cream APPLY TOPICALLY TO AFFECTED AREAS TWICE DAILY 6/18/20  Yes Jarett Odom MD   mupirocin (BACTROBAN) 2 % ointment Apply topically 3 times daily.  12/26/19  Yes Jo Benedict PA-C   pantoprazole (PROTONIX) 40 MG tablet Take 1 tablet by mouth daily for 10 days 7/10/21 7/10/21  Nancy Antonio DO       Current Medications:    Current Facility-Administered Medications: insulin glargine (LANTUS) injection vial 10 Units, 10 Units, Subcutaneous, Nightly  lisinopril (PRINIVIL;ZESTRIL) tablet 40 mg, 40 mg, Oral, Daily  sucralfate (CARAFATE) tablet 1 g, 1 g, Oral, 4x Daily  insulin lispro (HUMALOG) injection vial 0-10 Units, 0-10 Units, Subcutaneous, 4x Daily AC & HS  glucose (GLUTOSE) 40 % oral gel 15 g, 15 g, Oral, PRN  dextrose 50 % IV solution, 12.5 g, Intravenous, PRN  glucagon (rDNA) injection 1 mg, 1 mg, Intramuscular, PRN  dextrose 5 % solution, 100 mL/hr, Intravenous, PRN  sodium chloride flush 0.9 % injection 5-40 mL, 5-40 mL, Intravenous, 2 times per day  sodium chloride flush 0.9 % injection 5-40 mL, 5-40 mL, Intravenous, PRN  0.9 % sodium chloride infusion, 25 mL, Intravenous, PRN  ondansetron (ZOFRAN-ODT) disintegrating tablet 4 mg, 4 mg, Oral, Q8H PRN **OR** ondansetron (ZOFRAN) injection 4 mg, 4 mg, Intravenous, Q6H PRN  polyethylene glycol (GLYCOLAX) packet 17 g, 17 g, Oral, Daily PRN  [START ON 2021] aspirin chewable tablet 81 mg, 81 mg, Oral, Daily  enoxaparin (LOVENOX) injection 40 mg, 40 mg, Subcutaneous, Daily  perflutren lipid microspheres (DEFINITY) injection 1.65 mg, 1.5 mL, Intravenous, ONCE PRN  hydrALAZINE (APRESOLINE) tablet 25 mg, 25 mg, Oral, 3 times per day  furosemide (LASIX) injection 40 mg, 40 mg, Intravenous, BID  chlorhexidine (PERIDEX) 0.12 % solution 15 mL, 15 mL, Mouth/Throat, BID  nitroGLYCERIN (NITROSTAT) SL tablet 0.4 mg, 0.4 mg, Sublingual, Q5 Min PRN    Allergies:  Percocet [oxycodone-acetaminophen], Metformin and related, and Tape [adhesive tape]    Social History:    Lifelong non smoker  Denies ETOH/Illicit Drugs  Caffeine: Denies, drinks a lot of juice/Pop. Activity: Lives with , 20 yo sone and 42 yo brother in 2 story home. 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 Chronic SOB with exertion. No assistive devices. Code Status: Full Code      Family History: Mother alive  With +HTN, +CVA, + Lupus, + blood clot, + [address]  Father  age 61 from carcinoma. + tobacco abuse  Brother alive with +DM, +HTN,  and +CVA. No reported CAD or PPM/ICD. Sister with +DM no reported CAD    REVIEW OF SYSTEMS:     · Constitutional: Denies fatigue, fevers, chills or night sweats  · Eyes: Denies visual changes or drainage  · ENT: Denies headaches or hearing loss. No mouth sores or sore throat. No epistaxis   · Cardiovascular: + chest pain with arm and lip numbness. + palpitations. + BLE lymphedema. · Respiratory: + MORALES.  Denies cough, orthopnea or PND. No hemoptysis   · Gastrointestinal: Denies hematemesis or anorexia. No hematochezia or melena    · Genitourinary: Denies urgency, dysuria or hematuria. · Musculoskeletal: Denies gait disturbance, weakness or joint complaints  · Integumentary: Denies rash, hives or pruritis   · Neurological: Denies dizziness, headaches or seizures. No numbness or tingling  · Psychiatric: Denies anxiety or depression. · Endocrine: Denies temperature intolerance. No recent weight change. .  · Hematologic/Lymphatic: Denies abnormal bruising or bleeding. No swollen lymph nodes    PHYSICAL EXAM:   /83   Pulse 71   Temp 97.5 °F (36.4 °C) (Oral)   Resp 12   Ht 5' 6\" (1.676 m)   Wt (!) 350 lb (158.8 kg)   LMP 10/01/2012 (Approximate) Comment: 2013  SpO2 92%   BMI 56.49 kg/m²   CONST:  Well developed, morbidly obese  female who appears of stated age. Awake, alert and cooperative. No apparent distress. HEENT:   Head- Normocephalic, atraumatic   Eyes- Conjunctivae pink, anicteric  Throat- Oral mucosa pink and moist  Neck-  Thick. No stridor, trachea midline, no jugular venous distention. No carotid bruit. CHEST: Chest symmetrical and non-tender to palpation. No accessory muscle use or intercostal retractions  RESPIRATORY: Lung sounds - diminished in the bases, on RA  CARDIOVASCULAR:     Heart Ausculation- Heart tones distant. Regular rate and rhythm, no murmur heard. PV: No lower extremity edema. No varicosities. Pedal pulses palpable, no clubbing or cyanosis   ABDOMEN:  Soft, obese non-tender to light palpation. Bowel sounds present. No palpable masses; no abdominal bruit  MS: Good muscle strength and tone. No atrophy or abnormal movements. : Deferred  SKIN: Warm and dry no statis dermatitis or ulcers   NEURO / PSYCH: Oriented to person, place and time. Speech clear and appropriate. Follows all commands.  Flat affect     DATA:    ECG See HPI  Tele strips: SR 70-80's    Diagnostic:    CXR 7/14/2021:  no CHF or infiltrates. Labs:   CBC:   Recent Labs     07/14/21 2009   WBC 8.1   HGB 13.4   HCT 39.0        BMP:   Recent Labs     07/14/21  2009 07/15/21  0415    138   K 3.6 3.8   CO2 29 28   BUN 8 9   CREATININE 0.8 0.7   LABGLOM >60 >60   CALCIUM 8.9 8.3*     TFT:   Lab Results   Component Value Date    TSH 3.480 07/15/2021    G3MQZOM 7.5 04/13/2016    T4FREE 1.25 07/15/2021      HgA1c:   Lab Results   Component Value Date    LABA1C 6.8 (H) 07/15/2021     proBNP:   Recent Labs     07/14/21 2009   PROBNP 84     PT/INR:   Recent Labs     07/15/21  0415   PROTIME 11.6   INR 1.1     CARDIAC ENZYMES:  Recent Labs     07/14/21  2039 07/15/21  0415   TROPHS 9 10*     FASTING LIPID PANEL:  Lab Results   Component Value Date    CHOL 204 07/15/2021    HDL 51 07/15/2021    LDLCALC 134 07/15/2021    TRIG 96 07/15/2021     LIVER PROFILE:  Recent Labs     07/14/21 2009   AST 22   ALT 32   LABALBU 3.8   A&P per Dr Sanna Castillo  Electronically signed by Jagdeep Mota. EMILY Dent on 7/15/2021 at 7:08 AM     I personally and independently saw and examined patient and reviewed all done pertinent laboratory data, imaging studies, ECGs and rhythm strips. I have reviewed and agree with the EMILY history and physical exam as documented in the above note. Electronically signed by Dimitry Dawn MD on 7/15/2021 at [address] for CP and CHF     IMPRESSION:  1. Chronic non cardiac CP with history of multiple stress's and TTE's last stress in 2016 was nonischemic, [facility] in 2014 with minimal disease [facility] Last TTE in 2019 unremarkable (EF 60%). No ischemic EKG changes and negative troponin. 2. Patient is not in CHF  3. HTN  4. HLD  5. T2DM insulin requiring HgbA1c 7.0 in 3/2/2021  6. Morbid Obesity BMI 56.4  7. ALO unable to tolerate C-pap  8. BLE lymphedema (previously had been going to lymphedema clinic) stopped approximately 2 years ago  9. Anxiety/Depression/panic attacks  10.  Hx hypothyroidism previously on synthroid TSH 2.860 on 5/2021  11. PCOS  12. Hx cervical carcinoma s/p BRYANT/BSO in 2013     PLAN:   1. Discontinue IV Lasix  2. Continue home medications  3. No plans for additional cardiac testing  4. Maybe discharged home from cardiology   5.  Cardiology to sign off.      Electronically signed by Jessica Johnson MD on 7/15/2021 at 9:07 AM

## 2021-07-15 NOTE — CARE COORDINATION
7/15/21 Transition of Care; patient is observation due to c/o chest pain. She states she has had this for months and no one will address it. She has been to the ER \" many times\" she said and no one has helped her. She has been placed on pepcid and instructed to follow up with her pcp and she states she has not done that. Dr Del Oh in to see patient and cleared her for discharge. 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 She states she has been following with [facility] Medicine in [facility] and they have no \" diagnosis for the pain\". She has been instructed to make an appt with her pcp for this week or beginning of next week. She is requesting a doctors excuse to not return to work but this was not discussed by her physicians. She does drive and is independent. She fills her scripts at [facility] in [facility]. She will be discharged per pcp and her son will provide transportation home. When asked if she had any further questions or concerns she states no.  Gino Shepherd RN CM

## 2021-07-15 NOTE — PROGRESS NOTES
Cardiac consult placed to [facility] cardiology teamn 7/15/21 at 0430. Reason: Chest pain and CHF, Referring Dr: Dr. Dayne Cintron.

## 2021-07-15 NOTE — H&P
Hospital Medicine History & Physical      PCP: Rex Lamb MD    Date of Admission: 7/14/2021    Date of Service: Pt seen/examined on 7/15/2021 and  Placed in Observation. Chief Complaint: Chest pain      History Of Present Illness:      40 y.o. female who presented to [facility] from [facility] with medical history of asthma, anxiety, depression, diabetes, GERD, poorly controlled hypertension, hypothyroidism, PCOS, sleep apnea, dental caries status post multiple dental extraction few weeks ago and coronary artery disease. Patient has been having chest pain on and off for the past 2 weeks, pain is in the central part of the chest, sharp, 7 out of 10, on and off, radiates into the drawers and associated with paresthesia of the lips and hands. Pain is not exactly exertional, she has associated bilateral lower extremity edema. She denies any abdominal pain, nausea or vomiting. No dizziness or palpitations. No change in bowel habits or urinary complaints. Patient states that she had cardiac catheterization in 2016 and was told that she has some blockage however she does not have a stent that she knows of. This result is not available at this time. Vital signs notable for blood pressure of 227/100. Labs showed blood sugar 156 otherwise normal chemistry, negative troponin at 9, normal CBC. Chest x-ray is negative. She had negative CTA 2 weeks ago. EKG showed sinus rhythm rate of 81 with nonspecific ST-T wave changes and QT prolongation. She had an echo in 2019 with EF of 17% and diastolic dysfunction. She had negative stress test in 2016 with EF of 70%. She is being admitted for further management.     Past Medical History:          Diagnosis Date    Acute renal injury ([address] 9/4/2013    Analgesic overuse headache 12/19/2018    Anxiety     Arthritis     Asthma     Depression 3/19/2013    Diabetes mellitus ([address]     A1C=6.7 on 1/14/14    Fracture of tooth 12/19/2018    GERD (gastroesophageal reflux disease)     Glaucoma     Hypertension     Hypertensive urgency 3/19/2013    Hypothyroidism     Irritable bowel syndrome     Obesity     Obstructive sleep apnea     Pneumonia     Polycystic ovarian syndrome     Postcholecystectomy syndrome 2018    Spinal headache     Suicidal ideation 3/18/2016       Past Surgical History:          Procedure Laterality Date     SECTION      Dr. Favian Mayfield, [address], [place] [address] SURGERY  10/06/2011    4 extractions    ECHO COMPLETE  2014         FOOT SURGERY      RECONSTRUCTION LEFT FOOT, Dr. Ivette Kemp, [address]  9/3/13    incisional hernia repair with mesh    HYSTERECTOMY      KNEE CARTILAGE SURGERY      OVARY REMOVAL      left due to polycystic ovary, Dr. Scott Lynne, [address] AND [facility]  3/19/2013    Attempted Latasha Sack BRYANT;RSO, Dr. Madisyn Jaramillo, [address]       Medications Prior to Admission:      Prior to Admission medications    Medication Sig Start Date End Date Taking? Authorizing Provider   sucralfate (CARAFATE) 1 GM tablet Take 1 tablet by mouth 4 times daily 7/10/21  Yes Isaac Arvizu, DO   ibuprofen (ADVIL;MOTRIN) 600 MG tablet Take 1 tablet by mouth 3 times daily as needed for Pain 21  Yes Matias Mayo, DO   insulin glargine (BASAGLAR KWIKPEN) 100 UNIT/ML injection pen Inject 10 Units into the skin nightly 21  Yes Daphne Frias MD   Insulin Pen Needle 32G X 6 MM MISC 1 each by Does not apply route nightly 21  Yes Venita Vogt MD   amLODIPine (NORVASC) 10 MG tablet Take 1 tablet by mouth daily 21  Yes Venita Vogt MD   blood glucose monitor strips Test 2 times a day & as needed for symptoms of irregular blood glucose. Dispense sufficient amount for indicated testing frequency plus additional to accommodate PRN testing needs. 21  Yes Venita Vogt MD   mometasone (ELOCON) 0.1 % cream Apply topically daily. 5/18/21  Yes Fracnis Gaitan MD   fluconazole (DIFLUCAN) 150 MG tablet Take 1 tablet by mouth every 72 hours 5/18/21  Yes Francis Gaitan MD   lisinopril (PRINIVIL;ZESTRIL) 40 MG tablet Take 1 tablet by mouth daily 5/18/21  Yes Francis Gaitan MD   hydroCHLOROthiazide (HYDRODIURIL) 25 MG tablet Take 1 tablet by mouth every morning 5/18/21  Yes Francis Gaitan MD   Blood Glucose Monitoring Suppl (ONE TOUCH ULTRA 2) w/Device KIT USE DAILY AS DIRECTED AS NEEDED  3/4/21  Yes Francis Gaitan MD   Blood Pressure Monitoring (BLOOD PRESSURE MONITOR/L CUFF) MISC 1 Device by Does not apply route 2 times daily 3/2/21  Yes Francis Gaitan MD   Lancets MISC 1 each by Does not apply route daily 3/2/21  Yes Francis Gaitan MD   ondansetron (ZOFRAN-ODT) 4 MG disintegrating tablet Take 1 tablet by mouth 3 times daily as needed for Nausea or Vomiting 2/17/21  Yes CRYSTAL Flores   triamcinolone (ARISTOCORT) 0.5 % cream APPLY TOPICALLY TO AFFECTED AREAS TWICE DAILY 6/18/20  Yes Nadeem Farr MD   mupirocin (BACTROBAN) 2 % ointment Apply topically 3 times daily. 12/26/19  Yes Mira Houston PA-C   pantoprazole (PROTONIX) 40 MG tablet Take 1 tablet by mouth daily for 10 days 7/10/21 7/10/21  Wilian Gallegos DO       Allergies:  Percocet [oxycodone-acetaminophen], Metformin and related, and Tape [place] tape]    Social History:      The patient currently lives at home. TOBACCO:   reports that she has never smoked. She has never used smokeless tobacco.  ETOH:   reports no history of alcohol use.       Family History:     Reviewed in detail Positive as follows:        Problem Relation Age of Onset    Asthma Mother     Diabetes Mother     High Blood Pressure Mother     High Blood Pressure Father     Heart Disease Father     Cancer Father     Depression Father     Diabetes Brother     Asthma Brother     Thyroid Disease Sister     Asthma Sister     Depression Maternal Grandmother     High Blood Pressure Maternal Grandmother     Mental Illness Maternal Grandmother     Thyroid Disease Maternal Grandmother     Heart Disease Maternal Grandfather     Depression Paternal Grandmother     High Blood Pressure Paternal Grandmother     Cancer Paternal Grandfather        REVIEW OF SYSTEMS:   Pertinent positives as noted in the HPI. All other systems reviewed and negative. PHYSICAL EXAM:    BP (!) 160/75   Pulse 71   Temp 97.5 °F (36.4 °C) (Oral)   Resp 12   Ht 5' 6\" (1.676 m)   Wt (!) 350 lb (158.8 kg)   LMP 10/01/2012 (Approximate) Comment: 2013  SpO2 92%   BMI 56.49 kg/m²     General appearance: Middle-aged female, morbidly obese, lethargic but arousable, no apparent distress, appears stated age and cooperative. Afebrile. HEENT:  Normal cephalic, atraumatic without obvious deformity. Pupils equal, round, and reactive to light. Extra ocular muscles intact. Conjunctivae/corneas clear. edentulous  Neck: Supple, with full range of motion. No jugular venous distention. Trachea midline. Respiratory:  Normal respiratory effort. Clear to auscultation, bilaterally without Rales/Wheezes/Rhonchi. Cardiovascular:  Regular rate and rhythm with normal S1/S2 without murmurs, rubs or gallops. Abdomen: Soft, non-tender, non-distended with normal bowel sounds. Musculoskeletal:  No clubbing/cyanosis, 3+ edema bilaterally. Limited range of motion without deformity. Skin: Skin color, texture, turgor normal.  No rashes or lesions. Neurologic: Lethargic but arousable. Cranial nerves: II-XII intact, grossly non-focal.  Psychiatric:  Alert and oriented, thought content appropriate, normal insight  Capillary Refill: Brisk,< 3 seconds   Peripheral Pulses: DP pulses difficult to palpate due to edema.       Labs:     Recent Labs     07/14/21 2009   WBC 8.1   HGB 13.4   HCT 39.0        Recent Labs     07/14/21 2009      K 3.6      CO2 29   BUN 8   CREATININE 0.8   CALCIUM 8.9     Recent Labs     07/14/21 2009   AST 22 ALT 32   BILITOT 0.6   ALKPHOS 92     No results for input(s): INR in the last 72 hours. No results for input(s): Towana Ball in the last 72 hours. Urinalysis:      Lab Results   Component Value Date    NITRU Negative 02/17/2021    WBCUA NONE 02/17/2021    BACTERIA NONE SEEN 02/17/2021    RBCUA NONE 02/17/2021    RBCUA 0-1 01/09/2014    BLOODU Negative 02/17/2021    SPECGRAV 1.020 02/17/2021    GLUCOSEU Negative 02/17/2021       Radiology:   Reviewed and documented    XR CHEST PORTABLE   Final Result   No acute process. ASSESSMENT:    Active Hospital Problems    Diagnosis Date Noted    Type 2 diabetes mellitus with hyperglycemia, without long-term current use of insulin ([address] [E11.65] 03/20/2014     Priority: Medium    ALO (obstructive sleep apnea) [G47.33]      Priority: Medium    Depression with anxiety [F41.8]      Priority: Medium    Hypertensive urgency [I16.0] 03/19/2013     Priority: Medium    Hypothyroidism [E03.9] 03/19/2013     Priority: Low    Volume overload [E87.70] 07/15/2021    Bilateral lower extremity edema [R60.0] 07/15/2021    Morbid obesity with BMI of 50.0-59.9, adult ([address] [E66.01, Z68.43] 07/15/2021    PCOS (polycystic ovarian syndrome) [E28.2] 07/15/2021    Lymphedema of both lower extremities [I89.0] 07/15/2021    Chest pain [R07.9] 07/14/2021    Asthma [J45.909] 12/19/2018         PLAN:  1. Chest pain rule out acute coronary syndrome, consult cardiology given concern of heart failure. Cardiac enzymes negative. Ischemic work-up at the discretion of cardiology. I do not think she has PE.  2.  Suspected acute diastolic heart failure, patient with significant edema, diurese with Lasix, monitor I's and O's and daily weights. Repeat echo. 3.  Bilateral lower extremity edema, known to have lymphedema however has been on high-dose Norvasc as well. Will hold Norvasc for now. Diurese and monitor. Normal thyroid function in May.   4.  Hypertensive urgency, claims compliance to medications, stop amlodipine given volume overload, start hydralazine 25 mg 3 times daily, continue ACE inhibitor. Hold HCTZ since patient is on Lasix. Titrate hydralazine as needed. Beta-blocker as needed. 5.  Insulin-dependent diabetes, continue home medication, sliding scale coverage, monitor blood sugar, check A1c  6. Hyperlipidemia, statin. 7.  Depression and anxiety  8. Sleep apnea, CPAP as needed  9. Hypothyroidism, currently not on Synthroid, repeat thyroid function. Synthroid as needed. 10.  History of asthma, no acute exacerbation  11. PCOS  12. Morbid obesity, dietary and lifestyle modification. 13. Dental caries SP multiple dental extractions recently. Continue Peridex oral rinse        DVT Prophylaxis: Lovenox, check INR. Diet: No diet orders on file n.p.o. pending cardiology evaluation  Code Status: Prior full code    PT/OT Eval Status: As needed    Dispo -observation/telemetry       Valarie Vigil MD    Thank you Tamara Griggs MD for the opportunity to be involved in this patient's care.

## 2021-07-15 NOTE — TELEPHONE ENCOUNTER
Noted. Patient currently hospitalized. She will need an appointment for hospital follow up.   Thanks

## 2021-07-16 NOTE — DISCHARGE SUMMARY
Hospitalist Discharge Summary    Patient ID: Deborah Agosto   Patient : 1975  Patient's PCP: Eligah Lundborg, MD    Admit Date: 2021   Admitting Physician: Richa Nuñez MD    Discharge Date:  2021   Discharge Physician: Nader Stephenson DO   Discharge Condition: Stable  Discharge Disposition: [facility] course in brief:  (Please refer to daily progress notes for a comprehensive review of the hospitalization by requesting medical records)    HISTORY OF PRESENT ILLNESS: 41 yo morbidly obese  female previously known to Dr Nik Abraham last seen in office in 2016. PMH: HTN, T2DM insulin requiring, morbid obesity, ALO non compliant with C-pap, GERD, anxiety, recurrent admission of atypical CP with [facility] in  (minimal disease) most recent stress  (non-ischemic with EF 70%), PCOS, and lifelong non smoker.     2021 [facility]-ED CP (burning)/Panic attack. Troponin 10-->9, p-BNP 35. CXR no CHF or infiltrates. /103. Received GI Cocktail. Vistaril and Ativan--> discharged to home with script for Protonix. 2021 Seen At [place] [facility] ED for CP pressure sensation worsened with exertion ( not working in [facility]) which made CP worse. Troponin 13 x 2-->16. /88. CTA Pulmonary negative for PE. Discharged home with script for ibuprofen. [facility] ED 7/10/2021 LUQ and CP intermittent last 2 1/2 weeks. Troponin 11-->11. /90. Received GI cocktail with improvement. Discharged to home with script for Carafate. For 2 weeks felt like an elephant has been sitting on her chest on and off can last from 30 minutes to all day, at worse a 6 out of 10. On 2021 while at work felt like her heart was skipping beats, beating hard and felt like it was difficult to breathe Also felt her lips were numb and numbness in L arm along with intermitted CP.    [facility]-ED 2021 BP upon arrival 184/108 HR 80's and SR  Patient reports receiving 3 Nitro SL, ASA, and Morphine within 10 minutes time in ED and reports no real change in CP. Also received Clonidine, 1 liter NSS, Ativan, and Zofran. Currently resting in bed CP a 3 out of 10 currently non radiating with no associated symptoms. EKG no acute changes. CXR no CHF or infiltrates. Na 138, K+ 3.8, Bun/Cr 9/0.7, CO2 28, Glucose 156-->200, p-BNP 85, troponin 9-->10, t Chol 204, HDL 51, , triglycerides 96, LFT's WNL, HgbA1c 6.8, TSH 3.480, Free T4 1.25, CBC WNL, D-dimer 243, INR 1.1.     No clinical s/s of CHF. Currently rates CP a 3 out of 10 Current /83 HR 70's SR        Please note: past medical records were reviewed per electronic medical record (EMR) - see detailed reports under Past Medical/ Surgical History. Past Medical History:    1. Lifelong non smoker  2. Morbid obesity.  BMI 58 - 2016. MI 57.2 on 7/15/2021  3. PCOS  4. Spinal headache. 5. Hx Hypothyroidism previously on synthroid. TSH 2.860 on 2021  6. Hypertension. 7. T2DM insulin requiring HgbA1c 7.0 on 3/2/2021  8. GERD. 9. Anxiety/Depression. Hx suicidal ideations  10. ALO non compliant with C-pap  11. Hysterectomy (BRYANT/RSO) 2013. s/p  LSO  12. Allergy to Percocet. 13. No prior history of CAD or PAD.    14. Echo, Dr. Kayleigh Kaba, 2012.  Technically difficult study.   Normal wall motion, EF 70%.  No significant valvular heart disease.     15. Lexiscan MPS, 2012 (Dr. Kayleigh Kaba).  Normal exam.  EF 64%.    16. Multiple ER visits, 2013 for chest pain.    CBC, BMP, troponin negative.  CTA chest revealed motion artifact, no PE.  No dissection.  Fatty infiltration of liver.    17.  I, Para I with an uncomplicated pregnancy in about , subsequent hysterectomy in . 25. Tonsillectomy in , lap-cholecystectomy ,  , foot surgery , herniorrhaphy 2013.    19. Admission [id number], 2014 with atypical chest pain.   20. Cardiac cath, right femoral approach, [facility] [address] 2014.  Minimal CAD.  Normal LV.  AngioSeal was used to close the site. 21. ER visit, [facility] for right groin pain following her cardiac cath.  Lower extremity arterial Doppler, 07/03/2014 revealed a hematoma with no pseudoaneurysm and triphasic waveforms distally. 22. Echo, 01/14/2014.  EF 70%.  Stage I diastolic dysfunction.  Otherwise normal study. 23. Presentation [facility], 06/09/2015 with atypical chest pain.  EKG normal.  Cardiac biomarkers negative.  No further cardiac workup performed. 24. Presentation [facility], 07/23/2016 with palpitations and chest pain.  Enzymes negative.  EKG no acute changes.    25. Stress perfusion, 07/23/2016.  No perfusion defects.  EF 70%. 26. 2/2019 TTE Dr Jackson Castillo: EF 60%. Stage I DD. No VHD. 27. 6/2021 Teeth extractions  28. 6/22/2021 [facility]-ED CP (burning)/Panic attack. Troponin 10-->9, p-BNP 35. CXR no CHF or infiltrates. /103. Received GI Cocktail. Vistaril and Ativan--> discharged to home with script for Protonix. 29. 6/28/2021 Seen At 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 Troponin 13 x 2-->16. /88. CTA Pulmonary negative for PE. Discharged home with script for ibuprofen. 30. [facility] ED 7/10/2021 LUQ and CP intermittent last 2 1/2 weeks. Troponin 11-->11. /90. Received GI cocktail with improvement. Discharged to home with script for Carafate.            Medications Prior to admit:  Home Medications           Prior to Admission medications    Medication Sig Start Date End Date Taking?  Authorizing Provider   sucralfate (CARAFATE) 1 GM tablet Take 1 tablet by mouth 4 times daily 7/10/21   Yes Shen Sheridan, DO   ibuprofen (ADVIL;MOTRIN) 600 MG tablet Take 1 tablet by mouth 3 times daily as needed for Pain 6/28/21   Yes Marielle Neves, DO   insulin glargine (BASAGLAR KWIKPEN) 100 UNIT/ML injection pen Inject 10 Units into the skin nightly 5/18/21   Yes Aliza Hunt MD   Insulin Pen Needle 32G X 6 MM MISC 1 each by Does not apply route nightly 5/18/21   Yes Yoni Alvarez MD   amLODIPine (NORVASC) 10 MG tablet Take 1 tablet by mouth daily 5/18/21   Yes Yoni Alvarez MD   blood glucose monitor strips Test 2 times a day & as needed for symptoms of irregular blood glucose. Dispense sufficient amount for indicated testing frequency plus additional to accommodate PRN testing needs. 5/18/21   Yes Yoni Alvarez MD   mometasone (ELOCON) 0.1 % cream Apply topically daily. 5/18/21   Yes Yoni Alvarez MD   fluconazole (DIFLUCAN) 150 MG tablet Take 1 tablet by mouth every 72 hours 5/18/21   Yes Yoni Alvarez MD   lisinopril (PRINIVIL;ZESTRIL) 40 MG tablet Take 1 tablet by mouth daily 5/18/21   Yes Yoni Alvarez MD   hydroCHLOROthiazide (HYDRODIURIL) 25 MG tablet Take 1 tablet by mouth every morning 5/18/21   Yes Yoni Alvarez MD   Blood Glucose Monitoring Suppl (ONE TOUCH ULTRA 2) w/Device KIT USE DAILY AS DIRECTED AS NEEDED  3/4/21   Yes Yoni Alvarez MD   Blood Pressure Monitoring (BLOOD PRESSURE MONITOR/L CUFF) MISC 1 Device by Does not apply route 2 times daily 3/2/21   Yes Yoni Alvarez MD   Lancets MISC 1 each by Does not apply route daily 3/2/21   Yes Yoni Alvarez MD   ondansetron (ZOFRAN-ODT) 4 MG disintegrating tablet Take 1 tablet by mouth 3 times daily as needed for Nausea or Vomiting 2/17/21   Yes CRYSTAL Flores   triamcinolone (ARISTOCORT) 0.5 % cream APPLY TOPICALLY TO AFFECTED AREAS TWICE DAILY 6/18/20   Yes Guy Alvarado MD   mupirocin (BACTROBAN) 2 % ointment Apply topically 3 times daily.  12/26/19   Yes Nehal Chatman PA-C   pantoprazole (PROTONIX) 40 MG tablet Take 1 tablet by mouth daily for 10 days 7/10/21 7/10/21   Angely Ann DO            Current Medications:      Current Hospital Medications   Current Facility-Administered Medications: insulin glargine (LANTUS) injection vial 10 Units, 10 Units, Subcutaneous, Nightly  lisinopril (PRINIVIL;ZESTRIL) tablet 40 mg, 40 mg, Oral, Daily  sucralfate (CARAFATE) tablet 1 g, 1 g, Oral, 4x Daily  insulin lispro (HUMALOG) injection vial 0-10 Units, 0-10 Units, Subcutaneous, 4x Daily AC & HS  glucose (GLUTOSE) 40 % oral gel 15 g, 15 g, Oral, PRN  dextrose 50 % IV solution, 12.5 g, Intravenous, PRN  glucagon (rDNA) injection 1 mg, 1 mg, Intramuscular, PRN  dextrose 5 % solution, 100 mL/hr, Intravenous, PRN  sodium chloride flush 0.9 % injection 5-40 mL, 5-40 mL, Intravenous, 2 times per day  sodium chloride flush 0.9 % injection 5-40 mL, 5-40 mL, Intravenous, PRN  0.9 % sodium chloride infusion, 25 mL, Intravenous, PRN  ondansetron (ZOFRAN-ODT) disintegrating tablet 4 mg, 4 mg, Oral, Q8H PRN **OR** ondansetron (ZOFRAN) injection 4 mg, 4 mg, Intravenous, Q6H PRN  polyethylene glycol (GLYCOLAX) packet 17 g, 17 g, Oral, Daily PRN  [START ON 2021] aspirin chewable tablet 81 mg, 81 mg, Oral, Daily  enoxaparin (LOVENOX) injection 40 mg, 40 mg, Subcutaneous, Daily  perflutren lipid microspheres (DEFINITY) injection 1.65 mg, 1.5 mL, Intravenous, ONCE PRN  hydrALAZINE (APRESOLINE) tablet 25 mg, 25 mg, Oral, 3 times per day  furosemide (LASIX) injection 40 mg, 40 mg, Intravenous, BID  chlorhexidine (PERIDEX) 0.12 % solution 15 mL, 15 mL, Mouth/Throat, BID  nitroGLYCERIN (NITROSTAT) SL tablet 0.4 mg, 0.4 mg, Sublingual, Q5 Min PRN        Allergies:  Percocet [oxycodone-acetaminophen], Metformin and related, and Tape [adhesive tape]     Social History:    Lifelong non smoker  Denies ETOH/Illicit Drugs  Caffeine: Denies, drinks a lot of juice/Pop. Activity: Lives with , 22 yo sone and 42 yo brother in 2 story home. 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 Chronic SOB with exertion. No assistive devices. Code Status: Full Code        Family History: Mother alive  With +HTN, +CVA, + Lupus, + blood clot, + [address]  Father  age 61 from carcinoma. + tobacco abuse  Brother alive with +DM, +HTN,  and +CVA. No reported CAD or PPM/ICD.    Sister with +DM no reported CAD     REVIEW OF SYSTEMS:     · Constitutional: Denies fatigue, fevers, chills or night sweats  · Eyes: Denies visual changes or drainage  · ENT: Denies headaches or hearing loss. No mouth sores or sore throat. No epistaxis   · Cardiovascular: + chest pain with arm and lip numbness. + palpitations. + BLE lymphedema. · Respiratory: + MORALES. Denies cough, orthopnea or PND. No hemoptysis   · Gastrointestinal: Denies hematemesis or anorexia. No hematochezia or melena    · Genitourinary: Denies urgency, dysuria or hematuria. · Musculoskeletal: Denies gait disturbance, weakness or joint complaints  · Integumentary: Denies rash, hives or pruritis   · Neurological: Denies dizziness, headaches or seizures. No numbness or tingling  · Psychiatric: Denies anxiety or depression. · Endocrine: Denies temperature intolerance. No recent weight change. .  · Hematologic/Lymphatic: Denies abnormal bruising or bleeding. No swollen lymph nodes     PHYSICAL EXAM:   /83   Pulse 71   Temp 97.5 °F (36.4 °C) (Oral)   Resp 12   Ht 5' 6\" (1.676 m)   Wt (!) 350 lb (158.8 kg)   LMP 10/01/2012 (Approximate) Comment: 2013  SpO2 92%   BMI 56.49 kg/m²   CONST:  Well developed, morbidly obese  female who appears of stated age. Awake, alert and cooperative. No apparent distress. HEENT:   Head- Normocephalic, atraumatic   Eyes- Conjunctivae pink, anicteric  Throat- Oral mucosa pink and moist  Neck-  Thick. No stridor, trachea midline, no jugular venous distention. No carotid bruit. CHEST: Chest symmetrical and non-tender to palpation. No accessory muscle use or intercostal retractions  RESPIRATORY: Lung sounds - diminished in the bases, on RA  CARDIOVASCULAR:     Heart Ausculation- Heart tones distant. Regular rate and rhythm, no murmur heard. PV: No lower extremity edema. No varicosities. Pedal pulses palpable, no clubbing or cyanosis   ABDOMEN:  Soft, obese non-tender to light palpation.  Bowel sounds present. No palpable masses; no abdominal bruit  MS: Good muscle strength and tone. No atrophy or abnormal movements. : Deferred  SKIN: Warm and dry no statis dermatitis or ulcers   NEURO / PSYCH: Oriented to person, place and time. Speech clear and appropriate. Follows all commands. Flat affect      DATA:    ECG See HPI  Tele strips: SR 70-80's     Diagnostic:     CXR 7/14/2021:  no CHF or infiltrates.        Labs:   CBC:       Recent Labs     07/14/21 2009   WBC 8.1   HGB 13.4   HCT 39.0         BMP:        Recent Labs     07/14/21  2009 07/15/21  0415    138   K 3.6 3.8   CO2 29 28   BUN 8 9   CREATININE 0.8 0.7   LABGLOM >60 >60   CALCIUM 8.9 8.3*      TFT:         Lab Results   Component Value Date     TSH 3.480 07/15/2021     O7RCZFR 7.5 04/13/2016     T4FREE 1.25 07/15/2021      HgA1c:         Lab Results   Component Value Date     LABA1C 6.8 (H) 07/15/2021      proBNP:       Recent Labs     07/14/21 2009   PROBNP 84      PT/INR:       Recent Labs     07/15/21  0415   PROTIME 11.6   INR 1.1      CARDIAC ENZYMES:       Recent Labs     07/14/21  2039 07/15/21  0415   TROPHS 9 10*      FASTING LIPID PANEL:        Lab Results   Component Value Date     CHOL 204 07/15/2021     HDL 51 07/15/2021     LDLCALC 134 07/15/2021     TRIG 96 07/15/2021      LIVER PROFILE:      Recent Labs     07/14/21 2009   AST 22   ALT 32   LABALBU 3.8   A&P per Dr Gabi Durant  Electronically signed by Tylor Rosas. EMILY Hyman on 7/15/2021 at 7:08 AM      I personally and independently saw and examined patient and reviewed all done pertinent laboratory data, imaging studies, ECGs and rhythm strips. I have reviewed and agree with the EMILY history and physical exam as documented in the above note.     Electronically signed by Paul Jarvis MD on 7/15/2021 at 6:42 PM      Consulted for CP and CHF     IMPRESSION:  1.  Chronic non cardiac CP with history of multiple stress's and TTE's last stress in 2016 was nonischemic, [facility] in 2014 with minimal disease ([facility]). Last TTE in 2019 unremarkable (EF 60%). No ischemic EKG changes and negative troponin.   2. Patient is not in CHF  3. HTN  4. HLD  5. T2DM insulin requiring HgbA1c 7.0 in 3/2/2021  6. Morbid Obesity BMI 56.4  7. ALO unable to tolerate C-pap  8. BLE lymphedema (previously had been going to lymphedema clinic) stopped approximately 2 years ago  9. Anxiety/Depression/panic attacks  10. Hx hypothyroidism previously on synthroid TSH 2.860 on 5/2021  11. PCOS  12. Hx cervical carcinoma s/p BRYANT/BSO in 2013     PLAN:   1. Discontinue IV Lasix  2. Continue home medications  3. No plans for additional cardiac testing  4. Maybe discharged home from cardiology   5. Cardiology to sign off.         Consults:   IP CONSULT TO PRIMARY CARE PROVIDER  IP CONSULT TO CARDIOLOGY    Discharge Diagnoses:        Discharge Instructions / Follow up:    Future Appointments   Date Time Provider Jadiel Rose   7/19/2021  1:00 PM Milton Wilks MD [facility]       Continued appropriate risk factor modification of blood pressure, diabetes and serum lipids will remain essential to reducing risk of future atherosclerotic development    Activity: activity as tolerated    Significant labs:  CBC:   Recent Labs     07/14/21 2009   WBC 8.1   RBC 4.29   HGB 13.4   HCT 39.0   MCV 90.9   RDW 13.3        BMP:   Recent Labs     07/14/21  2009 07/15/21  0415    138   K 3.6 3.8    100   CO2 29 28   BUN 8 9   CREATININE 0.8 0.7     LFT:  Recent Labs     07/14/21 2009   PROT 6.6   ALKPHOS 92   ALT 32   AST 22   BILITOT 0.6     PT/INR:   Recent Labs     07/15/21  0415   INR 1.1     BNP: No results for input(s): BNP in the last 72 hours.   Hgb A1C:   Lab Results   Component Value Date    LABA1C 6.8 (H) 07/15/2021     Folate and B12: No results found for: Steven Castro, No results found for: FOLATE  Thyroid Studies:   Lab Results   Component Value Date    TSH 3.480 07/15/2021    R7JISVE 7.5 04/13/2016 Urinalysis:    Lab Results   Component Value Date    NITRU Negative 02/17/2021    WBCUA NONE 02/17/2021    BACTERIA NONE SEEN 02/17/2021    RBCUA NONE 02/17/2021    RBCUA 0-1 01/09/2014    BLOODU Negative 02/17/2021    SPECGRAV 1.020 02/17/2021    GLUCOSEU Negative 02/17/2021       Imaging:  XR CHEST (2 VW)    Result Date: 6/28/2021  EXAMINATION: TWO XRAY VIEWS OF THE CHEST 6/28/2021 7:49 pm COMPARISON: 06/22/2021 HISTORY: ORDERING SYSTEM PROVIDED HISTORY: chest pain TECHNOLOGIST PROVIDED HISTORY: Reason for exam:->chest pain FINDINGS: The lungs are without acute focal process. There is no effusion or pneumothorax. The cardiomediastinal silhouette is without acute process. The osseous structures are without acute process. No acute process. XR CHEST PORTABLE    Result Date: 7/14/2021  EXAMINATION: ONE XRAY VIEW OF THE CHEST 7/14/2021 7:57 pm COMPARISON: 07/10/2021 HISTORY: ORDERING SYSTEM PROVIDED HISTORY: chest pain TECHNOLOGIST PROVIDED HISTORY: Reason for exam:->chest pain FINDINGS: The lungs are without acute focal process. There is no effusion or pneumothorax. The cardiomediastinal silhouette is without acute process. The osseous structures are without acute process. No acute process. XR CHEST PORTABLE    Result Date: 7/10/2021  EXAMINATION: ONE XRAY VIEW OF THE CHEST 7/10/2021 12:41 pm COMPARISON: Previous CT of the chest of 06/28/2021 HISTORY: ORDERING SYSTEM PROVIDED HISTORY: chest pain TECHNOLOGIST PROVIDED HISTORY: Reason for exam:->chest pain FINDINGS: The cardiac silhouette is at upper limits of normal in size. There is no mediastinal widening The lungs are clear. There is no focal infiltrate or effusion. 1. There is no acute cardiopulmonary disease.      XR CHEST PORTABLE    Result Date: 6/22/2021  EXAMINATION: ONE XRAY VIEW OF THE CHEST 6/22/2021 8:42 am COMPARISON: 02/27/2017 HISTORY: ORDERING SYSTEM PROVIDED HISTORY: chest pain TECHNOLOGIST PROVIDED HISTORY: Reason for exam:->chest pain What reading provider will be dictating this exam?->CRC FINDINGS: The heart is mildly enlarged. There are no findings of failure or pneumonia. The lungs are under penetrated due to the patient's body habitus. There is no pleural effusion. Cardiomegaly. There are no findings of failure or pneumonia. CTA PULMONARY W CONTRAST    Result Date: 6/28/2021  EXAMINATION: CTA OF THE CHEST 6/28/2021 9:07 pm TECHNIQUE: CTA of the chest was performed after the administration of intravenous contrast.  Multiplanar reformatted images are provided for review. MIP images are provided for review. Dose modulation, iterative reconstruction, and/or weight based adjustment of the mA/kV was utilized to reduce the radiation dose to as low as reasonably achievable. COMPARISON: None. HISTORY: ORDERING SYSTEM PROVIDED HISTORY: elevated d-dimer TECHNOLOGIST PROVIDED HISTORY: Reason for exam:->elevated d-dimer Decision Support Exception - unselect if not a suspected or confirmed emergency medical condition->Emergency Medical Condition (MA) FINDINGS: Pulmonary Arteries: Pulmonary arteries are adequately opacified for evaluation. No evidence of intraluminal filling defect to suggest pulmonary embolism. Main pulmonary artery is normal in caliber. Mediastinum: No evidence of mediastinal lymphadenopathy. The heart and pericardium demonstrate no acute abnormality. There is no acute abnormality of the thoracic aorta. Lungs/pleura: The lungs are without acute process. No focal consolidation or pulmonary edema. No evidence of pleural effusion or pneumothorax. Upper Abdomen: Limited images of the upper abdomen are unremarkable. Soft Tissues/Bones: No acute bone or soft tissue abnormality. No evidence of pulmonary embolism or acute pulmonary abnormality.        Discharge Medications:      Medication List      START taking these medications    hydrALAZINE 25 MG tablet  Commonly known as: APRESOLINE  Take 1 tablet by mouth every 8 hours        CONTINUE taking these medications    amLODIPine 10 MG tablet  Commonly known as: NORVASC  Take 1 tablet by mouth daily     Basaglar KwikPen 100 UNIT/ML injection pen  Generic drug: insulin glargine  Inject 10 Units into the skin nightly     blood glucose test strips  Test 2 times a day & as needed for symptoms of irregular blood glucose. Dispense sufficient amount for indicated testing frequency plus additional to accommodate PRN testing needs. Blood Pressure Monitor/L Cuff Misc  1 Device by Does not apply route 2 times daily     fluconazole 150 MG tablet  Commonly known as: Diflucan  Take 1 tablet by mouth every 72 hours     hydroCHLOROthiazide 25 MG tablet  Commonly known as: HYDRODIURIL  Take 1 tablet by mouth every morning     ibuprofen 600 MG tablet  Commonly known as: ADVIL;MOTRIN  Take 1 tablet by mouth 3 times daily as needed for Pain     Insulin Pen Needle 32G X 6 MM Misc  1 each by Does not apply route nightly     Lancets Misc  1 each by Does not apply route daily     lisinopril 40 MG tablet  Commonly known as: PRINIVIL;ZESTRIL  Take 1 tablet by mouth daily     mometasone 0.1 % cream  Commonly known as: ELOCON  Apply topically daily. mupirocin 2 % ointment  Commonly known as: Bactroban  Apply topically 3 times daily.      ondansetron 4 MG disintegrating tablet  Commonly known as: ZOFRAN-ODT  Take 1 tablet by mouth 3 times daily as needed for Nausea or Vomiting     ONE TOUCH ULTRA 2 w/Device Kit  USE DAILY AS DIRECTED AS NEEDED     sucralfate 1 GM tablet  Commonly known as: Carafate  Take 1 tablet by mouth 4 times daily     triamcinolone 0.5 % cream  Commonly known as: ARISTOCORT  APPLY TOPICALLY TO AFFECTED AREAS TWICE DAILY        ASK your doctor about these medications    pantoprazole 40 MG tablet  Commonly known as: Protonix  Take 1 tablet by mouth daily for 10 days           Where to Get Your Medications      These medications were sent to [address] - [place] kirby, [address] [place] [address]., [facility] [address]    Phone: [phone]   · hydrALAZINE 25 MG tablet         Time Spent on discharge is more than 45 minutes in the examination, evaluation, counseling and review of medications and discharge plan.    +++++++++++++++++++++++++++++++++++++++++++++++++  Nader Stephenson, [facility]  +++++++++++++++++++++++++++++++++++++++++++++++++  NOTE: This report was transcribed using voice recognition software. Every effort was made to ensure accuracy; however, inadvertent computerized transcription errors may be present.

## 2021-07-19 ENCOUNTER — OFFICE VISIT (OUTPATIENT)
Dept: FAMILY MEDICINE CLINIC | Age: 46
End: 2021-07-19
Payer: MEDICAID

## 2021-07-19 VITALS
DIASTOLIC BLOOD PRESSURE: 68 MMHG | HEART RATE: 87 BPM | HEIGHT: 66 IN | RESPIRATION RATE: 22 BRPM | TEMPERATURE: 98.7 F | WEIGHT: 293 LBS | OXYGEN SATURATION: 98 % | BODY MASS INDEX: 47.09 KG/M2 | SYSTOLIC BLOOD PRESSURE: 121 MMHG

## 2021-07-19 DIAGNOSIS — R07.9 CHEST PAIN, UNSPECIFIED TYPE: Primary | ICD-10-CM

## 2021-07-19 DIAGNOSIS — F41.9 ANXIETY: ICD-10-CM

## 2021-07-19 DIAGNOSIS — E78.5 HYPERLIPIDEMIA, UNSPECIFIED HYPERLIPIDEMIA TYPE: ICD-10-CM

## 2021-07-19 DIAGNOSIS — L91.8 SKIN TAG: ICD-10-CM

## 2021-07-19 PROCEDURE — 93000 ELECTROCARDIOGRAM COMPLETE: CPT | Performed by: FAMILY MEDICINE

## 2021-07-19 PROCEDURE — 99214 OFFICE O/P EST MOD 30 MIN: CPT | Performed by: FAMILY MEDICINE

## 2021-07-19 PROCEDURE — G8427 DOCREV CUR MEDS BY ELIG CLIN: HCPCS | Performed by: FAMILY MEDICINE

## 2021-07-19 PROCEDURE — 1036F TOBACCO NON-USER: CPT | Performed by: FAMILY MEDICINE

## 2021-07-19 PROCEDURE — G8417 CALC BMI ABV UP PARAM F/U: HCPCS | Performed by: FAMILY MEDICINE

## 2021-07-19 RX ORDER — ASPIRIN 81 MG/1
81 TABLET ORAL DAILY
Qty: 90 TABLET | Refills: 1 | Status: SHIPPED
Start: 2021-07-19 | End: 2022-01-14 | Stop reason: SDUPTHER

## 2021-07-19 RX ORDER — ATORVASTATIN CALCIUM 20 MG/1
20 TABLET, FILM COATED ORAL DAILY
Qty: 90 TABLET | Refills: 1 | Status: SHIPPED
Start: 2021-07-19 | End: 2021-12-07 | Stop reason: SDUPTHER

## 2021-07-19 RX ORDER — VENLAFAXINE HYDROCHLORIDE 37.5 MG/1
37.5 CAPSULE, EXTENDED RELEASE ORAL DAILY
Qty: 30 CAPSULE | Refills: 3 | Status: SHIPPED
Start: 2021-07-19 | End: 2021-10-20

## 2021-07-19 RX ORDER — HYDROXYZINE HYDROCHLORIDE 25 MG/1
25 TABLET, FILM COATED ORAL EVERY 8 HOURS PRN
Qty: 90 TABLET | Refills: 0 | Status: SHIPPED
Start: 2021-07-19 | End: 2021-11-30 | Stop reason: SDUPTHER

## 2021-07-19 NOTE — PROGRESS NOTES
atS: 40 y.o. female with   Chief Complaint   Patient presents with   [place]     has a skin tag on back - very painful to the touch - getting larger    Chest Pain    Shortness of Breath    Facial Pain     numbness in and around mouth        Pt here to establish care. She is having chest pains on and off for the past 3 weeks on and off.      O: VS:  height is 5' 6\" (1.676 m) and weight is 354 lb (160.6 kg) (abnormal). Her temperature is 98.7 °F (37.1 °C). Her blood pressure is 121/68 and her pulse is 87. Her respiration is 22 and oxygen saturation is 98%. BP Readings from Last 3 Encounters:   07/19/21 121/68   07/15/21 (!) 159/86   07/10/21 (!) 187/90     See resident note      Impression/Plan:   1) chest pain - order stress test.  Pt having SOB and chest pain with exertion. Pt with positive heart score. ECG today with no changes while pt having pain. Statin and ASA 81 mg started. Pt also having numbness and tingling around the mouth - ed on possibility that this may be anxiety. 2) skin tag with hair around it - clip hair. To procedure clinic tmw. Appt made. Health Maintenance Due   Topic Date Due    Hepatitis C screen  Never done    HIV screen  Never done    Hepatitis B vaccine (1 of 3 - Risk 3-dose series) Never done    Diabetic foot exam  04/13/2017    Colon cancer screen colonoscopy  Never done    Diabetic retinal exam  07/18/2021         Attending Physician Statement  I have discussed the case, including pertinent history and exam findings with the resident. I also have seen the patient and performed key portions of the examination. I agree with the documented assessment and plan.       Anne Marie Bridges MD

## 2021-07-19 NOTE — PATIENT INSTRUCTIONS
Patient Education        Anxiety Disorder: Care Instructions  Your Care Instructions     Anxiety is a normal reaction to stress. Difficult situations can cause you to have symptoms such as sweaty palms and a nervous feeling. In an anxiety disorder, the symptoms are far more severe. Constant worry, muscle tension, trouble sleeping, nausea and diarrhea, and other symptoms can make normal daily activities difficult or impossible. These symptoms may occur for no reason, and they can affect your work, school, or social life. Medicines, counseling, and self-care can all help. Follow-up care is a key part of your treatment and safety. Be sure to make and go to all appointments, and call your doctor if you are having problems. It's also a good idea to know your test results and keep a list of the medicines you take. How can you care for yourself at home? · Take medicines exactly as directed. Call your doctor if you think you are having a problem with your medicine. · Go to your counseling sessions and follow-up appointments. · Recognize and accept your anxiety. Then, when you are in a situation that makes you anxious, say to yourself, \"This is not an emergency. I feel uncomfortable, but I am not in danger. I can keep going even if I feel anxious. \"  · Be kind to your body:  ? Relieve tension with exercise or a massage. ? Get enough rest.  ? Avoid alcohol, caffeine, nicotine, and illegal drugs. They can increase your anxiety level and cause sleep problems. ? Learn and do relaxation techniques. See below for more about these techniques. · Engage your mind. Get out and do something you enjoy. Go to a funny movie, or take a walk or hike. Plan your day. Having too much or too little to do can make you anxious. · Keep a record of your symptoms. Discuss your fears with a good friend or family member, or join a support group for people with similar problems. Talking to others sometimes relieves stress.   · Get involved in play a relaxation tape while you drive a car. When should you call for help? Call 911 anytime you think you may need emergency care. For example, call if:    · You feel you cannot stop from hurting yourself or someone else. Keep the numbers for these national suicide hotlines: 1-257-828-TALK (8-740.643.1697) and 3-035-WZABGUW (9-475.536.3486). If you or someone you know talks about suicide or feeling hopeless, get help right away. Watch closely for changes in your health, and be sure to contact your doctor if:    · You have anxiety or fear that affects your life.     · You have symptoms of anxiety that are new or different from those you had before. Where can you learn more? Go to https://QuantConnect.[employer]. org and sign in to your [employer] account. Enter P754 in the Libboo box to learn more about \"Anxiety Disorder: Care Instructions. \"     If you do not have an account, please click on the \"Sign Up Now\" link. Current as of: September 23, 2020               Content Version: 12.9  © 2006-2021 [email or web address]. Care instructions adapted under license by [facility] ([facility]). If you have questions about a medical condition or this instruction, always ask your healthcare professional. [address] any warranty or liability for your use of this information. Patient Education        Panic Attacks: Care Instructions  Overview     During a panic attack, you may have a feeling of intense fear or terror, trouble breathing, chest pain or tightness, heartbeat changes, dizziness, sweating, and shaking. A panic attack starts suddenly and usually lasts from 5 to 20 minutes but may last even longer. An attack can begin with a stressful event. Or it can happen without a cause. Although panic attacks can cause scary symptoms, you can learn to manage them with self-care, counseling, and medicine. Follow-up care is a key part of your treatment and safety.  Be sure to make and go to all appointments, and call your doctor if you are having problems. It's also a good idea to know your test results and keep a list of the medicines you take. How can you care for yourself at home? · Take your medicine exactly as directed. Call your doctor if you think you are having a problem with your medicine. · Go to your counseling sessions and follow-up appointments. · Recognize and accept your anxiety. Then, when you are in a situation that makes you anxious, say to yourself, \"This is not an emergency. I feel uncomfortable, but I am not in danger. I can keep going even if I feel anxious. \"  · Be kind to your body:  ? Relieve tension with exercise or a massage. ? Get enough rest.  ? Avoid alcohol, caffeine, nicotine, and illegal drugs. They can increase your anxiety level, cause sleep problems, or trigger a panic attack. ? Learn and do relaxation techniques. See below for more about these techniques. · Engage your mind. Get out and do something you enjoy. Go to a funny movie, or take a walk or hike. Plan your day. Having too much or too little to do can make you anxious. · Keep a record of your symptoms. Discuss your fears with a good friend or family member, or join a support group for people with similar problems. Talking to others sometimes relieves stress. · Get involved in social groups, or volunteer to help others. Being alone sometimes makes things seem worse than they are. · Get at least 30 minutes of exercise on most days of the week to relieve stress. Walking is a good choice. You also may want to do other activities, such as running, swimming, cycling, or playing tennis or team sports. Relaxation techniques  Do relaxation exercises for 10 to 20 minutes a day. You can play soothing, relaxing music while you do them, if you wish. · Tell others in your house that you are going to do your relaxation exercises. Ask them not to disturb you.   · Find a comfortable place, away from

## 2021-07-19 NOTE — LETTER
[address] [address]  Phone: [phone]  Fax: [phone]    Deep Durham MD        July 19, 2021     Patient: Tesfaye Sullivan   YOB: 1975   Date of Visit: 7/19/2021       To Whom It May Concern: It is my medical opinion that Siomara Shields may return to work on 7/21/21. If you have any questions or concerns, please don't hesitate to call.     Sincerely,        Deep Durham MD

## 2021-07-19 NOTE — PROGRESS NOTES
venlafaxine (EFFEXOR XR) 37.5 MG extended release capsule Take 1 capsule by mouth daily 30 capsule 3    hydrOXYzine (ATARAX) 25 MG tablet Take 1 tablet by mouth every 8 hours as needed for Anxiety Ok to take 2 tabs if needed. 90 tablet 0    hydrALAZINE (APRESOLINE) 25 MG tablet Take 1 tablet by mouth every 8 hours 90 tablet 3    sucralfate (CARAFATE) 1 GM tablet Take 1 tablet by mouth 4 times daily 40 tablet 0    ibuprofen (ADVIL;MOTRIN) 600 MG tablet Take 1 tablet by mouth 3 times daily as needed for Pain 30 tablet 0    insulin glargine (BASAGLAR KWIKPEN) 100 UNIT/ML injection pen Inject 10 Units into the skin nightly 5 pen 3    Insulin Pen Needle 32G X 6 MM MISC 1 each by Does not apply route nightly 100 each 3    amLODIPine (NORVASC) 10 MG tablet Take 1 tablet by mouth daily 30 tablet 3    blood glucose monitor strips Test 2 times a day & as needed for symptoms of irregular blood glucose. Dispense sufficient amount for indicated testing frequency plus additional to accommodate PRN testing needs. 100 strip 3    mometasone (ELOCON) 0.1 % cream Apply topically daily.  50 g 3    fluconazole (DIFLUCAN) 150 MG tablet Take 1 tablet by mouth every 72 hours 3 tablet 1    lisinopril (PRINIVIL;ZESTRIL) 40 MG tablet Take 1 tablet by mouth daily 90 tablet 1    hydroCHLOROthiazide (HYDRODIURIL) 25 MG tablet Take 1 tablet by mouth every morning 30 tablet 5    Blood Glucose Monitoring Suppl (ONE TOUCH ULTRA 2) w/Device KIT USE DAILY AS DIRECTED AS NEEDED  1 kit 0    Blood Pressure Monitoring (BLOOD PRESSURE MONITOR/L CUFF) MISC 1 Device by Does not apply route 2 times daily 1 each 0    Lancets MISC 1 each by Does not apply route daily 100 each 5    ondansetron (ZOFRAN-ODT) 4 MG disintegrating tablet Take 1 tablet by mouth 3 times daily as needed for Nausea or Vomiting 21 tablet 0    triamcinolone (ARISTOCORT) 0.5 % cream APPLY TOPICALLY TO AFFECTED AREAS TWICE DAILY 45 g 0    mupirocin (BACTROBAN) 2 % ointment Apply topically 3 times daily. 30 g 0     No current facility-administered medications for this visit.       Allergies   Allergen Reactions    Percocet [Oxycodone-Acetaminophen] Itching    Metformin And Related Other (See Comments)     GI side effects     Tape [Adhesive Tape] Itching       Past Medical & Surgical History:      Diagnosis Date    Acute renal injury ([facility] [address] 2013    Analgesic overuse headache 2018    Anxiety     Arthritis     Asthma     Depression 3/19/2013    Diabetes mellitus ([facility] [address]     A1C=6.7 on 14    Fracture of tooth 2018    GERD (gastroesophageal reflux disease)     Glaucoma     Hypertension     Hypertensive urgency 3/19/2013    Hypothyroidism     Irritable bowel syndrome     Obesity     Obstructive sleep apnea     Pneumonia     Polycystic ovarian syndrome     Postcholecystectomy syndrome 2018    Spinal headache     Suicidal ideation 3/18/2016     Past Surgical History:   Procedure Laterality Date     SECTION      Dr. Waqas Mccullough, [id number], LAPAROSCOPIC  2001    [facility]    DENTAL SURGERY  10/06/2011    4 extractions    ECHO COMPLETE  2014         FOOT SURGERY      RECONSTRUCTION LEFT FOOT, Dr. Deacon Sandoval, [address]  9/3/13    incisional hernia repair with mesh    HYSTERECTOMY      KNEE CARTILAGE SURGERY      OVARY REMOVAL      left due to polycystic ovary, Dr. Trisha Fair, [facility]    BRYANT AND BSO  3/19/2013    Attempted Lap-Open BRYANT;RSO, Dr. Jono Sanford, [facility] [address]         Family History:      Problem Relation Age of Onset    Asthma Mother     Diabetes Mother     High Blood Pressure Mother     High Blood Pressure Father     Heart Disease Father     Cancer Father     Depression Father     Diabetes Brother     Asthma Brother     Thyroid Disease Sister     Asthma Sister     Depression Maternal Grandmother     High Blood Pressure Maternal Grandmother     Mental Illness Maternal Grandmother     Thyroid Disease Maternal Grandmother     Heart Disease Maternal Grandfather     Depression Paternal Grandmother     High Blood Pressure Paternal Grandmother     Cancer Paternal Grandfather        Social History:  Social History     Tobacco Use    Smoking status: Never Smoker    Smokeless tobacco: Never Used   Substance Use Topics    Alcohol use: No     Comment: no alcohol since age 26;  drinks 2-3 glasses Coke/Pepsi daily & 2-3 glasses of iced tea daily        Immunization History   Administered Date(s) Administered    COVID-19, Pfizer, PF, 30mcg/0.3mL 03/28/2021, 04/29/2021    Influenza Vaccine, unspecified formulation 09/12/2014, 09/25/2015, 01/06/2016    Influenza Virus Vaccine 10/15/2012, 10/24/2013    Pneumococcal Polysaccharide ([id number]) 01/01/2012, 10/24/2013    Tdap (Boostrix, Adacel) 05/14/2014       Review of Systems   Constitutional: Negative for chills and fever. HENT: Negative for congestion. Eyes: Negative for visual disturbance. Respiratory: Positive for shortness of breath (on exertion). Negative for cough and wheezing. Cardiovascular: Positive for chest pain and palpitations (when anxious). Negative for leg swelling. Gastrointestinal: Negative for abdominal pain, nausea and vomiting. Genitourinary: Negative for dysuria and hematuria. Skin: Negative for rash. Neurological: Positive for numbness (when in panic attack). Negative for weakness and headaches. Psychiatric/Behavioral: Positive for decreased concentration, dysphoric mood and sleep disturbance. Negative for suicidal ideas. The patient is nervous/anxious. VS:  /68   Pulse 87   Temp 98.7 °F (37.1 °C)   Resp 22   Ht 5' 6\" (1.676 m)   Wt (!) 354 lb (160.6 kg)   LMP 10/01/2012 (Approximate) Comment: 2013  SpO2 98%   BMI 57.14 kg/m²     Physical Exam  Vitals reviewed. Constitutional:       General: She is not in acute distress.      Appearance: She is not ill-appearing. Comments: Very anxious   HENT:      Head: Normocephalic and atraumatic. Eyes:      General: No scleral icterus. Cardiovascular:      Rate and Rhythm: Normal rate and regular rhythm. Heart sounds: Normal heart sounds. No murmur heard. Comments: Chest pain not reproducible with palpation of chest wall   Pulmonary:      Effort: Pulmonary effort is normal. No respiratory distress. Breath sounds: Normal breath sounds. No wheezing or rhonchi. Abdominal:      General: There is no distension. Palpations: Abdomen is soft. Tenderness: There is no abdominal tenderness. Musculoskeletal:      Cervical back: Normal range of motion and neck supple. Right lower leg: No edema. Left lower leg: No edema. Skin:     General: Skin is warm. Findings: Lesion (skin tag on the back with hair around the base strangulating it. no active bleeding. some dark/old blood  noted) present. No rash. Neurological:      General: No focal deficit present. Mental Status: She is alert and oriented to person, place, and time. Cranial Nerves: No cranial nerve deficit. Motor: No weakness. Psychiatric:         Mood and Affect: Mood normal.         Behavior: Behavior normal.         Assessment/Plan:  Eliana was seen today for skin tag, chest pain, shortness of breath and facial pain. Diagnoses and all orders for this visit:    Chest pain, unspecified type  EKG in the office without any changes from the hospital. Patient with co morbidities. Will have a stress test done to rule out cardiac origin. In the meantime start baby aspirin and statin. -     EKG 12 lead; Future  -     NM Cardiac Stress Test Nuclear Imaging; Future  -     Cardiac Stress Test - w/Pharm; Future  -     EKG 12 lead    Anxiety  Agreeable to start treatment. Also educated about how anxiety can be manifested with chest pain, difficulties breathing and perioral numbness.    Close follow up   - venlafaxine (EFFEXOR XR) 37.5 MG extended release capsule; Take 1 capsule by mouth daily  -     hydrOXYzine (ATARAX) 25 MG tablet; Take 1 tablet by mouth every 8 hours as needed for Anxiety Ok to take 2 tabs if needed. Skin tag  Tried to remove the hair around it without result. Will have it removed all together tomorrow in procedure clinic. Appointment scheduled. Hyperlipidemia, unspecified hyperlipidemia type  -     atorvastatin (LIPITOR) 20 MG tablet; Take 1 tablet by mouth daily    Other orders  -     aspirin EC 81 MG EC tablet; Take 1 tablet by mouth daily          Follow up:  2 weeks for diabetes    Patient agrees with the above stated plan.       Rex Lamb MD  PGY-3 Family Medicine

## 2021-07-20 ENCOUNTER — OFFICE VISIT (OUTPATIENT)
Dept: FAMILY MEDICINE CLINIC | Age: 46
End: 2021-07-20

## 2021-07-20 ENCOUNTER — HOSPITAL ENCOUNTER (EMERGENCY)
Age: 46
Discharge: HOME | End: 2021-07-20
Attending: EMERGENCY MEDICINE
Payer: MEDICAID

## 2021-07-20 VITALS
HEIGHT: 66 IN | HEART RATE: 85 BPM | OXYGEN SATURATION: 99 % | TEMPERATURE: 97.4 F | BODY MASS INDEX: 47.09 KG/M2 | DIASTOLIC BLOOD PRESSURE: 67 MMHG | RESPIRATION RATE: 16 BRPM | WEIGHT: 293 LBS | SYSTOLIC BLOOD PRESSURE: 146 MMHG

## 2021-07-20 VITALS
HEIGHT: 66 IN | DIASTOLIC BLOOD PRESSURE: 118 MMHG | BODY MASS INDEX: 47.09 KG/M2 | HEART RATE: 82 BPM | SYSTOLIC BLOOD PRESSURE: 211 MMHG | WEIGHT: 293 LBS | OXYGEN SATURATION: 96 % | RESPIRATION RATE: 18 BRPM | TEMPERATURE: 97.6 F

## 2021-07-20 DIAGNOSIS — F41.1 ANXIETY STATE: ICD-10-CM

## 2021-07-20 DIAGNOSIS — L91.8 SKIN TAG: ICD-10-CM

## 2021-07-20 DIAGNOSIS — L98.9 SKIN LESION: Primary | ICD-10-CM

## 2021-07-20 DIAGNOSIS — I16.0 HYPERTENSIVE URGENCY: Primary | ICD-10-CM

## 2021-07-20 LAB
ANION GAP SERPL CALCULATED.3IONS-SCNC: 10 MMOL/L (ref 7–16)
BASOPHILS ABSOLUTE: 0.04 E9/L (ref 0–0.2)
BASOPHILS RELATIVE PERCENT: 0.5 % (ref 0–2)
BUN BLDV-MCNC: 9 MG/DL (ref 6–20)
CALCIUM SERPL-MCNC: 9.2 MG/DL (ref 8.6–10.2)
CHLORIDE BLD-SCNC: 99 MMOL/L (ref 98–107)
CO2: 30 MMOL/L (ref 22–29)
CREAT SERPL-MCNC: 0.7 MG/DL (ref 0.5–1)
EOSINOPHILS ABSOLUTE: 0.11 E9/L (ref 0.05–0.5)
EOSINOPHILS RELATIVE PERCENT: 1.4 % (ref 0–6)
GFR AFRICAN AMERICAN: >60
GFR NON-AFRICAN AMERICAN: >60 ML/MIN/1.73
GLUCOSE BLD-MCNC: 132 MG/DL (ref 74–99)
HCT VFR BLD CALC: 40.4 % (ref 34–48)
HEMOGLOBIN: 13.4 G/DL (ref 11.5–15.5)
IMMATURE GRANULOCYTES #: 0.08 E9/L
IMMATURE GRANULOCYTES %: 1 % (ref 0–5)
LYMPHOCYTES ABSOLUTE: 2.64 E9/L (ref 1.5–4)
LYMPHOCYTES RELATIVE PERCENT: 32.6 % (ref 20–42)
MCH RBC QN AUTO: 30.5 PG (ref 26–35)
MCHC RBC AUTO-ENTMCNC: 33.2 % (ref 32–34.5)
MCV RBC AUTO: 91.8 FL (ref 80–99.9)
MONOCYTES ABSOLUTE: 0.58 E9/L (ref 0.1–0.95)
MONOCYTES RELATIVE PERCENT: 7.2 % (ref 2–12)
NEUTROPHILS ABSOLUTE: 4.64 E9/L (ref 1.8–7.3)
NEUTROPHILS RELATIVE PERCENT: 57.3 % (ref 43–80)
PDW BLD-RTO: 13.2 FL (ref 11.5–15)
PLATELET # BLD: 188 E9/L (ref 130–450)
PMV BLD AUTO: 11.2 FL (ref 7–12)
POTASSIUM SERPL-SCNC: 3.8 MMOL/L (ref 3.5–5)
RBC # BLD: 4.4 E12/L (ref 3.5–5.5)
SODIUM BLD-SCNC: 139 MMOL/L (ref 132–146)
TROPONIN, HIGH SENSITIVITY: 10 NG/L (ref 0–9)
WBC # BLD: 8.1 E9/L (ref 4.5–11.5)

## 2021-07-20 PROCEDURE — 6360000002 HC RX W HCPCS: Performed by: EMERGENCY MEDICINE

## 2021-07-20 PROCEDURE — 99999 PR OFFICE/OUTPT VISIT,PROCEDURE ONLY: CPT | Performed by: FAMILY MEDICINE

## 2021-07-20 PROCEDURE — 96374 THER/PROPH/DIAG INJ IV PUSH: CPT

## 2021-07-20 PROCEDURE — 96375 TX/PRO/DX INJ NEW DRUG ADDON: CPT

## 2021-07-20 PROCEDURE — 85025 COMPLETE CBC W/AUTO DIFF WBC: CPT

## 2021-07-20 PROCEDURE — 84484 ASSAY OF TROPONIN QUANT: CPT

## 2021-07-20 PROCEDURE — 99284 EMERGENCY DEPT VISIT MOD MDM: CPT

## 2021-07-20 PROCEDURE — 80048 BASIC METABOLIC PNL TOTAL CA: CPT

## 2021-07-20 PROCEDURE — 93005 ELECTROCARDIOGRAM TRACING: CPT | Performed by: EMERGENCY MEDICINE

## 2021-07-20 RX ORDER — MORPHINE SULFATE 4 MG/ML
4 INJECTION, SOLUTION INTRAMUSCULAR; INTRAVENOUS ONCE
Status: COMPLETED | OUTPATIENT
Start: 2021-07-20 | End: 2021-07-20

## 2021-07-20 RX ORDER — HYDRALAZINE HYDROCHLORIDE 20 MG/ML
10 INJECTION INTRAMUSCULAR; INTRAVENOUS ONCE
Status: COMPLETED | OUTPATIENT
Start: 2021-07-20 | End: 2021-07-20

## 2021-07-20 RX ORDER — CEPHALEXIN 500 MG/1
500 CAPSULE ORAL 4 TIMES DAILY
Qty: 40 CAPSULE | Refills: 0 | Status: SHIPPED | OUTPATIENT
Start: 2021-07-20 | End: 2021-07-30

## 2021-07-20 RX ADMIN — MORPHINE SULFATE 4 MG: 4 INJECTION, SOLUTION INTRAMUSCULAR; INTRAVENOUS at 22:02

## 2021-07-20 RX ADMIN — HYDRALAZINE HYDROCHLORIDE 10 MG: 20 INJECTION INTRAMUSCULAR; INTRAVENOUS at 22:02

## 2021-07-20 ASSESSMENT — PAIN SCALES - GENERAL
PAINLEVEL_OUTOF10: 7
PAINLEVEL_OUTOF10: 7

## 2021-07-20 NOTE — PROGRESS NOTES
Patient was not seen in clip procedure clinic for skin tag removal.  However due to uncontrolled elevated blood pressure during her visit, procedure was not conducted. Patient was very upset as she reports \" she was promised for this procedure to happen today\". She reports usually her pressure runs higher so therefore is not something unusual.  Advised the patient that her blood pressure needs to be further stabilized before we have the skin tag procedure removal in the future. Advised patient to make follow-up appointment with PCP sooner for uncontrolled blood pressure. Patient furious after and claiming statements of going home and cutting the skin tag off herself. Offered counseling regarding risk of infection and bleeding with unsupervised risk. Also advised patient to go to the ED given her blood pressure was above systolic 757S during both blood pressure measurements. Not billed for this encounter.

## 2021-07-20 NOTE — ED NOTES
FIRST PROVIDER CONTACT ASSESSMENT NOTE                                                                                                Department of Emergency Medicine                                                      First Provider Note  21  4:55 PM EDT  NAME: Galina Amaya  : 1975  MRN: [id number]    Chief Complaint: Hypertension (was in office to get infected skin tag removed and was told bp was too high (210/118) - denies h/a or vision changes )      History of Present Illness:   Galina Amaya is a 40 y.o. female who presents to the ED for painful red skin tag and elevated BP at doctors office. Takes 4 meds for HTN         Focused Physical Exam:  VS:    ED Triage Vitals [21 1652]   BP Temp Temp Source Pulse Resp SpO2 Height Weight   (!) 214/118 97.4 °F (36.3 °C) Temporal 82 18 97 % 5' 6\" (1.676 m) (!) 351 lb (159.2 kg)        General: Alert and in no apparent distress. Medical History:  has a past medical history of Acute renal injury ([address] Analgesic overuse headache, Anxiety, Arthritis, Asthma, Depression, Diabetes mellitus ([address] Fracture of tooth, GERD (gastroesophageal reflux disease), Glaucoma, Hypertension, Hypertensive urgency, Hypothyroidism, Irritable bowel syndrome, Obesity, Obstructive sleep apnea, Pneumonia, Polycystic ovarian syndrome, Postcholecystectomy syndrome, Spinal headache, and Suicidal ideation. Surgical History:  has a past surgical history that includes Tonsillectomy (); Cholecystectomy, laparoscopic ();  section (); Foot surgery (); Ovary removal (); Dental surgery (10/06/2011); bernadette and bso (cervix removed) (3/19/2013); hernia repair (9/3/13); Hysterectomy; Echo Complete (2014); and Knee cartilage surgery. Social History:  reports that she has never smoked. She has never used smokeless tobacco. She reports that she does not drink alcohol and does not use drugs.     Family History: family history includes Asthma in her brother, mother, and sister; Cancer in her father and paternal grandfather; Depression in her father, maternal grandmother, and paternal grandmother; Diabetes in her brother and mother; Heart Disease in her father and maternal grandfather; High Blood Pressure in her father, maternal grandmother, mother, and paternal grandmother; Mental Illness in her maternal grandmother; Thyroid Disease in her maternal grandmother and sister.     Allergies: Percocet [oxycodone-acetaminophen], Metformin and related, and Tape Mindy Dewayne tape]     Initial Plan of Care:  Initiate Treatment-Testing, Proceed toTreatment Area When Bed Available for ED Attending/MLP to Continue Care    -------------------------------------------------[address] ASSESSMENT NOTE--------------------------------------------------------  Electronically signed by CRYSTAL Chery   DD: 7/20/21       CRYSTAL Bernal  07/20/21 [id number]

## 2021-07-21 LAB
EKG ATRIAL RATE: 78 BPM
EKG P AXIS: 50 DEGREES
EKG P-R INTERVAL: 172 MS
EKG Q-T INTERVAL: 436 MS
EKG QRS DURATION: 94 MS
EKG QTC CALCULATION (BAZETT): 497 MS
EKG R AXIS: -2 DEGREES
EKG T AXIS: 38 DEGREES
EKG VENTRICULAR RATE: 78 BPM

## 2021-07-21 PROCEDURE — 93010 ELECTROCARDIOGRAM REPORT: CPT | Performed by: INTERNAL MEDICINE

## 2021-07-21 ASSESSMENT — ENCOUNTER SYMPTOMS
ABDOMINAL PAIN: 0
RHINORRHEA: 0
NAUSEA: 0
SHORTNESS OF BREATH: 0
BACK PAIN: 0
COLOR CHANGE: 0
PHOTOPHOBIA: 0
VOMITING: 0
SINUS PRESSURE: 0
COUGH: 0

## 2021-07-21 NOTE — ED PROVIDER NOTES
Greyson Daley is a 40 y.o. female presenting to the ED for skin tag removal, hypertension, beginning pta ago. The complaint has been persistent, moderate in severity, and worsened by nothing. Patient is a 43-year-old female who follows with family medicine. She states she is on 3 antihypertensives and has resistant hypertension. Patient went into her family doctor today to have her skin tag removed and they refused to remove it due to markedly elevated blood pressure with systolic in the 231G. They recommended patient come to the ER. At first patient was resistant to antihypertensive treatment and evaluation for hypertensive urgency. She stated she had no symptoms and that she is just here to have her skin tag removed and she was told to come here to have it removed. However after reviewing family medicine notes that was not the case. Patient states she has been struggling to have it removed because she does not have an appointment for dermatology for some time. Patient denied any chest pain shortness of breath any headache any vision changes any neck or back pain any abdominal pain. Review of Systems:   Review of Systems   Constitutional: Negative for appetite change, fatigue and fever. HENT: Negative for congestion, rhinorrhea and sinus pressure. Eyes: Negative for photophobia and visual disturbance. Respiratory: Negative for cough and shortness of breath. Cardiovascular: Negative for chest pain and palpitations. Gastrointestinal: Negative for abdominal pain, nausea and vomiting. Endocrine: Negative for polyphagia and polyuria. Genitourinary: Negative for difficulty urinating and flank pain. Musculoskeletal: Negative for arthralgias and back pain. Skin: Negative for color change and rash. Allergic/Immunologic: Negative for food allergies and immunocompromised state. Neurological: Negative for dizziness and light-headedness.    Hematological: Negative for adenopathy. Does not bruise/bleed easily. Psychiatric/Behavioral: Negative for agitation and confusion.               --------------------------------------------- PAST HISTORY ---------------------------------------------  Past Medical History:  has a past medical history of Acute renal injury ([facility] [address] Analgesic overuse headache, Anxiety, Arthritis, Asthma, Depression, Diabetes mellitus ([facility] Fracture of tooth, GERD (gastroesophageal reflux disease), Glaucoma, Hypertension, Hypertensive urgency, Hypothyroidism, Irritable bowel syndrome, Obesity, Obstructive sleep apnea, Pneumonia, Polycystic ovarian syndrome, Postcholecystectomy syndrome, Spinal headache, and Suicidal ideation. Past Surgical History:  has a past surgical history that includes Tonsillectomy (); Cholecystectomy, laparoscopic ();  section (); Foot surgery (); Ovary removal (); Dental surgery (10/06/2011); bernadette and bso (cervix removed) (3/19/2013); hernia repair (9/3/13); Hysterectomy; Echo Complete (2014); and Knee cartilage surgery. Social History:  reports that she has never smoked. She has never used smokeless tobacco. She reports that she does not drink alcohol and does not use drugs. Family History: family history includes Asthma in her brother, mother, and sister; Cancer in her father and paternal grandfather; Depression in her father, maternal grandmother, and paternal grandmother; Diabetes in her brother and mother; Heart Disease in her father and maternal grandfather; High Blood Pressure in her father, maternal grandmother, mother, and paternal grandmother; Mental Illness in her maternal grandmother; Thyroid Disease in her maternal grandmother and sister. The patients home medications have been reviewed.     Allergies: Percocet [oxycodone-acetaminophen], Metformin and related, and Tape [adhesive tape]    -------------------------------------------------- RESULTS -------------------------------------------------  All laboratory and radiology results have been personally reviewed by myself   LABS:  Results for orders placed or performed during the hospital encounter of 07/20/21   CBC Auto Differential   Result Value Ref Range    WBC 8.1 4.5 - 11.5 E9/L    RBC 4.40 3.50 - 5.50 E12/L    Hemoglobin 13.4 11.5 - 15.5 g/dL    Hematocrit 40.4 34.0 - 48.0 %    MCV 91.8 80.0 - 99.9 fL    MCH 30.5 26.0 - 35.0 pg    MCHC 33.2 32.0 - 34.5 %    RDW 13.2 11.5 - 15.0 fL    Platelets 843 652 - 209 E9/L    MPV 11.2 7.0 - 12.0 fL    Neutrophils % 57.3 43.0 - 80.0 %    Immature Granulocytes % 1.0 0.0 - 5.0 %    Lymphocytes % 32.6 20.0 - 42.0 %    Monocytes % 7.2 2.0 - 12.0 %    Eosinophils % 1.4 0.0 - 6.0 %    Basophils % 0.5 0.0 - 2.0 %    Neutrophils Absolute 4.64 1.80 - 7.30 E9/L    Immature Granulocytes # 0.08 E9/L    Lymphocytes Absolute 2.64 1.50 - 4.00 E9/L    Monocytes Absolute 0.58 0.10 - 0.95 E9/L    Eosinophils Absolute 0.11 0.05 - 0.50 E9/L    Basophils Absolute 0.04 0.00 - 0.20 O4/L   Basic Metabolic Panel   Result Value Ref Range    Sodium 139 132 - 146 mmol/L    Potassium 3.8 3.5 - 5.0 mmol/L    Chloride 99 98 - 107 mmol/L    CO2 30 (H) 22 - 29 mmol/L    Anion Gap 10 7 - 16 mmol/L    Glucose 132 (H) 74 - 99 mg/dL    BUN 9 6 - 20 mg/dL    CREATININE 0.7 0.5 - 1.0 mg/dL    GFR Non-African American >60 >=60 mL/min/1.73    GFR African American >60     Calcium 9.2 8.6 - 10.2 mg/dL   Troponin   Result Value Ref Range    Troponin, High Sensitivity 10 (H) 0 - 9 ng/L   EKG 12 Lead   Result Value Ref Range    Ventricular Rate 78 BPM    Atrial Rate 78 BPM    P-R Interval 172 ms    QRS Duration 94 ms    Q-T Interval 436 ms    QTc Calculation (Bazett) 497 ms    P Axis 50 degrees    R Axis -2 degrees    T Axis 38 degrees       RADIOLOGY:  Interpreted by Radiologist.  No orders to display       ------------------------- NURSING NOTES AND VITALS REVIEWED ---------------------------   The nursing notes within the ED encounter and vital signs as below have been reviewed. BP (!) 146/67   Pulse 85   Temp 97.4 °F (36.3 °C) (Temporal)   Resp 16   Ht 5' 6\" (1.676 m)   Wt (!) 351 lb (159.2 kg)   LMP 10/01/2012 (Approximate) Comment: 2013  SpO2 99%   BMI 56.65 kg/m²   Oxygen Saturation Interpretation: Normal      ---------------------------------------------------PHYSICAL EXAM--------------------------------------    Physical Exam  Vitals reviewed. Constitutional:       General: She is not in acute distress. Appearance: Normal appearance. She is not toxic-appearing. HENT:      Head: Normocephalic and atraumatic. Right Ear: External ear normal.      Left Ear: External ear normal.      Nose: Nose normal. No congestion. Mouth/Throat:      Mouth: Mucous membranes are moist.      Pharynx: Oropharynx is clear. No posterior oropharyngeal erythema. Eyes:      Extraocular Movements: Extraocular movements intact. Pupils: Pupils are equal, round, and reactive to light. Cardiovascular:      Rate and Rhythm: Normal rate and regular rhythm. Pulses: Normal pulses. Heart sounds: No murmur heard. Pulmonary:      Effort: Pulmonary effort is normal.      Breath sounds: No wheezing or rhonchi. Chest:      Chest wall: No tenderness. Abdominal:      General: Bowel sounds are normal.      Tenderness: There is no abdominal tenderness. There is no right CVA tenderness, left CVA tenderness or guarding. Musculoskeletal:         General: No swelling or deformity. Cervical back: Normal range of motion and neck supple. No muscular tenderness. Skin:     General: Skin is warm and dry. Capillary Refill: Capillary refill takes less than 2 seconds. Comments: Large red erythematous pedunculated skin tag/skin lesion to her left upper thorax posteriorly just right of her left shoulder blade   Neurological:      General: No focal deficit present.       Mental Status: She is alert and oriented to person, place, and time. Psychiatric:         Mood and Affect: Mood normal.               ------------------------------ ED COURSE/MEDICAL DECISION MAKING----------------------  Medications   hydrALAZINE (APRESOLINE) injection 10 mg (10 mg Intravenous Given 7/20/21 2202)   morphine sulfate (PF) injection 4 mg (4 mg Intravenous Given 7/20/21 2202)     EKG: This EKG is signed and interpreted by me. [id number]  Rate: 78  Rhythm: Sinus  Interpretation: prolong qtc, possible lvh  Comparison: stable as compared to patient's most recent EKG      ED COURSE:       Medical Decision Making:    I recommended follow-up with general surgery or dermatology to have her skin lesion removed. I did give her Keflex. I told her it appeared to be red and inflamed possibly infected this time probably should be treated with antibiotics prior to removal.  She was medicated for blood pressure which markedly improved. Patient felt significantly better. She will be discharged. I instructed her to keep a log of her blood pressures at home follow-up with her primary care physician 1 to 2 days so they can recheck her in the office and adjust her medication. She is also to call and schedule appointment with a general surgeon and dermatologist to have her skin lesion removed. She will need to have them send pathology results and have her family doctor follow that up as well. Risks and benefits were discussed with patient for All medications dispensed and given in department as well prescriptions prescribed for home, . The patient elected to take the medicine. Pt instructed on warning signs and precautions for medication side effects. The patient was given warning signs for when to seek medical attention. Counseled regarding todays diagnosis, including possible risks and complications,  especially if left uncontrolled.      Counseled regarding the possible side effects, risks, benefits and alternatives to treatment; patient and/or guardian verbalizes understanding, agrees, feels comfortable with and wishes to proceed with treatment plan. Advised patient to call her primary care physician with any new medication issues, and read all Rx info from pharmacy to assure aware of all possible risks and side effects of medication before taking. I did discuss warning signs for when to return to the Emergency Room, and the patient verbalized understanding      Counseling: The emergency provider has spoken with the patient and discussed todays results, in addition to providing specific details for the plan of care and counseling regarding the diagnosis and prognosis. Questions are answered at this time and they are agreeable with the plan.      --------------------------------- IMPRESSION AND DISPOSITION ---------------------------------    IMPRESSION  1. Hypertensive urgency    2. Anxiety state    3. Skin tag        DISPOSITION  Disposition: Discharge to home  Patient condition is good      NOTE: This report was transcribed using voice recognition software.  Every effort was made to ensure accuracy; however, inadvertent computerized transcription errors may be present       Dinora Simeon DO  07/21/21 0110

## 2021-08-08 ASSESSMENT — ENCOUNTER SYMPTOMS
SHORTNESS OF BREATH: 1
VOMITING: 0
WHEEZING: 0
ABDOMINAL PAIN: 0
NAUSEA: 0
COUGH: 0

## 2021-08-20 RX ORDER — IBUPROFEN 800 MG/1
800 TABLET ORAL EVERY 8 HOURS PRN
Qty: 30 TABLET | Refills: 1 | OUTPATIENT
Start: 2021-08-20

## 2021-09-18 ENCOUNTER — APPOINTMENT (OUTPATIENT)
Dept: ULTRASOUND IMAGING | Age: 46
End: 2021-09-18
Payer: MEDICAID

## 2021-09-18 ENCOUNTER — HOSPITAL ENCOUNTER (EMERGENCY)
Age: 46
Discharge: HOME | End: 2021-09-18
Attending: EMERGENCY MEDICINE
Payer: MEDICAID

## 2021-09-18 VITALS
WEIGHT: 293 LBS | HEART RATE: 80 BPM | RESPIRATION RATE: 16 BRPM | OXYGEN SATURATION: 97 % | SYSTOLIC BLOOD PRESSURE: 191 MMHG | TEMPERATURE: 98.7 F | BODY MASS INDEX: 44.41 KG/M2 | HEIGHT: 68 IN | DIASTOLIC BLOOD PRESSURE: 106 MMHG

## 2021-09-18 DIAGNOSIS — Z86.79 HISTORY OF HYPERTENSION: ICD-10-CM

## 2021-09-18 DIAGNOSIS — M79.89 LEG SWELLING: Primary | ICD-10-CM

## 2021-09-18 PROCEDURE — 99283 EMERGENCY DEPT VISIT LOW MDM: CPT

## 2021-09-18 PROCEDURE — 93971 EXTREMITY STUDY: CPT

## 2021-09-18 ASSESSMENT — ENCOUNTER SYMPTOMS
SHORTNESS OF BREATH: 0
ABDOMINAL PAIN: 0
COUGH: 0
BACK PAIN: 0

## 2021-09-18 ASSESSMENT — PAIN DESCRIPTION - LOCATION: LOCATION: LEG

## 2021-09-18 ASSESSMENT — PAIN DESCRIPTION - ORIENTATION: ORIENTATION: LEFT

## 2021-09-18 NOTE — ED PROVIDER NOTES
This is a 60-year-old female past medical history of diabetes as well as hypertension who presents to the ED for evaluation of leg swelling. Patient states that for the past 1 week she has been noticing increasing swelling to her left leg. Patient states that also has a cramping-like sensation located behind her calf. Patient states she chronically has some lymphedema but notes that her swelling has been worse over this past week. Pain is worse whenever she tries to walk. Patient states when she rubs her calf this does seem to improve her symptoms. She has no chest pain or shortness of breath. She denies any numbness or tingling. The history is provided by the patient. No  was used. Review of Systems   Constitutional: Negative for fever. HENT: Negative for congestion. Eyes: Negative for visual disturbance. Respiratory: Negative for cough and shortness of breath. Cardiovascular: Negative for chest pain. Gastrointestinal: Negative for abdominal pain. Endocrine: Negative for polyuria. Genitourinary: Negative for dysuria. Musculoskeletal: Negative for back pain. Skin: Negative for rash. Neurological: Negative for headaches. Hematological: Does not bruise/bleed easily. Psychiatric/Behavioral: Negative for confusion. Physical Exam  Vitals and nursing note reviewed. Constitutional:       General: She is not in acute distress. Appearance: She is well-developed. She is obese. She is not ill-appearing. HENT:      Head: Normocephalic and atraumatic. Mouth/Throat:      Mouth: Mucous membranes are moist.   Eyes:      Extraocular Movements: Extraocular movements intact. Pupils: Pupils are equal, round, and reactive to light. Neck:      Vascular: No JVD. Cardiovascular:      Rate and Rhythm: Normal rate and regular rhythm. Heart sounds: No murmur heard. Pulmonary:      Effort: Pulmonary effort is normal.      Breath sounds:  No wheezing, rhonchi or rales. Chest:      Chest wall: No tenderness. Abdominal:      General: There is no distension. Palpations: Abdomen is soft. Tenderness: There is no abdominal tenderness. There is no guarding or rebound. Hernia: No hernia is present. Musculoskeletal:      Cervical back: Normal range of motion and neck supple. Right lower leg: No edema. Left lower leg: No edema. Comments: Slight leg swelling range of motion of knee ankle and hip pulses intact compartments soft   Skin:     General: Skin is warm and dry. Capillary Refill: Capillary refill takes less than 2 seconds. Neurological:      General: No focal deficit present. Mental Status: She is alert and oriented to person, place, and time. Cranial Nerves: No cranial nerve deficit. Psychiatric:         Mood and Affect: Mood normal.         Behavior: Behavior normal.          Procedures     MDM  Number of Diagnoses or Management Options  History of hypertension  Leg swelling  Diagnosis management comments: Patient is a pleasant 44-year-old female who presented to the ED for worsening leg swelling to her left lower leg over the past week. No signs of compartment syndrome ultrasounds reassuring showed no signs of any type of cyst or DVT. She was able to ambulate had no chest pain or shortness of breath  Of note patient was hypertensive was advised to go home and continue taking her medications as prescribed follow-up with her PCP.   Patient is appropriate for outpatient follow-up was given strict return precautions.                    --------------------------------------------- PAST HISTORY ---------------------------------------------  Past Medical History:  has a past medical history of Acute renal injury ([address] Analgesic overuse headache, Anxiety, Arthritis, Asthma, Depression, Diabetes mellitus ([address] Fracture of tooth, GERD (gastroesophageal reflux disease), Glaucoma, Hypertension, Hypertensive (Oral)   Resp 16   Ht 5' 8\" (1.727 m)   Wt (!) 335 lb (152 kg)   LMP 10/01/2012 (Approximate) Comment: 2013  SpO2 97%   BMI 50.94 kg/m²   Oxygen Saturation Interpretation: Normal      ------------------------------------------ PROGRESS NOTES ------------------------------------------  12:05 PM EDT  I have spoken with the patient and discussed todays results, in addition to providing specific details for the plan of care and counseling regarding the diagnosis and prognosis. Their questions are answered at this time and they are agreeable with the plan. I discussed at length with them reasons for immediate return here for re evaluation. They will followup with their PCP.    --------------------------------- ADDITIONAL PROVIDER NOTES ---------------------------------  At this time the patient is without objective evidence of an acute process requiring hospitalization or inpatient management. They have remained hemodynamically stable throughout their entire ED visit and are stable for discharge with outpatient follow-up. The plan has been discussed in detail and they are aware of the specific conditions for emergent return, as well as the importance of follow-up. Discharge Medication List as of 9/18/2021 12:55 PM          Diagnosis:  1. Leg swelling    2. History of hypertension        Disposition:  Patient's disposition: Discharge to home  Patient's condition is stable.       Smith Taylor DO  09/19/21 [id number]

## 2021-09-23 NOTE — TELEPHONE ENCOUNTER
Refill for Norvasc requested by pharmacy. Patient last seen 7/29/2021 and was to follow up in 2 weeks for diabetes. Not yet established with new provider. Contacted patient and gave appointment for 9/28/2021.  Advised her refills can be sent at that time unless she needs them sooner

## 2021-09-28 ENCOUNTER — OFFICE VISIT (OUTPATIENT)
Dept: FAMILY MEDICINE CLINIC | Age: 46
End: 2021-09-28
Payer: MEDICAID

## 2021-09-28 VITALS
WEIGHT: 293 LBS | BODY MASS INDEX: 44.41 KG/M2 | RESPIRATION RATE: 18 BRPM | DIASTOLIC BLOOD PRESSURE: 130 MMHG | SYSTOLIC BLOOD PRESSURE: 189 MMHG | HEART RATE: 89 BPM | TEMPERATURE: 97.3 F | HEIGHT: 68 IN | OXYGEN SATURATION: 98 %

## 2021-09-28 DIAGNOSIS — Z12.31 ENCOUNTER FOR SCREENING MAMMOGRAM FOR MALIGNANT NEOPLASM OF BREAST: Primary | ICD-10-CM

## 2021-09-28 DIAGNOSIS — E11.8 TYPE 2 DIABETES MELLITUS WITH COMPLICATION, WITHOUT LONG-TERM CURRENT USE OF INSULIN (HCC): ICD-10-CM

## 2021-09-28 DIAGNOSIS — I15.9 SECONDARY HYPERTENSION: ICD-10-CM

## 2021-09-28 LAB
CHP ED QC CHECK: ABNORMAL
GLUCOSE BLD-MCNC: 274 MG/DL
HBA1C MFR BLD: 8.5 %

## 2021-09-28 PROCEDURE — G8427 DOCREV CUR MEDS BY ELIG CLIN: HCPCS | Performed by: FAMILY MEDICINE

## 2021-09-28 PROCEDURE — 82962 GLUCOSE BLOOD TEST: CPT | Performed by: FAMILY MEDICINE

## 2021-09-28 PROCEDURE — 1036F TOBACCO NON-USER: CPT | Performed by: FAMILY MEDICINE

## 2021-09-28 PROCEDURE — 99213 OFFICE O/P EST LOW 20 MIN: CPT | Performed by: FAMILY MEDICINE

## 2021-09-28 PROCEDURE — G8417 CALC BMI ABV UP PARAM F/U: HCPCS | Performed by: FAMILY MEDICINE

## 2021-09-28 PROCEDURE — 3052F HG A1C>EQUAL 8.0%<EQUAL 9.0%: CPT | Performed by: FAMILY MEDICINE

## 2021-09-28 PROCEDURE — 83036 HEMOGLOBIN GLYCOSYLATED A1C: CPT | Performed by: FAMILY MEDICINE

## 2021-09-28 PROCEDURE — 2022F DILAT RTA XM EVC RTNOPTHY: CPT | Performed by: FAMILY MEDICINE

## 2021-09-28 SDOH — ECONOMIC STABILITY: FOOD INSECURITY: WITHIN THE PAST 12 MONTHS, THE FOOD YOU BOUGHT JUST DIDN'T LAST AND YOU DIDN'T HAVE MONEY TO GET MORE.: NEVER TRUE

## 2021-09-28 SDOH — ECONOMIC STABILITY: FOOD INSECURITY: WITHIN THE PAST 12 MONTHS, YOU WORRIED THAT YOUR FOOD WOULD RUN OUT BEFORE YOU GOT MONEY TO BUY MORE.: NEVER TRUE

## 2021-09-28 ASSESSMENT — PATIENT HEALTH QUESTIONNAIRE - PHQ9
SUM OF ALL RESPONSES TO PHQ QUESTIONS 1-9: 0
1. LITTLE INTEREST OR PLEASURE IN DOING THINGS: 0
SUM OF ALL RESPONSES TO PHQ9 QUESTIONS 1 & 2: 0
SUM OF ALL RESPONSES TO PHQ QUESTIONS 1-9: 0
SUM OF ALL RESPONSES TO PHQ QUESTIONS 1-9: 0
2. FEELING DOWN, DEPRESSED OR HOPELESS: 0

## 2021-09-28 ASSESSMENT — SOCIAL DETERMINANTS OF HEALTH (SDOH): HOW HARD IS IT FOR YOU TO PAY FOR THE VERY BASICS LIKE FOOD, HOUSING, MEDICAL CARE, AND HEATING?: NOT HARD AT ALL

## 2021-09-28 NOTE — PROGRESS NOTES
[facility]  Family Medicine Residency  Phone: [phone]  Fax: [phone]    Patient:  Henri Frazier 40 y.o. female                                 Date of Service: 9/28/21                            Chiefcomplaint:   Chief Complaint   Patient presents with   [address]     patient states she stopped all medications     Diabetes     7/15/21 HGA1C 6.8: pt stopped all medications 1.5 mths ago    Hypertension     no taking no medications    ED Follow-up     9/18/21: leg swelling, elevated BP    Leg Swelling     no improvement; worsening         History of Present Illness: The patient is a 40 y.o. female  presented to the clinic with complaints as above. Establish care    HTN  She stopped taking her meds bc she stated she did not like previous doc  She said she was here for skin tag removal which was not removed because her BP was elevated so procedure was not performed , she states she is very upset with that experience. She stated she stopped taking a ll her meds amonth ago and  she does not trust doctors. She used to be on  hydralazine , lisinopril , HCTZ for 20 years  HTN dx at age 14      DM   Last 7/15/21 : 6.8  Was on insulin however she is not taking anything at this time. 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 Review of Systems:   Review of Systems   Constitutional: Negative for chills and fever. HENT: Negative for congestion, sore throat and trouble swallowing. Respiratory: Negative for cough. Cardiovascular: Negative for chest pain and leg swelling. Gastrointestinal: Negative for abdominal pain, constipation, diarrhea, nausea and vomiting. Genitourinary: Negative for difficulty urinating. Musculoskeletal: Negative for arthralgias and myalgias.    Skin: Negative for rash and wound. Neurological: Negative for dizziness and headaches. Psychiatric/Behavioral: Negative for agitation.        Past Medical History:      Diagnosis Date    Acute renal injury ([facility] [address] 2013    Analgesic overuse headache 2018    Anxiety     Arthritis     Asthma     Depression 3/19/2013    Diabetes mellitus ([facility] [address]     A1C=6.7 on 14    Fracture of tooth 2018    GERD (gastroesophageal reflux disease)     Glaucoma     Hypertension     Hypertensive urgency 3/19/2013    Hypothyroidism     Irritable bowel syndrome     Obesity     Obstructive sleep apnea     Pneumonia     Polycystic ovarian syndrome     Postcholecystectomy syndrome 2018    Spinal headache     Suicidal ideation 3/18/2016       Past Surgical History:        Procedure Laterality Date     SECTION      Dr. Geovanni Wilkes, [address], LAPAROSCOPIC      [facility] SURGERY  10/06/2011    4 extractions    ECHO COMPLETE  2014         FOOT SURGERY      RECONSTRUCTION LEFT FOOT, Dr. Dahlia Jacobson, [address]  9/3/13    incisional hernia repair with mesh    HYSTERECTOMY      KNEE CARTILAGE SURGERY      OVARY REMOVAL      left due to polycystic ovary, Dr. Olaf Albarado, [facility]    BRYANT AND BSO  3/19/2013    Attempted Deena Primmer BRYANT;RSO, Dr. Gilda Sandhoff, [address]         Allergies:    Percocet [oxycodone-acetaminophen], Metformin and related, and Tape Sheilda Brynn tape]    Social History:   Social History     Socioeconomic History    Marital status:      Spouse name: Not on file    Number of children: 1    Years of education: 12    Highest education level: Not on file   Occupational History    Not on file   Tobacco Use    Smoking status: Never Smoker    Smokeless tobacco: Never Used   Vaping Use    Vaping Use: Never used   Substance and Sexual Activity    Alcohol use: No     Comment: no alcohol since age 26;  drinks 2-3 glasses Coke/Pepsi daily & 2-3 glasses of iced tea daily     Drug use: Never    Sexual activity: Not Currently     Partners: Male   Other Topics Concern    Not on file   Social History Narrative    Not on file     Social Determinants of Health     Financial Resource Strain: Low Risk     Difficulty of Paying Living Expenses: Not hard at all   Food Insecurity: No Food Insecurity    Worried About [address] in the Last Year: Never true    [address] in the Last Year: Never true   Transportation Needs:     Lack of Transportation (Medical):      Lack of Transportation (Non-Medical):    Physical Activity:     Days of Exercise per Week:     Minutes of Exercise per Session:    Stress:     Feeling of Stress :    Social Connections:     Frequency of Communication with Friends and Family:     Frequency of Social Gatherings with Friends and Family:     Attends Temple Services:     Active Member of Clubs or Organizations:     Attends Club or Organization Meetings:     Marital Status:    Intimate Partner Violence:     Fear of Current or Ex-Partner:     Emotionally Abused:     Physically Abused:     Sexually Abused:         Family History:       Problem Relation Age of Onset    Asthma Mother     Diabetes Mother     High Blood Pressure Mother     High Blood Pressure Father     Heart Disease Father     Cancer Father     Depression Father     Diabetes Brother     Asthma Brother     Thyroid Disease Sister     Asthma Sister     Depression Maternal Grandmother     High Blood Pressure Maternal Grandmother     Mental Illness Maternal Grandmother     Thyroid Disease Maternal Grandmother     Heart Disease Maternal Grandfather     Depression Paternal Grandmother     High Blood Pressure Paternal Grandmother     Cancer Paternal Grandfather        Physical Exam:    Vitals: BP (!) 189/130   Pulse 89   Temp 97.3 °F (36.3 °C) (Temporal)   Resp 18   Ht 5' 8\" (1.727 m)   Wt (!) 356 lb (161.5 kg)   LMP 10/01/2012 (Approximate) Comment: 2013  SpO2 98%   Breastfeeding No   BMI 54.13 kg/m²   BP Readings from Last 3 Encounters:   09/28/21 (!) 189/130   09/18/21 (!) 191/106   07/20/21 (!) 146/67     General Appearance: Well developed, awake, alert, oriented, no acute distress  HEENT: Normocephalic,atraumatic. PERRL, EOM's intact, EAC without erythema or swelling, no pallor or icterus. Neck: Supple, symmetrical, trachea midline. No JVD. Chest wall/Lung: Clear to auscultation  bilaterally,  respirations unlabored. No ronchi/wheezing/rales  Heart[de-identified]  Regular rate and rhythm, S1and S2 normal, no murmur, rub or gallop. Abdomen: Soft, non-tender, bowel sounds normoactive, no masses, no organomegaly  Extremities:  Extremities normal, atraumatic, no cyanosis. edema. Skin: Skin color, texture, turgor normal, no rashes or lesions  Musculokeletal: ROM grossly normal in all joints of extremities, no obvious joint swelling. Lymph nodes: no lymph node enlargement appreciated  Neurologic:   Alert&Oriented. Normal gait and coordination  No focal neurological deficits appreaciated         Psychiatric: has a normal mood and affect. Behavior is normal.       Assessment and Plan:     Secondary hypertension  Pt states she has never been worked up for secondary causes of HTN  Pt strongly recommended to start taking all her meds   - RENIN; Future  - ALDOSTERONE; Future  - US RETROPERITONEAL SULEIMAN; Future  - METANEPHRINES PLASMA FREE; Future    Type 2 diabetes mellitus with complication, without long-term current use of insulin (HCC)  -dulaglutide injection were discussed with the patient , pt is willing to try  - POCT glycosylated hemoglobin (Hb A1C)  - POCT Glucose    Encounter for screening mammogram for malignant neoplasm of breast    Return to Office: Return in about 4 weeks (around 10/26/2021) for DM check, HTN. I encourage further reading and education about your health conditions.   Information on many healthconditions is provided by the American Academy of Family Physicians: https://familydoctor. org/  Please bring any questions to me at your next visit. This document may have been prepared at least partiallythrough the use of voice recognition software. Although effort is taken to assure the accuracy of this document, it is possible that grammatical, syntax,  or spelling errors may occur. Medication List:    Current Outpatient Medications   Medication Sig Dispense Refill    Dulaglutide 0.75 MG/0.5ML SOPN Inject 0.75 mg into the skin once a week 2 mL 1    atorvastatin (LIPITOR) 20 MG tablet Take 1 tablet by mouth daily (Patient not taking: Reported on 9/28/2021) 90 tablet 1    aspirin EC 81 MG EC tablet Take 1 tablet by mouth daily (Patient not taking: Reported on 9/28/2021) 90 tablet 1    venlafaxine (EFFEXOR XR) 37.5 MG extended release capsule Take 1 capsule by mouth daily (Patient not taking: Reported on 9/28/2021) 30 capsule 3    hydrALAZINE (APRESOLINE) 25 MG tablet Take 1 tablet by mouth every 8 hours (Patient not taking: Reported on 9/28/2021) 90 tablet 3    sucralfate (CARAFATE) 1 GM tablet Take 1 tablet by mouth 4 times daily (Patient not taking: Reported on 9/28/2021) 40 tablet 0    ibuprofen (ADVIL;MOTRIN) 600 MG tablet Take 1 tablet by mouth 3 times daily as needed for Pain (Patient not taking: Reported on 9/28/2021) 30 tablet 0    amLODIPine (NORVASC) 10 MG tablet Take 1 tablet by mouth daily (Patient not taking: Reported on 9/28/2021) 30 tablet 3    blood glucose monitor strips Test 2 times a day & as needed for symptoms of irregular blood glucose. Dispense sufficient amount for indicated testing frequency plus additional to accommodate PRN testing needs. (Patient not taking: Reported on 9/28/2021) 100 strip 3    mometasone (ELOCON) 0.1 % cream Apply topically daily.  (Patient not taking: Reported on 9/28/2021) 50 g 3    fluconazole (DIFLUCAN) 150 MG tablet Take 1 tablet by mouth every 72 hours (Patient not taking: Reported on 9/28/2021) 3 tablet 1    lisinopril (PRINIVIL;ZESTRIL) 40 MG tablet Take 1 tablet by mouth daily (Patient not taking: Reported on 9/28/2021) 90 tablet 1    Lancets MISC 1 each by Does not apply route daily (Patient not taking: Reported on 9/28/2021) 100 each 5    ondansetron (ZOFRAN-ODT) 4 MG disintegrating tablet Take 1 tablet by mouth 3 times daily as needed for Nausea or Vomiting (Patient not taking: Reported on 9/28/2021) 21 tablet 0    triamcinolone (ARISTOCORT) 0.5 % cream APPLY TOPICALLY TO AFFECTED AREAS TWICE DAILY (Patient not taking: Reported on 9/28/2021) 45 g 0    mupirocin (BACTROBAN) 2 % ointment Apply topically 3 times daily. (Patient not taking: Reported on 9/28/2021) 30 g 0     No current facility-administered medications for this visit.         Adam Kaba MD

## 2021-09-28 NOTE — PROGRESS NOTES
S: 40 y.o. female with   Chief Complaint   Patient presents with   [address]     patient states she stopped all medications     Diabetes     7/15/21 HGA1C 6.8: pt stopped all medications 1.5 mths ago    Hypertension     no taking no medications    ED Follow-up     9/18/21: leg swelling, elevated BP    Leg Swelling     no improvement; worsening       HTN: Diagnosed 30 years ago, hydralazine 25mg TID/lisinopril 40/hctz 25mg/norvasc 10mg  Seen in ED twice d/t HTN urgency  Not taken anything for the past month  No symptoms  DM2: basaglar 10u previously, unable to tolerate metformin, glipizide and januvia previously  POCT A1C 8.5 today  Stopped all meds 1 month ago, +frustrations and distrust  Willing to restart meds    O: VS:  height is 5' 8\" (1.727 m) and weight is 356 lb (161.5 kg) (abnormal). Her temporal temperature is 97.3 °F (36.3 °C). Her blood pressure is 189/130 (abnormal) and her pulse is 89. Her respiration is 18 and oxygen saturation is 98%.    BP Readings from Last 3 Encounters:   09/28/21 (!) 189/130   09/18/21 (!) 191/106   07/20/21 (!) 146/67     See resident note      Impression/Plan:   1) HTN: Uncontrolled and noncompliant today, +workup for secondary HTN (thyroid normal in July), check SULEIMAN scan, restart hydralazine and norvasc today, restart lisinopril and HCTZ following lab draw  2) DM2: Uncontrolled, trial of trulicity weekly, may also consider SGLT2 inhibitor, dietary/lifestyle modifications, hold on restarting basaglar for now  Restart remaining home meds  Close f/u      Health Maintenance Due   Topic Date Due    Hepatitis C screen  Never done    HIV screen  Never done    Hepatitis B vaccine (1 of 3 - Risk 3-dose series) Never done    Diabetic foot exam  04/13/2017    Colon cancer screen colonoscopy  Never done    Diabetic retinal exam  07/18/2021    Flu vaccine (1) 09/01/2021         Attending Physician Statement  I have discussed the case, including pertinent history and exam findings with the resident. I agree with the documented assessment and plan.       Christa Ng MD

## 2021-10-05 ENCOUNTER — HOSPITAL ENCOUNTER (OUTPATIENT)
Dept: GENERAL RADIOLOGY | Age: 46
Discharge: HOME | End: 2021-10-07
Payer: MEDICAID

## 2021-10-05 DIAGNOSIS — Z12.31 ENCOUNTER FOR SCREENING MAMMOGRAM FOR MALIGNANT NEOPLASM OF BREAST: ICD-10-CM

## 2021-10-05 PROCEDURE — 77063 BREAST TOMOSYNTHESIS BI: CPT

## 2021-10-06 ENCOUNTER — TELEPHONE (OUTPATIENT)
Dept: FAMILY MEDICINE CLINIC | Age: 46
End: 2021-10-06

## 2021-10-06 ASSESSMENT — ENCOUNTER SYMPTOMS
ABDOMINAL PAIN: 0
NAUSEA: 0
TROUBLE SWALLOWING: 0
COUGH: 0
SORE THROAT: 0
CONSTIPATION: 0
DIARRHEA: 0
VOMITING: 0

## 2021-10-06 NOTE — TELEPHONE ENCOUNTER
Pt called asking if medication has been signed for pre approval.     Dulaglutide 0.75 MG/0.5ML SOPN           Also stated she was told she needs MRI of breast as a result of recent mammogram.

## 2021-10-06 NOTE — RESULT ENCOUNTER NOTE
Please inform pt her mammogram came back normal , it only showed fatty tissue. One year fu with another mammogram and MRI is recommended.

## 2021-10-11 DIAGNOSIS — Z12.39 ENCOUNTER FOR SCREENING FOR MALIGNANT NEOPLASM OF BREAST, UNSPECIFIED SCREENING MODALITY: Primary | ICD-10-CM

## 2021-10-12 ENCOUNTER — HOSPITAL ENCOUNTER (OUTPATIENT)
Age: 46
Discharge: HOME | End: 2021-10-12
Payer: MEDICAID

## 2021-10-12 PROCEDURE — 82088 ASSAY OF ALDOSTERONE: CPT

## 2021-10-12 PROCEDURE — 84244 ASSAY OF RENIN: CPT

## 2021-10-12 PROCEDURE — 83835 ASSAY OF METANEPHRINES: CPT

## 2021-10-15 LAB — ALDOSTERONE: 16.2 NG/DL

## 2021-10-16 LAB
METANEPH/PLASMA INTERP: NORMAL
METANEPHRINE FREE PLASMA: <0.1 NMOL/L (ref 0–0.49)
NORMETANEPHRINE FREE PLASMA: 0.68 NMOL/L (ref 0–0.89)

## 2021-10-18 LAB — RENIN ACTIVITY: 2.6 NG/ML/HR

## 2021-10-18 RX ORDER — AMLODIPINE BESYLATE 10 MG/1
10 TABLET ORAL DAILY
Qty: 30 TABLET | Refills: 3 | Status: SHIPPED
Start: 2021-10-18 | End: 2022-01-20

## 2021-10-20 DIAGNOSIS — F41.9 ANXIETY: ICD-10-CM

## 2021-10-20 RX ORDER — VENLAFAXINE HYDROCHLORIDE 37.5 MG/1
37.5 CAPSULE, EXTENDED RELEASE ORAL DAILY
Qty: 30 CAPSULE | Refills: 3 | Status: SHIPPED
Start: 2021-10-20 | End: 2022-01-06 | Stop reason: SDUPTHER

## 2021-11-01 ENCOUNTER — TELEPHONE (OUTPATIENT)
Dept: FAMILY MEDICINE CLINIC | Age: 46
End: 2021-11-01

## 2021-11-01 DIAGNOSIS — I15.9 SECONDARY HYPERTENSION: Primary | ICD-10-CM

## 2021-11-01 DIAGNOSIS — E11.8 TYPE 2 DIABETES MELLITUS WITH COMPLICATION, WITHOUT LONG-TERM CURRENT USE OF INSULIN (HCC): ICD-10-CM

## 2021-11-03 RX ORDER — LISINOPRIL 40 MG/1
40 TABLET ORAL DAILY
Qty: 30 TABLET | Refills: 2 | Status: SHIPPED
Start: 2021-11-03 | End: 2022-01-20

## 2021-11-29 DIAGNOSIS — F41.9 ANXIETY: ICD-10-CM

## 2021-11-30 DIAGNOSIS — F41.9 ANXIETY: ICD-10-CM

## 2021-11-30 RX ORDER — HYDROXYZINE HYDROCHLORIDE 25 MG/1
25 TABLET, FILM COATED ORAL EVERY 8 HOURS PRN
Qty: 90 TABLET | Refills: 0 | Status: SHIPPED | OUTPATIENT
Start: 2021-11-30 | End: 2021-12-30

## 2021-12-01 RX ORDER — HYDROXYZINE HYDROCHLORIDE 25 MG/1
25 TABLET, FILM COATED ORAL EVERY 8 HOURS PRN
Qty: 90 TABLET | Refills: 0 | Status: CANCELLED | OUTPATIENT
Start: 2021-12-01 | End: 2021-12-31

## 2021-12-01 RX ORDER — GLUCOSAMINE HCL/CHONDROITIN SU 500-400 MG
CAPSULE ORAL
Qty: 100 STRIP | Refills: 3 | Status: SHIPPED
Start: 2021-12-01 | End: 2022-03-22 | Stop reason: SDUPTHER

## 2021-12-07 ENCOUNTER — OFFICE VISIT (OUTPATIENT)
Dept: FAMILY MEDICINE CLINIC | Age: 46
End: 2021-12-07
Payer: MEDICAID

## 2021-12-07 VITALS
DIASTOLIC BLOOD PRESSURE: 93 MMHG | BODY MASS INDEX: 44.41 KG/M2 | OXYGEN SATURATION: 98 % | WEIGHT: 293 LBS | TEMPERATURE: 97.2 F | HEIGHT: 68 IN | SYSTOLIC BLOOD PRESSURE: 122 MMHG | RESPIRATION RATE: 18 BRPM | HEART RATE: 80 BPM

## 2021-12-07 DIAGNOSIS — I10 ESSENTIAL HYPERTENSION: ICD-10-CM

## 2021-12-07 DIAGNOSIS — E78.5 HYPERLIPIDEMIA, UNSPECIFIED HYPERLIPIDEMIA TYPE: ICD-10-CM

## 2021-12-07 DIAGNOSIS — K58.0 IRRITABLE BOWEL SYNDROME WITH DIARRHEA: ICD-10-CM

## 2021-12-07 DIAGNOSIS — E11.8 TYPE 2 DIABETES MELLITUS WITH COMPLICATION, WITHOUT LONG-TERM CURRENT USE OF INSULIN (HCC): Primary | ICD-10-CM

## 2021-12-07 LAB — HBA1C MFR BLD: 8.2 %

## 2021-12-07 PROCEDURE — 90674 CCIIV4 VAC NO PRSV 0.5 ML IM: CPT | Performed by: FAMILY MEDICINE

## 2021-12-07 PROCEDURE — 83036 HEMOGLOBIN GLYCOSYLATED A1C: CPT | Performed by: FAMILY MEDICINE

## 2021-12-07 PROCEDURE — 99213 OFFICE O/P EST LOW 20 MIN: CPT | Performed by: FAMILY MEDICINE

## 2021-12-07 PROCEDURE — 90471 IMMUNIZATION ADMIN: CPT | Performed by: FAMILY MEDICINE

## 2021-12-07 PROCEDURE — 1036F TOBACCO NON-USER: CPT | Performed by: FAMILY MEDICINE

## 2021-12-07 PROCEDURE — 2022F DILAT RTA XM EVC RTNOPTHY: CPT | Performed by: FAMILY MEDICINE

## 2021-12-07 PROCEDURE — G8482 FLU IMMUNIZE ORDER/ADMIN: HCPCS | Performed by: FAMILY MEDICINE

## 2021-12-07 PROCEDURE — G8417 CALC BMI ABV UP PARAM F/U: HCPCS | Performed by: FAMILY MEDICINE

## 2021-12-07 PROCEDURE — G8427 DOCREV CUR MEDS BY ELIG CLIN: HCPCS | Performed by: FAMILY MEDICINE

## 2021-12-07 PROCEDURE — 3052F HG A1C>EQUAL 8.0%<EQUAL 9.0%: CPT | Performed by: FAMILY MEDICINE

## 2021-12-07 RX ORDER — DULAGLUTIDE 1.5 MG/.5ML
1.5 INJECTION, SOLUTION SUBCUTANEOUS WEEKLY
Status: CANCELLED | OUTPATIENT
Start: 2021-12-07

## 2021-12-07 RX ORDER — DULAGLUTIDE 1.5 MG/.5ML
1.5 INJECTION, SOLUTION SUBCUTANEOUS WEEKLY
Qty: 4 PEN | Refills: 2 | Status: SHIPPED
Start: 2021-12-07 | End: 2022-02-23

## 2021-12-07 RX ORDER — MOMETASONE FUROATE 1 MG/G
CREAM TOPICAL
Qty: 50 G | Refills: 3 | Status: SHIPPED
Start: 2021-12-07 | End: 2022-04-01 | Stop reason: SDUPTHER

## 2021-12-07 RX ORDER — ATORVASTATIN CALCIUM 20 MG/1
20 TABLET, FILM COATED ORAL DAILY
Qty: 90 TABLET | Refills: 1 | Status: SHIPPED
Start: 2021-12-07 | End: 2022-04-01 | Stop reason: SDUPTHER

## 2021-12-07 ASSESSMENT — ENCOUNTER SYMPTOMS
ABDOMINAL PAIN: 0
VOMITING: 0
COUGH: 0
NAUSEA: 0
SORE THROAT: 0
CONSTIPATION: 0
TROUBLE SWALLOWING: 0

## 2021-12-07 NOTE — PROGRESS NOTES
S: 40 y.o. female with   Chief Complaint   Patient presents with    Diabetes    Hypertension    Health Maintenance     flu pending         Secondary workup negative thus far, BP much improved  DM2, Hgb A1C 6.8 previously, fasting sugar 275, trulicity 9.85, enjoying, did not tolerate metformin previously  IBS previously, trouble controlling stools, always with watery stools, accidents couple times ago, colonoscopy 2013    O: VS:  height is 5' 8\" (1.727 m) and weight is 350 lb (158.8 kg) (abnormal). Her temperature is 97.2 °F (36.2 °C). Her blood pressure is 122/93 (abnormal) and her pulse is 80. Her respiration is 18 and oxygen saturation is 98%. BP Readings from Last 3 Encounters:   12/07/21 (!) 122/93   09/28/21 (!) 189/130   09/18/21 (!) 191/106     See resident note      Impression/Plan:   1) HTN: Much improved, continue same meds for now, watch compliance, finish secondary workup  2) DM2: Uncontrolled, increase trulicity to 4.6FG weekly  3) IBS with diarrhea: +bulking agent, GI referral    RTC 1 month      Health Maintenance Due   Topic Date Due    Hepatitis C screen  Never done    HIV screen  Never done    Hepatitis B vaccine (1 of 3 - Risk 3-dose series) Never done    Diabetic foot exam  04/13/2017    Colon cancer screen colonoscopy  Never done    Diabetic retinal exam  07/18/2021    Flu vaccine (1) 09/01/2021    COVID-19 Vaccine (3 - Booster for Tafoya Peter series) 10/29/2021         Attending Physician Statement  I have discussed the case, including pertinent history and exam findings with the resident. I agree with the documented assessment and plan.       Frandy Beckman MD

## 2021-12-07 NOTE — PROGRESS NOTES
[facility]  Family Medicine Residency  Phone: [phone]  Fax: [phone]    Patient:  Bogdan Avila 40 y.o. female                                 Date of Service: 9/28/21                            Chiefcomplaint:   Chief Complaint   Patient presents with    Diabetes    Hypertension    Health Maintenance     flu pending      History of Present Illness: The patient is a 40 y.o. female  presented to the clinic with complaints as above. HTN  does not check at home   Has been taking lisinoporl , Norvasc and hydralazine   HTN dx at age 14  Has not had renal US done , plans to go today       DM   Last 7/15/21 : 6.8  Checks BGL at home, runs around 250 at 3: 30 am, drinks peppermint mocha 3 times daily   Is on dulaglutaide and noticing some stomach improvement  - getting flatter,she is overall happy with trulicity and thinks it is easier to inject. 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 .   No urinary changes  Noticing some burns on legs   No constipation  Always watery stools  Avoids dairy but still no changes         Review of Systems:   Review of Systems   Constitutional: Negative for chills and fever. HENT: Negative for congestion, sore throat and trouble swallowing. Respiratory: Negative for cough. Cardiovascular: Negative for chest pain and leg swelling. Gastrointestinal: Positive for diarrhea. Negative for abdominal pain, constipation, nausea and vomiting.    Genitourinary: Negative for difficulty urinating. Musculoskeletal: Negative for arthralgias and myalgias. Skin: Negative for rash and wound. Neurological: Negative for dizziness and headaches. Psychiatric/Behavioral: Negative for agitation.        Past Medical History:      Diagnosis Date    Acute renal injury ([facility] [address] 2013    Analgesic overuse headache 2018    Anxiety     Arthritis     Asthma     Depression 3/19/2013    Diabetes mellitus ([facility] [address]     A1C=6.7 on 14    Fracture of tooth 2018    GERD (gastroesophageal reflux disease)     Glaucoma     Hyperlipidemia 2021    Hypertension     Hypertensive urgency 3/19/2013    Hypothyroidism     Irritable bowel syndrome     Obesity     Obstructive sleep apnea     Pneumonia     Polycystic ovarian syndrome     Postcholecystectomy syndrome 2018    Spinal headache     Suicidal ideation 3/18/2016       Past Surgical History:        Procedure Laterality Date     SECTION      Dr. Lidia Horn, [address], [place]      [facility] SURGERY  10/06/2011    4 extractions    ECHO COMPLETE  2014         FOOT SURGERY      RECONSTRUCTION LEFT FOOT, Dr. Willow Pastrana, [address]  9/3/13    incisional hernia repair with mesh    HYSTERECTOMY      KNEE CARTILAGE SURGERY      OVARY REMOVAL      left due to polycystic ovary, Dr. Felipe Buchanan, [facility]    BRYANT AND BSO  3/19/2013    Attempted Lap-Open BRYANT;RSO, Dr. Cinda Jordan, [facility]    TONSILLECTOMY  1980s       Allergies:    Percocet [oxycodone-acetaminophen], Metformin and related, and Tape Duanne Gals tape]    Social History:   Social History     Socioeconomic History    Marital status:      Spouse name: Not on file    Number of children: 1    Years of education: 12    Highest education level: Not on file   Occupational History    Not on file   Tobacco Use    Smoking status: Never Smoker    Smokeless tobacco: Never Used   Vaping Use    Sister     Depression Maternal Grandmother     High Blood Pressure Maternal Grandmother     Mental Illness Maternal Grandmother     Thyroid Disease Maternal Grandmother     Heart Disease Maternal Grandfather     Depression Paternal Grandmother     High Blood Pressure Paternal Grandmother     Cancer Paternal Grandfather        Physical Exam:    Vitals: BP (!) 122/93   Pulse 80   Temp 97.2 °F (36.2 °C)   Resp 18   Ht 5' 8\" (1.727 m)   Wt (!) 350 lb (158.8 kg)   LMP 10/01/2012 (Approximate) Comment: 2013  SpO2 98%   BMI 53.22 kg/m²   BP Readings from Last 3 Encounters:   12/07/21 (!) 122/93   09/28/21 (!) 189/130   09/18/21 (!) 191/106     General Appearance: Well developed, awake, alert, oriented, no acute distress  HEENT: Normocephalic,atraumatic. PERRL, EOM's intact, EAC without erythema or swelling, no pallor or icterus. Neck: Supple, symmetrical, trachea midline. No JVD. Chest wall/Lung: Clear to auscultation  bilaterally,  respirations unlabored. No ronchi/wheezing/rales  Heart[de-identified]  Regular rate and rhythm, S1and S2 normal, no murmur, rub or gallop. Abdomen: Soft, non-tender, bowel sounds normoactive, no masses, no organomegaly  Extremities:  Extremities normal, atraumatic, no cyanosis. edema. Skin: Skin color, texture, turgor normal, no rashes or lesions  Musculokeletal: ROM grossly normal in all joints of extremities, no obvious joint swelling. Lymph nodes: no lymph node enlargement appreciated  Neurologic:   Alert&Oriented. Normal gait and coordination  No focal neurological deficits appreaciated         Psychiatric: has a normal mood and affect.  Behavior is normal.       Assessment and Plan:     IBS diarrhea predominant   Will refer to GI for further workup  Pt was recommended taking fibre/bulking agent     Essential hypertension  stable  All other workup for secondary causes of HTN was negative, awaiting renal US  Will continue present mgmt    Type 2 diabetes mellitus with complication, without long-term current use of insulin (HCC)  -increasing dulaglutide injection were discussed with the patient , pt is willing to try  Will send new script to pharmacy  - POCT glycosylated hemoglobin (Hb A1C) : 8.2 , last one was 8.5   - POCT Glucose      Return to Office: Return in about 4 weeks (around 1/4/2022) for DM check. I encourage further reading and education about your health conditions. Information on many healthconditions is provided by the American Academy of Family Physicians: https://familydoctor. org/  Please bring any questions to me at your next visit. This document may have been prepared at least partiallythrough the use of voice recognition software. Although effort is taken to assure the accuracy of this document, it is possible that grammatical, syntax,  or spelling errors may occur. Medication List:    Current Outpatient Medications   Medication Sig Dispense Refill    atorvastatin (LIPITOR) 20 MG tablet Take 1 tablet by mouth daily 90 tablet 1    mometasone (ELOCON) 0.1 % cream Apply topically daily. 50 g 3    Dulaglutide (TRULICITY) 1.5 DP/0.6MX SOPN Inject 1.5 mg into the skin once a week 4 pen 2    blood glucose monitor strips Test 2 times a day & as needed for symptoms of irregular blood glucose. Dispense sufficient amount for indicated testing frequency plus additional to accommodate PRN testing needs. 100 strip 3    hydrOXYzine (ATARAX) 25 MG tablet Take 1 tablet by mouth every 8 hours as needed for Anxiety Ok to take 2 tabs if needed.  90 tablet 0    lisinopril (PRINIVIL;ZESTRIL) 40 MG tablet Take 1 tablet by mouth daily 30 tablet 2    venlafaxine (EFFEXOR XR) 37.5 MG extended release capsule TAKE 1 CAPSULE BY MOUTH DAILY  30 capsule 3    amLODIPine (NORVASC) 10 MG tablet Take 1 tablet by mouth daily 30 tablet 3    aspirin EC 81 MG EC tablet Take 1 tablet by mouth daily 90 tablet 1    hydrALAZINE (APRESOLINE) 25 MG tablet Take 1 tablet by mouth

## 2021-12-08 PROBLEM — E78.5 HYPERLIPIDEMIA: Status: ACTIVE | Noted: 2021-12-08

## 2021-12-08 PROBLEM — K58.0 IRRITABLE BOWEL SYNDROME WITH DIARRHEA: Status: ACTIVE | Noted: 2021-12-08

## 2021-12-08 ASSESSMENT — ENCOUNTER SYMPTOMS: DIARRHEA: 1

## 2022-01-03 ENCOUNTER — E-VISIT (OUTPATIENT)
Dept: PRIMARY CARE CLINIC | Age: 47
End: 2022-01-03
Payer: MEDICAID

## 2022-01-03 DIAGNOSIS — Z20.822 CLOSE EXPOSURE TO COVID-19 VIRUS: Primary | ICD-10-CM

## 2022-01-03 PROCEDURE — 99422 OL DIG E/M SVC 11-20 MIN: CPT | Performed by: INTERNAL MEDICINE

## 2022-01-03 ASSESSMENT — LIFESTYLE VARIABLES: SMOKING_STATUS: NO, I HAVE NEVER SMOKED

## 2022-01-03 NOTE — PROGRESS NOTES
If you are asymptomatic with recent definite exposure to a Covid positive client, please get tested for Covid. If you are positive, please follow the quide lines below to isolate at home. Contact your provider if your symptoms worsen. Recent Covid infections according to the CDC are most likely due to the Omicron variant and allow for a reduced number of quarantine days at  Home if you are asymptomatic or have mild symptoms. Consider 5 days of quarantine at home followed by 5 days of wearing a mask. Steps to help prevent the spread of COVID-19 if you are sick  SOURCE - [email or web address].info/. html     Stay home except to get medical care   ; Stay home: People who are mildly ill with COVID-19 are able to isolate at home during their illness. You should restrict activities outside your home, except for getting medical care.   ; Avoid public areas: Do not go to work, school, or public areas.   ; Avoid public transportation: Avoid using public transportation, ride-sharing, or taxis.  ; Separate yourself from other people and animals in your home   ; Stay away from others: As much as possible, you should stay in a specific room and away from other people in your home. Also, you should use a separate bathroom, if available.   ; Limit contact with pets & animals: You should restrict contact with pets and other animals while you are sick with COVID-19, just like you would around other people. Although there have not been reports of pets or other animals becoming sick with COVID-19, it is still recommended that people sick with COVID-19 limit contact with animals until more information is known about the virus. ; When possible, have another member of your household care for your animals while you are sick. If you are sick with COVID-19, avoid contact with your pet, including petting, snuggling, being kissed or licked, and sharing food.  If you must care for your pet or be around animals while you are sick, wash your hands before and after you interact with pets and wear a facemask. See COVID-19 and Animals for more information. Other considerations   The ill person should eat/be fed in their room if possible. Non-disposable  items used should be handled with gloves and washed with hot water or in a . Clean hands after handling used  items.  If possible, dedicate a lined trash can for the ill person. Use gloves when removing garbage bags, handling, and disposing of trash. Wash hands after handling or disposing of trash.  Consider consulting with your local health department about trash disposal guidance if available. Information for Household Members and Caregivers of Someone who is Sick   Call ahead before visiting your doctor   Call ahead: If you have a medical appointment, call the healthcare provider and tell them that you have or may have COVID-19. This will help the healthcare provider's office take steps to keep other people from getting infected or exposed. Wear a facemask if you are sick   ; If you are sick: You should wear a facemask when you are around other people (e.g., sharing a room or vehicle) or pets and before you enter a healthcare provider's office. ; If you are caring for others: If the person who is sick is not able to wear a facemask (for example, because it causes trouble breathing), then people who live with the person who is sick should not stay in the same room with them, or they should wear a facemask if they enter a room with the person who is sick. Cover your coughs and sneezes   ; Cover: Cover your mouth and nose with a tissue when you cough or sneeze.   ; Dispose:  Throw used tissues in a lined trash can.   ; Wash hands: Immediately wash your hands with soap and water for at least 20 seconds or, if soap and water are not available, clean your hands with an alcohol-based hand  that contains at least 60% alcohol. Clean your hands often   ; Wash hands: Wash your hands often with soap and water for at least 20 seconds, especially after blowing your nose, coughing, or sneezing; going to the bathroom; and before eating or preparing food.   ; Hand : If soap and water are not readily available, use an alcohol-based hand  with at least 60% alcohol, covering all surfaces of your hands and rubbing them together until they feel dry.   ; Soap and water: Soap and water are the best option if hands are visibly dirty.   ; Avoid touching: Avoid touching your eyes, nose, and mouth with unwashed hands. Handwashing Tips   ; Wet your hands with clean, running water (warm or cold), turn off the tap, and apply soap.  ; Lather your hands by rubbing them together with the soap. Lather the backs of your hands, between your fingers, and under your nails. ; Scrub your hands for at least 20 seconds. Need a timer? Hum the [place] from beginning to end twice.  ; Rinse your hands well under clean, running water.  ; Dry your hands using a clean towel or air dry them. Avoid sharing personal household items   ; Do not share: You should not share dishes, drinking glasses, cups, eating utensils, towels, or bedding with other people or pets in your home.   ; Wash thoroughly after use: After using these items, they should be washed thoroughly with soap and water. Clean all high-touch surfaces everyday   ; Clean and disinfect: Practice routine cleaning of high touch surfaces.  ; High touch surfaces include counters, tabletops, doorknobs, bathroom fixtures, toilets, phones, keyboards, tablets, and bedside tables.  ; Disinfect areas with bodily fluids: Also, clean any surfaces that may have blood, stool, or body fluids on them.   ; Household : Use a household cleaning spray or wipe, according to the label instructions.  Labels contain instructions for safe and effective use of the cleaning product

## 2022-01-05 DIAGNOSIS — F41.9 ANXIETY: ICD-10-CM

## 2022-01-05 NOTE — TELEPHONE ENCOUNTER
Patient states her  is in the hospital and is dying and she would like a refill on this as soon as possible.      Meds pending your approval; sending to [facility] for a one time fill as patient is staying at the hospital.

## 2022-01-06 DIAGNOSIS — F41.9 ANXIETY: ICD-10-CM

## 2022-01-06 RX ORDER — VENLAFAXINE HYDROCHLORIDE 37.5 MG/1
37.5 CAPSULE, EXTENDED RELEASE ORAL DAILY
Qty: 30 CAPSULE | Refills: 0 | OUTPATIENT
Start: 2022-01-06

## 2022-01-06 RX ORDER — HYDROXYZINE HYDROCHLORIDE 25 MG/1
25 TABLET, FILM COATED ORAL EVERY 8 HOURS PRN
Qty: 30 TABLET | Refills: 0 | Status: SHIPPED
Start: 2022-01-06 | End: 2022-01-14 | Stop reason: SDUPTHER

## 2022-01-06 RX ORDER — VENLAFAXINE HYDROCHLORIDE 37.5 MG/1
37.5 CAPSULE, EXTENDED RELEASE ORAL DAILY
Qty: 30 CAPSULE | Refills: 3 | Status: SHIPPED
Start: 2022-01-06 | End: 2022-02-04

## 2022-01-06 NOTE — TELEPHONE ENCOUNTER
Please inform patient her meds have been refilled. I did refilled atarax. However I would like to see her on her appt to discuss her mood and anxiety.

## 2022-01-06 NOTE — PROGRESS NOTES
Medication sent to wrong pharmacy.  Patient wanted the one in the hospital. Reordered at [facility] and cancelled accudose

## 2022-01-13 ENCOUNTER — HOSPITAL ENCOUNTER (EMERGENCY)
Age: 47
Discharge: HOME | End: 2022-01-14
Attending: EMERGENCY MEDICINE
Payer: MEDICAID

## 2022-01-13 DIAGNOSIS — F41.1 ANXIETY STATE: ICD-10-CM

## 2022-01-13 DIAGNOSIS — R03.0 ELEVATED BLOOD PRESSURE READING: ICD-10-CM

## 2022-01-13 DIAGNOSIS — F43.21 GRIEF REACTION: Primary | ICD-10-CM

## 2022-01-13 LAB
ANION GAP SERPL CALCULATED.3IONS-SCNC: 9 MMOL/L (ref 7–16)
BASOPHILS ABSOLUTE: 0.04 E9/L (ref 0–0.2)
BASOPHILS RELATIVE PERCENT: 0.5 % (ref 0–2)
BUN BLDV-MCNC: 9 MG/DL (ref 6–20)
CALCIUM SERPL-MCNC: 8.7 MG/DL (ref 8.6–10.2)
CHLORIDE BLD-SCNC: 102 MMOL/L (ref 98–107)
CO2: 27 MMOL/L (ref 22–29)
CREAT SERPL-MCNC: 0.8 MG/DL (ref 0.5–1)
EOSINOPHILS ABSOLUTE: 0.14 E9/L (ref 0.05–0.5)
EOSINOPHILS RELATIVE PERCENT: 1.9 % (ref 0–6)
GFR AFRICAN AMERICAN: >60
GFR NON-AFRICAN AMERICAN: >60 ML/MIN/1.73
GLUCOSE BLD-MCNC: 201 MG/DL (ref 74–99)
HCT VFR BLD CALC: 37.5 % (ref 34–48)
HEMOGLOBIN: 12.5 G/DL (ref 11.5–15.5)
IMMATURE GRANULOCYTES #: 0.04 E9/L
IMMATURE GRANULOCYTES %: 0.5 % (ref 0–5)
LACTIC ACID: 1.9 MMOL/L (ref 0.5–2.2)
LYMPHOCYTES ABSOLUTE: 3.05 E9/L (ref 1.5–4)
LYMPHOCYTES RELATIVE PERCENT: 41.3 % (ref 20–42)
MCH RBC QN AUTO: 30.1 PG (ref 26–35)
MCHC RBC AUTO-ENTMCNC: 33.3 % (ref 32–34.5)
MCV RBC AUTO: 90.4 FL (ref 80–99.9)
MONOCYTES ABSOLUTE: 0.53 E9/L (ref 0.1–0.95)
MONOCYTES RELATIVE PERCENT: 7.2 % (ref 2–12)
NEUTROPHILS ABSOLUTE: 3.58 E9/L (ref 1.8–7.3)
NEUTROPHILS RELATIVE PERCENT: 48.6 % (ref 43–80)
PDW BLD-RTO: 13 FL (ref 11.5–15)
PLATELET # BLD: 176 E9/L (ref 130–450)
PMV BLD AUTO: 11.1 FL (ref 7–12)
POTASSIUM SERPL-SCNC: 3.5 MMOL/L (ref 3.5–5)
RBC # BLD: 4.15 E12/L (ref 3.5–5.5)
SODIUM BLD-SCNC: 138 MMOL/L (ref 132–146)
TROPONIN, HIGH SENSITIVITY: 11 NG/L (ref 0–9)
WBC # BLD: 7.4 E9/L (ref 4.5–11.5)

## 2022-01-13 PROCEDURE — 2580000003 HC RX 258: Performed by: EMERGENCY MEDICINE

## 2022-01-13 PROCEDURE — 84484 ASSAY OF TROPONIN QUANT: CPT

## 2022-01-13 PROCEDURE — 36415 COLL VENOUS BLD VENIPUNCTURE: CPT

## 2022-01-13 PROCEDURE — 96375 TX/PRO/DX INJ NEW DRUG ADDON: CPT

## 2022-01-13 PROCEDURE — 83605 ASSAY OF LACTIC ACID: CPT

## 2022-01-13 PROCEDURE — 80048 BASIC METABOLIC PNL TOTAL CA: CPT

## 2022-01-13 PROCEDURE — 85025 COMPLETE CBC W/AUTO DIFF WBC: CPT

## 2022-01-13 PROCEDURE — 96374 THER/PROPH/DIAG INJ IV PUSH: CPT

## 2022-01-13 PROCEDURE — 99284 EMERGENCY DEPT VISIT MOD MDM: CPT

## 2022-01-13 PROCEDURE — 6360000002 HC RX W HCPCS: Performed by: EMERGENCY MEDICINE

## 2022-01-13 PROCEDURE — 96361 HYDRATE IV INFUSION ADD-ON: CPT

## 2022-01-13 PROCEDURE — 93005 ELECTROCARDIOGRAM TRACING: CPT | Performed by: EMERGENCY MEDICINE

## 2022-01-13 RX ORDER — DIPHENHYDRAMINE HYDROCHLORIDE 50 MG/ML
50 INJECTION INTRAMUSCULAR; INTRAVENOUS ONCE
Status: COMPLETED | OUTPATIENT
Start: 2022-01-13 | End: 2022-01-13

## 2022-01-13 RX ORDER — METOCLOPRAMIDE HYDROCHLORIDE 5 MG/ML
10 INJECTION INTRAMUSCULAR; INTRAVENOUS ONCE
Status: COMPLETED | OUTPATIENT
Start: 2022-01-13 | End: 2022-01-13

## 2022-01-13 RX ORDER — 0.9 % SODIUM CHLORIDE 0.9 %
1000 INTRAVENOUS SOLUTION INTRAVENOUS ONCE
Status: COMPLETED | OUTPATIENT
Start: 2022-01-13 | End: 2022-01-14

## 2022-01-13 RX ORDER — LORAZEPAM 2 MG/ML
2 INJECTION INTRAMUSCULAR ONCE
Status: COMPLETED | OUTPATIENT
Start: 2022-01-13 | End: 2022-01-13

## 2022-01-13 RX ADMIN — DIPHENHYDRAMINE HYDROCHLORIDE 50 MG: 50 INJECTION, SOLUTION INTRAMUSCULAR; INTRAVENOUS at 23:24

## 2022-01-13 RX ADMIN — SODIUM CHLORIDE 1000 ML: 9 INJECTION, SOLUTION INTRAVENOUS at 23:23

## 2022-01-13 RX ADMIN — METOCLOPRAMIDE HYDROCHLORIDE 10 MG: 5 INJECTION INTRAMUSCULAR; INTRAVENOUS at 23:25

## 2022-01-13 RX ADMIN — LORAZEPAM 2 MG: 2 INJECTION INTRAMUSCULAR; INTRAVENOUS at 23:25

## 2022-01-13 ASSESSMENT — PAIN DESCRIPTION - LOCATION: LOCATION: ABDOMEN

## 2022-01-13 ASSESSMENT — PAIN SCALES - GENERAL: PAINLEVEL_OUTOF10: 6

## 2022-01-14 ENCOUNTER — TELEPHONE (OUTPATIENT)
Dept: FAMILY MEDICINE CLINIC | Age: 47
End: 2022-01-14

## 2022-01-14 VITALS
RESPIRATION RATE: 18 BRPM | WEIGHT: 293 LBS | OXYGEN SATURATION: 97 % | TEMPERATURE: 97.8 F | HEART RATE: 78 BPM | BODY MASS INDEX: 53.95 KG/M2 | DIASTOLIC BLOOD PRESSURE: 86 MMHG | SYSTOLIC BLOOD PRESSURE: 165 MMHG

## 2022-01-14 LAB
EKG ATRIAL RATE: 78 BPM
EKG P AXIS: 51 DEGREES
EKG P-R INTERVAL: 166 MS
EKG Q-T INTERVAL: 448 MS
EKG QRS DURATION: 90 MS
EKG QTC CALCULATION (BAZETT): 510 MS
EKG R AXIS: -5 DEGREES
EKG T AXIS: 53 DEGREES
EKG VENTRICULAR RATE: 78 BPM
TROPONIN, HIGH SENSITIVITY: 10 NG/L (ref 0–9)

## 2022-01-14 PROCEDURE — 36415 COLL VENOUS BLD VENIPUNCTURE: CPT

## 2022-01-14 PROCEDURE — 93010 ELECTROCARDIOGRAM REPORT: CPT | Performed by: INTERNAL MEDICINE

## 2022-01-14 PROCEDURE — 84484 ASSAY OF TROPONIN QUANT: CPT

## 2022-01-14 RX ORDER — ASPIRIN 81 MG/1
81 TABLET ORAL DAILY
Qty: 90 TABLET | Refills: 1 | Status: SHIPPED
Start: 2022-01-14 | End: 2022-04-01 | Stop reason: SDUPTHER

## 2022-01-14 RX ORDER — LORAZEPAM 1 MG/1
1 TABLET ORAL EVERY 6 HOURS PRN
Qty: 12 TABLET | Refills: 0 | Status: SHIPPED | OUTPATIENT
Start: 2022-01-14 | End: 2022-01-17

## 2022-01-14 RX ORDER — LORAZEPAM 1 MG/1
1 TABLET ORAL EVERY 6 HOURS PRN
Qty: 12 TABLET | Refills: 0 | Status: SHIPPED | OUTPATIENT
Start: 2022-01-14 | End: 2022-01-14 | Stop reason: SDUPTHER

## 2022-01-14 RX ORDER — HYDROXYZINE HYDROCHLORIDE 25 MG/1
25 TABLET, FILM COATED ORAL EVERY 8 HOURS PRN
Qty: 30 TABLET | Refills: 0 | Status: SHIPPED | OUTPATIENT
Start: 2022-01-14 | End: 2022-01-24

## 2022-01-14 RX ORDER — LANCETS 30 GAUGE
1 EACH MISCELLANEOUS DAILY
Qty: 100 EACH | Refills: 5 | Status: SHIPPED
Start: 2022-01-14 | End: 2022-01-20 | Stop reason: SDUPTHER

## 2022-01-14 NOTE — ED PROVIDER NOTES
HPI:  22, Time: 11:41 PM EST        Elinaa Croft is a 40 y.o. female presenting to the ED for gradual onset severe anxiety post grief feelings and insomnia following the death of her spouse, beginning 2 days ago. The complaint has been persistent, severe in severity, and worsened by nothing. Patient denies any suicidal homicidal ideation. She has not had any fever/chills, no chest pain or shortness of breath no change in bowel bladder patterns but states she has some severe anxiety regarding the loss of her spouse. No relieving factors are reported. No other complaints. Review of Systems:   A complete review of systems was performed and pertinent positives and negatives are stated within HPI, all other systems reviewed and are negative.    --------------------------------------------- PAST HISTORY ---------------------------------------------  Past Medical History:  has a past medical history of Acute renal injury ([facility] [address] Analgesic overuse headache, Anxiety, Arthritis, Asthma, Depression, Diabetes mellitus ([facility] [address] Fracture of tooth, GERD (gastroesophageal reflux disease), Glaucoma, Hyperlipidemia, Hypertension, Hypertensive urgency, Hypothyroidism, Irritable bowel syndrome, Obesity, Obstructive sleep apnea, Pneumonia, Polycystic ovarian syndrome, Postcholecystectomy syndrome, Spinal headache, and Suicidal ideation. Past Surgical History:  has a past surgical history that includes Tonsillectomy (); Cholecystectomy, laparoscopic ();  section (); Foot surgery (); Ovary removal (); Dental surgery (10/06/2011); bernadette and bso (cervix removed) (3/19/2013); hernia repair (9/3/13); Hysterectomy; Echo Complete (2014); and Knee cartilage surgery. Social History:  reports that she has never smoked. She has never used smokeless tobacco. She reports that she does not drink alcohol and does not use drugs.     Family History: family history includes Asthma in her brother, mother, and sister; Cancer in her father and paternal grandfather; Depression in her father, maternal grandmother, and paternal grandmother; Diabetes in her brother and mother; Heart Disease in her father and maternal grandfather; High Blood Pressure in her father, maternal grandmother, mother, and paternal grandmother; Mental Illness in her maternal grandmother; Thyroid Disease in her maternal grandmother and sister. The patients home medications have been reviewed.     Allergies: Percocet [oxycodone-acetaminophen], Metformin and related, and Tape [adhesive tape]    -------------------------------------------------- RESULTS -------------------------------------------------  All laboratory and radiology results have been personally reviewed by myself   LABS:  Results for orders placed or performed during the hospital encounter of 01/13/22   CBC Auto Differential   Result Value Ref Range    WBC 7.4 4.5 - 11.5 E9/L    RBC 4.15 3.50 - 5.50 E12/L    Hemoglobin 12.5 11.5 - 15.5 g/dL    Hematocrit 37.5 34.0 - 48.0 %    MCV 90.4 80.0 - 99.9 fL    MCH 30.1 26.0 - 35.0 pg    MCHC 33.3 32.0 - 34.5 %    RDW 13.0 11.5 - 15.0 fL    Platelets 901 090 - 771 E9/L    MPV 11.1 7.0 - 12.0 fL    Neutrophils % 48.6 43.0 - 80.0 %    Immature Granulocytes % 0.5 0.0 - 5.0 %    Lymphocytes % 41.3 20.0 - 42.0 %    Monocytes % 7.2 2.0 - 12.0 %    Eosinophils % 1.9 0.0 - 6.0 %    Basophils % 0.5 0.0 - 2.0 %    Neutrophils Absolute 3.58 1.80 - 7.30 E9/L    Immature Granulocytes # 0.04 E9/L    Lymphocytes Absolute 3.05 1.50 - 4.00 E9/L    Monocytes Absolute 0.53 0.10 - 0.95 E9/L    Eosinophils Absolute 0.14 0.05 - 0.50 E9/L    Basophils Absolute 0.04 0.00 - 0.20 N1/G   Basic Metabolic Panel   Result Value Ref Range    Sodium 138 132 - 146 mmol/L    Potassium 3.5 3.5 - 5.0 mmol/L    Chloride 102 98 - 107 mmol/L    CO2 27 22 - 29 mmol/L    Anion Gap 9 7 - 16 mmol/L    Glucose 201 (H) 74 - 99 mg/dL    BUN 9 6 - 20 mg/dL    CREATININE 0.8 0.5 - 1.0 mg/dL    GFR Non-African American >60 >=60 mL/min/1.73    GFR African American >60     Calcium 8.7 8.6 - 10.2 mg/dL   Lactic Acid, Plasma   Result Value Ref Range    Lactic Acid 1.9 0.5 - 2.2 mmol/L   Troponin   Result Value Ref Range    Troponin, High Sensitivity 11 (H) 0 - 9 ng/L   Troponin   Result Value Ref Range    Troponin, High Sensitivity 10 (H) 0 - 9 ng/L   EKG 12 Lead   Result Value Ref Range    Ventricular Rate 78 BPM    Atrial Rate 78 BPM    P-R Interval 166 ms    QRS Duration 90 ms    Q-T Interval 448 ms    QTc Calculation (Bazett) 510 ms    P Axis 51 degrees    R Axis -5 degrees    T Axis 53 degrees       RADIOLOGY:  Interpreted by Radiologist.  No orders to display       ------------------------- NURSING NOTES AND VITALS REVIEWED ---------------------------    The nursing notes within the ED encounter and vital signs as below have been reviewed. BP (!) 171/77   Pulse 76   Temp 97.8 °F (36.6 °C) (Oral)   Resp 18   Wt (!) 354 lb 12.8 oz (160.9 kg)   LMP 10/01/2012 (Approximate) Comment: 2013  SpO2 96%   BMI 53.95 kg/m²   Oxygen Saturation Interpretation: Normal      ---------------------------------------------------PHYSICAL EXAM--------------------------------------      Constitutional/General: Alert and oriented x3, well appearing, non toxic in moderate distress crying  Head: Normocephalic and atraumatic  Eyes: PERRL, EOMI  Mouth: Oropharynx clear, handling secretions, no trismus  Neck: Supple, full ROM, no JVD. Trachea midline  Pulmonary: Lungs clear to auscultation bilaterally, no wheezes, rales, or rhonchi. Not in respiratory distress  Cardiovascular:  Regular rate and rhythm, no murmurs, gallops, or rubs. 2+ distal pulses  Abdomen: Soft, non tender, non distended, no organomegaly no masses no guarding or rigidity normal active bowel sounds  Extremities: Moves all extremities x 4.  Warm and well perfused  Skin: warm and dry without rash  Neurologic: GCS 15, cranial nerves II through XII intact with no focal deficits. No meningeal signs  Psych: Anxious, grieving affect with tearing but no suicidal no homicidal ideations. No loose associations nor any Tangential speech pattern or flight of ideas      ------------------------------ ED COURSE/MEDICAL DECISION MAKING----------------------  Medications   0.9 % sodium chloride bolus (0 mLs IntraVENous Stopped 1/14/22 0023)   diphenhydrAMINE (BENADRYL) injection 50 mg (50 mg IntraVENous Given 1/13/22 2324)   metoclopramide (REGLAN) injection 10 mg (10 mg IntraVENous Given 1/13/22 2325)   LORazepam (ATIVAN) injection 2 mg (2 mg IntraVENous Given 1/13/22 2325)         ED COURSE:     Medical Decision Making:   Patient is understandably having a grief reaction due to the loss of her spouse. Because of her history of type 2 diabetes and high pretension with markedly elevated diastolic blood pressure reading, I did do cardiac screening assessment just to make sure that she did not have an anginal equivalent presentation. Patient at no time complained of any chest heaviness or pain nor pressure. Her EKG was nondiagnostic for ischemia with a  normal delta troponin. Her mildly prolonged QT interval was less than 1/2 RR interval.  Her symptoms are significantly abated with meds administered above and she is felt to be stable for discharge close outpatient follow-up with her PCP. She has family members to watch her closely for any developing symptoms of severe depression. EKG #1:  Interpreted by emergency department attending physician unless otherwise noted. 1/14/22  Time: 22:31  Rhythm: normal sinus   Rate: normal  Axis: normal  Conduction: normal  ST Segments: no acute change  T Waves: non specific changes  Clinical Impression: non-specific EKG  Comparison to Prior tracings: There are no significant changes when compared to prior tracings. Counseling:   The emergency provider has spoken with the patient and discussed todays results, in addition to providing specific details for the plan of care and counseling regarding the diagnosis and prognosis. Questions are answered at this time and they are agreeable with the plan. Controlled Substance Monitoring:  Acute and Chronic Pain Monitoring:   RX Monitoring 1/13/2022   Periodic Controlled Substance Monitoring No signs of potential drug abuse or diversion identified.       --------------------------------- IMPRESSION AND DISPOSITION ---------------------------------    IMPRESSION  1. Grief reaction    2. Anxiety state    3. Elevated blood pressure reading        DISPOSITION  Disposition: Discharge to home  Patient condition is stable      NOTE: This report was transcribed using voice recognition software.  Every effort was made to ensure accuracy; however, inadvertent computerized transcription errors may be present        Ami Almaraz MD  01/14/22 6762

## 2022-01-19 DIAGNOSIS — I15.9 SECONDARY HYPERTENSION: ICD-10-CM

## 2022-01-20 ENCOUNTER — TELEPHONE (OUTPATIENT)
Dept: FAMILY MEDICINE CLINIC | Age: 47
End: 2022-01-20

## 2022-01-20 RX ORDER — LISINOPRIL 40 MG/1
40 TABLET ORAL DAILY
Qty: 30 TABLET | Refills: 2 | Status: SHIPPED
Start: 2022-01-20 | End: 2022-03-22

## 2022-01-20 RX ORDER — AMLODIPINE BESYLATE 10 MG/1
TABLET ORAL
Qty: 30 TABLET | Refills: 3 | Status: SHIPPED
Start: 2022-01-20 | End: 2022-04-01 | Stop reason: SDUPTHER

## 2022-01-20 NOTE — TELEPHONE ENCOUNTER
Refill needed on:     One touch lancet 33 gauge  At [facility] · Seizure-like activity at nursing facility  Likely secondary to hyponatremia    · Currently with acute metabolic encephalopathy from hyponatremia vs postictal state  · Possible UTI:  Continue empiric ceftriaxone and follow urine culture  · Seizure precautions and have neurology evaluate

## 2022-01-21 RX ORDER — LANCETS 30 GAUGE
1 EACH MISCELLANEOUS DAILY
Qty: 100 EACH | Refills: 5 | Status: SHIPPED
Start: 2022-01-21 | End: 2022-04-01 | Stop reason: SDUPTHER

## 2022-02-03 DIAGNOSIS — F41.9 ANXIETY: ICD-10-CM

## 2022-02-04 RX ORDER — VENLAFAXINE HYDROCHLORIDE 37.5 MG/1
37.5 CAPSULE, EXTENDED RELEASE ORAL DAILY
Qty: 30 CAPSULE | Refills: 3 | Status: SHIPPED
Start: 2022-02-04 | End: 2022-04-01 | Stop reason: SDUPTHER

## 2022-02-08 ENCOUNTER — E-VISIT (OUTPATIENT)
Dept: PRIMARY CARE CLINIC | Age: 47
End: 2022-02-08
Payer: MEDICAID

## 2022-02-08 DIAGNOSIS — N76.0 ACUTE VAGINITIS: Primary | ICD-10-CM

## 2022-02-08 PROCEDURE — 99422 OL DIG E/M SVC 11-20 MIN: CPT | Performed by: NURSE PRACTITIONER

## 2022-02-08 RX ORDER — METRONIDAZOLE 500 MG/1
500 TABLET ORAL 2 TIMES DAILY
Qty: 14 TABLET | Refills: 0 | Status: SHIPPED | OUTPATIENT
Start: 2022-02-08 | End: 2022-02-15

## 2022-02-08 RX ORDER — FLUCONAZOLE 150 MG/1
150 TABLET ORAL ONCE
Qty: 1 TABLET | Refills: 1 | Status: SHIPPED | OUTPATIENT
Start: 2022-02-08 | End: 2022-02-16 | Stop reason: SDUPTHER

## 2022-02-08 NOTE — PROGRESS NOTES
Reviewed questionnaire  Reviewed previous encounters, meds, allergies and history     Dx vaginitis     Plan  -rx for diflucan. May repeat in 3 days if symptoms do not improve  Rx for flagyl. Avoid alcohol while taking      I'm sorry to hear that you haven't been feeling well. I will send in diflucan for what sounds like a yeast infection. I will also send in flagyl due to the foul smelling discharge to treat for BV. If your symptoms worsen or fail to improve please see you PCP or OBGYN. Diflucan can be taken once or may be repeated in 3 days if your symptoms are not fully resolved. I have included information below to help with your symptoms at home.     How you can care for yourself at home:     -Wear loose cotton clothing. Do not wear nylon or other fabric that holds body heat and moisture close to the skin. -Try sleeping without underwear.  -Do not scratch. Relieve itching with a cold pack or a cool bath. -Do not wash your vaginal area more than once a day. Use plain water or a mild, unscented soap. Air-dry the vaginal area.   -Change out of wet swimsuits after swimming.  -Do not have sex until you have finished your treatment.  -Do not douche.     Call your doctor if:      -You have unexpected vaginal bleeding.   -You have new or increased pain in your vagina or pelvis.   -You have a fever.   -You are not getting better after 3 days.   -Your symptoms come back after you finish your medicines.     Hope you feel better soon.     Time spent 11-20

## 2022-02-08 NOTE — PATIENT INSTRUCTIONS
Patient Education        Vaginitis: Care Instructions  Your Care Instructions     Vaginitis is soreness or infection of the vagina. This common problem can cause itching and burning. And it can cause a change in vaginal discharge. Sometimes it can cause pain during sex. Vaginitis may be caused by bacteria, yeast, or other germs. Some infections that cause it are caught from a sexual partner. Bath products, spermicides, and douches can irritate the vagina too. Some women have this problem during and after menopause. A drop in estrogen levels during this time can cause dryness, soreness, and pain during sex. Your doctor can give you medicine to treat an infection. And home care may help you feel better. For certain types of infections, your sex partner must be treated too. Follow-up care is a key part of your treatment and safety. Be sure to make and go to all appointments, and call your doctor if you are having problems. It's also a good idea to know your test results and keep a list of the medicines you take. How can you care for yourself at home? · If your doctor prescribed antibiotics, take them as directed. Do not stop taking them just because you feel better. You need to take the full course of antibiotics. · Take your medicines exactly as prescribed. Call your doctor if you think you are having a problem with your medicine. · Do not eat or drink anything that has alcohol if you are taking metronidazole (Flagyl). · If you have a yeast infection, use over-the-counter products as your doctor tells you to. Or take medicine your doctor prescribes exactly as directed. · Wash your vaginal area daily with water. You also can use a mild, unscented soap if you want. · Do not use scented bath products. And do not use vaginal sprays or douches. · Put a washcloth soaked in cool water on the area to relieve itching. Or you can take cool baths.   · If you have dryness because of menopause, use estrogen cream or pills that your doctor prescribes. · Ask your doctor about when it is okay to have sex. · Use a personal lubricant before sex if you have dryness. Examples are Astroglide, K-Y Jelly, and Wet Lubricant Gel. · Ask your doctor if your sex partner also needs treatment. When should you call for help? Call your doctor now or seek immediate medical care if:    · You have a fever and pelvic pain. Watch closely for changes in your health, and be sure to contact your doctor if:    · You have bleeding other than your period.     · You do not get better as expected. Where can you learn more? Go to [email or web address] and sign in to your [employer] account. Enter W046 in the [employer] box to learn more about \"Vaginitis: Care Instructions. \"     If you do not have an account, please click on the \"Sign Up Now\" link. Current as of: February 11, 2021               Content Version: 13.1  © 2006-2021 Healthwise, Incorporated. Care instructions adapted under license by [facility] ([facility]). If you have questions about a medical condition or this instruction, always ask your healthcare professional. [address] any warranty or liability for your use of this information.

## 2022-02-16 RX ORDER — FLUCONAZOLE 150 MG/1
150 TABLET ORAL ONCE
Qty: 1 TABLET | Refills: 1 | OUTPATIENT
Start: 2022-02-16 | End: 2022-02-16

## 2022-02-16 RX ORDER — FLUCONAZOLE 150 MG/1
150 TABLET ORAL ONCE
Qty: 1 TABLET | Refills: 1 | Status: SHIPPED | OUTPATIENT
Start: 2022-02-16 | End: 2022-02-16

## 2022-02-16 RX ORDER — FLUCONAZOLE 150 MG/1
150 TABLET ORAL
Qty: 3 TABLET | Refills: 1 | OUTPATIENT
Start: 2022-02-16

## 2022-02-16 NOTE — TELEPHONE ENCOUNTER
Patient did an e-visit on 2-8-22 and was prescribed flagyl and diflucan for a yeast infection. However, the pharmacy never received the diflucan. Can you resend the script for her.  Symptoms are not resolved with the flagyl

## 2022-02-23 DIAGNOSIS — E11.8 TYPE 2 DIABETES MELLITUS WITH COMPLICATION, WITHOUT LONG-TERM CURRENT USE OF INSULIN (HCC): ICD-10-CM

## 2022-02-23 RX ORDER — DULAGLUTIDE 1.5 MG/.5ML
1.5 INJECTION, SOLUTION SUBCUTANEOUS WEEKLY
Qty: 2 ML | Refills: 3 | Status: SHIPPED
Start: 2022-02-23 | End: 2022-04-01 | Stop reason: SDUPTHER

## 2022-03-21 DIAGNOSIS — I15.9 SECONDARY HYPERTENSION: ICD-10-CM

## 2022-03-22 RX ORDER — LISINOPRIL 40 MG/1
40 TABLET ORAL DAILY
Qty: 30 TABLET | Refills: 2 | Status: SHIPPED
Start: 2022-03-22 | End: 2022-04-01 | Stop reason: SDUPTHER

## 2022-03-22 RX ORDER — GLUCOSAMINE HCL/CHONDROITIN SU 500-400 MG
CAPSULE ORAL
Qty: 100 STRIP | Refills: 3 | Status: SHIPPED
Start: 2022-03-22 | End: 2022-04-01 | Stop reason: SDUPTHER

## 2022-03-22 NOTE — TELEPHONE ENCOUNTER
Last Appointment   12/7/2021  Next Appointment  Visit date not found    Patient was due in January     Left detailed message and sent [employer] message to schedule follow up

## 2022-03-22 NOTE — TELEPHONE ENCOUNTER
Patient returned call; her  passed away on 1/12/22. She just started a new job and is afraid to take off work.      Scheduled for mid April to give her time to request off    Last Appointment   12/7/2021  Next Appointment  4/15/2022

## 2022-03-25 DIAGNOSIS — F43.21 GRIEF REACTION: ICD-10-CM

## 2022-03-25 DIAGNOSIS — F41.1 ANXIETY STATE: ICD-10-CM

## 2022-03-25 NOTE — TELEPHONE ENCOUNTER
Received call from pharmacy; patient requesting refill on this medication that she was prescribed when in the ER on 1-14-22.      Last Appointment   12/7/2021  Next Appointment  4/15/2022

## 2022-03-28 RX ORDER — LORAZEPAM 1 MG/1
1 TABLET ORAL EVERY 6 HOURS PRN
Qty: 12 TABLET | Refills: 0 | OUTPATIENT
Start: 2022-03-28 | End: 2022-03-31

## 2022-03-28 NOTE — TELEPHONE ENCOUNTER
We cannot prescribe ativan for her. I need to see her and assess her condition. We can discuss that at upcoming appt.

## 2022-03-29 ENCOUNTER — APPOINTMENT (OUTPATIENT)
Dept: GENERAL RADIOLOGY | Age: 47
End: 2022-03-29
Payer: MEDICAID

## 2022-03-29 ENCOUNTER — HOSPITAL ENCOUNTER (EMERGENCY)
Age: 47
Discharge: HOME | End: 2022-03-29
Attending: EMERGENCY MEDICINE
Payer: MEDICAID

## 2022-03-29 VITALS
BODY MASS INDEX: 47.09 KG/M2 | TEMPERATURE: 98.4 F | HEART RATE: 81 BPM | WEIGHT: 293 LBS | OXYGEN SATURATION: 99 % | RESPIRATION RATE: 14 BRPM | DIASTOLIC BLOOD PRESSURE: 93 MMHG | HEIGHT: 66 IN | SYSTOLIC BLOOD PRESSURE: 181 MMHG

## 2022-03-29 DIAGNOSIS — J06.9 VIRAL UPPER RESPIRATORY INFECTION: ICD-10-CM

## 2022-03-29 DIAGNOSIS — J01.90 ACUTE NON-RECURRENT SINUSITIS, UNSPECIFIED LOCATION: Primary | ICD-10-CM

## 2022-03-29 LAB
ANION GAP SERPL CALCULATED.3IONS-SCNC: 8 MMOL/L (ref 7–16)
BASOPHILS ABSOLUTE: 0.03 E9/L (ref 0–0.2)
BASOPHILS RELATIVE PERCENT: 0.4 % (ref 0–2)
BUN BLDV-MCNC: 11 MG/DL (ref 6–20)
CALCIUM SERPL-MCNC: 8.7 MG/DL (ref 8.6–10.2)
CHLORIDE BLD-SCNC: 102 MMOL/L (ref 98–107)
CO2: 26 MMOL/L (ref 22–29)
CREAT SERPL-MCNC: 0.7 MG/DL (ref 0.5–1)
EOSINOPHILS ABSOLUTE: 0.18 E9/L (ref 0.05–0.5)
EOSINOPHILS RELATIVE PERCENT: 2.6 % (ref 0–6)
GFR AFRICAN AMERICAN: >60
GFR NON-AFRICAN AMERICAN: >60 ML/MIN/1.73
GLUCOSE BLD-MCNC: 248 MG/DL (ref 74–99)
HCT VFR BLD CALC: 41 % (ref 34–48)
HEMOGLOBIN: 13.6 G/DL (ref 11.5–15.5)
IMMATURE GRANULOCYTES #: 0.02 E9/L
IMMATURE GRANULOCYTES %: 0.3 % (ref 0–5)
LYMPHOCYTES ABSOLUTE: 1.69 E9/L (ref 1.5–4)
LYMPHOCYTES RELATIVE PERCENT: 24.6 % (ref 20–42)
MCH RBC QN AUTO: 30.2 PG (ref 26–35)
MCHC RBC AUTO-ENTMCNC: 33.2 % (ref 32–34.5)
MCV RBC AUTO: 91.1 FL (ref 80–99.9)
MONOCYTES ABSOLUTE: 0.5 E9/L (ref 0.1–0.95)
MONOCYTES RELATIVE PERCENT: 7.3 % (ref 2–12)
NEUTROPHILS ABSOLUTE: 4.44 E9/L (ref 1.8–7.3)
NEUTROPHILS RELATIVE PERCENT: 64.8 % (ref 43–80)
PDW BLD-RTO: 12.7 FL (ref 11.5–15)
PLATELET # BLD: 142 E9/L (ref 130–450)
PMV BLD AUTO: 11.6 FL (ref 7–12)
POTASSIUM REFLEX MAGNESIUM: 4 MMOL/L (ref 3.5–5)
RBC # BLD: 4.5 E12/L (ref 3.5–5.5)
SODIUM BLD-SCNC: 136 MMOL/L (ref 132–146)
WBC # BLD: 6.9 E9/L (ref 4.5–11.5)

## 2022-03-29 PROCEDURE — 94640 AIRWAY INHALATION TREATMENT: CPT

## 2022-03-29 PROCEDURE — 80048 BASIC METABOLIC PNL TOTAL CA: CPT

## 2022-03-29 PROCEDURE — 85025 COMPLETE CBC W/AUTO DIFF WBC: CPT

## 2022-03-29 PROCEDURE — 36415 COLL VENOUS BLD VENIPUNCTURE: CPT

## 2022-03-29 PROCEDURE — 71045 X-RAY EXAM CHEST 1 VIEW: CPT

## 2022-03-29 PROCEDURE — 6370000000 HC RX 637 (ALT 250 FOR IP): Performed by: STUDENT IN AN ORGANIZED HEALTH CARE EDUCATION/TRAINING PROGRAM

## 2022-03-29 PROCEDURE — 99285 EMERGENCY DEPT VISIT HI MDM: CPT

## 2022-03-29 RX ORDER — GUAIFENESIN 600 MG/1
600 TABLET, EXTENDED RELEASE ORAL 2 TIMES DAILY
Qty: 30 TABLET | Refills: 0 | Status: SHIPPED | OUTPATIENT
Start: 2022-03-29 | End: 2022-04-05

## 2022-03-29 RX ORDER — ONDANSETRON 4 MG/1
4 TABLET, ORALLY DISINTEGRATING ORAL ONCE
Status: COMPLETED | OUTPATIENT
Start: 2022-03-29 | End: 2022-03-29

## 2022-03-29 RX ORDER — ALBUTEROL SULFATE 90 UG/1
2 AEROSOL, METERED RESPIRATORY (INHALATION) 4 TIMES DAILY PRN
Qty: 18 G | Refills: 0 | Status: SHIPPED | OUTPATIENT
Start: 2022-03-29

## 2022-03-29 RX ORDER — ONDANSETRON 2 MG/ML
4 INJECTION INTRAMUSCULAR; INTRAVENOUS ONCE
Status: DISCONTINUED | OUTPATIENT
Start: 2022-03-29 | End: 2022-03-29

## 2022-03-29 RX ORDER — GUAIFENESIN 600 MG/1
600 TABLET, EXTENDED RELEASE ORAL ONCE
Status: DISCONTINUED | OUTPATIENT
Start: 2022-03-29 | End: 2022-03-29 | Stop reason: CLARIF

## 2022-03-29 RX ORDER — IPRATROPIUM BROMIDE AND ALBUTEROL SULFATE 2.5; .5 MG/3ML; MG/3ML
1 SOLUTION RESPIRATORY (INHALATION) ONCE
Status: COMPLETED | OUTPATIENT
Start: 2022-03-29 | End: 2022-03-29

## 2022-03-29 RX ORDER — GUAIFENESIN 400 MG/1
400 TABLET ORAL ONCE
Status: COMPLETED | OUTPATIENT
Start: 2022-03-29 | End: 2022-03-29

## 2022-03-29 RX ADMIN — ONDANSETRON 4 MG: 4 TABLET, ORALLY DISINTEGRATING ORAL at 05:58

## 2022-03-29 RX ADMIN — IPRATROPIUM BROMIDE AND ALBUTEROL SULFATE 1 AMPULE: .5; 3 SOLUTION RESPIRATORY (INHALATION) at 06:05

## 2022-03-29 RX ADMIN — GUAIFENESIN 400 MG: 400 TABLET ORAL at 05:58

## 2022-03-29 ASSESSMENT — ENCOUNTER SYMPTOMS
RHINORRHEA: 0
ABDOMINAL PAIN: 0
CHEST TIGHTNESS: 1
NAUSEA: 1
COUGH: 1
SINUS PRESSURE: 1
SINUS PAIN: 0
DIARRHEA: 0
VOMITING: 1
BACK PAIN: 0
SORE THROAT: 1
SHORTNESS OF BREATH: 1
EYE PAIN: 0
CONSTIPATION: 0
EYE REDNESS: 0

## 2022-03-29 ASSESSMENT — PAIN SCALES - GENERAL: PAINLEVEL_OUTOF10: 5

## 2022-03-29 ASSESSMENT — PAIN DESCRIPTION - DESCRIPTORS: DESCRIPTORS: ACHING;SORE

## 2022-03-29 ASSESSMENT — PAIN DESCRIPTION - LOCATION: LOCATION: THROAT

## 2022-03-29 ASSESSMENT — PAIN DESCRIPTION - PAIN TYPE: TYPE: ACUTE PAIN

## 2022-03-29 ASSESSMENT — PAIN - FUNCTIONAL ASSESSMENT: PAIN_FUNCTIONAL_ASSESSMENT: 0-10

## 2022-03-29 ASSESSMENT — PAIN DESCRIPTION - FREQUENCY: FREQUENCY: INTERMITTENT

## 2022-03-29 NOTE — PROGRESS NOTES
03/29/22 0605   Treatment   Treatment Type HHN   $Treatment Type $Inhaled Therapy/Meds   Medications Albuterol/Ipratropium   Pre-Tx Pulse 81   Pre-Tx Resps 16   Breath Sounds Pre-Tx ANGELICA Clear;Diminished   Breath Sounds Pre-Tx LLL Diminished   Breath Sounds Pre-Tx RUL Clear;Diminished   Breath Sounds Pre-Tx RML Diminished   Breath Sounds Pre-Tx RLL Diminished   Patient Observation   Observations   (c/o stuffy head and nose)

## 2022-03-29 NOTE — Clinical Note
Isac Patricio was seen and treated in our emergency department on 3/29/2022. She may return to work on 03/31/2022. If you have any questions or concerns, please don't hesitate to call.       Sean Lamar, DO

## 2022-03-29 NOTE — ED PROVIDER NOTES
[facility]  Department of Emergency Medicine     Written by: [place], DO  Patient Name: Rosa Catherine  Attending Provider: Henry Kwok MD  Admit Date: 3/29/2022  4:37 AM  MRN: [id number]                   : 1975        Chief Complaint   Patient presents with   Prince Vargas Nausea    Nasal Congestion    Pharyngitis    - Chief complaint    Ms. Bia Ferrari is a 55 yo female who presents to the ED due to shortness of breath. Patient notes that over the past 2 days she has had progressively worsening sinus congestion and headache due to the pressure with postnasal drip and nausea. She states that she began having some nausea and vomiting yesterday, only producing mucus when she vomits. She notes that she has been increasingly fatigued and has had a fever as high as 101 at home. She endorses sinus pressure and a sore throat as well as a mild cough with chest tightness and progressively worsening shortness of breath. She states that due to vomiting she feels as though she has a midsternal burning chest pain that she believes is related to gagging and vomiting. Patient notes that she feels mildly anxious in the hospital as her  recently passed away in [address] [place] [address] facility. Patient has not taken any medications to this point that have relieved her symptoms. Symptoms have been constant and progressively worsening. He states that she has chronic lower extremity edema which is mildly worsened recently as well. She denies any aggravating or relieving factors. Denies any chills, abdominal pain, bowel or urinary changes, numbness or tingling, rashes or vision changes. Review of Systems   Constitutional: Positive for activity change, fatigue and fever. Negative for appetite change and chills. HENT: Positive for congestion, postnasal drip, sinus pressure and sore throat. Negative for rhinorrhea and sinus pain. Eyes: Negative for pain and redness.    Respiratory: Positive for cough, chest tightness and shortness of breath. Cardiovascular: Positive for chest pain (Burning). Negative for palpitations and leg swelling. Gastrointestinal: Positive for nausea and vomiting (\"mucus\"). Negative for abdominal pain, constipation and diarrhea. Genitourinary: Negative for dysuria, flank pain, frequency, hematuria and urgency. Musculoskeletal: Negative for arthralgias, back pain, gait problem, joint swelling and myalgias. Skin: Negative for rash. Neurological: Negative for dizziness, seizures, syncope, weakness, light-headedness, numbness and headaches. Physical Exam  Constitutional:       General: She is not in acute distress. Appearance: Normal appearance. She is normal weight. She is not ill-appearing or toxic-appearing. HENT:      Head: Normocephalic and atraumatic. Right Ear: External ear normal.      Left Ear: External ear normal.      Nose: Congestion present. Mouth/Throat:      Mouth: Mucous membranes are moist. No oral lesions. Pharynx: Oropharynx is clear. Uvula midline. No pharyngeal swelling, oropharyngeal exudate or posterior oropharyngeal erythema. Tonsils: No tonsillar exudate or tonsillar abscesses. Eyes:      Extraocular Movements: Extraocular movements intact. Conjunctiva/sclera: Conjunctivae normal.   Cardiovascular:      Rate and Rhythm: Normal rate and regular rhythm. Pulses: Normal pulses. Heart sounds: Normal heart sounds. Pulmonary:      Effort: Pulmonary effort is normal. No respiratory distress. Breath sounds: Normal breath sounds. No wheezing. Abdominal:      General: Abdomen is flat. Bowel sounds are normal. There is no distension. Palpations: Abdomen is soft. Tenderness: There is no abdominal tenderness. There is no rebound. Hernia: No hernia is present. Musculoskeletal:         General: Normal range of motion. Cervical back: Normal range of motion and neck supple.    Skin: General: Skin is warm and dry. Neurological:      General: No focal deficit present. Mental Status: She is alert and oriented to person, place, and time. Mental status is at baseline. Psychiatric:         Mood and Affect: Mood normal.         Behavior: Behavior normal.      Comments: Mildly anxious          Procedures       MDM  Number of Diagnoses or Management Options  Acute non-recurrent sinusitis, unspecified location  Viral upper respiratory infection  Diagnosis management comments: This is a 57 yo female who presents to the ED due to shortness of breath. Patient was initially given guaifenesin, Zofran and a DuoNeb treatment. She reported mild relief of her symptoms on reevaluation. Patient CBC was benign with normal lites. BMP revealed mild hyperglycemia with a glucose of 248 with other values markedly normal.  Chest x-ray revealed no acute cardiopulmonary abnormality. Patient was told of these benign findings and she will be discharged with guaifenesin and an albuterol inhaler for her symptoms. She has been told to follow with her primary care provider if her symptoms persist for longer than a week to 10 days. She been told to come back to the emergency department if her symptoms return, worsen or change in any time.          Amount and/or Complexity of Data Reviewed  Clinical lab tests: reviewed  Tests in the radiology section of CPT®: reviewed                --------------------------------------------- PAST HISTORY ---------------------------------------------  Past Medical History:  has a past medical history of Acute renal injury ([address] Analgesic overuse headache, Anxiety, Arthritis, Asthma, Depression, Diabetes mellitus ([address] Fracture of tooth, GERD (gastroesophageal reflux disease), Glaucoma, Hyperlipidemia, Hypertension, Hypertensive urgency, Hypothyroidism, Irritable bowel syndrome, Obesity, Obstructive sleep apnea, Pneumonia, Polycystic ovarian syndrome, Postcholecystectomy syndrome, Spinal headache, and Suicidal ideation. Past Surgical History:  has a past surgical history that includes Tonsillectomy (); Cholecystectomy, laparoscopic ();  section (); Foot surgery (); Ovary removal (); Dental surgery (10/06/2011); bernadette and bso (cervix removed) (3/19/2013); hernia repair (9/3/13); Hysterectomy; Echo Complete (2014); and Knee cartilage surgery. Social History:  reports that she has never smoked. She has never used smokeless tobacco. She reports that she does not drink alcohol and does not use drugs. Family History: family history includes Asthma in her brother, mother, and sister; Cancer in her father and paternal grandfather; Depression in her father, maternal grandmother, and paternal grandmother; Diabetes in her brother and mother; Heart Disease in her father and maternal grandfather; High Blood Pressure in her father, maternal grandmother, mother, and paternal grandmother; Mental Illness in her maternal grandmother; Thyroid Disease in her maternal grandmother and sister. The patients home medications have been reviewed. Allergies: Percocet [oxycodone-acetaminophen], Metformin and related, and Tape [adhesive tape]    -------------------------------------------------- RESULTS -------------------------------------------------  Labs:  No results found for this visit on 22. Radiology:  XR CHEST PORTABLE    (Results Pending)       ------------------------- NURSING NOTES AND VITALS REVIEWED ---------------------------  Date / Time Roomed:  3/29/2022  4:37 AM  ED Bed Assignment:      The nursing notes within the ED encounter and vital signs as below have been reviewed.    BP (!) 181/107   Pulse 89   Temp 98.4 °F (36.9 °C) (Oral)   Resp 18   Ht 5' 6\" (1.676 m)   Wt (!) 340 lb (154.2 kg)   LMP 10/01/2012 (Approximate) Comment:   SpO2 97%   BMI 54.88 kg/m²   Oxygen Saturation Interpretation: Normal      ------------------------------------------ PROGRESS NOTES ------------------------------------------  5:02 AM EDT  I have spoken with the patient and discussed todays results, in addition to providing specific details for the plan of care and counseling regarding the diagnosis and prognosis. Their questions are answered at this time and they are agreeable with the plan. I discussed at length with them reasons for immediate return here for re evaluation. They will followup with their primary care physician by calling their office tomorrow. --------------------------------- ADDITIONAL PROVIDER NOTES ---------------------------------  At this time the patient is without objective evidence of an acute process requiring hospitalization or inpatient management. They have remained hemodynamically stable throughout their entire ED visit and are stable for discharge with outpatient follow-up. The plan has been discussed in detail and they are aware of the specific conditions for emergent return, as well as the importance of follow-up. New Prescriptions    No medications on file       Diagnosis:  1. Acute non-recurrent sinusitis, unspecified location    2. Viral upper respiratory infection        Disposition:  Patient's disposition: Discharge to home  Patient's condition is stable. Patient was seen and evaluated by myself and my attending Keyon Pedraza MD. Assessment and Plan discussed with attending provider, please see attestation for final plan of care.      DO Orlando Rain DO  Resident  03/29/22 0055

## 2022-03-31 ENCOUNTER — APPOINTMENT (OUTPATIENT)
Dept: CT IMAGING | Age: 47
End: 2022-03-31
Payer: MEDICAID

## 2022-03-31 ENCOUNTER — HOSPITAL ENCOUNTER (EMERGENCY)
Age: 47
Discharge: HOME | End: 2022-04-01
Attending: EMERGENCY MEDICINE
Payer: MEDICAID

## 2022-03-31 ENCOUNTER — TELEMEDICINE (OUTPATIENT)
Dept: FAMILY MEDICINE CLINIC | Age: 47
End: 2022-03-31
Payer: MEDICAID

## 2022-03-31 DIAGNOSIS — J06.9 VIRAL URI: Primary | ICD-10-CM

## 2022-03-31 DIAGNOSIS — J06.9 UPPER RESPIRATORY TRACT INFECTION, UNSPECIFIED TYPE: ICD-10-CM

## 2022-03-31 DIAGNOSIS — J45.41 MODERATE PERSISTENT ASTHMA WITH EXACERBATION: Primary | ICD-10-CM

## 2022-03-31 LAB
ALBUMIN SERPL-MCNC: 3.9 G/DL (ref 3.5–5.2)
ALP BLD-CCNC: 142 U/L (ref 35–104)
ALT SERPL-CCNC: 23 U/L (ref 0–32)
ANION GAP SERPL CALCULATED.3IONS-SCNC: 12 MMOL/L (ref 7–16)
AST SERPL-CCNC: 17 U/L (ref 0–31)
BASOPHILS ABSOLUTE: 0.02 E9/L (ref 0–0.2)
BASOPHILS RELATIVE PERCENT: 0.2 % (ref 0–2)
BILIRUB SERPL-MCNC: 0.4 MG/DL (ref 0–1.2)
BUN BLDV-MCNC: 11 MG/DL (ref 6–20)
CALCIUM SERPL-MCNC: 9.1 MG/DL (ref 8.6–10.2)
CHLORIDE BLD-SCNC: 98 MMOL/L (ref 98–107)
CO2: 27 MMOL/L (ref 22–29)
CREAT SERPL-MCNC: 0.8 MG/DL (ref 0.5–1)
EOSINOPHILS ABSOLUTE: 0.03 E9/L (ref 0.05–0.5)
EOSINOPHILS RELATIVE PERCENT: 0.3 % (ref 0–6)
GFR AFRICAN AMERICAN: >60
GFR NON-AFRICAN AMERICAN: >60 ML/MIN/1.73
GLUCOSE BLD-MCNC: 368 MG/DL (ref 74–99)
HCT VFR BLD CALC: 43.5 % (ref 34–48)
HEMOGLOBIN: 14.5 G/DL (ref 11.5–15.5)
IMMATURE GRANULOCYTES #: 0.1 E9/L
IMMATURE GRANULOCYTES %: 1.1 % (ref 0–5)
INFLUENZA A BY PCR: NOT DETECTED
INFLUENZA B BY PCR: NOT DETECTED
LYMPHOCYTES ABSOLUTE: 0.96 E9/L (ref 1.5–4)
LYMPHOCYTES RELATIVE PERCENT: 10.4 % (ref 20–42)
MCH RBC QN AUTO: 30.3 PG (ref 26–35)
MCHC RBC AUTO-ENTMCNC: 33.3 % (ref 32–34.5)
MCV RBC AUTO: 90.8 FL (ref 80–99.9)
MONOCYTES ABSOLUTE: 0.19 E9/L (ref 0.1–0.95)
MONOCYTES RELATIVE PERCENT: 2.1 % (ref 2–12)
NEUTROPHILS ABSOLUTE: 7.96 E9/L (ref 1.8–7.3)
NEUTROPHILS RELATIVE PERCENT: 85.9 % (ref 43–80)
PDW BLD-RTO: 12.6 FL (ref 11.5–15)
PLATELET # BLD: 190 E9/L (ref 130–450)
PMV BLD AUTO: 11.3 FL (ref 7–12)
POTASSIUM REFLEX MAGNESIUM: 4.2 MMOL/L (ref 3.5–5)
RBC # BLD: 4.79 E12/L (ref 3.5–5.5)
REASON FOR REJECTION: NORMAL
REASON FOR REJECTION: NORMAL
REJECTED TEST: NORMAL
REJECTED TEST: NORMAL
SARS-COV-2, NAAT: NOT DETECTED
SODIUM BLD-SCNC: 137 MMOL/L (ref 132–146)
TOTAL PROTEIN: 7.2 G/DL (ref 6.4–8.3)
WBC # BLD: 9.3 E9/L (ref 4.5–11.5)

## 2022-03-31 PROCEDURE — 36415 COLL VENOUS BLD VENIPUNCTURE: CPT

## 2022-03-31 PROCEDURE — 94640 AIRWAY INHALATION TREATMENT: CPT

## 2022-03-31 PROCEDURE — 6360000002 HC RX W HCPCS

## 2022-03-31 PROCEDURE — G8482 FLU IMMUNIZE ORDER/ADMIN: HCPCS | Performed by: FAMILY MEDICINE

## 2022-03-31 PROCEDURE — 80053 COMPREHEN METABOLIC PANEL: CPT

## 2022-03-31 PROCEDURE — 6360000004 HC RX CONTRAST MEDICATION: Performed by: RADIOLOGY

## 2022-03-31 PROCEDURE — 99213 OFFICE O/P EST LOW 20 MIN: CPT | Performed by: FAMILY MEDICINE

## 2022-03-31 PROCEDURE — 96366 THER/PROPH/DIAG IV INF ADDON: CPT

## 2022-03-31 PROCEDURE — 85025 COMPLETE CBC W/AUTO DIFF WBC: CPT

## 2022-03-31 PROCEDURE — 6370000000 HC RX 637 (ALT 250 FOR IP)

## 2022-03-31 PROCEDURE — 94664 DEMO&/EVAL PT USE INHALER: CPT

## 2022-03-31 PROCEDURE — 71275 CT ANGIOGRAPHY CHEST: CPT

## 2022-03-31 PROCEDURE — 96365 THER/PROPH/DIAG IV INF INIT: CPT

## 2022-03-31 PROCEDURE — G8417 CALC BMI ABV UP PARAM F/U: HCPCS | Performed by: FAMILY MEDICINE

## 2022-03-31 PROCEDURE — G8427 DOCREV CUR MEDS BY ELIG CLIN: HCPCS | Performed by: FAMILY MEDICINE

## 2022-03-31 PROCEDURE — 87635 SARS-COV-2 COVID-19 AMP PRB: CPT

## 2022-03-31 PROCEDURE — 99285 EMERGENCY DEPT VISIT HI MDM: CPT

## 2022-03-31 PROCEDURE — 1036F TOBACCO NON-USER: CPT | Performed by: FAMILY MEDICINE

## 2022-03-31 PROCEDURE — 87502 INFLUENZA DNA AMP PROBE: CPT

## 2022-03-31 RX ORDER — MAGNESIUM SULFATE IN WATER 40 MG/ML
2000 INJECTION, SOLUTION INTRAVENOUS ONCE
Status: DISCONTINUED | OUTPATIENT
Start: 2022-03-31 | End: 2022-03-31

## 2022-03-31 RX ORDER — MAGNESIUM SULFATE IN WATER 40 MG/ML
2000 INJECTION, SOLUTION INTRAVENOUS ONCE
Status: COMPLETED | OUTPATIENT
Start: 2022-03-31 | End: 2022-04-01

## 2022-03-31 RX ORDER — IPRATROPIUM BROMIDE AND ALBUTEROL SULFATE 2.5; .5 MG/3ML; MG/3ML
3 SOLUTION RESPIRATORY (INHALATION) ONCE
Status: COMPLETED | OUTPATIENT
Start: 2022-03-31 | End: 2022-03-31

## 2022-03-31 RX ADMIN — IOPAMIDOL 90 ML: 755 INJECTION, SOLUTION INTRAVENOUS at 21:50

## 2022-03-31 RX ADMIN — MAGNESIUM SULFATE HEPTAHYDRATE 2000 MG: 40 INJECTION, SOLUTION INTRAVENOUS at 22:54

## 2022-03-31 RX ADMIN — IPRATROPIUM BROMIDE AND ALBUTEROL SULFATE 3 AMPULE: .5; 3 SOLUTION RESPIRATORY (INHALATION) at 18:45

## 2022-03-31 ASSESSMENT — ENCOUNTER SYMPTOMS
COUGH: 1
ABDOMINAL PAIN: 0
SHORTNESS OF BREATH: 1
CHEST TIGHTNESS: 0
BLOOD IN STOOL: 0
WHEEZING: 1
NAUSEA: 0
CONSTIPATION: 0
DIARRHEA: 0
VOMITING: 0
BACK PAIN: 0

## 2022-03-31 NOTE — PROGRESS NOTES
[facility] Video Visit Precepting Note    Subjective: This Telehealth visit was performed as two-way, audio-video technology platform. Verbal consent was taken from patient as noted in medical assistant's/nurse's note. 55 yo F video visit for ED f/u  She was short of breath and had rhinorrhea  She had CXR - results reviewed  Was treated with dayquil, mucinex  She had a rapid COVID test which was negative  Cough is productive of green sputum  She is having fever - 101/ 102  Objective:  As noted in resident's note. Assessment/Plan:  Fever and productive cough with wheezing  Likely due to viral infection with reactive air way disease  ddx does include influenza/ covid though rapid covid was negative  - last pulse ox at ED was normal  Recommend ED for evaluation - needs reexamination and reassessment of vital signs     Attending Physician Statement  I have reviewed the chart, including any radiology or labs. I have discussed the case, including pertinent history with the resident. I agree with the assessment, plan and orders as documented by the resident. Please refer to the resident note for additional information.     Electronically signed by Layla Dominguez MD on 3/31/22 at 4:13 PM EDT

## 2022-03-31 NOTE — LETTER
[facility] Emergency Department  [address] [place] [address]  Phone: [phone]               April 1, 2022    Patient: Greyson Daley   YOB: 1975   Date of Visit: 3/31/2022       To Whom It May Concern:    Pascale Rainey was seen and treated in our emergency department on 3/31/2022. She may return to work on 4/4/2022.       Sincerely,       Yasmin Oleary RN         Signature:__________________________________

## 2022-03-31 NOTE — ED PROVIDER NOTES
[facility] ENCOUNTER      Pt Name: Yuni Wagner  MRN: [id number]  [place] 1975  Date of evaluation: 3/31/2022      CHIEF COMPLAINT       Chief Complaint   Patient presents with   Margueritte Mins of Breath        HPI  Yuni Wagner is a 56 y.o. female  with PMHx of asthma, HTN, DM2 presents with breath, cough, fevers, chills. Patient states that her symptoms have been worsening since Sunday, she states that she came to the emergency department 3 days ago, and was discharged on albuterol and Mucinex. States that her congestion has slightly improved however she continues to have worsening shortness of breath, cough is productive of green sputum. She is also been having fevers and chills, T-max was 101F improved with Tylenol. Patient states that she did not take any Tylenol today. Describes symptoms moderate in severity with no alleviating or exacerbating factors. Patient is denying any other symptoms at this time including no n/v, headache, dizziness, vision changes, neck tenderness or stiffness, weakness, cp, palpitations, leg swelling/tenderness, abd pain, dysuria, hematuria, diarrhea, constipation. Denies any smoking however lives with a smoker. Except as noted above the remainder of the review of systems was reviewed and negative. Review of Systems   Constitutional: Positive for chills and fever. Negative for activity change, appetite change and fatigue. HENT: Positive for congestion. Negative for nosebleeds and tinnitus. Eyes: Negative for visual disturbance. Respiratory: Positive for cough, shortness of breath and wheezing. Negative for chest tightness. Cardiovascular: Negative for chest pain, palpitations and leg swelling. Gastrointestinal: Negative for abdominal pain, blood in stool, constipation, diarrhea, nausea and vomiting. Endocrine: Negative for polydipsia and polyphagia.    Genitourinary: Negative for dysuria, flank pain, hematuria, vaginal bleeding, vaginal discharge and vaginal pain. Musculoskeletal: Negative for back pain, gait problem, joint swelling and myalgias. Skin: Negative for rash. Allergic/Immunologic: Negative for immunocompromised state. Neurological: Negative for dizziness, syncope, weakness, numbness and headaches. Hematological: Negative for adenopathy. Psychiatric/Behavioral: Negative for behavioral problems, confusion and hallucinations. Physical Exam  Constitutional:       General: She is not in acute distress. Appearance: Normal appearance. She is obese. She is not ill-appearing, toxic-appearing or diaphoretic. HENT:      Head: Normocephalic and atraumatic. Right Ear: External ear normal.      Left Ear: External ear normal.      Nose: Nose normal. No congestion or rhinorrhea. Mouth/Throat:      Mouth: Mucous membranes are moist.      Pharynx: Oropharynx is clear. No oropharyngeal exudate or posterior oropharyngeal erythema. Eyes:      Conjunctiva/sclera: Conjunctivae normal.      Pupils: Pupils are equal, round, and reactive to light. Cardiovascular:      Rate and Rhythm: Normal rate and regular rhythm. Pulses: Normal pulses. Heart sounds: Normal heart sounds. Pulmonary:      Effort: Pulmonary effort is normal.      Breath sounds: Decreased breath sounds and wheezing present. No rhonchi or rales. Abdominal:      General: Abdomen is flat. Bowel sounds are normal. There is no distension. Palpations: Abdomen is soft. There is no mass. Tenderness: There is no abdominal tenderness. There is no right CVA tenderness, left CVA tenderness or guarding. Hernia: No hernia is present. Musculoskeletal:      Cervical back: Normal range of motion. Skin:     General: Skin is warm and dry. Capillary Refill: Capillary refill takes less than 2 seconds. Neurological:      General: No focal deficit present.       Mental Status: She is alert and oriented to person, place, and time. Mental status is at baseline. Psychiatric:         Mood and Affect: Mood normal.         Behavior: Behavior normal.         Thought Content: Thought content normal.          Procedures     MDM        56 y.o. female  with PMHx of asthma, HTN, DM2 presents with breath, cough, fevers, chills. Patient states that her symptoms have been worsening since Sunday, she states that she came to the emergency department 3 days ago, and was discharged on albuterol and Mucinex. While in the ED patient was hypertensive but otherwise hemodynamically stable, afebrile, nontoxic-appearing, in no respiratory distress. Physical exam remarkable for decreased breath sounds with bilateral wheezing. labs remarkable for hyperglycemia and elevated alk phos with negative influenza and Covid tests. CTA Pulm negative for PE. Patient received DuoNeb treatments and magnesium with significant symptomatic relief. She had received Solu-Medrol from EMS prior to arrival.  Wheezing did significantly improve. I explained to patient the importance of limiting allergens from her home as patient had a strong cigarette odor. Patient feels comfortable going home and will follow up outpatient with PCP. Patient understands to return to the ED in case of worsening symptoms. --------------------------------------------- PAST HISTORY ---------------------------------------------  Past Medical History:  has a past medical history of Acute renal injury ([facility] Analgesic overuse headache, Anxiety, Arthritis, Asthma, Depression, Diabetes mellitus ([facility] Fracture of tooth, GERD (gastroesophageal reflux disease), Glaucoma, Hyperlipidemia, Hypertension, Hypertensive urgency, Hypothyroidism, Irritable bowel syndrome, Obesity, Obstructive sleep apnea, Pneumonia, Polycystic ovarian syndrome, Postcholecystectomy syndrome, Spinal headache, and Suicidal ideation.     Past Surgical History:  has a past surgical history that includes Tonsillectomy (); Cholecystectomy, laparoscopic ();  section (); Foot surgery (); Ovary removal (); Dental surgery (10/06/2011); bernadette and bso (cervix removed) (3/19/2013); hernia repair (9/3/13); Hysterectomy; Echo Complete (2014); and Knee cartilage surgery. Social History:  reports that she has never smoked. She has never used smokeless tobacco. She reports that she does not drink alcohol and does not use drugs. Family History: family history includes Asthma in her brother, mother, and sister; Cancer in her father and paternal grandfather; Depression in her father, maternal grandmother, and paternal grandmother; Diabetes in her brother and mother; Heart Disease in her father and maternal grandfather; High Blood Pressure in her father, maternal grandmother, mother, and paternal grandmother; Mental Illness in her maternal grandmother; Thyroid Disease in her maternal grandmother and sister. The patients home medications have been reviewed.     Allergies: Percocet [oxycodone-acetaminophen], Metformin and related, and Tape [adhesive tape]    -------------------------------------------------- RESULTS -------------------------------------------------  Labs:  Results for orders placed or performed during the hospital encounter of 22   COVID-19, Rapid    Specimen: Nasopharyngeal Swab   Result Value Ref Range    SARS-CoV-2, NAAT Not Detected Not Detected   RAPID INFLUENZA A/B ANTIGENS    Specimen: Nasopharyngeal   Result Value Ref Range    Influenza A by PCR Not Detected Not Detected    Influenza B by PCR Not Detected Not Detected   Comprehensive Metabolic Panel w/ Reflex to MG   Result Value Ref Range    Sodium 137 132 - 146 mmol/L    Potassium reflex Magnesium 4.2 3.5 - 5.0 mmol/L    Chloride 98 98 - 107 mmol/L    CO2 27 22 - 29 mmol/L    Anion Gap 12 7 - 16 mmol/L    Glucose 368 (H) 74 - 99 mg/dL    BUN 11 6 - 20 mg/dL    CREATININE 0.8 0.5 - 1.0 mg/dL    GFR Non-African American >60 >=60 mL/min/1.73    GFR African American >60     Calcium 9.1 8.6 - 10.2 mg/dL    Total Protein 7.2 6.4 - 8.3 g/dL    Albumin 3.9 3.5 - 5.2 g/dL    Total Bilirubin 0.4 0.0 - 1.2 mg/dL    Alkaline Phosphatase 142 (H) 35 - 104 U/L    ALT 23 0 - 32 U/L    AST 17 0 - 31 U/L   SPECIMEN REJECTION   Result Value Ref Range    Rejected Test cbcwd     Reason for Rejection see below    SPECIMEN REJECTION   Result Value Ref Range    Rejected Test cbcwd     Reason for Rejection see below    CBC with Auto Differential   Result Value Ref Range    WBC 9.3 4.5 - 11.5 E9/L    RBC 4.79 3.50 - 5.50 E12/L    Hemoglobin 14.5 11.5 - 15.5 g/dL    Hematocrit 43.5 34.0 - 48.0 %    MCV 90.8 80.0 - 99.9 fL    MCH 30.3 26.0 - 35.0 pg    MCHC 33.3 32.0 - 34.5 %    RDW 12.6 11.5 - 15.0 fL    Platelets 956 263 - 978 E9/L    MPV 11.3 7.0 - 12.0 fL    Neutrophils % 85.9 (H) 43.0 - 80.0 %    Immature Granulocytes % 1.1 0.0 - 5.0 %    Lymphocytes % 10.4 (L) 20.0 - 42.0 %    Monocytes % 2.1 2.0 - 12.0 %    Eosinophils % 0.3 0.0 - 6.0 %    Basophils % 0.2 0.0 - 2.0 %    Neutrophils Absolute 7.96 (H) 1.80 - 7.30 E9/L    Immature Granulocytes # 0.10 E9/L    Lymphocytes Absolute 0.96 (L) 1.50 - 4.00 E9/L    Monocytes Absolute 0.19 0.10 - 0.95 E9/L    Eosinophils Absolute 0.03 (L) 0.05 - 0.50 E9/L    Basophils Absolute 0.02 0.00 - 0.20 E9/L       Radiology:  CTA PULMONARY W CONTRAST   Final Result   No evidence of pulmonary embolism or acute pulmonary abnormality. Study was technically limited by obesity. RECOMMENDATIONS:   Unavailable             ------------------------- NURSING NOTES AND VITALS REVIEWED ---------------------------  Date / Time Roomed:  3/31/2022  5:28 PM  ED Bed Assignment:  02/02    The nursing notes within the ED encounter and vital signs as below have been reviewed.    BP (!) 176/82   Pulse 88   Temp 98.1 °F (36.7 °C)   Resp 17   LMP 10/01/2012 (Approximate) Comment: 2013  SpO2 96% Oxygen Saturation Interpretation: Normal      ------------------------------------------ PROGRESS NOTES ------------------------------------------  12:43 PM EDT  I have spoken with the patient and discussed todays results, in addition to providing specific details for the plan of care and counseling regarding the diagnosis and prognosis. Their questions are answered at this time and they are agreeable with the plan. I discussed at length with them reasons for immediate return here for re evaluation. They will followup with their primary care physician by calling their office tomorrow. --------------------------------- ADDITIONAL PROVIDER NOTES ---------------------------------  At this time the patient is without objective evidence of an acute process requiring hospitalization or inpatient management. They have remained hemodynamically stable throughout their entire ED visit and are stable for discharge with outpatient follow-up. The plan has been discussed in detail and they are aware of the specific conditions for emergent return, as well as the importance of follow-up. Discharge Medication List as of 4/1/2022 12:29 AM          Diagnosis:  1. Moderate persistent asthma with exacerbation    2. Upper respiratory tract infection, unspecified type        Disposition:  Patient's disposition: Discharge to home  Patient's condition is stable.        Kate Nava MD  Resident  04/03/22 [id number]

## 2022-03-31 NOTE — PROGRESS NOTES
3/31/2022    TELEHEALTH EVALUATION -- Audio/Visual (During KUKSD-05 public health emergency)    HPI:  Went to ER for sob  Cold and congestion  Fever 101, 102  Took mucinex, albuterol, dayquil and nyquil  Sleep on and off  Very weak  1 day of sore throat  Took rapid covid today and was negative  Cant walk   Difficulty breathing  +wheezing  +cough   +green sputum   +HA   CXR  : was negative  Gave her mucinex and inhaler   Did not swab for Covid or flu   No sick contacts   Fully vaccinated   No booster yet  No travel       [facility]  1975[facility] has requested an audio/video evaluation for the following concern(s):      Review of Systems   Constitutional: Negative for chills and fever. HENT: Positive for congestion and sore throat. Negative for trouble swallowing. Respiratory: Positive for cough, shortness of breath and wheezing. Cardiovascular: Negative for chest pain and leg swelling. Gastrointestinal: Negative for abdominal pain, constipation, diarrhea, nausea and vomiting. Genitourinary: Negative for difficulty urinating. Musculoskeletal: Negative for arthralgias and myalgias. Skin: Negative for rash and wound. Neurological: Negative for dizziness and headaches. Psychiatric/Behavioral: Negative for agitation. Prior to Visit Medications    Medication Sig Taking?  Authorizing Provider   albuterol sulfate HFA (VENTOLIN HFA) 108 (90 Base) MCG/ACT inhaler Inhale 2 puffs into the lungs 4 times daily as needed for Wheezing Yes Selvin Sunshine,    hydrALAZINE (APRESOLINE) 25 MG tablet Take 1 tablet by mouth every 8 hours Yes Geovanna Hernandez,    ondansetron (ZOFRAN-ODT) 4 MG disintegrating tablet Take 1 tablet by mouth 3 times daily as needed for Nausea or Vomiting Yes Delphia Spurling, PA   amLODIPine (NORVASC) 10 MG tablet Take 10 mg by mouth daily  Historical Provider, MD   Dulaglutide (TRULICITY) 1.5 GG/0.5MB SOPN Inject 1.5 mg into the skin once a week Given Tuesday  Historical Provider, 3/19/2013    Hypothyroidism     Irritable bowel syndrome     Obesity     Obstructive sleep apnea     Pneumonia     Polycystic ovarian syndrome     Postcholecystectomy syndrome 2018    Spinal headache     Suicidal ideation 3/18/2016   ,   Past Surgical History:   Procedure Laterality Date     SECTION      Dr. Isis Rivera, [address], LAPAROSCOPIC      [facility]    COLONOSCOPY      DENTAL SURGERY  10/06/2011    4 extractions    ECHO COMPLETE  2014         ENDOSCOPY, COLON, DIAGNOSTIC      FOOT SURGERY      RECONSTRUCTION LEFT FOOT, Dr. Robert Urbina, [address]  9/3/13    incisional hernia repair with mesh    HYSTERECTOMY      KNEE CARTILAGE SURGERY      OVARY REMOVAL      left due to polycystic ovary, Dr. Faraz Melchor, [facility]    BRYANT AND BSO  3/19/2013    Attempted [facility] BRYANT;RSO, Dr. Arely Coombs, [address]  [id number]   ,   Social History     Tobacco Use    Smoking status: Never Smoker    Smokeless tobacco: Never Used   Vaping Use    Vaping Use: Never used   Substance Use Topics    Alcohol use: No     Comment: no alcohol since age 26;  drinks 2-3 glasses Coke/Pepsi daily & 2-3 glasses of iced tea daily     Drug use: Never   ,   Family History   Problem Relation Age of Onset    Asthma Mother     Diabetes Mother     High Blood Pressure Mother     High Blood Pressure Father     Heart Disease Father     Cancer Father     Depression Father     Diabetes Brother     Asthma Brother     Thyroid Disease Sister     Asthma Sister     Depression Maternal Grandmother     High Blood Pressure Maternal Grandmother     Mental Illness Maternal Grandmother     Thyroid Disease Maternal Grandmother     Heart Disease Maternal Grandfather     Depression Paternal Grandmother     High Blood Pressure Paternal Grandmother     Cancer Paternal Grandfather    ,   Immunization History   Administered Date(s) Administered    COVID-19, Pfizer Purple top, DILUTE for use, 12+ yrs, 30mcg/0.3mL dose 03/28/2021, 04/29/2021    Influenza A ([id number]) Vaccine PF IM 12/29/2009    Influenza Vaccine, unspecified formulation 09/12/2014, 09/25/2015, 01/06/2016    Influenza Virus Vaccine 10/15/2012, 10/24/2013    Influenza, MDCK Quadv, IM, PF (Flucelvax 2 yrs and older) 12/07/2021    Influenza, Valeria Reichmann, IM, (6 mo and older Fluzone, Flulaval, Fluarix and 3 yrs and older Afluria) 09/25/2015, 01/06/2016, 09/21/2016    Pneumococcal Polysaccharide ([id number]) 01/01/2012, 10/24/2013    Tdap (Boostrix, Adacel) 05/14/2014   ,   Health Maintenance   Topic Date Due    Hepatitis C screen  Never done    HIV screen  Never done    Hepatitis B vaccine (1 of 3 - Risk 3-dose series) Never done    Diabetic foot exam  04/13/2017    Colorectal Cancer Screen  Never done    Diabetic retinal exam  07/18/2021    COVID-19 Vaccine (3 - Booster for Pfizer series) 09/29/2021    Diabetic microalbuminuria test  05/18/2022    TSH testing  07/15/2022    Depression Monitoring  09/28/2022    A1C test (Diabetic or Prediabetic)  04/01/2023    Lipid screen  04/01/2023    Potassium monitoring  04/06/2023    Creatinine monitoring  04/06/2023    DTaP/Tdap/Td vaccine (2 - Td or Tdap) 05/14/2024    Pneumococcal 0-64 years Vaccine (2 of 2 - PPSV23) 02/02/2041    Flu vaccine  Completed    Hepatitis A vaccine  Aged Out    Hib vaccine  Aged Out    Meningococcal (ACWY) vaccine  Aged Out       PHYSICAL EXAMINATION:  [ INSTRUCTIONS:  \"[x]\" Indicates a positive item  \"[]\" Indicates a negative item  -- DELETE ALL ITEMS NOT EXAMINED]  Vital Signs: (As obtained by patient/caregiver or practitioner observation)    Blood pressure-  Heart rate-    Respiratory rate-    Temperature-  Pulse oximetry-     Constitutional: [x] Appears well-developed and well-nourished [x] No apparent distress      [] Abnormal-   Mental status  [x] Alert and awake  [] Oriented to person/place/time []Able to follow commands      Eyes:  EOM    [x]  Normal  [] Abnormal-  Sclera  [x]  Normal  [] Abnormal -         Discharge [x]  None visible  [] Abnormal -    HENT:   [x] Normocephalic, atraumatic. [] Abnormal   [] Mouth/Throat: Mucous membranes are moist.     External Ears [x] Normal  [] Abnormal-     Neck: [x] No visualized mass     Pulmonary/Chest: [x] Respiratory effort normal.  [] No visualized signs of difficulty breathing or respiratory distress        [] Abnormal-      Musculoskeletal:   [x] Normal gait with no signs of ataxia         [] Normal range of motion of neck        [] Abnormal-       Neurological:        [x] No Facial Asymmetry (Cranial nerve 7 motor function) (limited exam to video visit)          [x] No gaze palsy        [] Abnormal-         Skin:        [x] No significant exanthematous lesions or discoloration noted on facial skin         [] Abnormal-            Psychiatric:       [x] Normal Affect [] No Hallucinations        [] Abnormal-     Other pertinent observable physical exam findings-     ASSESSMENT/PLAN:  1. Viral URI  Recommended supportive care  Pt needs to either go to urgent care or ER if s/sx worsen   Does has hx of asthma and has been using inhaler but reports significant wheezing   If not will schedule appt in clinic for tomorrow      Return in about 1 day (around 4/1/2022), or if symptoms worsen or fail to improve. Homa Plasencia, was evaluated through a synchronous (real-time) audio-video encounter. The patient (or guardian if applicable) is aware that this is a billable service, which includes applicable co-pays. This Virtual Visit was conducted with patient's (and/or legal guardian's) consent. The visit was conducted pursuant to the emergency declaration under the [facility], [address] [place] [address] authority and the [employer] General Act.   Patient identification was verified, and a caregiver was present when appropriate. The patient was located at home in a state where the provider was licensed to provide care. Total time spent on this encounter: Not billed by time    --Kaylene Art MD on 4/6/2022 at 1:04 PM    An electronic signature was used to authenticate this note.

## 2022-04-01 ENCOUNTER — OFFICE VISIT (OUTPATIENT)
Dept: FAMILY MEDICINE CLINIC | Age: 47
End: 2022-04-01
Payer: MEDICAID

## 2022-04-01 VITALS
HEART RATE: 88 BPM | OXYGEN SATURATION: 96 % | DIASTOLIC BLOOD PRESSURE: 82 MMHG | SYSTOLIC BLOOD PRESSURE: 176 MMHG | TEMPERATURE: 98.1 F | RESPIRATION RATE: 17 BRPM

## 2022-04-01 VITALS
HEIGHT: 66 IN | BODY MASS INDEX: 47.09 KG/M2 | SYSTOLIC BLOOD PRESSURE: 161 MMHG | DIASTOLIC BLOOD PRESSURE: 110 MMHG | WEIGHT: 293 LBS

## 2022-04-01 DIAGNOSIS — F41.9 ANXIETY: ICD-10-CM

## 2022-04-01 DIAGNOSIS — J45.901 MODERATE ASTHMA WITH ACUTE EXACERBATION, UNSPECIFIED WHETHER PERSISTENT: ICD-10-CM

## 2022-04-01 DIAGNOSIS — E11.8 TYPE 2 DIABETES MELLITUS WITH COMPLICATION, WITHOUT LONG-TERM CURRENT USE OF INSULIN (HCC): ICD-10-CM

## 2022-04-01 DIAGNOSIS — E78.5 HYPERLIPIDEMIA, UNSPECIFIED HYPERLIPIDEMIA TYPE: ICD-10-CM

## 2022-04-01 DIAGNOSIS — I10 ESSENTIAL HYPERTENSION: ICD-10-CM

## 2022-04-01 DIAGNOSIS — E66.01 MORBID OBESITY WITH BMI OF 50.0-59.9, ADULT (HCC): Primary | ICD-10-CM

## 2022-04-01 LAB
CHOLESTEROL, TOTAL: 213 MG/DL (ref 0–199)
HBA1C MFR BLD: 8.1 %
HDLC SERPL-MCNC: 72 MG/DL
LDL CHOLESTEROL CALCULATED: 121 MG/DL (ref 0–99)
TRIGL SERPL-MCNC: 101 MG/DL (ref 0–149)
VLDLC SERPL CALC-MCNC: 20 MG/DL

## 2022-04-01 PROCEDURE — 3052F HG A1C>EQUAL 8.0%<EQUAL 9.0%: CPT | Performed by: FAMILY MEDICINE

## 2022-04-01 PROCEDURE — 83036 HEMOGLOBIN GLYCOSYLATED A1C: CPT | Performed by: FAMILY MEDICINE

## 2022-04-01 PROCEDURE — 1036F TOBACCO NON-USER: CPT | Performed by: FAMILY MEDICINE

## 2022-04-01 PROCEDURE — 99213 OFFICE O/P EST LOW 20 MIN: CPT | Performed by: FAMILY MEDICINE

## 2022-04-01 PROCEDURE — G8427 DOCREV CUR MEDS BY ELIG CLIN: HCPCS | Performed by: FAMILY MEDICINE

## 2022-04-01 PROCEDURE — G8417 CALC BMI ABV UP PARAM F/U: HCPCS | Performed by: FAMILY MEDICINE

## 2022-04-01 PROCEDURE — 2022F DILAT RTA XM EVC RTNOPTHY: CPT | Performed by: FAMILY MEDICINE

## 2022-04-01 RX ORDER — ATORVASTATIN CALCIUM 20 MG/1
20 TABLET, FILM COATED ORAL DAILY
Qty: 90 TABLET | Refills: 1 | Status: SHIPPED | OUTPATIENT
Start: 2022-04-01

## 2022-04-01 RX ORDER — LANCETS 30 GAUGE
1 EACH MISCELLANEOUS DAILY
Qty: 100 EACH | Refills: 5 | Status: SHIPPED
Start: 2022-04-01 | End: 2022-04-05

## 2022-04-01 RX ORDER — LISINOPRIL 40 MG/1
40 TABLET ORAL DAILY
Qty: 30 TABLET | Refills: 2 | Status: SHIPPED
Start: 2022-04-01 | End: 2022-09-19

## 2022-04-01 RX ORDER — DULAGLUTIDE 1.5 MG/.5ML
1.5 INJECTION, SOLUTION SUBCUTANEOUS WEEKLY
Qty: 2 ML | Refills: 3 | Status: SHIPPED
Start: 2022-04-01 | End: 2022-04-05

## 2022-04-01 RX ORDER — ASPIRIN 81 MG/1
81 TABLET ORAL DAILY
Qty: 90 TABLET | Refills: 1 | Status: ON HOLD
Start: 2022-04-01 | End: 2022-04-12 | Stop reason: SDUPTHER

## 2022-04-01 RX ORDER — VENLAFAXINE HYDROCHLORIDE 37.5 MG/1
37.5 CAPSULE, EXTENDED RELEASE ORAL DAILY
Qty: 30 CAPSULE | Refills: 3 | Status: SHIPPED
Start: 2022-04-01 | End: 2022-04-05

## 2022-04-01 RX ORDER — GLUCOSAMINE HCL/CHONDROITIN SU 500-400 MG
CAPSULE ORAL
Qty: 100 STRIP | Refills: 3 | Status: SHIPPED
Start: 2022-04-01 | End: 2022-04-05

## 2022-04-01 RX ORDER — AMLODIPINE BESYLATE 10 MG/1
TABLET ORAL
Qty: 30 TABLET | Refills: 3 | Status: SHIPPED
Start: 2022-04-01 | End: 2022-04-05

## 2022-04-01 RX ORDER — MOMETASONE FUROATE 1 MG/G
CREAM TOPICAL
Qty: 50 G | Refills: 3 | Status: SHIPPED
Start: 2022-04-01 | End: 2022-04-05

## 2022-04-01 NOTE — PROGRESS NOTES
[address] [facility] Care  Family Medicine Residency  Phone: [phone]  Fax: [phone]    Patient:  Carolyn Romero 56 y.o. female                                 Date of Service: 4/1/22                            Chiefcomplaint: No chief complaint on file. History of Present Illness: The patient is a 56 y.o. female  presented to the clinic with complaints as above. Was in ER yest for asthma exacerbation  Received duoneb,steoid  covid and flu was negative  Had virtual visit yesterday as well   Has been wheezing   Albuterol twice since morning     HTN   Lisinopril on 40 mg   noravsc 10 mg   Not complaint with meds   Her  passed away recently and has been going through lot of stress hence was not taking any meds    DM   On trulicity  On lipitor  Not taking meds as well      Review of Systems:   Review of Systems   Constitutional: Positive for fatigue. Negative for chills and fever. HENT: Negative for congestion, sore throat and trouble swallowing. Respiratory: Positive for cough, shortness of breath and wheezing. Cardiovascular: Negative for chest pain and leg swelling. Gastrointestinal: Negative for abdominal pain, constipation, diarrhea, nausea and vomiting. Genitourinary: Negative for difficulty urinating. Musculoskeletal: Negative for arthralgias and myalgias. Skin: Negative for rash and wound. Neurological: Negative for dizziness and headaches. Psychiatric/Behavioral: Negative for agitation.        Past Medical History:      Diagnosis Date    Acute renal injury ([facility] [address] 9/4/2013    Analgesic overuse headache 12/19/2018    Anxiety     Arthritis     Asthma     CAD (coronary artery disease)     Depression 3/19/2013    Diabetes mellitus ([facility] [address]     A1C=6.7 on 1/14/14    Fracture of tooth 12/19/2018    GERD (gastroesophageal reflux disease)     Glaucoma     Hyperlipidemia 12/8/2021    Hypertension     Hypertensive urgency 3/19/2013    Hypothyroidism     Irritable bowel syndrome     Obesity     Obstructive sleep apnea     Pneumonia     Polycystic ovarian syndrome     Postcholecystectomy syndrome 2018    Spinal headache     Suicidal ideation 3/18/2016       Past Surgical History:        Procedure Laterality Date     SECTION      Dr. Pj Ferguson, [address], LAPAROSCOPIC      [facility]    COLONOSCOPY      DENTAL SURGERY  10/06/2011    4 extractions    ECHO COMPLETE  2014         ENDOSCOPY, COLON, DIAGNOSTIC      FOOT SURGERY      RECONSTRUCTION LEFT FOOT, Dr. Shon Omalley, [address]  9/3/13    incisional hernia repair with mesh    HYSTERECTOMY      KNEE CARTILAGE SURGERY      OVARY REMOVAL      left due to polycystic ovary, Dr. Malu Daley, [facility]    BRYANT AND BSO  3/19/2013    [facility]ed [facility];RSO, Dr. Gabriela Early, [address]         Allergies:    Percocet [oxycodone-acetaminophen], Metformin and related, and Tape Priscilla Yamile tape]    Social History:   Social History     Socioeconomic History    Marital status:       Spouse name: Not on file    Number of children: 1    Years of education: 12    Highest education level: Not on file   Occupational History    Not on file   Tobacco Use    Smoking status: Never Smoker    Smokeless tobacco: Never Used   Vaping Use    Vaping Use: Never used   Substance and Sexual Activity    Alcohol use: No     Comment: no alcohol since age 26;  drinks 2-3 glasses Coke/Pepsi daily & 2-3 glasses of iced tea daily     Drug use: Never    Sexual activity: Not Currently     Partners: Male   Other Topics Concern    Not on file   Social History Narrative    Not on file     Social Determinants of Health     Financial Resource Strain: Low Risk     Difficulty of Paying Living Expenses: Not hard at all   Food Insecurity: No Food Insecurity    Worried About Running Out of Food in the Last Year: Never true    [address] in the Last Year: Never true   Transportation Needs:     Lack of Transportation (Medical): Not on file    Lack of Transportation (Non-Medical):  Not on file   Physical Activity:     Days of Exercise per Week: Not on file    Minutes of Exercise per Session: Not on file   Stress:     Feeling of Stress : Not on file   Social Connections:     Frequency of Communication with Friends and Family: Not on file    Frequency of Social Gatherings with Friends and Family: Not on file    Attends [employer] Services: Not on file    Active Member of [address] [place] [address] [employer] or Organizations: Not on file    Attends Club or Organization Meetings: Not on file    Marital Status: Not on file   Intimate Partner Violence:     Fear of Current or Ex-Partner: Not on file    Emotionally Abused: Not on file    Physically Abused: Not on file    Sexually Abused: Not on file   Housing Stability:     Unable to Pay for Housing in the Last Year: Not on file    Number of [place] in the Last Year: Not on file    Unstable Housing in the Last Year: Not on file        Family History:       Problem Relation Age of Onset    Asthma Mother     Diabetes Mother     High Blood Pressure Mother     High Blood Pressure Father     Heart Disease Father     Cancer Father     Depression Father     Diabetes Brother     Asthma Brother     Thyroid Disease Sister     Asthma Sister     Depression Maternal Grandmother     High Blood Pressure Maternal Grandmother     Mental Illness Maternal Grandmother     Thyroid Disease Maternal Grandmother     Heart Disease Maternal Grandfather     Depression Paternal Grandmother     High Blood Pressure Paternal Grandmother     Cancer Paternal Grandfather        Physical Exam:    Vitals: BP (!) 161/110   Ht 5' 6\" (1.676 m)   Wt (!) 346 lb (156.9 kg)   LMP 10/01/2012 (Approximate) Comment: 2013  BMI 55.85 kg/m²   BP Readings from Last 3 Encounters:   04/06/22 (!) 207/169   04/01/22 (!) 161/110   04/01/22 (!) 176/82     General Appearance: Well developed, awake, alert, oriented, no acute distress  HEENT: Normocephalic,atraumatic. PERRL, EOM's intact, EAC without erythema or swelling, no pallor or icterus. Neck: Supple, symmetrical, trachea midline. No JVD. Chest wall/Lung: Clear to auscultation bilaterally,  respirations unlabored. No ronchi/wheezing/rales  Heart[de-identified]  Regular rate and rhythm, S1and S2 normal, no murmur, rub or gallop. Abdomen: Soft, non-tender, bowel sounds normoactive, no masses, no organomegaly  Extremities:  Extremities normal, atraumatic, no cyanosis. edema. Skin: Skin color, texture, turgor normal, no rashes or lesions  Musculokeletal: ROM grossly normal in all joints of extremities, no obvious joint swelling. Lymph nodes: no lymph node enlargement appreciated  Neurologic:   Alert&Oriented. Normal gait and coordination  No focal neurological deficits appreaciated         Psychiatric: has a normal mood and affect. Behavior is normal.       Assessment and Plan:     Acute asthma exacerbation  Continue with inhalers for now     Hyperlipidemia, unspecified hyperlipidemia type  encouraged compliance   - atorvastatin (LIPITOR) 20 MG tablet; Take 1 tablet by mouth daily  Dispense: 90 tablet; Refill: 1  - LIPID PANEL; Future    Type 2 diabetes mellitus with complication, without long-term current use of insulin (HCC)  Pt willing to be efficient and compliant with meds, will  Not make any changes yet   Continue trulicity once a week  - atorvastatin (LIPITOR) 20 MG tablet; Take 1 tablet by mouth daily  Dispense: 90 tablet; Refill: 1  - POCT glycosylated hemoglobin (Hb A1C)    Essential hypertension  Continue present meds  Hydralazine 25 mg Q8hrs   Lisinopril 40 mg daily  Norvasc 10 mg daily   - lisinopril (PRINIVIL;ZESTRIL) 40 MG tablet; Take 1 tablet by mouth daily  Dispense: 30 tablet;  Refill: 2    Morbid obesity with BMI of 50.0-59.9, adult [facility])  Will refer to Tam Sheldon MD, [facility]      Return to Office: Return in about 6 weeks (around 5/13/2022) for DM check, HTN. I encourage further reading and education about your health conditions. Information on many healthconditions is provided by the American Academy of Family Physicians: https://familydoctor. org/  Please bring any questions to me at your next visit. This document may have been prepared at least partiallythrough the use of voice recognition software. Although effort is taken to assure the accuracy of this document, it is possible that grammatical, syntax,  or spelling errors may occur. Medication List:    No current facility-administered medications for this visit. No current outpatient medications on file.      Facility-Administered Medications Ordered in Other Visits   Medication Dose Route Frequency Provider Last Rate Last Admin    fluconazole (DIFLUCAN) tablet 200 mg  200 mg Oral Daily Suzette Bee MD        amLODIPine (NORVASC) tablet 10 mg  10 mg Oral Daily Suzette Bee MD   10 mg at 04/06/22 0858    aspirin EC tablet 81 mg  81 mg Oral Daily Suzette Bee MD   81 mg at 04/06/22 0858    atorvastatin (LIPITOR) tablet 20 mg  20 mg Oral Daily Suzette Bee MD   20 mg at 04/06/22 5886    hydrALAZINE (APRESOLINE) tablet 25 mg  25 mg Oral 3 times per day Suzette Bee MD   25 mg at 04/06/22 0600    lisinopril (PRINIVIL;ZESTRIL) tablet 40 mg  40 mg Oral Daily Suzette Bee MD   40 mg at 04/06/22 0858    sodium chloride flush 0.9 % injection 5-40 mL  5-40 mL IntraVENous 2 times per day Suzette Bee MD   10 mL at 04/06/22 0858    sodium chloride flush 0.9 % injection 5-40 mL  5-40 mL IntraVENous PRN Suzette Bee MD        0.9 % sodium chloride infusion   IntraVENous PRN Suzette Bee MD        enoxaparin (LOVENOX) injection 40 mg  40 mg SubCUTAneous Daily Suzette Bee MD   40 mg at 04/06/22 0858    ondansetron (ZOFRAN-ODT) disintegrating tablet 4 mg  4 mg Oral Q8H PRN Suzette Bee MD        Or    ondansetron (ZOFRAN) injection 4 mg  4 mg IntraVENous Q6H PRN Dhara Fields MD        polyethylene glycol (GLYCOLAX) packet 17 g  17 g Oral Daily PRN Dhara Fields MD        ipratropium-albuterol (DUONEB) nebulizer solution 1 ampule  1 ampule Inhalation Q4H PRN Dhara Fields MD        methylPREDNISolone sodium (SOLU-MEDROL) injection 40 mg  40 mg IntraVENous Q8H Dhara Fields MD   40 mg at 04/06/22 0306    ibuprofen (ADVIL;MOTRIN) tablet 400 mg  400 mg Oral Q6H PRN Dhara Fields MD   400 mg at 04/05/22 1312    doxycycline (VIBRAMYCIN) 100 mg in dextrose 5 % 100 mL IVPB  100 mg IntraVENous Q12H Dhara Fields MD   Stopped at 04/06/22 0353    hydrALAZINE (APRESOLINE) injection 10 mg  10 mg IntraVENous Q6H PRN Dhara Fields MD   10 mg at 04/05/22 1638    glucose (GLUTOSE) 40 % oral gel 15 g  15 g Oral PRN Leeroy Westmoreland, DO        dextrose 50 % IV solution  12.5 g IntraVENous PRN Leeroy Westmoreland, DO        glucagon (rDNA) injection 1 mg  1 mg IntraMUSCular PRN Leeroy Westmoreland, DO        dextrose 5 % solution  100 mL/hr IntraVENous PRN Leeroy Westmoreland, DO        insulin glargine-yfgn [facility]) injection vial 39 Units  0.25 Units/kg SubCUTAneous Nightly Leeroy Rose, DO   39 Units at 04/05/22 2146    insulin lispro (HUMALOG) injection vial 0-18 Units  0-18 Units SubCUTAneous TID WC Leeroy Rose DO   15 Units at 04/06/22 0649    insulin lispro (HUMALOG) injection vial 0-9 Units  0-9 Units SubCUTAneous Nightly Evie Naik MD

## 2022-04-01 NOTE — PROGRESS NOTES
Radiology Procedure Waiver   Name: Scarlett Mireles  : 1975  MRN: [id number]    Date:  3/31/22    Time: 9:32 PM EDT    Benefits of immediately proceeding with radiology exam(s) without pre-testing outweigh the risks or are not indicated as specified below and therefore the following is/are being waived:    [] Benefits of immediate radiology exam(s) outweigh any risk. OR    Pre-exam testing is not indicated for the following reason(s):  [] Pregnancy test   [] Patients LMP on-time and regular.   [] Patient had Tubal Ligation or has other Contraception Device. [] Patient  is Menopausal or Premenarcheal.    [x] Patient had Full or Partial Hysterectomy. [] Protocol for CT contrast allegry   [] Patient has tolerated well previously   [] Patient does not have a true allergy    [] MRI Questionnaire     [] BUN/Creatinine   [] Patient age w/no hx of renal dysfunction. [] Patient on Dialysis. [] Recent Normal Labs.   Electronically signed by Sammy Perez MD on 3/31/22 at 9:32 PM EDT

## 2022-04-04 ENCOUNTER — HOSPITAL ENCOUNTER (INPATIENT)
Age: 47
LOS: 8 days | Discharge: HOME | DRG: 141 | End: 2022-04-13
Attending: EMERGENCY MEDICINE | Admitting: INTERNAL MEDICINE
Payer: MEDICAID

## 2022-04-04 ENCOUNTER — APPOINTMENT (OUTPATIENT)
Dept: GENERAL RADIOLOGY | Age: 47
DRG: 141 | End: 2022-04-04
Payer: MEDICAID

## 2022-04-04 DIAGNOSIS — E11.8 TYPE 2 DIABETES MELLITUS WITH COMPLICATION, WITHOUT LONG-TERM CURRENT USE OF INSULIN (HCC): ICD-10-CM

## 2022-04-04 DIAGNOSIS — J45.41 MODERATE PERSISTENT ASTHMA WITH EXACERBATION: Primary | ICD-10-CM

## 2022-04-04 LAB
ALBUMIN SERPL-MCNC: 3.6 G/DL (ref 3.5–5.2)
ALP BLD-CCNC: 133 U/L (ref 35–104)
ALT SERPL-CCNC: 26 U/L (ref 0–32)
ANION GAP SERPL CALCULATED.3IONS-SCNC: 9 MMOL/L (ref 7–16)
AST SERPL-CCNC: 17 U/L (ref 0–31)
BASOPHILS ABSOLUTE: 0.04 E9/L (ref 0–0.2)
BASOPHILS RELATIVE PERCENT: 0.4 % (ref 0–2)
BILIRUB SERPL-MCNC: 0.4 MG/DL (ref 0–1.2)
BUN BLDV-MCNC: 9 MG/DL (ref 6–20)
CALCIUM SERPL-MCNC: 8.8 MG/DL (ref 8.6–10.2)
CHLORIDE BLD-SCNC: 95 MMOL/L (ref 98–107)
CO2: 27 MMOL/L (ref 22–29)
CREAT SERPL-MCNC: 0.7 MG/DL (ref 0.5–1)
EOSINOPHILS ABSOLUTE: 0.15 E9/L (ref 0.05–0.5)
EOSINOPHILS RELATIVE PERCENT: 1.6 % (ref 0–6)
GFR AFRICAN AMERICAN: >60
GFR NON-AFRICAN AMERICAN: >60 ML/MIN/1.73
GLUCOSE BLD-MCNC: 325 MG/DL (ref 74–99)
HCT VFR BLD CALC: 42.3 % (ref 34–48)
HEMOGLOBIN: 14.4 G/DL (ref 11.5–15.5)
IMMATURE GRANULOCYTES #: 0.04 E9/L
IMMATURE GRANULOCYTES %: 0.4 % (ref 0–5)
LYMPHOCYTES ABSOLUTE: 3.3 E9/L (ref 1.5–4)
LYMPHOCYTES RELATIVE PERCENT: 35.9 % (ref 20–42)
MCH RBC QN AUTO: 29.9 PG (ref 26–35)
MCHC RBC AUTO-ENTMCNC: 34 % (ref 32–34.5)
MCV RBC AUTO: 87.8 FL (ref 80–99.9)
MONOCYTES ABSOLUTE: 0.63 E9/L (ref 0.1–0.95)
MONOCYTES RELATIVE PERCENT: 6.8 % (ref 2–12)
NEUTROPHILS ABSOLUTE: 5.04 E9/L (ref 1.8–7.3)
NEUTROPHILS RELATIVE PERCENT: 54.9 % (ref 43–80)
PDW BLD-RTO: 12.5 FL (ref 11.5–15)
PLATELET # BLD: 227 E9/L (ref 130–450)
PMV BLD AUTO: 11 FL (ref 7–12)
POTASSIUM SERPL-SCNC: 3.6 MMOL/L (ref 3.5–5)
PRO-BNP: 16 PG/ML (ref 0–125)
RBC # BLD: 4.82 E12/L (ref 3.5–5.5)
SODIUM BLD-SCNC: 131 MMOL/L (ref 132–146)
TOTAL PROTEIN: 7.1 G/DL (ref 6.4–8.3)
TROPONIN, HIGH SENSITIVITY: 14 NG/L (ref 0–9)
WBC # BLD: 9.2 E9/L (ref 4.5–11.5)

## 2022-04-04 PROCEDURE — 6370000000 HC RX 637 (ALT 250 FOR IP): Performed by: GENERAL PRACTICE

## 2022-04-04 PROCEDURE — 87150 DNA/RNA AMPLIFIED PROBE: CPT

## 2022-04-04 PROCEDURE — 36415 COLL VENOUS BLD VENIPUNCTURE: CPT

## 2022-04-04 PROCEDURE — 99284 EMERGENCY DEPT VISIT MOD MDM: CPT

## 2022-04-04 PROCEDURE — 6360000002 HC RX W HCPCS: Performed by: GENERAL PRACTICE

## 2022-04-04 PROCEDURE — 83880 ASSAY OF NATRIURETIC PEPTIDE: CPT

## 2022-04-04 PROCEDURE — 94664 DEMO&/EVAL PT USE INHALER: CPT

## 2022-04-04 PROCEDURE — 96365 THER/PROPH/DIAG IV INF INIT: CPT

## 2022-04-04 PROCEDURE — 71045 X-RAY EXAM CHEST 1 VIEW: CPT

## 2022-04-04 PROCEDURE — 87040 BLOOD CULTURE FOR BACTERIA: CPT

## 2022-04-04 PROCEDURE — 87077 CULTURE AEROBIC IDENTIFY: CPT

## 2022-04-04 PROCEDURE — 84484 ASSAY OF TROPONIN QUANT: CPT

## 2022-04-04 PROCEDURE — 87186 SC STD MICRODIL/AGAR DIL: CPT

## 2022-04-04 PROCEDURE — 96366 THER/PROPH/DIAG IV INF ADDON: CPT

## 2022-04-04 PROCEDURE — 80053 COMPREHEN METABOLIC PANEL: CPT

## 2022-04-04 PROCEDURE — 85025 COMPLETE CBC W/AUTO DIFF WBC: CPT

## 2022-04-04 PROCEDURE — 94640 AIRWAY INHALATION TREATMENT: CPT

## 2022-04-04 PROCEDURE — 93005 ELECTROCARDIOGRAM TRACING: CPT | Performed by: EMERGENCY MEDICINE

## 2022-04-04 RX ORDER — IPRATROPIUM BROMIDE AND ALBUTEROL SULFATE 2.5; .5 MG/3ML; MG/3ML
3 SOLUTION RESPIRATORY (INHALATION) ONCE
Status: COMPLETED | OUTPATIENT
Start: 2022-04-04 | End: 2022-04-04

## 2022-04-04 RX ORDER — MAGNESIUM SULFATE IN WATER 40 MG/ML
2000 INJECTION, SOLUTION INTRAVENOUS ONCE
Status: COMPLETED | OUTPATIENT
Start: 2022-04-04 | End: 2022-04-05

## 2022-04-04 RX ADMIN — IPRATROPIUM BROMIDE AND ALBUTEROL SULFATE 3 AMPULE: 2.5; .5 SOLUTION RESPIRATORY (INHALATION) at 22:47

## 2022-04-04 RX ADMIN — MAGNESIUM SULFATE HEPTAHYDRATE 2000 MG: 40 INJECTION, SOLUTION INTRAVENOUS at 23:16

## 2022-04-04 ASSESSMENT — PAIN DESCRIPTION - LOCATION: LOCATION: CHEST

## 2022-04-04 NOTE — LETTER
Wilfrido Willis was seen and treated in our facility from  4/4/2022-4/12/ 2022. She may return to work on 4/18/2022    If you have any questions or concerns, please don't hesitate to call.       Sincerely,

## 2022-04-04 NOTE — LETTER
[phone]  Phone: [phone]    No name on file. April 12, 2022     Patient: Reid Vela   YOB: 1975   Date of Visit: 4/4/2022       To Whom It May Concern:    Eliana Peter was admitted to [facility]) from 4/4/2022-4/. If you have any questions or concerns, please don't hesitate to call.     Sincerely,      Marielos Gallagher RN

## 2022-04-05 PROBLEM — J45.901 ASTHMA EXACERBATION ATTACKS: Status: ACTIVE | Noted: 2022-04-05

## 2022-04-05 PROBLEM — J45.901 ACUTE ASTHMA EXACERBATION: Status: ACTIVE | Noted: 2022-04-05

## 2022-04-05 LAB
D DIMER: 359 NG/ML DDU
EKG ATRIAL RATE: 104 BPM
EKG P AXIS: 49 DEGREES
EKG P-R INTERVAL: 160 MS
EKG Q-T INTERVAL: 362 MS
EKG QRS DURATION: 88 MS
EKG QTC CALCULATION (BAZETT): 476 MS
EKG R AXIS: -23 DEGREES
EKG T AXIS: 51 DEGREES
EKG VENTRICULAR RATE: 104 BPM
GLUCOSE BLD-MCNC: 647 MG/DL (ref 74–99)
METER GLUCOSE: 382 MG/DL (ref 74–99)
METER GLUCOSE: 480 MG/DL (ref 74–99)
METER GLUCOSE: 499 MG/DL (ref 74–99)
METER GLUCOSE: >500 MG/DL (ref 74–99)
METER GLUCOSE: >500 MG/DL (ref 74–99)
TROPONIN, HIGH SENSITIVITY: 13 NG/L (ref 0–9)
TROPONIN, HIGH SENSITIVITY: 14 NG/L (ref 0–9)
TROPONIN, HIGH SENSITIVITY: 17 NG/L (ref 0–9)

## 2022-04-05 PROCEDURE — 82947 ASSAY GLUCOSE BLOOD QUANT: CPT

## 2022-04-05 PROCEDURE — 85378 FIBRIN DEGRADE SEMIQUANT: CPT

## 2022-04-05 PROCEDURE — 6360000002 HC RX W HCPCS: Performed by: NURSE PRACTITIONER

## 2022-04-05 PROCEDURE — 2700000000 HC OXYGEN THERAPY PER DAY

## 2022-04-05 PROCEDURE — S5553 INSULIN LONG ACTING 5 U: HCPCS | Performed by: FAMILY MEDICINE

## 2022-04-05 PROCEDURE — 93010 ELECTROCARDIOGRAM REPORT: CPT | Performed by: INTERNAL MEDICINE

## 2022-04-05 PROCEDURE — 2580000003 HC RX 258: Performed by: INTERNAL MEDICINE

## 2022-04-05 PROCEDURE — 6360000002 HC RX W HCPCS: Performed by: INTERNAL MEDICINE

## 2022-04-05 PROCEDURE — 36415 COLL VENOUS BLD VENIPUNCTURE: CPT

## 2022-04-05 PROCEDURE — 84484 ASSAY OF TROPONIN QUANT: CPT

## 2022-04-05 PROCEDURE — 6370000000 HC RX 637 (ALT 250 FOR IP): Performed by: FAMILY MEDICINE

## 2022-04-05 PROCEDURE — 6370000000 HC RX 637 (ALT 250 FOR IP): Performed by: INTERNAL MEDICINE

## 2022-04-05 PROCEDURE — 1200000000 HC SEMI PRIVATE

## 2022-04-05 PROCEDURE — 6360000002 HC RX W HCPCS: Performed by: GENERAL PRACTICE

## 2022-04-05 PROCEDURE — 82962 GLUCOSE BLOOD TEST: CPT

## 2022-04-05 RX ORDER — DULAGLUTIDE 1.5 MG/.5ML
1.5 INJECTION, SOLUTION SUBCUTANEOUS WEEKLY
Status: ON HOLD | COMMUNITY
End: 2022-04-12 | Stop reason: HOSPADM

## 2022-04-05 RX ORDER — HYDRALAZINE HYDROCHLORIDE 20 MG/ML
10 INJECTION INTRAMUSCULAR; INTRAVENOUS EVERY 6 HOURS PRN
Status: DISCONTINUED | OUTPATIENT
Start: 2022-04-05 | End: 2022-04-13 | Stop reason: HOSPADM

## 2022-04-05 RX ORDER — ONDANSETRON 4 MG/1
4 TABLET, ORALLY DISINTEGRATING ORAL EVERY 8 HOURS PRN
Status: DISCONTINUED | OUTPATIENT
Start: 2022-04-05 | End: 2022-04-13 | Stop reason: HOSPADM

## 2022-04-05 RX ORDER — HYDRALAZINE HYDROCHLORIDE 25 MG/1
25 TABLET, FILM COATED ORAL EVERY 8 HOURS SCHEDULED
Status: DISCONTINUED | OUTPATIENT
Start: 2022-04-05 | End: 2022-04-06

## 2022-04-05 RX ORDER — SODIUM CHLORIDE 9 MG/ML
INJECTION, SOLUTION INTRAVENOUS PRN
Status: DISCONTINUED | OUTPATIENT
Start: 2022-04-05 | End: 2022-04-13 | Stop reason: HOSPADM

## 2022-04-05 RX ORDER — DEXTROSE MONOHYDRATE 50 MG/ML
100 INJECTION, SOLUTION INTRAVENOUS PRN
Status: DISCONTINUED | OUTPATIENT
Start: 2022-04-05 | End: 2022-04-05

## 2022-04-05 RX ORDER — NICOTINE POLACRILEX 4 MG
15 LOZENGE BUCCAL PRN
Status: DISCONTINUED | OUTPATIENT
Start: 2022-04-05 | End: 2022-04-05

## 2022-04-05 RX ORDER — DEXTROSE MONOHYDRATE 25 G/50ML
12.5 INJECTION, SOLUTION INTRAVENOUS PRN
Status: DISCONTINUED | OUTPATIENT
Start: 2022-04-05 | End: 2022-04-13 | Stop reason: HOSPADM

## 2022-04-05 RX ORDER — FLUCONAZOLE 150 MG/1
150 TABLET ORAL
Status: ON HOLD | COMMUNITY
End: 2022-04-12 | Stop reason: HOSPADM

## 2022-04-05 RX ORDER — ATORVASTATIN CALCIUM 20 MG/1
20 TABLET, FILM COATED ORAL DAILY
Status: DISCONTINUED | OUTPATIENT
Start: 2022-04-05 | End: 2022-04-13 | Stop reason: HOSPADM

## 2022-04-05 RX ORDER — HYDRALAZINE HYDROCHLORIDE 20 MG/ML
10 INJECTION INTRAMUSCULAR; INTRAVENOUS EVERY 6 HOURS PRN
Status: DISCONTINUED | OUTPATIENT
Start: 2022-04-05 | End: 2022-04-05

## 2022-04-05 RX ORDER — SODIUM CHLORIDE 0.9 % (FLUSH) 0.9 %
5-40 SYRINGE (ML) INJECTION EVERY 12 HOURS SCHEDULED
Status: DISCONTINUED | OUTPATIENT
Start: 2022-04-05 | End: 2022-04-13 | Stop reason: HOSPADM

## 2022-04-05 RX ORDER — GUAIFENESIN 600 MG/1
1200 TABLET, EXTENDED RELEASE ORAL 2 TIMES DAILY
COMMUNITY
Start: 2022-03-29 | End: 2022-04-12

## 2022-04-05 RX ORDER — POLYETHYLENE GLYCOL 3350 17 G/17G
17 POWDER, FOR SOLUTION ORAL DAILY PRN
Status: DISCONTINUED | OUTPATIENT
Start: 2022-04-05 | End: 2022-04-13 | Stop reason: HOSPADM

## 2022-04-05 RX ORDER — DEXTROSE MONOHYDRATE 50 MG/ML
100 INJECTION, SOLUTION INTRAVENOUS PRN
Status: DISCONTINUED | OUTPATIENT
Start: 2022-04-05 | End: 2022-04-13 | Stop reason: HOSPADM

## 2022-04-05 RX ORDER — LISINOPRIL 20 MG/1
40 TABLET ORAL DAILY
Status: DISCONTINUED | OUTPATIENT
Start: 2022-04-05 | End: 2022-04-13 | Stop reason: HOSPADM

## 2022-04-05 RX ORDER — AMLODIPINE BESYLATE 10 MG/1
10 TABLET ORAL DAILY
Status: DISCONTINUED | OUTPATIENT
Start: 2022-04-05 | End: 2022-04-06

## 2022-04-05 RX ORDER — TRIAMCINOLONE ACETONIDE 5 MG/G
CREAM TOPICAL 3 TIMES DAILY PRN
COMMUNITY

## 2022-04-05 RX ORDER — INSULIN GLARGINE-YFGN 100 [IU]/ML
0.25 INJECTION, SOLUTION SUBCUTANEOUS NIGHTLY
Status: DISCONTINUED | OUTPATIENT
Start: 2022-04-05 | End: 2022-04-09

## 2022-04-05 RX ORDER — AMLODIPINE BESYLATE 10 MG/1
10 TABLET ORAL DAILY
Status: ON HOLD | COMMUNITY
End: 2022-04-12 | Stop reason: HOSPADM

## 2022-04-05 RX ORDER — ASPIRIN 81 MG/1
81 TABLET ORAL DAILY
Status: DISCONTINUED | OUTPATIENT
Start: 2022-04-05 | End: 2022-04-13 | Stop reason: HOSPADM

## 2022-04-05 RX ORDER — IPRATROPIUM BROMIDE AND ALBUTEROL SULFATE 2.5; .5 MG/3ML; MG/3ML
1 SOLUTION RESPIRATORY (INHALATION) EVERY 4 HOURS PRN
Status: DISCONTINUED | OUTPATIENT
Start: 2022-04-05 | End: 2022-04-13 | Stop reason: HOSPADM

## 2022-04-05 RX ORDER — HYDRALAZINE HYDROCHLORIDE 20 MG/ML
10 INJECTION INTRAMUSCULAR; INTRAVENOUS ONCE
Status: COMPLETED | OUTPATIENT
Start: 2022-04-05 | End: 2022-04-05

## 2022-04-05 RX ORDER — SODIUM CHLORIDE 0.9 % (FLUSH) 0.9 %
5-40 SYRINGE (ML) INJECTION PRN
Status: DISCONTINUED | OUTPATIENT
Start: 2022-04-05 | End: 2022-04-13 | Stop reason: HOSPADM

## 2022-04-05 RX ORDER — ONDANSETRON 2 MG/ML
4 INJECTION INTRAMUSCULAR; INTRAVENOUS EVERY 6 HOURS PRN
Status: DISCONTINUED | OUTPATIENT
Start: 2022-04-05 | End: 2022-04-13 | Stop reason: HOSPADM

## 2022-04-05 RX ORDER — MOMETASONE FUROATE 1 MG/G
CREAM TOPICAL DAILY PRN
COMMUNITY
End: 2022-10-20

## 2022-04-05 RX ORDER — IBUPROFEN 400 MG/1
400 TABLET ORAL EVERY 6 HOURS PRN
Status: DISCONTINUED | OUTPATIENT
Start: 2022-04-05 | End: 2022-04-13 | Stop reason: HOSPADM

## 2022-04-05 RX ORDER — METHYLPREDNISOLONE SODIUM SUCCINATE 40 MG/ML
40 INJECTION, POWDER, LYOPHILIZED, FOR SOLUTION INTRAMUSCULAR; INTRAVENOUS EVERY 8 HOURS
Status: DISCONTINUED | OUTPATIENT
Start: 2022-04-05 | End: 2022-04-08

## 2022-04-05 RX ORDER — DEXTROSE MONOHYDRATE 25 G/50ML
12.5 INJECTION, SOLUTION INTRAVENOUS PRN
Status: DISCONTINUED | OUTPATIENT
Start: 2022-04-05 | End: 2022-04-05

## 2022-04-05 RX ORDER — NICOTINE POLACRILEX 4 MG
15 LOZENGE BUCCAL PRN
Status: DISCONTINUED | OUTPATIENT
Start: 2022-04-05 | End: 2022-04-13 | Stop reason: HOSPADM

## 2022-04-05 RX ADMIN — INSULIN LISPRO 6 UNITS: 100 INJECTION, SOLUTION INTRAVENOUS; SUBCUTANEOUS at 17:36

## 2022-04-05 RX ADMIN — HYDRALAZINE HYDROCHLORIDE 10 MG: 20 INJECTION INTRAMUSCULAR; INTRAVENOUS at 16:38

## 2022-04-05 RX ADMIN — METHYLPREDNISOLONE SODIUM SUCCINATE 40 MG: 40 INJECTION, POWDER, FOR SOLUTION INTRAMUSCULAR; INTRAVENOUS at 20:17

## 2022-04-05 RX ADMIN — INSULIN GLARGINE-YFGN 39 UNITS: 100 INJECTION, SOLUTION SUBCUTANEOUS at 21:46

## 2022-04-05 RX ADMIN — HYDRALAZINE HYDROCHLORIDE 25 MG: 25 TABLET, FILM COATED ORAL at 15:05

## 2022-04-05 RX ADMIN — ATORVASTATIN CALCIUM 20 MG: 20 TABLET, FILM COATED ORAL at 15:05

## 2022-04-05 RX ADMIN — IBUPROFEN 400 MG: 400 TABLET, FILM COATED ORAL at 13:12

## 2022-04-05 RX ADMIN — HYDRALAZINE HYDROCHLORIDE 10 MG: 20 INJECTION INTRAMUSCULAR; INTRAVENOUS at 07:47

## 2022-04-05 RX ADMIN — HYDRALAZINE HYDROCHLORIDE 10 MG: 20 INJECTION INTRAMUSCULAR; INTRAVENOUS at 01:20

## 2022-04-05 RX ADMIN — LISINOPRIL 40 MG: 20 TABLET ORAL at 15:05

## 2022-04-05 RX ADMIN — TRIMETHOBENZAMIDE HYDROCHLORIDE 200 MG: 100 INJECTION INTRAMUSCULAR at 03:02

## 2022-04-05 RX ADMIN — ENOXAPARIN SODIUM 40 MG: 100 INJECTION SUBCUTANEOUS at 15:05

## 2022-04-05 RX ADMIN — METHYLPREDNISOLONE SODIUM SUCCINATE 40 MG: 40 INJECTION, POWDER, FOR SOLUTION INTRAMUSCULAR; INTRAVENOUS at 11:19

## 2022-04-05 RX ADMIN — AMLODIPINE BESYLATE 10 MG: 10 TABLET ORAL at 15:05

## 2022-04-05 RX ADMIN — HYDRALAZINE HYDROCHLORIDE 25 MG: 25 TABLET, FILM COATED ORAL at 21:46

## 2022-04-05 RX ADMIN — INSULIN LISPRO 20 UNITS: 100 INJECTION, SOLUTION INTRAVENOUS; SUBCUTANEOUS at 21:47

## 2022-04-05 RX ADMIN — INSULIN LISPRO 5 UNITS: 100 INJECTION, SOLUTION INTRAVENOUS; SUBCUTANEOUS at 12:19

## 2022-04-05 RX ADMIN — SODIUM CHLORIDE, PRESERVATIVE FREE 10 ML: 5 INJECTION INTRAVENOUS at 21:48

## 2022-04-05 RX ADMIN — ASPIRIN 81 MG: 81 TABLET, COATED ORAL at 15:05

## 2022-04-05 ASSESSMENT — PAIN - FUNCTIONAL ASSESSMENT
PAIN_FUNCTIONAL_ASSESSMENT: 0-10
PAIN_FUNCTIONAL_ASSESSMENT: 0-10
PAIN_FUNCTIONAL_ASSESSMENT: ACTIVITIES ARE NOT PREVENTED

## 2022-04-05 ASSESSMENT — PAIN DESCRIPTION - FREQUENCY: FREQUENCY: CONTINUOUS

## 2022-04-05 ASSESSMENT — ENCOUNTER SYMPTOMS
SORE THROAT: 0
SHORTNESS OF BREATH: 1
EYE REDNESS: 0
EYE DISCHARGE: 0
VOMITING: 0
WHEEZING: 1
DIARRHEA: 0
ABDOMINAL DISTENTION: 0
EYE PAIN: 0
SINUS PRESSURE: 0
BACK PAIN: 0
COUGH: 1
NAUSEA: 0

## 2022-04-05 ASSESSMENT — PAIN SCALES - GENERAL
PAINLEVEL_OUTOF10: 0
PAINLEVEL_OUTOF10: 0
PAINLEVEL_OUTOF10: 10
PAINLEVEL_OUTOF10: 0
PAINLEVEL_OUTOF10: 3
PAINLEVEL_OUTOF10: 0

## 2022-04-05 ASSESSMENT — PAIN DESCRIPTION - ONSET: ONSET: PROGRESSIVE

## 2022-04-05 ASSESSMENT — PAIN DESCRIPTION - PAIN TYPE: TYPE: ACUTE PAIN

## 2022-04-05 ASSESSMENT — PAIN DESCRIPTION - PROGRESSION: CLINICAL_PROGRESSION: GRADUALLY WORSENING

## 2022-04-05 ASSESSMENT — PAIN DESCRIPTION - DESCRIPTORS: DESCRIPTORS: ACHING

## 2022-04-05 ASSESSMENT — PAIN DESCRIPTION - LOCATION: LOCATION: HEAD

## 2022-04-05 NOTE — ED NOTES
N2N given to 39 nurse all questions answered. Dr. Blanche Apley at bedside aware of BP and ordered 10mg Hydralazine.  Given to patient ok to send up to floor       Fabiola Burgos RN  04/05/22 [id number]

## 2022-04-05 NOTE — ED PROVIDER NOTES
ED  Provider Note  Admit Date/RoomTime: 4/4/2022 10:19 PM  ED Room: 18/18A-18     HPI:   Fransico Funez is a 56 y.o. female presenting to the ED for shortness of breath, beginning a week ago. History comes primarily from the patient. Patient Active Problem List:     Hypertensive urgency     Obesity     Hypothyroidism     Depression with anxiety     Glaucoma     ALO (obstructive sleep apnea)     Type 2 diabetes mellitus with complication, without long-term current use of insulin (HCC)     Hematoma following angiography     Asthma     Arteriosclerosis of coronary artery     Atypical nevus     Infection with methicillin-resistant Staphylococcus aureus     Unspecified vitamin D deficiency     Essential hypertension     Accelerated hypertension     Generalized abdominal pain     Chest pain     Volume overload     Bilateral lower extremity edema     Morbid obesity with BMI of 50.0-59.9, adult (HCC)     PCOS (polycystic ovarian syndrome)     Lymphedema of both lower extremities     Chest pain in adult     Irritable bowel syndrome with diarrhea     Hyperlipidemia     Close exposure to COVID-19 virus  . The complaint has been persistent, moderate in severity, improved by nothing and worsened by light exertion. Associated symptoms include cough. He has a history of asthma and does live with an individual who smokes in the home, leading to a significant amount of secondhand smoke even though the patient herself does not smoke routinely. She is also poorly controlled diabetic and hypertensive patient. About a week ago she developed a sensation of shortness of breath with a cough and wheezing that she initially thought to be related to seasonal allergies, however over the course the week her shortness of breath aggressively worsened to the point where she was not able to perform some of her work duties.   For this reason she presented to [facility] 4 days ago where an evaluation was performed which revealed no significant pathology at that time. COVID and flu test were negative, CT revealed no evidence of pulmonary embolism. The patient was diagnosed with a viral bronchitis and was considered stable for discharge to home. However despite this, the patient was persistent in her symptoms and therefore returned to [facility] today for repeat evaluation. Patient is concerned that her symptoms are keeping her from work and she may be fired if she is unable to get corrected. Review of Systems   Constitutional: Positive for activity change. Negative for chills and fever. HENT: Negative for ear pain, sinus pressure and sore throat. Eyes: Negative for pain, discharge and redness. Respiratory: Positive for cough, shortness of breath and wheezing. Cardiovascular: Negative for chest pain. Gastrointestinal: Negative for abdominal distention, diarrhea, nausea and vomiting. Genitourinary: Negative for dysuria and frequency. Musculoskeletal: Negative for arthralgias and back pain. Skin: Negative for rash and wound. Neurological: Negative for weakness and headaches. Hematological: Negative for adenopathy. All other systems reviewed and are negative. Physical Exam  Vitals and nursing note reviewed. Constitutional:       Appearance: She is well-developed. She is obese. HENT:      Head: Normocephalic and atraumatic. Eyes:      Pupils: Pupils are equal, round, and reactive to light. Cardiovascular:      Rate and Rhythm: Normal rate and regular rhythm. Pulmonary:      Effort: Pulmonary effort is normal. No respiratory distress. Breath sounds: Wheezing present. No rales. Abdominal:      General: Bowel sounds are normal.      Palpations: Abdomen is soft. Tenderness: There is no abdominal tenderness. There is no guarding or rebound. Musculoskeletal:      Cervical back: Normal range of motion and neck supple.    Skin:     General: Skin is warm and dry.   Neurological:      Mental Status: She is alert and oriented to person, place, and time. Cranial Nerves: No cranial nerve deficit. Coordination: Coordination normal.        EKG interpretation  Rate 104  Sinus rhythm  Left axis deviation  No RI, QRS, QT prolongation  No ST segment elevations or depressions  Q waves noted in lead III and aVF  Abnormal EKG    Procedures     MDM  Number of Diagnoses or Management Options  Diagnosis management comments: Emergency department evaluation was notable for persistent asthma exacerbation despite treatment in the emergency department on her second visit. For this reason the patient was considered at high risk for further respiratory decompensation and would benefit from close observation in the inpatient setting. This information was relayed to the patient who understood this plan of care and was amenable to the plan. Patient was discussed with the admitting service (Dr. Samy Booth) who concurred with the decision for admission, and have agreed to admit the patient to telemetry.           --------------------------------------------- PAST HISTORY ---------------------------------------------  Past Medical History:  has a past medical history of Acute renal injury ([facility] [address] Analgesic overuse headache, Anxiety, Arthritis, Asthma, Depression, Diabetes mellitus ([facility] [address] Fracture of tooth, GERD (gastroesophageal reflux disease), Glaucoma, Hyperlipidemia, Hypertension, Hypertensive urgency, Hypothyroidism, Irritable bowel syndrome, Obesity, Obstructive sleep apnea, Pneumonia, Polycystic ovarian syndrome, Postcholecystectomy syndrome, Spinal headache, and Suicidal ideation. Past Surgical History:  has a past surgical history that includes Tonsillectomy (); Cholecystectomy, laparoscopic ();  section (); Foot surgery (); Ovary removal (); Dental surgery (10/06/2011); bernadette and bso (cervix removed) (3/19/2013); hernia repair (9/3/13);  Hysterectomy; Echo Complete (1/14/2014); and Knee cartilage surgery. Social History:  reports that she has never smoked. She has never used smokeless tobacco. She reports that she does not drink alcohol and does not use drugs. Family History: family history includes Asthma in her brother, mother, and sister; Cancer in her father and paternal grandfather; Depression in her father, maternal grandmother, and paternal grandmother; Diabetes in her brother and mother; Heart Disease in her father and maternal grandfather; High Blood Pressure in her father, maternal grandmother, mother, and paternal grandmother; Mental Illness in her maternal grandmother; Thyroid Disease in her maternal grandmother and sister. The patients home medications have been reviewed.     Allergies: Percocet [oxycodone-acetaminophen], Metformin and related, and Tape [adhesive tape]    -------------------------------------------------- RESULTS -------------------------------------------------    LABS:  Results for orders placed or performed during the hospital encounter of 04/04/22   CBC with Auto Differential   Result Value Ref Range    WBC 9.2 4.5 - 11.5 E9/L    RBC 4.82 3.50 - 5.50 E12/L    Hemoglobin 14.4 11.5 - 15.5 g/dL    Hematocrit 42.3 34.0 - 48.0 %    MCV 87.8 80.0 - 99.9 fL    MCH 29.9 26.0 - 35.0 pg    MCHC 34.0 32.0 - 34.5 %    RDW 12.5 11.5 - 15.0 fL    Platelets 612 992 - 092 E9/L    MPV 11.0 7.0 - 12.0 fL    Neutrophils % 54.9 43.0 - 80.0 %    Immature Granulocytes % 0.4 0.0 - 5.0 %    Lymphocytes % 35.9 20.0 - 42.0 %    Monocytes % 6.8 2.0 - 12.0 %    Eosinophils % 1.6 0.0 - 6.0 %    Basophils % 0.4 0.0 - 2.0 %    Neutrophils Absolute 5.04 1.80 - 7.30 E9/L    Immature Granulocytes # 0.04 E9/L    Lymphocytes Absolute 3.30 1.50 - 4.00 E9/L    Monocytes Absolute 0.63 0.10 - 0.95 E9/L    Eosinophils Absolute 0.15 0.05 - 0.50 E9/L    Basophils Absolute 0.04 0.00 - 0.20 E9/L   Comprehensive Metabolic Panel   Result Value Ref Range    Sodium 131 (L) 132 - 146 mmol/L    Potassium 3.6 3.5 - 5.0 mmol/L    Chloride 95 (L) 98 - 107 mmol/L    CO2 27 22 - 29 mmol/L    Anion Gap 9 7 - 16 mmol/L    Glucose 325 (H) 74 - 99 mg/dL    BUN 9 6 - 20 mg/dL    CREATININE 0.7 0.5 - 1.0 mg/dL    GFR Non-African American >60 >=60 mL/min/1.73    GFR African American >60     Calcium 8.8 8.6 - 10.2 mg/dL    Total Protein 7.1 6.4 - 8.3 g/dL    Albumin 3.6 3.5 - 5.2 g/dL    Total Bilirubin 0.4 0.0 - 1.2 mg/dL    Alkaline Phosphatase 133 (H) 35 - 104 U/L    ALT 26 0 - 32 U/L    AST 17 0 - 31 U/L   Troponin   Result Value Ref Range    Troponin, High Sensitivity 14 (H) 0 - 9 ng/L   Brain Natriuretic Peptide   Result Value Ref Range    Pro-BNP 16 0 - 125 pg/mL   Troponin   Result Value Ref Range    Troponin, High Sensitivity 13 (H) 0 - 9 ng/L       RADIOLOGY:  XR CHEST PORTABLE   Final Result   Prominence of the cardiac silhouette. No new cardiopulmonary pathology. ----------------------- NURSING NOTES AND VITALS REVIEWED ---------------------------  Date / Time Roomed:  4/4/2022 10:19 PM  ED Bed Assignment:  18A/18A-18    The nursing notes within the ED encounter and vital signs as below have been reviewed. Patient Vitals for the past 24 hrs:   BP Temp Temp src Pulse Resp SpO2 Height Weight   04/05/22 0054 (!) 195/126 97.9 °F (36.6 °C) -- 109 20 95 % -- --   04/04/22 2232 (!) 214/115 98.1 °F (36.7 °C) Oral 110 30 94 % 5' 6\" (1.676 m) (!) 345 lb (156.5 kg)       Oxygen Saturation Interpretation: Abnormal and Improved after treatment    ------------------------------------------ PROGRESS NOTES ------------------------------------------  Re-evaluation(s):  Time: 1:30 AM EDT  Patients symptoms show no change  Repeat physical examination is not changed    Counseling:  I have spoken with the patient and discussed todays results, in addition to providing specific details for the plan of care and counseling regarding the diagnosis and prognosis.   Their questions are answered at this time and they are agreeable with the plan of admission.    --------------------------------- ADDITIONAL PROVIDER NOTES ---------------------------------  Consultations:  Time: 1:30 AM EDT. Spoke with Dr. Vinod Gilliland. Discussed case. They will admit the patient. This patient's ED course included: a personal history and physicial examination, re-evaluation prior to disposition, multiple bedside re-evaluations and IV medications    This patient has remained hemodynamically stable during their ED course. Diagnosis:  1. Moderate persistent asthma with exacerbation        Disposition:  Patient's disposition: Admit to telemetry  Patient's condition is fair. Carl Whyte DO  Resident  04/05/22 8902  ATTENDING PROVIDER ATTESTATION:     I have personally performed and/or participated in the history, exam, medical decision making, and procedures and agree with all pertinent clinical information. I have also reviewed and agree with the past medical, family and social history unless otherwise noted. I have discussed this patient in detail with the resident, and provided the instruction and education regarding sob. My findings/Plan: I was the primary provider for patient. Patient presenting here because of shortness of breath. Patient reports ongoing issue for the past week she was seen at outlying facility treated and released. She had a CT of her chest at that time. Patient is diabetic as well as hypertensive. Patient was medicated prior to arrival she was given steroids as well as breathing treatments. Patient was also given nitro for her complaints of chest pain but also because she was hypertensive. Patient arrived here she was in mild to moderate distress. She had wheezes bilaterally. Patient does report feeling feverish at home. Patient reports temperature over 100 at home. Patient reporting no leg pain no swelling she reports no abdominal pain or vomiting.   Patient had Covid testing done at outlying facility. Patient heart exam tachycardic patient abdomen is soft nontender. Patient neurologically intact labs and EKG noted reviewed as well as chest x-ray I did review her CT of her chest that was done several days ago. Patient medicated here with breathing treatments as well as magnesium. Patient does report some improvement she still mildly tachycardic patient still has expiratory wheezes on exam.  Patient did not have a fever here in the emergency department. Patient made aware of findings and plan. Plan will be to admit to monitored bed we did speak to on-call internal medicine.   Patient will be admitted to monitored bed         Abby Robb MD  04/05/22 [id number]       Abby Robb MD  04/05/22 [id number]

## 2022-04-05 NOTE — PROGRESS NOTES
Perfect served Dr Marianne Boogie re: blood sugar 480, pt non-compliant with diet, ordering out [employer] and family bringing in drinks.

## 2022-04-05 NOTE — PLAN OF CARE
Problem: Skin Integrity:  Goal: Will show no infection signs and symptoms  Description: Will show no infection signs and symptoms  Outcome: Met This Shift  Goal: Absence of new skin breakdown  Description: Absence of new skin breakdown  Outcome: Met This Shift     Problem: Breathing Pattern - Ineffective:  Goal: Ability to achieve and maintain a regular respiratory rate will improve  Description: Ability to achieve and maintain a regular respiratory rate will improve  Outcome: Met This Shift

## 2022-04-05 NOTE — H&P
Hospitalist History & Physical      PCP: Nasir Briones MD    Date of Admission: 4/4/2022    Date of Service: Pt seen/examined on 4/4/2022  Placed in Observation. Chief Complaint:  had concerns including Shortness of Breath ((going on for a 1 week, states she has been sick with a \"virus\" and has not gotten any better) EMS gave 2 breathing tx, 125mg solumedrol, 1 nitro. ). History Of Present Illness:    Ms. Asif Thorne, a 56y.o. year old female  who  has a past medical history of Acute renal injury ([address] Analgesic overuse headache, Anxiety, Arthritis, Asthma, Depression, Diabetes mellitus ([address] Fracture of tooth, GERD (gastroesophageal reflux disease), Glaucoma, Hyperlipidemia, Hypertension, Hypertensive urgency, Hypothyroidism, Irritable bowel syndrome, Obesity, Obstructive sleep apnea, Pneumonia, Polycystic ovarian syndrome, Postcholecystectomy syndrome, Spinal headache, and Suicidal ideation. This is a 67-year-old black female who was admitted through the emergency room because of asthma exacerbation. Patient has been outpatient clinic with similar presentations, she received DuoNeb and steroids. Covid and flu were negative. She continued to have shortness of breath and wheezing associated with dry cough.   She denies fever or chills, no hematuria dysuria hematemesis or hematochezia  At the time of examination patient is not requiring oxygen, she does not appear to be in distress      Past Medical History:        Diagnosis Date    Acute renal injury ([address] 9/4/2013    Analgesic overuse headache 12/19/2018    Anxiety     Arthritis     Asthma     Depression 3/19/2013    Diabetes mellitus ([address]     A1C=6.7 on 1/14/14    Fracture of tooth 12/19/2018    GERD (gastroesophageal reflux disease)     Glaucoma     Hyperlipidemia 12/8/2021    Hypertension     Hypertensive urgency 3/19/2013    Hypothyroidism     Irritable bowel syndrome     Obesity     Obstructive sleep apnea     Pneumonia     Polycystic ovarian syndrome     Postcholecystectomy syndrome 2018    Spinal headache     Suicidal ideation 3/18/2016       Past Surgical History:        Procedure Laterality Date     SECTION      Dr. Isis Rivera, [address], LAPAR[place]COPIC      [facility] DENTAL SURGERY  10/06/2011    4 extractions    ECHO COMPLETE  2014         FOOT SURGERY      RECONSTRUCTION LEFT FOOT, Dr. Robert Urbina, [address]  9/3/13    incisional hernia repair with mesh    HYSTERECTOMY      KNEE CARTILAGE SURGERY      OVARY REMOVAL      left due to polycystic ovary, Dr. Faraz Melchor, [facility]    BRYANT AND BSO  3/19/2013    Attempted Lucille Sis BRYANT;RSO, Dr. Arely Coombs, [address] [facility] [address]  [address]       Medications Prior to Admission:      Prior to Admission medications    Medication Sig Start Date End Date Taking?  Authorizing Provider   amLODIPine (NORVASC) 10 MG tablet Take 10 mg by mouth daily   Yes Historical Provider, MD   Dulaglutide (TRULICITY) 1.5 AJ/3.7RF SOPN Inject 1.5 mg into the skin once a week Given Tuesday   Yes Historical Provider, MD   fluconazole (DIFLUCAN) 150 MG tablet Take 150 mg by mouth every 72 hours as needed   Yes Historical Provider, MD   guaiFENesin (MUCINEX) 600 MG extended release tablet Take 1,200 mg by mouth 2 times daily 3/29/22 4/12/22 Yes Historical Provider, MD   mometasone (ELOCON) 0.1 % cream Apply topically daily as needed (apply to both hands and under breasts)   Yes Historical Provider, MD   mupirocin (BACTROBAN) 2 % ointment Apply topically 3 times daily as needed (apply to both hands and under breasts)   Yes Historical Provider, MD   triamcinolone (ARISTOCORT) 0.5 % cream Apply topically 3 times daily as needed (apply to both hands and under breasts)   Yes Historical Provider, MD   aspirin EC 81 MG EC tablet Take 1 tablet by mouth daily 22   Lalita Barrera MD   atorvastatin (LIPITOR) 20 MG tablet Take 1 tablet by mouth daily 4/1/22   Brayden Bagley MD   lisinopril (PRINIVIL;ZESTRIL) 40 MG tablet Take 1 tablet by mouth daily 4/1/22   Brayden Bagley MD   albuterol sulfate HFA (VENTOLIN HFA) 108 (90 Base) MCG/ACT inhaler Inhale 2 puffs into the lungs 4 times daily as needed for Wheezing 3/29/22   Clorinda Row, DO   hydrALAZINE (APRESOLINE) 25 MG tablet Take 1 tablet by mouth every 8 hours 7/15/21   Berdie Class, DO   pantoprazole (PROTONIX) 40 MG tablet Take 1 tablet by mouth daily for 10 days 7/10/21 7/10/21  Teodoro Thompson, DO   ondansetron (ZOFRAN-ODT) 4 MG disintegrating tablet Take 1 tablet by mouth 3 times daily as needed for Nausea or Vomiting 2/17/21   CRYSTAL Degroot       Allergies:  Percocet [oxycodone-acetaminophen], Metformin and related, and Tape [adhesive tape]    Social History:    TOBACCO:   reports that she has never smoked. She has never used smokeless tobacco.  ETOH:   reports no history of alcohol use. Family History:    Reviewed in detail and negative for DM, CAD, Cancer, CVA. Positive as follows\"      Problem Relation Age of Onset    Asthma Mother     Diabetes Mother     High Blood Pressure Mother     High Blood Pressure Father     Heart Disease Father     Cancer Father     Depression Father     Diabetes Brother     Asthma Brother     Thyroid Disease Sister     Asthma Sister     Depression Maternal Grandmother     High Blood Pressure Maternal Grandmother     Mental Illness Maternal Grandmother     Thyroid Disease Maternal Grandmother     Heart Disease Maternal Grandfather     Depression Paternal Grandmother     High Blood Pressure Paternal Grandmother     Cancer Paternal Grandfather        REVIEW OF SYSTEMS:   Pertinent positives as noted in the HPI. All other systems reviewed and negative.   Shortness of breath, no abdominal pain, no nausea or vomiting  PHYSICAL EXAM:  BP (!) 198/119   Pulse 103   Temp 98.1 °F (36.7 °C)   Resp 18   Ht 5' 6\" (1.676 m)   Wt (!) 345 lb (156.5 kg)   LMP 10/01/2012 (Approximate) Comment: 2013  SpO2 95%   BMI 55.68 kg/m²   General appearance: No apparent distress, appears stated age and cooperative. HEENT: Normal cephalic, atraumatic without obvious deformity. Pupils equal, round, and reactive to light. Extra ocular muscles intact. Conjunctivae/corneas clear. Neck: Supple, with full range of motion. No jugular venous distention. Trachea midline. Respiratory: Decreased breath sounds, few wheezing  Cardiovascular: S1-S2 normal, no rubs gallops or murmurs  Abdomen: Soft obese nontender nondistended positive bowel sounds   musculoskeletal: No clubbing, cyanosis  Skin: Normal skin color. No rashes or lesions. Neurologic:  Neurovascularly intact without any focal sensory/motor deficits. Cranial nerves: II-XII intact, grossly non-focal.  Psychiatric: Alert and oriented, thought content appropriate, normal insight    Reviewed EKG and CXR personally    CBC:   Recent Labs     04/04/22  2253   WBC 9.2   RBC 4.82   HGB 14.4   HCT 42.3   MCV 87.8   RDW 12.5        BMP:   Recent Labs     04/04/22  2253   *   K 3.6   CL 95*   CO2 27   BUN 9   CREATININE 0.7     LFT:  Recent Labs     04/04/22  2253   PROT 7.1   ALKPHOS 133*   ALT 26   AST 17   BILITOT 0.4     CE:  No results for input(s): Georgette Breeze in the last 72 hours. PT/INR: No results for input(s): INR, APTT in the last 72 hours. BNP: No results for input(s): BNP in the last 72 hours.   ESR:   Lab Results   Component Value Date    SEDRATE 42 (H) 06/11/2013     CRP: No results found for: CRP  D Dimer:   Lab Results   Component Value Date    DDIMER 243 07/14/2021      Folate and B12: No results found for: Paulene Leak, No results found for: FOLATE  Lactic Acid:   Lab Results   Component Value Date    LACTA 1.9 01/13/2022     Thyroid Studies:   Lab Results   Component Value Date    TSH 3.480 07/15/2021    S6YTAWX 7.5 04/13/2016       Oupatient labs:  Lab Results   Component Value Date    CHOL 213 (H) 04/01/2022    TRIG 101 04/01/2022    HDL 72 04/01/2022    LDLCALC 121 (H) 04/01/2022    TSH 3.480 07/15/2021    INR 1.1 07/15/2021    LABA1C 8.1 04/01/2022       Urinalysis:    Lab Results   Component Value Date    NITRU Negative 02/17/2021    WBCUA NONE 02/17/2021    BACTERIA NONE SEEN 02/17/2021    RBCUA NONE 02/17/2021    RBCUA 0-1 01/09/2014    BLOODU Negative 02/17/2021    SPECGRAV 1.020 02/17/2021    GLUCOSEU Negative 02/17/2021       Imaging:  XR CHEST PORTABLE    Result Date: 4/4/2022  EXAMINATION: ONE XRAY VIEW OF THE CHEST 4/4/2022 11:23 pm COMPARISON: Chest series from July 14, 2021 HISTORY: ORDERING SYSTEM PROVIDED HISTORY: chest pain sob TECHNOLOGIST PROVIDED HISTORY: Reason for exam:->chest pain sob What reading provider will be dictating this exam?->CRC FINDINGS: Adequate and symmetric aeration of the lungs. There are no formed consolidations, pleural effusions, or pneumothoraces. Trachea and central mainstem bronchi appear clear. Cardiac silhouette is prominent. The remaining cardiomediastinal silhouette and pulmonary vascularity appear within normal limits. Osseous and thoracic soft tissue structures demonstrate no acute findings. Prominence of the cardiac silhouette. No new cardiopulmonary pathology. XR CHEST PORTABLE    Result Date: 3/29/2022  EXAMINATION: ONE XRAY VIEW OF THE CHEST 3/29/2022 5:04 am COMPARISON: 07/14/2021 HISTORY: ORDERING SYSTEM PROVIDED HISTORY: sinus congestion TECHNOLOGIST PROVIDED HISTORY: Reason for exam:->sinus congestion FINDINGS: The cardiomediastinal silhouette is stable. The lungs are clear. No pneumothorax or pleural effusion. No acute cardiopulmonary abnormality. CTA PULMONARY W CONTRAST    Result Date: 3/31/2022  EXAMINATION: CTA OF THE CHEST 3/31/2022 9:48 pm TECHNIQUE: CTA of the chest was performed after the administration of intravenous contrast.  Multiplanar reformatted images are provided for review.   MIP images are provided for review. Dose modulation, iterative reconstruction, and/or weight based adjustment of the mA/kV was utilized to reduce the radiation dose to as low as reasonably achievable. COMPARISON: CTA chest 06/28/2021 and chest x-ray 03/29/2022 HISTORY: ORDERING SYSTEM PROVIDED HISTORY: sob TECHNOLOGIST PROVIDED HISTORY: Reason for exam:->sob Decision Support Exception - unselect if not a suspected or confirmed emergency medical condition->Emergency Medical Condition (MA) FINDINGS: Pulmonary Arteries: Study is limited by obesity. Pulmonary arteries are adequately opacified for evaluation. No evidence of intraluminal filling defect to suggest pulmonary embolism. Main pulmonary artery is normal in caliber. Mediastinum: No evidence of mediastinal lymphadenopathy. The heart is mildly enlarged with left ventricular prominence. The heart and pericardium demonstrate no acute abnormality. There is no acute abnormality of the thoracic aorta. Lungs/pleura: The lungs are without acute process. No focal consolidation or pulmonary edema. No evidence of pleural effusion or pneumothorax. Upper Abdomen: Diffuse fatty infiltration of the liver. The gallbladder is surgically absent. Soft Tissues/Bones: No acute bone or soft tissue abnormality. No evidence of pulmonary embolism or acute pulmonary abnormality. Study was technically limited by obesity. RECOMMENDATIONS: Unavailable       ASSESSMENT:    Principal Problem:    Acute asthma exacerbation  Resolved Problems:    * No resolved hospital problems. *    Assessment plan:  1. Mild asthma exacerbation: Patient has been treated as an outpatient and failed therapy, she has been treated with bronchodilators and oral steroids. Start IV steroids, oxygen bronchodilators and empirically on antibiotics with doxycycline. 2.  Essential hypertension: Not well controlled, resume outpatient medications Norvasc lisinopril and hydralazine. Adjust antihypertensives accordingly.   Check cardiac enzymes and D-dimer. 3.  Beatties type II with hyperglycemia: Placed on insulin sliding scale. 4.  Migraine with exacerbation: As needed ibuprofen    5. Morbid obesity: BMI 55.6    6. GERD without esophagitis: Continue PPI    Disposition: Home in 2 to 3 days, pending clinical progression    Diet: No diet orders on file  Code Status: Prior    Surrogate decision maker confirmed with patient:  Primary Emergency Contact: GorgeCherrie, Home Phone: [phone]    DVT Prophylaxis: []Lovenox []Heparin []PCD [] [address] []Encouraged ambulation    Disposition: []Med/Surg [] Intermediate [] ICU/CCU  Admit status: [] Observation [] Inpatient     +++++++++++++++++++++++++++++++++++++++++++++++++  Ricardo Jones MD  [address]  +++++++++++++++++++++++++++++++++++++++++++++++++  NOTE: This report was transcribed using voice recognition software. Every effort was made to ensure accuracy; however, inadvertent computerized transcription errors may be present.

## 2022-04-05 NOTE — CARE COORDINATION
Spoke with patient. She is from home, lives with her son. She is independent, ambulating in the room with no assistance. She works at [employer] there. No neb, no oxygen at home. She plans to return home with no needs when released. She sees internal medicine physician at [facility]. For questions I can be reached at [phone].  [facility]

## 2022-04-05 NOTE — PROGRESS NOTES
Pt refuses diet as ordered with diabetic/carb restrictions, states she eats what she wants and her PCP is happy with her hemA1c of 8. States she will order food outside of hospital. Dr. Richi Mullen notified.

## 2022-04-05 NOTE — PROGRESS NOTES
Pt c/o migraine. States she is going to take her excedrine migraine she has in her purse at the bedside. I asked the pt not to take home or OTC meds. Perfect served Dr. Edwin Torres with pt's c/o migraine and request prn pain med.

## 2022-04-06 PROBLEM — J06.9 VIRAL URI: Status: ACTIVE | Noted: 2022-04-06

## 2022-04-06 LAB
ACINETOBACTER CALCOAC BAUMANNII COMPLEX BY PCR: NOT DETECTED
ALBUMIN SERPL-MCNC: 3.8 G/DL (ref 3.5–5.2)
ALP BLD-CCNC: 123 U/L (ref 35–104)
ALT SERPL-CCNC: 20 U/L (ref 0–32)
ANION GAP SERPL CALCULATED.3IONS-SCNC: 10 MMOL/L (ref 7–16)
AST SERPL-CCNC: 9 U/L (ref 0–31)
BACTEROIDES FRAGILIS BY PCR: NOT DETECTED
BASOPHILS ABSOLUTE: 0.02 E9/L (ref 0–0.2)
BASOPHILS RELATIVE PERCENT: 0.1 % (ref 0–2)
BILIRUB SERPL-MCNC: 0.4 MG/DL (ref 0–1.2)
BOTTLE TYPE: ABNORMAL
BUN BLDV-MCNC: 22 MG/DL (ref 6–20)
CALCIUM SERPL-MCNC: 9.6 MG/DL (ref 8.6–10.2)
CANDIDA ALBICANS BY PCR: NOT DETECTED
CANDIDA AURIS BY PCR: NOT DETECTED
CANDIDA GLABRATA BY PCR: NOT DETECTED
CANDIDA KRUSEI BY PCR: NOT DETECTED
CANDIDA PARAPSILOSIS BY PCR: NOT DETECTED
CANDIDA TROPICALIS BY PCR: NOT DETECTED
CHLORIDE BLD-SCNC: 96 MMOL/L (ref 98–107)
CO2: 26 MMOL/L (ref 22–29)
CREAT SERPL-MCNC: 0.7 MG/DL (ref 0.5–1)
CRYPTOCOCCUS NEOFORMANS/GATTII BY PCR: NOT DETECTED
ENTEROBACTER CLOACAE COMPLEX BY PCR: NOT DETECTED
ENTEROBACTERALES BY PCR: NOT DETECTED
ENTEROCOCCUS FAECALIS BY PCR: NOT DETECTED
ENTEROCOCCUS FAECIUM BY PCR: NOT DETECTED
EOSINOPHILS ABSOLUTE: 0 E9/L (ref 0.05–0.5)
EOSINOPHILS RELATIVE PERCENT: 0 % (ref 0–6)
ESCHERICHIA COLI BY PCR: NOT DETECTED
GFR AFRICAN AMERICAN: >60
GFR NON-AFRICAN AMERICAN: >60 ML/MIN/1.73
GLUCOSE BLD-MCNC: 432 MG/DL (ref 74–99)
HAEMOPHILUS INFLUENZAE BY PCR: NOT DETECTED
HCT VFR BLD CALC: 41.5 % (ref 34–48)
HEMOGLOBIN: 14.2 G/DL (ref 11.5–15.5)
IMMATURE GRANULOCYTES #: 0.31 E9/L
IMMATURE GRANULOCYTES %: 2 % (ref 0–5)
KLEBSIELLA AEROGENES BY PCR: NOT DETECTED
KLEBSIELLA OXYTOCA BY PCR: NOT DETECTED
KLEBSIELLA PNEUMONIAE GROUP BY PCR: NOT DETECTED
LISTERIA MONOCYTOGENES BY PCR: NOT DETECTED
LYMPHOCYTES ABSOLUTE: 1.63 E9/L (ref 1.5–4)
LYMPHOCYTES RELATIVE PERCENT: 10.5 % (ref 20–42)
MCH RBC QN AUTO: 30.5 PG (ref 26–35)
MCHC RBC AUTO-ENTMCNC: 34.2 % (ref 32–34.5)
MCV RBC AUTO: 89.2 FL (ref 80–99.9)
METER GLUCOSE: 377 MG/DL (ref 74–99)
METER GLUCOSE: 438 MG/DL (ref 74–99)
METER GLUCOSE: 447 MG/DL (ref 74–99)
METER GLUCOSE: 472 MG/DL (ref 74–99)
METHICILLIN RESISTANCE MECA/C  BY PCR: NOT DETECTED
MONOCYTES ABSOLUTE: 0.59 E9/L (ref 0.1–0.95)
MONOCYTES RELATIVE PERCENT: 3.8 % (ref 2–12)
NEISSERIA MENINGITIDIS BY PCR: NOT DETECTED
NEUTROPHILS ABSOLUTE: 13.01 E9/L (ref 1.8–7.3)
NEUTROPHILS RELATIVE PERCENT: 83.6 % (ref 43–80)
ORDER NUMBER: ABNORMAL
PDW BLD-RTO: 13 FL (ref 11.5–15)
PLATELET # BLD: 295 E9/L (ref 130–450)
PMV BLD AUTO: 11.2 FL (ref 7–12)
POTASSIUM REFLEX MAGNESIUM: 4.3 MMOL/L (ref 3.5–5)
PROTEUS SPECIES BY PCR: NOT DETECTED
PSEUDOMONAS AERUGINOSA BY PCR: NOT DETECTED
RBC # BLD: 4.65 E12/L (ref 3.5–5.5)
REASON FOR REJECTION: NORMAL
REJECTED TEST: NORMAL
SALMONELLA SPECIES BY PCR: NOT DETECTED
SERRATIA MARCESCENS BY PCR: NOT DETECTED
SODIUM BLD-SCNC: 132 MMOL/L (ref 132–146)
SOURCE OF BLOOD CULTURE: ABNORMAL
STAPHYLOCOCCUS AUREUS BY PCR: NOT DETECTED
STAPHYLOCOCCUS EPIDERMIDIS BY PCR: DETECTED
STAPHYLOCOCCUS LUGDUNENSIS BY PCR: NOT DETECTED
STAPHYLOCOCCUS SPECIES BY PCR: DETECTED
STENOTROPHOMONAS MALTOPHILIA BY PCR: NOT DETECTED
STREPTOCOCCUS AGALACTIAE BY PCR: NOT DETECTED
STREPTOCOCCUS PNEUMONIAE BY PCR: NOT DETECTED
STREPTOCOCCUS PYOGENES  BY PCR: NOT DETECTED
STREPTOCOCCUS SPECIES BY PCR: NOT DETECTED
TOTAL PROTEIN: 6.7 G/DL (ref 6.4–8.3)
WBC # BLD: 15.6 E9/L (ref 4.5–11.5)

## 2022-04-06 PROCEDURE — 6370000000 HC RX 637 (ALT 250 FOR IP): Performed by: INTERNAL MEDICINE

## 2022-04-06 PROCEDURE — 80053 COMPREHEN METABOLIC PANEL: CPT

## 2022-04-06 PROCEDURE — APPSS180 APP SPLIT SHARED TIME > 60 MINUTES: Performed by: NURSE PRACTITIONER

## 2022-04-06 PROCEDURE — 82962 GLUCOSE BLOOD TEST: CPT

## 2022-04-06 PROCEDURE — 6360000002 HC RX W HCPCS: Performed by: INTERNAL MEDICINE

## 2022-04-06 PROCEDURE — 87040 BLOOD CULTURE FOR BACTERIA: CPT

## 2022-04-06 PROCEDURE — 1200000000 HC SEMI PRIVATE

## 2022-04-06 PROCEDURE — S5553 INSULIN LONG ACTING 5 U: HCPCS | Performed by: FAMILY MEDICINE

## 2022-04-06 PROCEDURE — 36415 COLL VENOUS BLD VENIPUNCTURE: CPT

## 2022-04-06 PROCEDURE — 2500000003 HC RX 250 WO HCPCS: Performed by: INTERNAL MEDICINE

## 2022-04-06 PROCEDURE — 6370000000 HC RX 637 (ALT 250 FOR IP): Performed by: FAMILY MEDICINE

## 2022-04-06 PROCEDURE — 85025 COMPLETE CBC W/AUTO DIFF WBC: CPT

## 2022-04-06 PROCEDURE — 99255 IP/OBS CONSLTJ NEW/EST HI 80: CPT | Performed by: INTERNAL MEDICINE

## 2022-04-06 PROCEDURE — 2580000003 HC RX 258: Performed by: INTERNAL MEDICINE

## 2022-04-06 RX ORDER — NIFEDIPINE 30 MG/1
30 TABLET, EXTENDED RELEASE ORAL 2 TIMES DAILY
Status: DISCONTINUED | OUTPATIENT
Start: 2022-04-06 | End: 2022-04-06

## 2022-04-06 RX ORDER — FLUCONAZOLE 100 MG/1
200 TABLET ORAL DAILY
Status: DISCONTINUED | OUTPATIENT
Start: 2022-04-06 | End: 2022-04-06

## 2022-04-06 RX ORDER — DILTIAZEM HYDROCHLORIDE 180 MG/1
360 CAPSULE, COATED, EXTENDED RELEASE ORAL DAILY
Status: DISCONTINUED | OUTPATIENT
Start: 2022-04-06 | End: 2022-04-13 | Stop reason: HOSPADM

## 2022-04-06 RX ORDER — METOPROLOL TARTRATE 5 MG/5ML
5 INJECTION INTRAVENOUS ONCE
Status: COMPLETED | OUTPATIENT
Start: 2022-04-06 | End: 2022-04-06

## 2022-04-06 RX ORDER — HYDRALAZINE HYDROCHLORIDE 50 MG/1
50 TABLET, FILM COATED ORAL EVERY 8 HOURS SCHEDULED
Status: DISCONTINUED | OUTPATIENT
Start: 2022-04-06 | End: 2022-04-10

## 2022-04-06 RX ADMIN — INSULIN LISPRO 18 UNITS: 100 INJECTION, SOLUTION INTRAVENOUS; SUBCUTANEOUS at 11:33

## 2022-04-06 RX ADMIN — METOPROLOL TARTRATE 25 MG: 25 TABLET, FILM COATED ORAL at 11:34

## 2022-04-06 RX ADMIN — HYDRALAZINE HYDROCHLORIDE 50 MG: 50 TABLET, FILM COATED ORAL at 13:42

## 2022-04-06 RX ADMIN — METOPROLOL TARTRATE 5 MG: 1 INJECTION, SOLUTION INTRAVENOUS at 11:11

## 2022-04-06 RX ADMIN — ENOXAPARIN SODIUM 40 MG: 100 INJECTION SUBCUTANEOUS at 08:58

## 2022-04-06 RX ADMIN — INSULIN GLARGINE-YFGN 39 UNITS: 100 INJECTION, SOLUTION SUBCUTANEOUS at 20:44

## 2022-04-06 RX ADMIN — LISINOPRIL 40 MG: 20 TABLET ORAL at 08:58

## 2022-04-06 RX ADMIN — SODIUM CHLORIDE, PRESERVATIVE FREE 10 ML: 5 INJECTION INTRAVENOUS at 08:58

## 2022-04-06 RX ADMIN — HYDRALAZINE HYDROCHLORIDE 25 MG: 25 TABLET, FILM COATED ORAL at 06:00

## 2022-04-06 RX ADMIN — ATORVASTATIN CALCIUM 20 MG: 20 TABLET, FILM COATED ORAL at 08:58

## 2022-04-06 RX ADMIN — METHYLPREDNISOLONE SODIUM SUCCINATE 40 MG: 40 INJECTION, POWDER, FOR SOLUTION INTRAMUSCULAR; INTRAVENOUS at 03:06

## 2022-04-06 RX ADMIN — INSULIN LISPRO 9 UNITS: 100 INJECTION, SOLUTION INTRAVENOUS; SUBCUTANEOUS at 20:43

## 2022-04-06 RX ADMIN — ASPIRIN 81 MG: 81 TABLET, COATED ORAL at 08:58

## 2022-04-06 RX ADMIN — INSULIN LISPRO 18 UNITS: 100 INJECTION, SOLUTION INTRAVENOUS; SUBCUTANEOUS at 17:51

## 2022-04-06 RX ADMIN — HYDRALAZINE HYDROCHLORIDE 50 MG: 50 TABLET, FILM COATED ORAL at 20:44

## 2022-04-06 RX ADMIN — DOXYCYCLINE 100 MG: 100 INJECTION, POWDER, LYOPHILIZED, FOR SOLUTION INTRAVENOUS at 03:02

## 2022-04-06 RX ADMIN — INSULIN LISPRO 15 UNITS: 100 INJECTION, SOLUTION INTRAVENOUS; SUBCUTANEOUS at 06:49

## 2022-04-06 RX ADMIN — METHYLPREDNISOLONE SODIUM SUCCINATE 40 MG: 40 INJECTION, POWDER, FOR SOLUTION INTRAMUSCULAR; INTRAVENOUS at 17:45

## 2022-04-06 RX ADMIN — DILTIAZEM HYDROCHLORIDE 360 MG: 180 CAPSULE, EXTENDED RELEASE ORAL at 17:45

## 2022-04-06 RX ADMIN — METHYLPREDNISOLONE SODIUM SUCCINATE 40 MG: 40 INJECTION, POWDER, FOR SOLUTION INTRAMUSCULAR; INTRAVENOUS at 11:10

## 2022-04-06 RX ADMIN — AMLODIPINE BESYLATE 10 MG: 10 TABLET ORAL at 08:58

## 2022-04-06 RX ADMIN — FLUCONAZOLE 200 MG: 100 TABLET ORAL at 09:00

## 2022-04-06 ASSESSMENT — ENCOUNTER SYMPTOMS
VOMITING: 0
NAUSEA: 0
TROUBLE SWALLOWING: 0
COUGH: 1
WHEEZING: 1
TROUBLE SWALLOWING: 0
VOMITING: 0
ABDOMINAL PAIN: 0
SORE THROAT: 0
NAUSEA: 0
SHORTNESS OF BREATH: 1
ABDOMINAL PAIN: 0
SHORTNESS OF BREATH: 1
CONSTIPATION: 0
DIARRHEA: 0
COUGH: 1
WHEEZING: 1
SORE THROAT: 1
CONSTIPATION: 0
DIARRHEA: 0

## 2022-04-06 ASSESSMENT — PAIN SCALES - GENERAL
PAINLEVEL_OUTOF10: 0
PAINLEVEL_OUTOF10: 0

## 2022-04-06 NOTE — CONSULTS
Patient seen and examined. Chart, labs, imaging studies, EKG and rhythm strips reviewed. Full consult to follow. We were asked to see the patient for \"Atrial Fibrillation\". IMPRESSION:  1. Rhythm strip reviewed, there is no Atrial Fibrillation. 2. Asthma exacerbation  3. Chronic non cardiac CP with history of multiple stresses and TTEs last stress in 2016 was nonischemic, C in 2014 with minimal disease [facility]). Last TTE in 2019 unremarkable (EF 60%). No ischemic EKG changes and negative troponin. 4. HTN  5. HLD  6. T2DM insulin requiring HgbA1c 7.0 in 3/2/2021  7. Morbid Obesity BMI 55.6  8. ALO unable to tolerate C-pap  9. BLE lymphedema (previously had been going to lymphedema clinic) stopped approximately 2 years ago  10. Anxiety/Depression/panic attacks  11. Hx hypothyroidism previously on synthroid TSH 3.4 on 07/15/2021  12. PCOS  13. Hx cervical carcinoma s/p BRYANT/BSO in 2013         PLAN:   1. Discontinue Lopressor  2. Discontinue therapeutic dose Lovenox  3. Change Procardia to Cardizem CD   4. Increase Hydralazine   5. Continue Lisinopril   6. Monitor BP   7. No further cardiac work up needed. 8. Cardiology will sign off.     Electronically signed by Monserrat Dozier MD on 4/6/2022 at 1:02 PM

## 2022-04-06 NOTE — PROGRESS NOTES
Dr. Suzette Bee MD,    Your patient is on a medication that requires a renal and/or weight dose adjustment. Renal/Body Weight Function Assessment:    Date Body Weight IBW  Adjusted BW SCr  CrCl Dialysis status   4/6/2022 (!) 345 lb (156.5 kg)   Ideal body weight: 59.3 kg (130 lb 11.7 oz)  Adjusted ideal body weight: 98.2 kg (216 lb 7 oz) Serum creatinine: 0.7 mg/dL 04/06/22 0426  Estimated creatinine clearance: 156 mL/min N/a       Pharmacy has dose-adjusted the following medication(s):    Date Previous Order Adjusted Order   4/6/2022 Lovenox 40mg daily Lovenox 40mg BID       These changes were made per protocol according to the [facility] Renal Dosing Policy/ [facility] Pharmacist Anticoagulant Review. *Please note this dose may need readjusted if your patient's condition changes. Please contact pharmacy with any questions regarding these changes.     Clyde Mcclure [facility]  4/6/2022  11:13 AM

## 2022-04-06 NOTE — PROGRESS NOTES
Pharmacy Consultation Note  (Antibiotic Dosing and Monitoring)    Initial consult date: 4/6  Consulting physician/provider: Dr. Marlene Price  Drug: Vancomycin  Indication: Empiric    Age/  Gender Height Weight IBW  Allergy Information   47 y.o./female 5' 6\" (167.6 cm) (!) 345 lb (156.5 kg)     Ideal body weight: 59.3 kg (130 lb 11.7 oz)  Adjusted ideal body weight: 98.2 kg (216 lb 7 oz)   Percocet [oxycodone-acetaminophen], Metformin and related, and Tape [adhesive tape]      Renal Function:  Recent Labs     04/04/22  2253 04/06/22  0426   BUN 9 22*   CREATININE 0.7 0.7     No intake or output data in the 24 hours ending 04/06/22 0909      Date  SCr/CrCl       or HD Drug/Dose Time   Given Level(s)   (Time)   4/6   0.7 Vanco 1250 mg IV q12h <1000>                           Assessment:  · Patient is a 56 y.o. female who has been initiated on vancomycin  · Estimated Creatinine Clearance: 156 mL/min (based on SCr of 0.7 mg/dL).   · To dose vancomycin, pharmacy will be utilizing ITT EXIM calculation software for goal AUC/RAUDEL 400-600 mg/L-hr    Plan:  · Will continue vancomycin 1250 IV every 12 hours  · Will check vancomycin levels when appropriate  · Will continue to monitor renal function   · Clinical pharmacy to follow      Gerald Duran [facility] 4/6/2022 9:09 AM

## 2022-04-06 NOTE — PLAN OF CARE
Problem: Skin Integrity:  Goal: Will show no infection signs and symptoms  Description: Will show no infection signs and symptoms  Outcome: Met This Shift  Goal: Absence of new skin breakdown  Description: Absence of new skin breakdown  Outcome: Met This Shift     Problem: Breathing Pattern - Ineffective:  Goal: Ability to achieve and maintain a regular respiratory rate will improve  Description: Ability to achieve and maintain a regular respiratory rate will improve  Outcome: Met This Shift     Problem: Falls - Risk of:  Goal: Will remain free from falls  Description: Will remain free from falls  Outcome: Met This Shift  Goal: Absence of physical injury  Description: Absence of physical injury  Outcome: Met This Shift

## 2022-04-06 NOTE — CONSULTS
[facility] CONSULT NOTE    Reason for Consult: Staphylococcus epidermidis on blood culture   Requested by: Vero Terrell MD    Chief complaint: Shortness of breath    History Obtained From: Patient and EMR     HISTORY OFPRESENT ILLNESS              The patient is a 56 y.o. super morbidly obese female with history of CAD, DM, hypertension, hyperlipidemia, presented on 04/04 with shortness of breath for 1 week. On admission, she was afebrile, tolerating room air well, and hemodynamically stable with no leukocytosis. CXR showed prominent cardiac silhouette and no cardiolpulmonary pathology. Prior chest CTA on 03/31 was unremarkable. Blood cultures showed Gram-positive cocci in clusters (methicillin-susceptible Staphylococcus epidermidis by PCR). Doxycycline, fluconazole, steroids, vancomycin were started on 04/06. ID service was subsequently consulted for further recommendations.     Past Medical History  Past Medical History:   Diagnosis Date    Acute renal injury ([facility] [address] 9/4/2013    Analgesic overuse headache 12/19/2018    Anxiety     Arthritis     Asthma     CAD (coronary artery disease)     Depression 3/19/2013    Diabetes mellitus ([facility] [address]     A1C=6.7 on 1/14/14    Fracture of tooth 12/19/2018    GERD (gastroesophageal reflux disease)     Glaucoma     Hyperlipidemia 12/8/2021    Hypertension     Hypertensive urgency 3/19/2013    Hypothyroidism     Irritable bowel syndrome     Obesity     Obstructive sleep apnea     Pneumonia     Polycystic ovarian syndrome     Postcholecystectomy syndrome 12/19/2018    Spinal headache     Suicidal ideation 3/18/2016       Current Facility-Administered Medications   Medication Dose Route Frequency Provider Last Rate Last Admin    fluconazole (DIFLUCAN) tablet 200 mg  200 mg Oral Daily Vero Terrell MD        NIFEdipine (PROCARDIA XL) extended release tablet 30 mg  30 mg Oral BID Vero Terrell MD        metoprolol tartrate (LOPRESSOR) tablet 25 mg  25 mg Oral BID Radha Gómez MD        aspirin EC tablet 81 mg  81 mg Oral Daily Radha Gómez MD   81 mg at 04/06/22 0858    atorvastatin (LIPITOR) tablet 20 mg  20 mg Oral Daily Radha Gómez MD   20 mg at 04/06/22 8851    hydrALAZINE (APRESOLINE) tablet 25 mg  25 mg Oral 3 times per day Radha Gómez MD   25 mg at 04/06/22 0600    lisinopril (PRINIVIL;ZESTRIL) tablet 40 mg  40 mg Oral Daily Radha Gómez MD   40 mg at 04/06/22 0858    sodium chloride flush 0.9 % injection 5-40 mL  5-40 mL IntraVENous 2 times per day Radha Gómez MD   10 mL at 04/06/22 0858    sodium chloride flush 0.9 % injection 5-40 mL  5-40 mL IntraVENous PRN Radha Gómez MD        0.9 % sodium chloride infusion   IntraVENous PRN Radha Gómez MD        enoxaparin (LOVENOX) injection 40 mg  40 mg SubCUTAneous Daily Radha Gómez MD   40 mg at 04/06/22 0858    ondansetron (ZOFRAN-ODT) disintegrating tablet 4 mg  4 mg Oral Q8H PRN Radha Gómez MD        Or    ondansetron [facility]) injection 4 mg  4 mg IntraVENous Q6H PRN Radha Gómez MD        polyethylene glycol (GLYCOLAX) packet 17 g  17 g Oral Daily PRN Radha Gómez MD        ipratropium-albuterol (DUONEB) nebulizer solution 1 ampule  1 ampule Inhalation Q4H PRN Radha Gómez MD        methylPREDNISolone sodium (SOLU-MEDROL) injection 40 mg  40 mg IntraVENous Q8H Radha Gómez MD   40 mg at 04/06/22 0306    ibuprofen (ADVIL;MOTRIN) tablet 400 mg  400 mg Oral Q6H PRN Radha Gómez MD   400 mg at 04/05/22 1312    doxycycline (VIBRAMYCIN) 100 mg in dextrose 5 % 100 mL IVPB  100 mg IntraVENous Q12H Radha Gómez MD   Stopped at 04/06/22 0353    hydrALAZINE (APRESOLINE) injection 10 mg  10 mg IntraVENous Q6H PRN Radha Gómez MD   10 mg at 04/05/22 1638    glucose (GLUTOSE) 40 % oral gel 15 g  15 g Oral PRN Adonica Claribel, DO        dextrose 50 % IV solution  12.5 g IntraVENous PRN Adonica Claribel, DO        glucagon (rDNA) injection 1 mg  1 mg IntraMUSCular PRN Adonica Claribel, DO       [facility] Pressure Mother     High Blood Pressure Father     Heart Disease Father     Cancer Father     Depression Father     Diabetes Brother     Asthma Brother     Thyroid Disease Sister     Asthma Sister     Depression Maternal Grandmother     High Blood Pressure Maternal Grandmother     Mental Illness Maternal Grandmother     Thyroid Disease Maternal Grandmother     Heart Disease Maternal Grandfather     Depression Paternal Grandmother     High Blood Pressure Paternal Grandmother     Cancer Paternal Grandfather        Review of Systems:  Constitutional: Had fever, no chills  Eyes: No vision changes, no retroorbital pain  ENT: No hearing changes, no ear pain  Respiratory: Had cough, had dyspnea  Cardiovascular: No chest pain, no palpitations  Gastrointestinal: No abdominal pain, no diarrhea  Genitourinary: No dysuria, no hematuria  Integumentary: No rash, no itching  Musculoskeletal: No muscle pain, no joint pain  Neurologic: No headache, no numbness in extremities    Physical Examination:  Vitals:    04/05/22 1745 04/05/22 1930 04/06/22 0545 04/06/22 0845   BP: (!) 140/80 (!) 154/79 124/70 (!) 207/169   Pulse:  107  105   Resp:  22  20   Temp:  97.5 °F (36.4 °C)  97.7 °F (36.5 °C)   TempSrc:  Temporal     SpO2:  92%  95%   Weight:       Height:         Constitutional: Alert, not in distress  Eyes: Sclerae anicteric, no conjunctival erythema  ENT: No buccal lesion, no pharyngeal exudates  Neck: No nuchal rigidity, no cervical adenopathy  Lungs: Clear breath sounds, no crackles, no wheezes  Heart: Regular rate and rhythm, no murmurs  Abdomen: Bowel sounds present, soft, nontender  Skin: Warm and dry, no active dermatoses  Musculoskeletal: No joint erythema, no joint swelling    Labs, imaging, and medical records/notes were personally reviewed.       Assessment:  Gram-positive cocci in clusters (methicillin-susceptible Staphylococcus epidermidis by PCR) on blood culture, likely contaminant  Leukocytosis,

## 2022-04-06 NOTE — CARE COORDINATION
Plan is home with son when medically ready. Per ID note today, Gram-positive cocci in clusters (methicillin-susceptible Staphylococcus epidermidis by PCR) on blood culture, likely contaminant Leukocytosis, likely medication-induced, on steroids. Plan: Stop vancomycin, doxycycline, fluconazole. Follow up blood cultures. Per cardiology note today, Prominence of the cardiac silhouette.  No new cardiopulmonary pathology. C/Sw will continue to follow.   Amarilis Huang RN CM  [phone]

## 2022-04-06 NOTE — CONSULTS
Inpatient Cardiology Consultation      Reason for Consult:  Atrial Fibrillation     Consulting Physician: Dr. Yevgeniy Beyer    Requesting Physician:  Dr. Freida Huffman    Date of Consultation: 4/6/2022    HISTORY OF PRESENT ILLNESS:   Ms. Nancy Sellers is a 56year old  female who was most recently seen in consultation with Dr. Yevgeniy Beyer on 07/15/2021. Her medical history includes HTN, T2DM insulin requiring, morbid obesity, ALO non compliant with C-pap, GERD, anxiety, recurrent admission of atypical CP with [facility] in 2014 (minimal disease) most recent stress 2016 (non-ischemic with EF 70%), PCOS, and lifelong non smoker. Ms. Nancy Sellers presented to [facility] ED on 04/04/2022 with complaints of dyspnea. She states that prior to presentation over the course of the past week she was having worsening MORALES with head congestion which she further describes as coughing up dark green sputum and sneezing. She states she also was febrile with a temperature of 101 without chills. She states that since the death of her  01/12/2022 she has been having intermittent continuous midsternal chest pain that lasts several days in duration and is unchanged with exertion. She describes her chest pain as a tightness and states over the course of the past week her chest hurts \" when I take a deep breath\". She states that she has chronic orthopnea and PND that is unchanged and also has ALO and is noncompliant with CPAP. EMS was summoned and she did receive a nebulizer treatment in route as well as Solu-Medrol 125 mg and 1 SL NTG. Upon arrival to the ED her VS were oral temperature 98.2-992-/115-94% on RA. EKG ST. CXR unremarkable. WBC 9.2. H&H 14.4/42. 3. .  K3.6.  BUN/SCR 9/0.7. Troponin of 14.  proBNP 16.  She did receive DuoNeb, magnesium sulfate 2 g, hydralazine 20 mg IV, Tigan. She was admitted to a telemetry monitored unit. ID was consulted and an echocardiogram was ordered.   Cardiology was consulted for management of \" atrial fibrillation\" is reportedly telemetry monitoring reflected atrial fibrillation however, on further review by Dr. Qiana Rodas and SR was noted. Please note: past medical records were reviewed per electronic medical record (EMR) - see detailed reports under Past Medical/ Surgical History. Past Medical and Surgical History:    1. Lifelong non smoker  2. Morbid obesity.  BMI 58 - 2016. MI 57.2 on 7/15/2021  3. PCOS  4. Spinal headache. 5. Hx Hypothyroidism previously on synthroid. TSH 2.860 on 2021  6. Hypertension. 7. T2DM insulin requiring HgbA1c 7.0 on 3/2/2021  8. GERD. 9. Anxiety/Depression. Hx suicidal ideations  10. ALO non compliant with C-pap  11. Hysterectomy (BRYANT/RSO) 2013. s/p  LSO  12. Allergy to Percocet. 13. No prior history of CAD or PAD.    14. Echo, Dr. Katie Shaw, 2012.  Technically difficult study.   Normal wall motion, EF 70%.  No significant valvular heart disease.     15. Lexiscan MPS, 2012 (Dr. Katie Shaw).  Normal exam.  EF 64%.    16. Multiple ER visits, 2013 for chest pain.    CBC, BMP, troponin negative.  CTA chest revealed motion artifact, no PE.  No dissection.  Fatty infiltration of liver.    17.  I, Para I with an uncomplicated pregnancy in about , subsequent hysterectomy in . 25. Tonsillectomy in , lap-cholecystectomy ,  , foot surgery , herniorrhaphy 2013.    [facility]. [facility] chest pain. 20. Cardiac cath, right femoral approach, Dr. Hieu Gregg, [facility] [address] 2014.  Minimal CAD.  Normal LV.  AngioSeal was used to close the site. 21. ER visit, [facility] for right groin pain following her cardiac cath.  Lower extremity arterial Doppler, 2014 revealed a hematoma with no pseudoaneurysm and triphasic waveforms distally. 22. Echo, 2014.  EF 70%.  Stage I diastolic dysfunction.  Otherwise normal study. 23. Presentation [facility], 2015 with atypical chest pain.  EKG normal.  Cardiac biomarkers negative.  No further cardiac workup performed. 24. Presentation [facility], 07/23/2016 with palpitations and chest pain.  Enzymes negative.  EKG no acute changes.    25. Stress perfusion, 07/23/2016: No perfusion defects.  EF 70%. 26. 2/2019 TTE Dr Rojelio Martinez: EF 60%. Stage I DD. No VHD. 27. 6/2021 Teeth extractions  28. 6/22/2021 [facility]-ED CP (burning)/Panic attack. Troponin 10-->9, p-BNP 35. CXR no CHF or infiltrates. /103. Received GI Cocktail. Vistaril and Ativan--> discharged to home with script for Protonix. 29. 6/28/2021 Seen At [facility] ED for CP pressure sensation worsened with exertion (AC not working in [place]) which made CP worse. Troponin 13 x 2-->16. /88. CTA Pulmonary negative for PE. Discharged home with script for ibuprofen. 30. [facility] ED 7/10/2021 LUQ and CP intermittent last 2 1/2 weeks. Troponin 11-->11. /90. Received GI cocktail with improvement. Discharged to home with script for Carafate.          Medications Prior to admit:  Prior to Admission medications    Medication Sig Start Date End Date Taking?  Authorizing Provider   amLODIPine (NORVASC) 10 MG tablet Take 10 mg by mouth daily   Yes Historical Provider, MD   Dulaglutide (TRULICITY) 1.5 PW/9.1NX SOPN Inject 1.5 mg into the skin once a week Given Tuesday   Yes Historical Provider, MD   fluconazole (DIFLUCAN) 150 MG tablet Take 150 mg by mouth every 72 hours as needed   Yes Historical Provider, MD   guaiFENesin (MUCINEX) 600 MG extended release tablet Take 1,200 mg by mouth 2 times daily 3/29/22 4/12/22 Yes Historical Provider, MD   mometasone (ELOCON) 0.1 % cream Apply topically daily as needed (apply to both hands and under breasts)   Yes Historical Provider, MD   mupirocin (BACTROBAN) 2 % ointment Apply topically 3 times daily as needed (apply to both hands and under breasts)   Yes Historical Provider, MD   triamcinolone (ARISTOCORT) 0.5 % cream Apply topically 3 times daily as needed (apply to both hands and under breasts)   Yes Historical Provider, MD   aspirin EC 81 MG EC tablet Take 1 tablet by mouth daily 4/1/22   Fredi Aase, MD   atorvastatin (LIPITOR) 20 MG tablet Take 1 tablet by mouth daily 4/1/22   Fredi Aase, MD   lisinopril (PRINIVIL;ZESTRIL) 40 MG tablet Take 1 tablet by mouth daily 4/1/22   Fredi Aase, MD   albuterol sulfate HFA (VENTOLIN HFA) 108 (90 Base) MCG/ACT inhaler Inhale 2 puffs into the lungs 4 times daily as needed for Wheezing 3/29/22   Scout Nathan,    hydrALAZINE (APRESOLINE) 25 MG tablet Take 1 tablet by mouth every 8 hours 7/15/21   Alban Hashimoto, DO   pantoprazole (PROTONIX) 40 MG tablet Take 1 tablet by mouth daily for 10 days 7/10/21 7/10/21  Larisa Singh,    ondansetron (ZOFRAN-ODT) 4 MG disintegrating tablet Take 1 tablet by mouth 3 times daily as needed for Nausea or Vomiting 2/17/21   CRYSTAL Quintero       Current Medications:    Current Facility-Administered Medications: NIFEdipine (PROCARDIA XL) extended release tablet 30 mg, 30 mg, Oral, BID  metoprolol tartrate (LOPRESSOR) tablet 25 mg, 25 mg, Oral, BID  enoxaparin (LOVENOX) injection 40 mg, 40 mg, SubCUTAneous, BID  aspirin EC tablet 81 mg, 81 mg, Oral, Daily  atorvastatin (LIPITOR) tablet 20 mg, 20 mg, Oral, Daily  hydrALAZINE (APRESOLINE) tablet 25 mg, 25 mg, Oral, 3 times per day  lisinopril (PRINIVIL;ZESTRIL) tablet 40 mg, 40 mg, Oral, Daily  sodium chloride flush 0.9 % injection 5-40 mL, 5-40 mL, IntraVENous, 2 times per day  sodium chloride flush 0.9 % injection 5-40 mL, 5-40 mL, IntraVENous, PRN  0.9 % sodium chloride infusion, , IntraVENous, PRN  ondansetron (ZOFRAN-ODT) disintegrating tablet 4 mg, 4 mg, Oral, Q8H PRN **OR** ondansetron (ZOFRAN) injection 4 mg, 4 mg, IntraVENous, Q6H PRN  polyethylene glycol (GLYCOLAX) packet 17 g, 17 g, Oral, Daily PRN  ipratropium-albuterol (DUONEB) nebulizer solution 1 ampule, 1 ampule, Inhalation, Q4H PRN  methylPREDNISolone sodium (SOLU-MEDROL) injection 40 mg, 40 mg, IntraVENous, Q8H  ibuprofen (ADVIL;MOTRIN) tablet 400 mg, 400 mg, Oral, Q6H PRN  hydrALAZINE (APRESOLINE) injection 10 mg, 10 mg, IntraVENous, Q6H PRN  glucose (GLUTOSE) 40 % oral gel 15 g, 15 g, Oral, PRN  dextrose 50 % IV solution, 12.5 g, IntraVENous, PRN  glucagon (rDNA) injection 1 mg, 1 mg, IntraMUSCular, PRN  dextrose 5 % solution, 100 mL/hr, IntraVENous, PRN  insulin glargine-yfgn (SEMGLEE-YFGN) injection vial 39 Units, 0.25 Units/kg, SubCUTAneous, Nightly  insulin lispro (HUMALOG) injection vial 0-18 Units, 0-18 Units, SubCUTAneous, TID WC  insulin lispro (HUMALOG) injection vial 0-9 Units, 0-9 Units, SubCUTAneous, Nightly    Allergies:  Percocet [oxycodone-acetaminophen], Metformin and related, and Tape [adhesive tape]    Social History:    Denies tobacco, alcohol, illicit drug use. Caffeine intake includes 2-3 large cups of iced coffee from [employer] a day. 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  She states that since the death of her  01/2022 she sleeps 2 hours a night. Family History: Mother with initial MI at age 55. Father with initial MI at age 58. Paternal grandfather with cardiac arrest at time of his death at age 61. Son with history of cardiac murmur at birth that has since    REVIEW OF SYSTEMS:     · Constitutional: Denies fevers, chills, night sweats, and fatigue  · HEENT: Denies nose bleeds, and blurred vision,oral pain, abscess or lesion. As of migraine headaches  · Musculoskeletal: Denies falls, pain to BLE with ambulation and edema to BLE. · Neurological: Denies dizziness and lightheadedness, numbness and tingling  · Cardiovascular: Complains of chest pain can see HPI. Complains of intermittent palpitations, and feelings of heart racing. · Respiratory: Planes of MORALES. Complains of unchanged orthopnea and PND  · Gastrointestinal: Denies heartburn, nausea/vomiting, diarrhea and constipation, black/bloody, and tarry stools.    · Genitourinary: Denies dysuria and hematuria  · Hematologic: Denies excessive bruising or bleeding  · Lymphatic: Denies lumps and bumps to neck, axilla, breast, and groin planes of lymphedema that is chronic to BLE  · Endocrine: Denies excessive thirst. Denies intolerance to hot and cold  · GYN: Postmenopausal state; Denies vaginal bleeding./P hysterectomy  · Psychiatric: Complains of anxiety and depression secondary to the recent untimely death of her . PHYSICAL EXAM:   BP (!) 207/169   Pulse 105   Temp 97.7 °F (36.5 °C)   Resp 20   Ht 5' 6\" (1.676 m)   Wt (!) 345 lb (156.5 kg)   LMP 10/01/2012 (Approximate) Comment: 2013  SpO2 95%   BMI 55.68 kg/m²   CONST:  Well developed, morbidly obese middle-aged  female who appears stated age. Awake, alert, cooperative, no apparent distress  HEENT:   Head- Normocephalic, atraumatic   Eyes- Conjunctivae pink, anicteric  Throat- Oral mucosa pink and moist  Neck-  No stridor, trachea midline, no jugular venous distention. No adenopathy. Short, thick neck  CHEST: Chest symmetrical and non-tender to palpation. No accessory muscle use or intercostal retractions  RESPIRATORY: Lung sounds - clear throughout fields, diminished bilateral bases  CARDIOVASCULAR:     No carotid bruit  Heart Inspection- shows no noted pulsations  Heart Palpation- no heaves or thrills; PMI is non-displaced   Heart Ausculation- Regular rate and rhythm, no murmur. No s3, s4 or rub   PV: Lymphedema noted to bilateral lower extremity edema. No varicosities. Pedal pulses palpable, no clubbing or cyanosis   ABDOMEN: Soft, obese, non-tender to light palpation. Bowel sounds present. No palpable masses no organomegaly; no abdominal bruit  MS: Good muscle strength and tone. No atrophy or abnormal movements. : Deferred  SKIN: Warm and dry no statis dermatitis or ulcers   NEURO / PSYCH: Oriented to person, place and time. Speech clear and appropriate. Follows all commands.  Pleasant affect     DATA:    ECG: As above  Tele strips: As above  Diagnostic:  Labs:   CBC:   Recent Labs     04/04/22 2253 04/06/22 0426   WBC 9.2 15.6*   HGB 14.4 14.2   HCT 42.3 41.5    295     BMP:   Recent Labs     04/04/22 2253 04/06/22 0426   * 132   K 3.6 4.3   CO2 27 26   BUN 9 22*   CREATININE 0.7 0.7   LABGLOM >60 >60   CALCIUM 8.8 9.6   HgA1c:   Lab Results   Component Value Date    LABA1C 8.1 04/01/2022   FASTING LIPID PANEL:  Lab Results   Component Value Date    CHOL 213 04/01/2022    HDL 72 04/01/2022    LDLCALC 121 04/01/2022    TRIG 101 04/01/2022     LIVER PROFILE:  Recent Labs     04/04/22 2253 04/06/22 0426   AST 17 9   ALT 26 20   LABALBU 3.6 3.8     Results for Kiley Manning (MRN [id number]) as of 4/6/2022 11:54   Ref. Range 4/4/2022 22:53 4/5/2022 00:45 4/5/2022 14:31 4/5/2022 17:36   Troponin, High Sensitivity Latest Ref Range: 0 - 9 ng/L 14 (H) 13 (H) 14 (H) 17 (H)     04/04/2022 CXR:   Prominence of the cardiac silhouette.  No new cardiopulmonary pathology. IMPRESSION and PLAN to follow per Dr. Luz Mayes    Electronically signed by GEOVANI Mg CNP on 4/6/2022 at 11:50 AM       I personally and independently saw and examined patient and reviewed all done pertinent laboratory data, imaging studies, ECGs and rhythm strips. I have reviewed and agree with the APN history and physical exam as documented in the above note. Electronically signed by Cathryn Polanco MD on 4/6/2022 at 1:57 PM     We were asked to see the patient for \"Atrial Fibrillation\".      IMPRESSION:  1. Rhythm strip reviewed, there is no Atrial Fibrillation. 2. Asthma exacerbation  3. Chronic non cardiac CP with history of multiple stresses and TTEs last stress in 2016 was nonischemic, C in 2014 with minimal disease [facility] Last TTE in 2019 unremarkable (EF 60%). No ischemic EKG changes and negative troponin.   4. HTN  5. HLD  6. T2DM insulin requiring HgbA1c 7.0 in 3/2/2021  7. Morbid Obesity BMI 55.6  8. ALO unable to tolerate C-pap  9.  BLE lymphedema (previously had been going to lymphedema clinic) stopped approximately 2 years ago  8. Anxiety/Depression/panic attacks  11. Hx hypothyroidism previously on synthroid TSH 3.4 on 07/15/2021  12. PCOS  13. Hx cervical carcinoma s/p BRYANT/BSO in 2013           PLAN:   1. Discontinue Lopressor  2. Discontinue therapeutic dose Lovenox  3. Change Procardia to Cardizem CD   4. Increase Hydralazine   5. Continue Lisinopril   6. Monitor BP   7. No further cardiac work up needed.    8. Cardiology will sign off.     Electronically signed by Paul Jarvis MD on 4/6/2022 at 1:02 PM

## 2022-04-06 NOTE — PROGRESS NOTES
Hospitalist Progress Note      SYNOPSIS: Patient admitted on 2022 for Acute asthma exacerbation      SUBJECTIVE:    Patient seen and examined  Records reviewed. Feels better today, shortness of breath has improved. She is complaining of cough. No nausea no vomiting no dizziness. No chest pain. Stable overnight. No other overnight issues reported. Temp (24hrs), Av.6 °F (36.4 °C), Min:97.5 °F (36.4 °C), Max:97.7 °F (36.5 °C)    DIET: ADULT DIET; Regular; 5 carb choices (75 gm/meal)  CODE: Full Code  No intake or output data in the 24 hours ending 22 1003    OBJECTIVE:    BP (!) 207/169   Pulse 105   Temp 97.7 °F (36.5 °C)   Resp 20   Ht 5' 6\" (1.676 m)   Wt (!) 345 lb (156.5 kg)   LMP 10/01/2012 (Approximate) Comment:   SpO2 95%   BMI 55.68 kg/m²     General appearance: No apparent distress, appears stated age and cooperative. HEENT:  Conjunctivae/corneas clear. Neck: Supple. Respiratory: Decreased breath sounds, no wheezing  Cardiovascular: Regular rate rhythm, normal S1-S2  Abdomen: Soft, nontender, nondistended  Musculoskeletal: No clubbing, cyanosis, no bilateral lower extremity edema. Skin:  No rashes  on visible skin  Neurologic: awake, alert and following commands     Assessment and plan:  1. Mild asthma exacerbation: Patient has been treated as an outpatient and failed therapy, continue IV steroids, oxygen bronchodilators and empirically on antibiotics with doxycycline. Gradual improvement. 2.  Essential hypertension: Not well controlled, resume outpatient medications on lisinopril and hydralazine, discontinue Norvasc, start Procardia and metoprolol.     3.  Beatties type II with hyperglycemia: Placed on insulin sliding scale.     4. Migraine with exacerbation: As needed ibuprofen     5. Morbid obesity: BMI 55.6     6.   GERD without esophagitis: Continue PPI     7.  Staph bacteremia: Likely a contaminant, started vancomycin and infectious disease consultation. 8.  Paroxysmal atrial fibrillation: Likely new onset, not on anticoagulation, start metoprolol, obtain 2D echo and cardiology consultation. Disposition: Home in 1-2 days, pending clinical progression    Medications:  REVIEWED DAILY    Infusion Medications    sodium chloride      dextrose       Scheduled Medications    fluconazole  200 mg Oral Daily    vancomycin  1,250 mg IntraVENous Q12H    amLODIPine  10 mg Oral Daily    aspirin EC  81 mg Oral Daily    atorvastatin  20 mg Oral Daily    hydrALAZINE  25 mg Oral 3 times per day    lisinopril  40 mg Oral Daily    sodium chloride flush  5-40 mL IntraVENous 2 times per day    enoxaparin  40 mg SubCUTAneous Daily    methylPREDNISolone  40 mg IntraVENous Q8H    doxycycline (VIBRAMYCIN) IV  100 mg IntraVENous Q12H    insulin glargine  0.25 Units/kg SubCUTAneous Nightly    insulin lispro  0-18 Units SubCUTAneous TID WC    insulin lispro  0-9 Units SubCUTAneous Nightly     PRN Meds: sodium chloride flush, sodium chloride, ondansetron **OR** ondansetron, polyethylene glycol, ipratropium-albuterol, ibuprofen, hydrALAZINE, glucose, dextrose, glucagon (rDNA), dextrose    Labs:     Recent Labs     04/04/22 2253 04/06/22  0426   WBC 9.2 15.6*   HGB 14.4 14.2   HCT 42.3 41.5    295       Recent Labs     04/04/22 2253 04/06/22  0426   * 132   K 3.6 4.3   CL 95* 96*   CO2 27 26   BUN 9 22*   CREATININE 0.7 0.7   CALCIUM 8.8 9.6       Recent Labs     04/04/22 2253 04/06/22  0426   PROT 7.1 6.7   ALKPHOS 133* 123*   ALT 26 20   AST 17 9   BILITOT 0.4 0.4       No results for input(s): INR in the last 72 hours. No results for input(s): Rosy Grupo in the last 72 hours.     Chronic labs:    Lab Results   Component Value Date    CHOL 213 (H) 04/01/2022    TRIG 101 04/01/2022    HDL 72 04/01/2022    LDLCALC 121 (H) 04/01/2022    TSH 3.480 07/15/2021    INR 1.1 07/15/2021    LABA1C 8.1 04/01/2022       Radiology: REVIEWED DAILY    +++++++++++++++++++++++++++++++++++++++++++++++++  Dhara Fields MD  [address], [place]  +++++++++++++++++++++++++++++++++++++++++++++++++  NOTE: This report was transcribed using voice recognition software. Every effort was made to ensure accuracy; however, inadvertent computerized transcription errors may be present.

## 2022-04-07 ENCOUNTER — APPOINTMENT (OUTPATIENT)
Dept: GENERAL RADIOLOGY | Age: 47
DRG: 141 | End: 2022-04-07
Payer: MEDICAID

## 2022-04-07 LAB
ALBUMIN SERPL-MCNC: 3.9 G/DL (ref 3.5–5.2)
ALP BLD-CCNC: 118 U/L (ref 35–104)
ALT SERPL-CCNC: 17 U/L (ref 0–32)
ANION GAP SERPL CALCULATED.3IONS-SCNC: 11 MMOL/L (ref 7–16)
AST SERPL-CCNC: 9 U/L (ref 0–31)
BASOPHILS ABSOLUTE: 0.03 E9/L (ref 0–0.2)
BASOPHILS RELATIVE PERCENT: 0.2 % (ref 0–2)
BILIRUB SERPL-MCNC: 0.4 MG/DL (ref 0–1.2)
BUN BLDV-MCNC: 36 MG/DL (ref 6–20)
CALCIUM SERPL-MCNC: 9.5 MG/DL (ref 8.6–10.2)
CHLORIDE BLD-SCNC: 93 MMOL/L (ref 98–107)
CO2: 26 MMOL/L (ref 22–29)
CREAT SERPL-MCNC: 1 MG/DL (ref 0.5–1)
EOSINOPHILS ABSOLUTE: 0 E9/L (ref 0.05–0.5)
EOSINOPHILS RELATIVE PERCENT: 0 % (ref 0–6)
GFR AFRICAN AMERICAN: >60
GFR NON-AFRICAN AMERICAN: 60 ML/MIN/1.73
GLUCOSE BLD-MCNC: 426 MG/DL (ref 74–99)
GLUCOSE BLD-MCNC: 641 MG/DL (ref 74–99)
GLUCOSE BLD-MCNC: 675 MG/DL (ref 74–99)
HCT VFR BLD CALC: 41.1 % (ref 34–48)
HEMOGLOBIN: 13.8 G/DL (ref 11.5–15.5)
IMMATURE GRANULOCYTES #: 0.32 E9/L
IMMATURE GRANULOCYTES %: 2 % (ref 0–5)
LYMPHOCYTES ABSOLUTE: 1.91 E9/L (ref 1.5–4)
LYMPHOCYTES RELATIVE PERCENT: 11.7 % (ref 20–42)
MCH RBC QN AUTO: 30.3 PG (ref 26–35)
MCHC RBC AUTO-ENTMCNC: 33.6 % (ref 32–34.5)
MCV RBC AUTO: 90.1 FL (ref 80–99.9)
METER GLUCOSE: 382 MG/DL (ref 74–99)
METER GLUCOSE: 403 MG/DL (ref 74–99)
METER GLUCOSE: 473 MG/DL (ref 74–99)
METER GLUCOSE: 476 MG/DL (ref 74–99)
METER GLUCOSE: 498 MG/DL (ref 74–99)
METER GLUCOSE: >500 MG/DL (ref 74–99)
METER GLUCOSE: >500 MG/DL (ref 74–99)
MONOCYTES ABSOLUTE: 0.45 E9/L (ref 0.1–0.95)
MONOCYTES RELATIVE PERCENT: 2.8 % (ref 2–12)
NEUTROPHILS ABSOLUTE: 13.63 E9/L (ref 1.8–7.3)
NEUTROPHILS RELATIVE PERCENT: 83.3 % (ref 43–80)
PDW BLD-RTO: 13 FL (ref 11.5–15)
PLATELET # BLD: 317 E9/L (ref 130–450)
PMV BLD AUTO: 11.2 FL (ref 7–12)
POTASSIUM REFLEX MAGNESIUM: 4.6 MMOL/L (ref 3.5–5)
RBC # BLD: 4.56 E12/L (ref 3.5–5.5)
SODIUM BLD-SCNC: 130 MMOL/L (ref 132–146)
TOTAL PROTEIN: 7 G/DL (ref 6.4–8.3)
WBC # BLD: 16.3 E9/L (ref 4.5–11.5)

## 2022-04-07 PROCEDURE — 6370000000 HC RX 637 (ALT 250 FOR IP): Performed by: INTERNAL MEDICINE

## 2022-04-07 PROCEDURE — 82962 GLUCOSE BLOOD TEST: CPT

## 2022-04-07 PROCEDURE — 82947 ASSAY GLUCOSE BLOOD QUANT: CPT

## 2022-04-07 PROCEDURE — S5553 INSULIN LONG ACTING 5 U: HCPCS | Performed by: FAMILY MEDICINE

## 2022-04-07 PROCEDURE — 80053 COMPREHEN METABOLIC PANEL: CPT

## 2022-04-07 PROCEDURE — 6360000002 HC RX W HCPCS: Performed by: INTERNAL MEDICINE

## 2022-04-07 PROCEDURE — 6370000000 HC RX 637 (ALT 250 FOR IP): Performed by: FAMILY MEDICINE

## 2022-04-07 PROCEDURE — 71048 X-RAY EXAM CHEST 4+ VIEWS: CPT

## 2022-04-07 PROCEDURE — 36415 COLL VENOUS BLD VENIPUNCTURE: CPT

## 2022-04-07 PROCEDURE — 86738 MYCOPLASMA ANTIBODY: CPT

## 2022-04-07 PROCEDURE — 2580000003 HC RX 258: Performed by: INTERNAL MEDICINE

## 2022-04-07 PROCEDURE — 94640 AIRWAY INHALATION TREATMENT: CPT

## 2022-04-07 PROCEDURE — 85025 COMPLETE CBC W/AUTO DIFF WBC: CPT

## 2022-04-07 PROCEDURE — 6360000002 HC RX W HCPCS: Performed by: FAMILY MEDICINE

## 2022-04-07 PROCEDURE — 1200000000 HC SEMI PRIVATE

## 2022-04-07 RX ORDER — DOXYCYCLINE HYCLATE 100 MG/1
100 CAPSULE ORAL EVERY 12 HOURS SCHEDULED
Status: DISCONTINUED | OUTPATIENT
Start: 2022-04-07 | End: 2022-04-08

## 2022-04-07 RX ORDER — GUAIFENESIN 400 MG/1
400 TABLET ORAL 3 TIMES DAILY
Status: DISCONTINUED | OUTPATIENT
Start: 2022-04-07 | End: 2022-04-13 | Stop reason: HOSPADM

## 2022-04-07 RX ORDER — INSULIN GLARGINE-YFGN 100 [IU]/ML
10 INJECTION, SOLUTION SUBCUTANEOUS ONCE
Status: COMPLETED | OUTPATIENT
Start: 2022-04-07 | End: 2022-04-07

## 2022-04-07 RX ORDER — ALBUTEROL SULFATE 2.5 MG/3ML
2.5 SOLUTION RESPIRATORY (INHALATION) EVERY 6 HOURS
Status: DISCONTINUED | OUTPATIENT
Start: 2022-04-07 | End: 2022-04-13 | Stop reason: HOSPADM

## 2022-04-07 RX ORDER — BENZONATATE 100 MG/1
100 CAPSULE ORAL 3 TIMES DAILY PRN
Status: DISCONTINUED | OUTPATIENT
Start: 2022-04-07 | End: 2022-04-07

## 2022-04-07 RX ORDER — BENZONATATE 100 MG/1
200 CAPSULE ORAL 3 TIMES DAILY PRN
Status: DISCONTINUED | OUTPATIENT
Start: 2022-04-07 | End: 2022-04-13 | Stop reason: HOSPADM

## 2022-04-07 RX ADMIN — INSULIN LISPRO 18 UNITS: 100 INJECTION, SOLUTION INTRAVENOUS; SUBCUTANEOUS at 16:55

## 2022-04-07 RX ADMIN — ASPIRIN 81 MG: 81 TABLET, COATED ORAL at 09:31

## 2022-04-07 RX ADMIN — ALBUTEROL SULFATE 2.5 MG: 2.5 SOLUTION RESPIRATORY (INHALATION) at 19:39

## 2022-04-07 RX ADMIN — INSULIN LISPRO 17 UNITS: 100 INJECTION, SOLUTION INTRAVENOUS; SUBCUTANEOUS at 23:48

## 2022-04-07 RX ADMIN — SODIUM CHLORIDE, PRESERVATIVE FREE 10 ML: 5 INJECTION INTRAVENOUS at 21:44

## 2022-04-07 RX ADMIN — METHYLPREDNISOLONE SODIUM SUCCINATE 40 MG: 40 INJECTION, POWDER, FOR SOLUTION INTRAMUSCULAR; INTRAVENOUS at 03:30

## 2022-04-07 RX ADMIN — INSULIN GLARGINE-YFGN 10 UNITS: 100 INJECTION, SOLUTION SUBCUTANEOUS at 16:53

## 2022-04-07 RX ADMIN — ALBUTEROL SULFATE 2.5 MG: 2.5 SOLUTION RESPIRATORY (INHALATION) at 13:49

## 2022-04-07 RX ADMIN — HYDRALAZINE HYDROCHLORIDE 50 MG: 50 TABLET, FILM COATED ORAL at 05:55

## 2022-04-07 RX ADMIN — DILTIAZEM HYDROCHLORIDE 360 MG: 180 CAPSULE, EXTENDED RELEASE ORAL at 09:31

## 2022-04-07 RX ADMIN — METHYLPREDNISOLONE SODIUM SUCCINATE 40 MG: 40 INJECTION, POWDER, FOR SOLUTION INTRAMUSCULAR; INTRAVENOUS at 09:29

## 2022-04-07 RX ADMIN — ATORVASTATIN CALCIUM 20 MG: 20 TABLET, FILM COATED ORAL at 09:31

## 2022-04-07 RX ADMIN — GUAIFENESIN 400 MG: 400 TABLET ORAL at 21:44

## 2022-04-07 RX ADMIN — INSULIN GLARGINE-YFGN 39 UNITS: 100 INJECTION, SOLUTION SUBCUTANEOUS at 20:45

## 2022-04-07 RX ADMIN — HYDRALAZINE HYDROCHLORIDE 50 MG: 50 TABLET, FILM COATED ORAL at 22:57

## 2022-04-07 RX ADMIN — LISINOPRIL 40 MG: 20 TABLET ORAL at 09:31

## 2022-04-07 RX ADMIN — INSULIN LISPRO 9 UNITS: 100 INJECTION, SOLUTION INTRAVENOUS; SUBCUTANEOUS at 20:45

## 2022-04-07 RX ADMIN — METHYLPREDNISOLONE SODIUM SUCCINATE 40 MG: 40 INJECTION, POWDER, FOR SOLUTION INTRAMUSCULAR; INTRAVENOUS at 18:34

## 2022-04-07 RX ADMIN — HYDRALAZINE HYDROCHLORIDE 50 MG: 50 TABLET, FILM COATED ORAL at 14:26

## 2022-04-07 RX ADMIN — INSULIN LISPRO 15 UNITS: 100 INJECTION, SOLUTION INTRAVENOUS; SUBCUTANEOUS at 09:28

## 2022-04-07 RX ADMIN — SODIUM CHLORIDE, PRESERVATIVE FREE 10 ML: 5 INJECTION INTRAVENOUS at 09:00

## 2022-04-07 ASSESSMENT — PAIN SCALES - GENERAL: PAINLEVEL_OUTOF10: 7

## 2022-04-07 ASSESSMENT — PAIN DESCRIPTION - LOCATION: LOCATION: HEAD

## 2022-04-07 NOTE — PLAN OF CARE
Problem: Skin Integrity:  Goal: Will show no infection signs and symptoms  Description: Will show no infection signs and symptoms  4/6/2022 2122 by Alissa Mclean RN  Outcome: Met This Shift  4/6/2022 1258 by Irwin Pinto RN  Outcome: Met This Shift  Goal: Absence of new skin breakdown  Description: Absence of new skin breakdown  4/6/2022 2122 by Alissa Mclean RN  Outcome: Met This Shift  4/6/2022 1258 by Irwin Pinto RN  Outcome: Met This Shift     Problem: Breathing Pattern - Ineffective:  Goal: Ability to achieve and maintain a regular respiratory rate will improve  Description: Ability to achieve and maintain a regular respiratory rate will improve  4/6/2022 2122 by Alissa Mclean RN  Outcome: Met This Shift  4/6/2022 1258 by Irwin Pinto RN  Outcome: Met This Shift     Problem: Falls - Risk of:  Goal: Will remain free from falls  Description: Will remain free from falls  4/6/2022 2122 by Alissa Mclean RN  Outcome: Met This Shift  4/6/2022 1258 by Irwin Pinto RN  Outcome: Met This Shift  Goal: Absence of physical injury  Description: Absence of physical injury  4/6/2022 2122 by Alissa Mclean RN  Outcome: Met This Shift  4/6/2022 1258 by Irwin Pinto RN  Outcome: Met This Shift

## 2022-04-07 NOTE — CARE COORDINATION
Per ID note today, Gram-positive cocci in clusters (methicillin-susceptible Staphylococcus epidermidis by PCR) on blood culture, likely contaminant. Leukocytosis, likely medication-induced, on steroids. Still awaiting Follow up blood cultures. I met with patient in room to offer home health care. She declined. She did say she needs a script for a new Glucometer and strips. Internal med notified. She also said she will need a Dr. Release on day of discharge to return to work. Patient said her brother will bring her truck here for her to drive home at time of discharge.   David Jackson RN CM  [phone]

## 2022-04-07 NOTE — PROGRESS NOTES
[facility] PROGRESS NOTE      Chief complaint: Follow-up of Staphylococcus epidermidis on blood culture    The patient is a 56 y.o. super morbidly obese female with history of CAD, DM, hypertension, hyperlipidemia, presented on 04/04 with shortness of breath for 1 week. On admission, she was afebrile, tolerating room air well, and hemodynamically stable with no leukocytosis. CXR showed prominent cardiac silhouette and no cardiolpulmonary pathology. Prior chest CTA on 03/31 was unremarkable. Blood cultures showed Gram-positive cocci in clusters (methicillin-susceptible Staphylococcus epidermidis by PCR). Doxycycline, fluconazole, steroids, vancomycin were started on 04/06. Subjective: Patient was seen and examined. No chills, no abdominal pain, no diarrhea, no rash, no itching. She reprots increased shortness of breath from coughing. Objective:    Vitals:    04/07/22 0830   BP: (!) 161/86   Pulse: 84   Resp: 18   Temp: 98.2 °F (36.8 °C)   SpO2: 93%     Constitutional: Alert, not in distress  Respiratory: Clear breath sounds, no crackles, no wheezes  Cardiovascular: Regular rate and rhythm, no murmurs  Gastrointestinal: Bowel sounds present, soft, nontender  Skin: Warm and dry, no active dermatoses  Musculoskeletal: No joint swelling, no joint erythema    Labs, imaging, and medical records/notes were personally reviewed. Assessment:  Gram-positive cocci in clusters (methicillin-susceptible Staphylococcus epidermidis by PCR) on blood culture, likely contaminant  Leukocytosis, likely medication-induced, on steroids    Recommendations: Follow up blood cultures. OK for discharge from ID standpoint if repeat blood cultures from 04/06 showed no growth. Stop steroids as soon as no longer indicated. Continue supportive care. Case was discussed with Dr. Nereyda Torres.     Thank you for involving me in the care of Clemente Agustin. I will continue to follow.  Please do not hesitate to call for any questions or concerns.     Electronically signed by Juan David Elizondo MD on 4/7/2022 at 9:55 AM

## 2022-04-07 NOTE — PROGRESS NOTES
Hospitalist Progress Note      SYNOPSIS: Patient admitted on 2022 for Acute asthma exacerbation      SUBJECTIVE:    Patient seen and examined  Records reviewed. Patient examined today. She mentioned she is still wheezing a lot. Also has worse cough at night and she can take deep breaths that she feels very diminished. Discussed continue steroids and also will add breathing treatments. Stable overnight. No other overnight issues reported. Temp (24hrs), Av.3 °F (36.8 °C), Min:98.2 °F (36.8 °C), Max:98.4 °F (36.9 °C)    DIET: ADULT DIET; Regular; 5 carb choices (75 gm/meal)  CODE: Full Code    Intake/Output Summary (Last 24 hours) at 2022 1232  Last data filed at 2022 1256  Gross per 24 hour   Intake 0 ml   Output --   Net 0 ml       OBJECTIVE:    BP (!) 161/86   Pulse 84   Temp 98.2 °F (36.8 °C) (Oral)   Resp 18   Ht 5' 6\" (1.676 m)   Wt (!) 345 lb (156.5 kg)   LMP 10/01/2012 (Approximate) Comment: 2013  SpO2 93%   BMI 55.68 kg/m²     General appearance: No apparent distress, appears stated age and cooperative. HEENT:  Conjunctivae/corneas clear. Neck: Supple. No jugular venous distention. Respiratory: Very diminished tight and wheezing to auscultation bilaterally, normal respiratory effort  Cardiovascular: Regular rate rhythm, normal S1-S2  Abdomen: Soft, nontender, nondistended  Musculoskeletal: No clubbing, cyanosis, no bilateral lower extremity edema. Brisk capillary refill. Skin:  No rashes  on visible skin  Neurologic: awake, alert and following commands       Assessment and plan:  1.  Asthma exacerbation: Given lack of improvement at this time we will go ahead and Continue IV Solu-Medrol she is still wheezing and coarse with tightness on exam.  Doxycycline. Check respiratory panel atypical pneumonia labs. Chest x-ray. Encouraged him to spirometer. Tessalon and Mucinex.     2.  Essential hypertension: Blood pressure medication has been adjusted by cardiologyMetoprolol was discontinued with Cardizem CD added. Hydralazine dose increased. Continue lisinopril. 3.  Diabetes type II with hyperglycemia: Placed on insulin sliding scale and basal insulin. Resume home Trulicity.     4.  Migraine with exacerbation: Continue NSAID     5.  Morbid obesity: BMI 55.6     6.  GERD without esophagitis: Continue PPI     7.  Staph bacteremia: Likely a contaminant, antibiotics discontinued by infectious disease on discussion with ID no need for antibiotics. Also concerning blood cultures infectious disease would like to keep her in-house until repeat blood cultures report no growth.     8.  Paroxysmal atrial fibrillation: Ruled out by cardiology          DISPOSITION:  Home in 24hrs    Medications:  REVIEWED DAILY    Infusion Medications    sodium chloride      dextrose       Scheduled Medications    Dulaglutide  1.5 mg SubCUTAneous Once    albuterol  2.5 mg Nebulization Q6H    guaiFENesin  600 mg Oral BID    enoxaparin  40 mg SubCUTAneous BID    hydrALAZINE  50 mg Oral 3 times per day    dilTIAZem  360 mg Oral Daily    aspirin EC  81 mg Oral Daily    atorvastatin  20 mg Oral Daily    lisinopril  40 mg Oral Daily    sodium chloride flush  5-40 mL IntraVENous 2 times per day    methylPREDNISolone  40 mg IntraVENous Q8H    insulin glargine  0.25 Units/kg SubCUTAneous Nightly    insulin lispro  0-18 Units SubCUTAneous TID WC    insulin lispro  0-9 Units SubCUTAneous Nightly     PRN Meds: benzonatate, benzocaine, sodium chloride flush, sodium chloride, ondansetron **OR** ondansetron, polyethylene glycol, ipratropium-albuterol, ibuprofen, hydrALAZINE, glucose, dextrose, glucagon (rDNA), dextrose    Labs:     Recent Labs     04/04/22 2253 04/06/22  0426 04/07/22  0510   WBC 9.2 15.6* 16.3*   HGB 14.4 14.2 13.8   HCT 42.3 41.5 41.1    295 317       Recent Labs     04/04/22 2253 04/06/22  0426 04/07/22  0510   * 132 130*   K 3.6 4.3 4.6   CL 95* 96* 93*   CO2 27 26 26   BUN 9 22* 36*   CREATININE 0.7 0.7 1.0   CALCIUM 8.8 9.6 9.5       Recent Labs     04/04/22  2253 04/06/22  0426 04/07/22  0510   PROT 7.1 6.7 7.0   ALKPHOS 133* 123* 118*   ALT 26 20 17   AST 17 9 9   BILITOT 0.4 0.4 0.4       No results for input(s): INR in the last 72 hours. No results for input(s): Rishi Snowball in the last 72 hours. Chronic labs:    Lab Results   Component Value Date    CHOL 213 (H) 04/01/2022    TRIG 101 04/01/2022    HDL 72 04/01/2022    LDLCALC 121 (H) 04/01/2022    TSH 3.480 07/15/2021    INR 1.1 07/15/2021    LABA1C 8.1 04/01/2022       Radiology: REVIEWED DAILY    +++++++++++++++++++++++++++++++++++++++++++++++++  Zion Lozano MD  [facility] Physician - [address], [place]  +++++++++++++++++++++++++++++++++++++++++++++++++  NOTE: This report was transcribed using voice recognition software. Every effort was made to ensure accuracy; however, inadvertent computerized transcription errors may be present.

## 2022-04-08 LAB
ADENOVIRUS BY PCR: NOT DETECTED
ALBUMIN SERPL-MCNC: 4 G/DL (ref 3.5–5.2)
ALP BLD-CCNC: 110 U/L (ref 35–104)
ALT SERPL-CCNC: 22 U/L (ref 0–32)
ANION GAP SERPL CALCULATED.3IONS-SCNC: 12 MMOL/L (ref 7–16)
AST SERPL-CCNC: 15 U/L (ref 0–31)
BASOPHILS ABSOLUTE: 0 E9/L (ref 0–0.2)
BASOPHILS RELATIVE PERCENT: 0.3 % (ref 0–2)
BILIRUB SERPL-MCNC: 0.7 MG/DL (ref 0–1.2)
BORDETELLA PARAPERTUSSIS BY PCR: NOT DETECTED
BORDETELLA PERTUSSIS BY PCR: NOT DETECTED
BUN BLDV-MCNC: 36 MG/DL (ref 6–20)
CALCIUM SERPL-MCNC: 9.3 MG/DL (ref 8.6–10.2)
CHLAMYDOPHILIA PNEUMONIAE BY PCR: NOT DETECTED
CHLORIDE BLD-SCNC: 94 MMOL/L (ref 98–107)
CO2: 24 MMOL/L (ref 22–29)
CORONAVIRUS 229E BY PCR: NOT DETECTED
CORONAVIRUS HKU1 BY PCR: NOT DETECTED
CORONAVIRUS NL63 BY PCR: NOT DETECTED
CORONAVIRUS OC43 BY PCR: NOT DETECTED
CREAT SERPL-MCNC: 0.9 MG/DL (ref 0.5–1)
EOSINOPHILS ABSOLUTE: 0 E9/L (ref 0.05–0.5)
EOSINOPHILS RELATIVE PERCENT: 0.1 % (ref 0–6)
GFR AFRICAN AMERICAN: >60
GFR NON-AFRICAN AMERICAN: >60 ML/MIN/1.73
GLUCOSE BLD-MCNC: 493 MG/DL (ref 74–99)
HCT VFR BLD CALC: 42.8 % (ref 34–48)
HEMOGLOBIN: 14.3 G/DL (ref 11.5–15.5)
HUMAN METAPNEUMOVIRUS BY PCR: NOT DETECTED
HUMAN RHINOVIRUS/ENTEROVIRUS BY PCR: NOT DETECTED
INFLUENZA A BY PCR: NOT DETECTED
INFLUENZA B BY PCR: NOT DETECTED
LYMPHOCYTES ABSOLUTE: 0.62 E9/L (ref 1.5–4)
LYMPHOCYTES RELATIVE PERCENT: 4.3 % (ref 20–42)
MCH RBC QN AUTO: 29.9 PG (ref 26–35)
MCHC RBC AUTO-ENTMCNC: 33.4 % (ref 32–34.5)
MCV RBC AUTO: 89.5 FL (ref 80–99.9)
METER GLUCOSE: 404 MG/DL (ref 74–99)
METER GLUCOSE: 419 MG/DL (ref 74–99)
METER GLUCOSE: 451 MG/DL (ref 74–99)
METER GLUCOSE: 454 MG/DL (ref 74–99)
METER GLUCOSE: >500 MG/DL (ref 74–99)
MONOCYTES ABSOLUTE: 0.78 E9/L (ref 0.1–0.95)
MONOCYTES RELATIVE PERCENT: 5.2 % (ref 2–12)
MYCOPLASMA PNEUMONIAE BY PCR: NOT DETECTED
NEUTROPHILS ABSOLUTE: 14.2 E9/L (ref 1.8–7.3)
NEUTROPHILS RELATIVE PERCENT: 90.5 % (ref 43–80)
PARAINFLUENZA VIRUS 1 BY PCR: NOT DETECTED
PARAINFLUENZA VIRUS 2 BY PCR: NOT DETECTED
PARAINFLUENZA VIRUS 3 BY PCR: NOT DETECTED
PARAINFLUENZA VIRUS 4 BY PCR: NOT DETECTED
PDW BLD-RTO: 12.7 FL (ref 11.5–15)
PLATELET # BLD: 345 E9/L (ref 130–450)
PMV BLD AUTO: 11.1 FL (ref 7–12)
POTASSIUM REFLEX MAGNESIUM: 4.6 MMOL/L (ref 3.5–5)
RBC # BLD: 4.78 E12/L (ref 3.5–5.5)
RESPIRATORY SYNCYTIAL VIRUS BY PCR: NOT DETECTED
SARS-COV-2, PCR: NOT DETECTED
SMUDGE CELLS: ABNORMAL
SODIUM BLD-SCNC: 130 MMOL/L (ref 132–146)
TEAR DROP CELLS: ABNORMAL
TOTAL PROTEIN: 6.9 G/DL (ref 6.4–8.3)
WBC # BLD: 15.6 E9/L (ref 4.5–11.5)

## 2022-04-08 PROCEDURE — 6370000000 HC RX 637 (ALT 250 FOR IP): Performed by: FAMILY MEDICINE

## 2022-04-08 PROCEDURE — 85025 COMPLETE CBC W/AUTO DIFF WBC: CPT

## 2022-04-08 PROCEDURE — 0202U NFCT DS 22 TRGT SARS-COV-2: CPT

## 2022-04-08 PROCEDURE — S5553 INSULIN LONG ACTING 5 U: HCPCS | Performed by: FAMILY MEDICINE

## 2022-04-08 PROCEDURE — 36415 COLL VENOUS BLD VENIPUNCTURE: CPT

## 2022-04-08 PROCEDURE — 82962 GLUCOSE BLOOD TEST: CPT

## 2022-04-08 PROCEDURE — 1200000000 HC SEMI PRIVATE

## 2022-04-08 PROCEDURE — 6360000002 HC RX W HCPCS: Performed by: INTERNAL MEDICINE

## 2022-04-08 PROCEDURE — 6360000002 HC RX W HCPCS: Performed by: FAMILY MEDICINE

## 2022-04-08 PROCEDURE — 2580000003 HC RX 258: Performed by: INTERNAL MEDICINE

## 2022-04-08 PROCEDURE — 6370000000 HC RX 637 (ALT 250 FOR IP): Performed by: INTERNAL MEDICINE

## 2022-04-08 PROCEDURE — 94640 AIRWAY INHALATION TREATMENT: CPT

## 2022-04-08 PROCEDURE — 80053 COMPREHEN METABOLIC PANEL: CPT

## 2022-04-08 PROCEDURE — 87449 NOS EACH ORGANISM AG IA: CPT

## 2022-04-08 RX ORDER — PREDNISONE 20 MG/1
20 TABLET ORAL DAILY
Status: COMPLETED | OUTPATIENT
Start: 2022-04-08 | End: 2022-04-10

## 2022-04-08 RX ADMIN — ALBUTEROL SULFATE 2.5 MG: 2.5 SOLUTION RESPIRATORY (INHALATION) at 01:02

## 2022-04-08 RX ADMIN — HYDRALAZINE HYDROCHLORIDE 50 MG: 50 TABLET, FILM COATED ORAL at 13:42

## 2022-04-08 RX ADMIN — INSULIN LISPRO 18 UNITS: 100 INJECTION, SOLUTION INTRAVENOUS; SUBCUTANEOUS at 18:13

## 2022-04-08 RX ADMIN — GUAIFENESIN 400 MG: 400 TABLET ORAL at 13:42

## 2022-04-08 RX ADMIN — SODIUM CHLORIDE, PRESERVATIVE FREE 10 ML: 5 INJECTION INTRAVENOUS at 21:11

## 2022-04-08 RX ADMIN — METHYLPREDNISOLONE SODIUM SUCCINATE 40 MG: 40 INJECTION, POWDER, FOR SOLUTION INTRAMUSCULAR; INTRAVENOUS at 04:44

## 2022-04-08 RX ADMIN — INSULIN LISPRO 18 UNITS: 100 INJECTION, SOLUTION INTRAVENOUS; SUBCUTANEOUS at 07:34

## 2022-04-08 RX ADMIN — METHYLPREDNISOLONE SODIUM SUCCINATE 40 MG: 40 INJECTION, POWDER, FOR SOLUTION INTRAMUSCULAR; INTRAVENOUS at 10:30

## 2022-04-08 RX ADMIN — HYDRALAZINE HYDROCHLORIDE 50 MG: 50 TABLET, FILM COATED ORAL at 06:09

## 2022-04-08 RX ADMIN — INSULIN LISPRO 9 UNITS: 100 INJECTION, SOLUTION INTRAVENOUS; SUBCUTANEOUS at 21:13

## 2022-04-08 RX ADMIN — BENZONATATE 200 MG: 100 CAPSULE ORAL at 14:25

## 2022-04-08 RX ADMIN — GUAIFENESIN 400 MG: 400 TABLET ORAL at 10:31

## 2022-04-08 RX ADMIN — ALBUTEROL SULFATE 2.5 MG: 2.5 SOLUTION RESPIRATORY (INHALATION) at 20:13

## 2022-04-08 RX ADMIN — LISINOPRIL 40 MG: 20 TABLET ORAL at 10:31

## 2022-04-08 RX ADMIN — ASPIRIN 81 MG: 81 TABLET, COATED ORAL at 10:31

## 2022-04-08 RX ADMIN — GUAIFENESIN 400 MG: 400 TABLET ORAL at 21:08

## 2022-04-08 RX ADMIN — ATORVASTATIN CALCIUM 20 MG: 20 TABLET, FILM COATED ORAL at 10:31

## 2022-04-08 RX ADMIN — HYDRALAZINE HYDROCHLORIDE 50 MG: 50 TABLET, FILM COATED ORAL at 21:07

## 2022-04-08 RX ADMIN — SODIUM CHLORIDE, PRESERVATIVE FREE 10 ML: 5 INJECTION INTRAVENOUS at 10:39

## 2022-04-08 RX ADMIN — DILTIAZEM HYDROCHLORIDE 360 MG: 180 CAPSULE, EXTENDED RELEASE ORAL at 10:31

## 2022-04-08 RX ADMIN — INSULIN GLARGINE-YFGN 39 UNITS: 100 INJECTION, SOLUTION SUBCUTANEOUS at 21:13

## 2022-04-08 RX ADMIN — PREDNISONE 20 MG: 20 TABLET ORAL at 21:07

## 2022-04-08 RX ADMIN — INSULIN LISPRO 18 UNITS: 100 INJECTION, SOLUTION INTRAVENOUS; SUBCUTANEOUS at 13:41

## 2022-04-08 ASSESSMENT — PAIN SCALES - GENERAL
PAINLEVEL_OUTOF10: 0
PAINLEVEL_OUTOF10: 0

## 2022-04-08 NOTE — PROGRESS NOTES
[facility] PROGRESS NOTE      Chief complaint: Follow-up of Staphylococcus epidermidis on blood culture    The patient is a 56 y.o. super morbidly obese female with history of CAD, DM, hypertension, hyperlipidemia, presented on 04/04 with shortness of breath for 1 week. On admission, she was afebrile, tolerating room air well, and hemodynamically stable with no leukocytosis. CXR showed prominent cardiac silhouette and no cardiolpulmonary pathology. Prior chest CTA on 03/31 was unremarkable. Blood cultures showed Gram-positive cocci in clusters (methicillin-susceptible Staphylococcus epidermidis by PCR). Doxycycline, fluconazole, steroids, vancomycin were started on 04/06. Repeat blood cultures on 04/06 showed no growth to date. Subjective: Patient was seen and examined. No chills, no abdominal pain, no diarrhea, no rash, no itching. She reports shortness of breath from coughing only during nighttime. She reports having sleep apnea but does not wear CPAP. Objective:    Vitals:    04/08/22 0015   BP: 136/77   Pulse: 86   Resp: 18   Temp: 97.9 °F (36.6 °C)   SpO2: 94%     Constitutional: Alert, not in distress  Respiratory: Clear breath sounds, no crackles, no wheezes  Cardiovascular: Regular rate and rhythm, no murmurs  Gastrointestinal: Bowel sounds present, soft, nontender  Skin: Warm and dry, no active dermatoses  Musculoskeletal: No joint swelling, no joint erythema    Labs, imaging, and medical records/notes were personally reviewed. Assessment:  Staphylococcus epidermidis on blood culture, likely contaminant  Leukocytosis, likely medication-induced, on steroids    Recommendations:  Stop doxycycline. No antibiotic therapy indicated for now. Follow up blood cultures. Stop steroids as soon as no longer indicated. Continue supportive care. Thank you for involving me in the care of Clemente Agustin. I will sign off for now.  Please do not hesitate to call for any questions, concerns, or new

## 2022-04-08 NOTE — PROGRESS NOTES
Hospitalist Progress Note      SYNOPSIS: Patient admitted on 2022 for Acute asthma exacerbation      SUBJECTIVE:    Patient seen and examined  Records reviewed. Patient examined today. She mentioned she is still wheezing a lot. Also has worse cough at night and she can take deep breaths that she feels very diminished. Discussed continue steroids and also will add breathing treatments. Feeling improved with breathing    Temp (24hrs), Av.1 °F (36.7 °C), Min:97.9 °F (36.6 °C), Max:98.4 °F (36.9 °C)    DIET: ADULT DIET; Regular; 5 carb choices (75 gm/meal)  CODE: Full Code    Intake/Output Summary (Last 24 hours) at 2022 3750  Last data filed at 2022 0830  Gross per 24 hour   Intake 480 ml   Output --   Net 480 ml       OBJECTIVE:    BP (!) 149/81   Pulse 90   Temp 98 °F (36.7 °C) (Oral)   Resp 16   Ht 5' 6\" (1.676 m)   Wt (!) 345 lb (156.5 kg)   LMP 10/01/2012 (Approximate) Comment: 2013  SpO2 96%   BMI 55.68 kg/m²     General appearance: No apparent distress, appears stated age and cooperative. HEENT:  Conjunctivae/corneas clear. Neck: Supple. No jugular venous distention. Respiratory: Very diminished tight and wheezing to auscultation bilaterally, normal respiratory effort  Cardiovascular: Regular rate rhythm, normal S1-S2  Abdomen: Soft, nontender, nondistended  Musculoskeletal: No clubbing, cyanosis, no bilateral lower extremity edema. Brisk capillary refill. Skin:  No rashes  on visible skin  Neurologic: awake, alert and following commands       Assessment and plan:  1.  Asthma exacerbation: Given lack of improvement at this time we will go ahead and Continue IV Solu-Medrol she is still wheezing and coarse with tightness on exam.  Doxycycline. Check respiratory panel atypical pneumonia labs. Chest x-ray. Encouraged him to spirometer. Tessalon and Mucinex.     2.  Essential hypertension: Blood pressure medication has been adjusted by cardiologyMetoprolol was discontinued with Cardizem CD added. Hydralazine dose increased. Continue lisinopril. 3.  Diabetes type II with hyperglycemia: Placed on insulin sliding scale and basal insulin. Resume home Trulicity.     4.  Migraine with exacerbation: Continue NSAID     5.  Morbid obesity: BMI 55.6     6.  GERD without esophagitis: Continue PPI     7.  Staph bacteremia: Likely a contaminant, antibiotics discontinued by infectious disease on discussion with ID no need for antibiotics. Also concerning blood cultures infectious disease would like to keep her in-house until repeat blood cultures report no growth.     8.  Paroxysmal atrial fibrillation: Ruled out by cardiology    Patient sugars to be better controlled before discharge  Patient reports that this is from her not taking Trulicity for 2 days  Still on Solu-Medrol 40 mg every 8  Discontinue that and put her on oral prednisone      DISPOSITION: Remains inpatient    Medications:  REVIEWED DAILY    Infusion Medications    sodium chloride      dextrose       Scheduled Medications    albuterol  2.5 mg Nebulization Q6H    guaiFENesin  400 mg Oral TID    enoxaparin  40 mg SubCUTAneous BID    hydrALAZINE  50 mg Oral 3 times per day    dilTIAZem  360 mg Oral Daily    aspirin EC  81 mg Oral Daily    atorvastatin  20 mg Oral Daily    lisinopril  40 mg Oral Daily    sodium chloride flush  5-40 mL IntraVENous 2 times per day    methylPREDNISolone  40 mg IntraVENous Q8H    insulin glargine  0.25 Units/kg SubCUTAneous Nightly    insulin lispro  0-18 Units SubCUTAneous TID WC    insulin lispro  0-9 Units SubCUTAneous Nightly     PRN Meds: benzonatate, benzocaine, sodium chloride flush, sodium chloride, ondansetron **OR** ondansetron, polyethylene glycol, ipratropium-albuterol, ibuprofen, hydrALAZINE, glucose, dextrose, glucagon (rDNA), dextrose    Labs:     Recent Labs     04/06/22  0426 04/07/22  0510 04/08/22  1315   WBC 15.6* 16.3* 15.6*   HGB 14.2 13.8 14.3   HCT 41.5 41.1 42.8    317 345       Recent Labs     04/06/22  0426 04/07/22  0510 04/08/22  1315    130* 130*   K 4.3 4.6 4.6   CL 96* 93* 94*   CO2 26 26 24   BUN 22* 36* 36*   CREATININE 0.7 1.0 0.9   CALCIUM 9.6 9.5 9.3       Recent Labs     04/06/22  0426 04/07/22  0510 04/08/22  1315   PROT 6.7 7.0 6.9   ALKPHOS 123* 118* 110*   ALT 20 17 22   AST 9 9 15   BILITOT 0.4 0.4 0.7       No results for input(s): INR in the last 72 hours. No results for input(s): Young Hug in the last 72 hours. Chronic labs:    Lab Results   Component Value Date    CHOL 213 (H) 04/01/2022    TRIG 101 04/01/2022    HDL 72 04/01/2022    LDLCALC 121 (H) 04/01/2022    TSH 3.480 07/15/2021    INR 1.1 07/15/2021    LABA1C 8.1 04/01/2022       Radiology: REVIEWED DAILY    +++++++++++++++++++++++++++++++++++++++++++++++++  Latesha Chávez MD  [address]  +++++++++++++++++++++++++++++++++++++++++++++++++  NOTE: This report was transcribed using voice recognition software. Every effort was made to ensure accuracy; however, inadvertent computerized transcription errors may be present.

## 2022-04-08 NOTE — CARE COORDINATION
Plan is home when medically ready. Per internal med note yesterday, Given lack of improvement at this time we will go ahead and Continue IV Solu-Medrol she is still wheezing and coarse with tightness on exam.  Doxycycline. Check respiratory panel atypical pneumonia labs. Chest x-ray. Encouraged him to spirometer. Tessalon and Mucinex. Essential hypertension: Blood pressure medication has been adjusted by cardiologyMetoprolol was discontinued with Cardizem CD added. Hydralazine dose increased. Continue lisinopril. Patient said she will need a DrBernardo Release on day of discharge to return to work. She said her brother will bring her truck here for her to drive home at time of discharge.    Ray Tamayo RN CM  [phone]

## 2022-04-08 NOTE — PLAN OF CARE
Problem: Skin Integrity:  Goal: Will show no infection signs and symptoms  Description: Will show no infection signs and symptoms  Outcome: Met This Shift  Goal: Absence of new skin breakdown  Description: Absence of new skin breakdown  Outcome: Met This Shift     Problem: Breathing Pattern - Ineffective:  Goal: Ability to achieve and maintain a regular respiratory rate will improve  Description: Ability to achieve and maintain a regular respiratory rate will improve  Outcome: Met This Shift     Problem: Falls - Risk of:  Goal: Will remain free from falls  Description: Will remain free from falls  Outcome: Met This Shift  Goal: Absence of physical injury  Description: Absence of physical injury  Outcome: Met This Shift 07-Nov-2018

## 2022-04-08 NOTE — PLAN OF CARE
Problem: Skin Integrity:  Goal: Will show no infection signs and symptoms  Description: Will show no infection signs and symptoms  4/8/2022 1230 by Royal Serna  Outcome: Met This Shift  4/8/2022 0452 by Daniela Oconnor RN  Outcome: Met This Shift  Goal: Absence of new skin breakdown  Description: Absence of new skin breakdown  4/8/2022 1230 by Royal Serna  Outcome: Met This Shift  4/8/2022 0452 by Daniela Oconnor RN  Outcome: Met This Shift     Problem: Breathing Pattern - Ineffective:  Goal: Ability to achieve and maintain a regular respiratory rate will improve  Description: Ability to achieve and maintain a regular respiratory rate will improve  4/8/2022 1230 by Royal Serna  Outcome: Ongoing  4/8/2022 0452 by Daniela Oconnor RN  Outcome: Met This Shift     Problem: Falls - Risk of:  Goal: Will remain free from falls  Description: Will remain free from falls  4/8/2022 1230 by Royal Serna  Outcome: Met This Shift  4/8/2022 0452 by Daniela Oconnor RN  Outcome: Met This Shift  Goal: Absence of physical injury  Description: Absence of physical injury  4/8/2022 1230 by Royal Serna  Outcome: Met This Shift  4/8/2022 0452 by Daniela Oconnor RN  Outcome: Met This Shift

## 2022-04-09 LAB
ALBUMIN SERPL-MCNC: 3.7 G/DL (ref 3.5–5.2)
ALP BLD-CCNC: 108 U/L (ref 35–104)
ALT SERPL-CCNC: 22 U/L (ref 0–32)
ANION GAP SERPL CALCULATED.3IONS-SCNC: 17 MMOL/L (ref 7–16)
AST SERPL-CCNC: 17 U/L (ref 0–31)
BASOPHILS ABSOLUTE: 0 E9/L (ref 0–0.2)
BASOPHILS RELATIVE PERCENT: 0.3 % (ref 0–2)
BILIRUB SERPL-MCNC: 0.5 MG/DL (ref 0–1.2)
BUN BLDV-MCNC: 39 MG/DL (ref 6–20)
CALCIUM SERPL-MCNC: 9.1 MG/DL (ref 8.6–10.2)
CHLORIDE BLD-SCNC: 96 MMOL/L (ref 98–107)
CHOLESTEROL, TOTAL: 195 MG/DL (ref 0–199)
CO2: 22 MMOL/L (ref 22–29)
CREAT SERPL-MCNC: 1 MG/DL (ref 0.5–1)
EOSINOPHILS ABSOLUTE: 0 E9/L (ref 0.05–0.5)
EOSINOPHILS RELATIVE PERCENT: 0.1 % (ref 0–6)
GFR AFRICAN AMERICAN: >60
GFR NON-AFRICAN AMERICAN: 60 ML/MIN/1.73
GLUCOSE BLD-MCNC: 496 MG/DL (ref 74–99)
HBA1C MFR BLD: 8.6 % (ref 4–5.6)
HCT VFR BLD CALC: 41.6 % (ref 34–48)
HDLC SERPL-MCNC: 57 MG/DL
HEMOGLOBIN: 14 G/DL (ref 11.5–15.5)
L. PNEUMOPHILA SEROGP 1 UR AG: NORMAL
LDL CHOLESTEROL CALCULATED: 94 MG/DL (ref 0–99)
LYMPHOCYTES ABSOLUTE: 1.02 E9/L (ref 1.5–4)
LYMPHOCYTES RELATIVE PERCENT: 7.1 % (ref 20–42)
MCH RBC QN AUTO: 30.4 PG (ref 26–35)
MCHC RBC AUTO-ENTMCNC: 33.7 % (ref 32–34.5)
MCV RBC AUTO: 90.2 FL (ref 80–99.9)
METER GLUCOSE: 375 MG/DL (ref 74–99)
METER GLUCOSE: 390 MG/DL (ref 74–99)
METER GLUCOSE: 405 MG/DL (ref 74–99)
METER GLUCOSE: 428 MG/DL (ref 74–99)
METER GLUCOSE: 441 MG/DL (ref 74–99)
MONOCYTES ABSOLUTE: 0.73 E9/L (ref 0.1–0.95)
MONOCYTES RELATIVE PERCENT: 4.5 % (ref 2–12)
NEUTROPHILS ABSOLUTE: 12.85 E9/L (ref 1.8–7.3)
NEUTROPHILS RELATIVE PERCENT: 88.4 % (ref 43–80)
PDW BLD-RTO: 12.7 FL (ref 11.5–15)
PLATELET # BLD: 286 E9/L (ref 130–450)
PMV BLD AUTO: 11.4 FL (ref 7–12)
POTASSIUM REFLEX MAGNESIUM: 5 MMOL/L (ref 3.5–5)
RBC # BLD: 4.61 E12/L (ref 3.5–5.5)
SODIUM BLD-SCNC: 135 MMOL/L (ref 132–146)
STREP PNEUMONIAE ANTIGEN, URINE: NORMAL
TOTAL PROTEIN: 6.4 G/DL (ref 6.4–8.3)
TRIGL SERPL-MCNC: 221 MG/DL (ref 0–149)
VLDLC SERPL CALC-MCNC: 44 MG/DL
WBC # BLD: 14.6 E9/L (ref 4.5–11.5)

## 2022-04-09 PROCEDURE — 6370000000 HC RX 637 (ALT 250 FOR IP): Performed by: FAMILY MEDICINE

## 2022-04-09 PROCEDURE — 6370000000 HC RX 637 (ALT 250 FOR IP): Performed by: INTERNAL MEDICINE

## 2022-04-09 PROCEDURE — 80053 COMPREHEN METABOLIC PANEL: CPT

## 2022-04-09 PROCEDURE — 83036 HEMOGLOBIN GLYCOSYLATED A1C: CPT

## 2022-04-09 PROCEDURE — 6360000002 HC RX W HCPCS: Performed by: FAMILY MEDICINE

## 2022-04-09 PROCEDURE — 85025 COMPLETE CBC W/AUTO DIFF WBC: CPT

## 2022-04-09 PROCEDURE — 1200000000 HC SEMI PRIVATE

## 2022-04-09 PROCEDURE — 94640 AIRWAY INHALATION TREATMENT: CPT

## 2022-04-09 PROCEDURE — 36415 COLL VENOUS BLD VENIPUNCTURE: CPT

## 2022-04-09 PROCEDURE — S5553 INSULIN LONG ACTING 5 U: HCPCS | Performed by: FAMILY MEDICINE

## 2022-04-09 PROCEDURE — 82962 GLUCOSE BLOOD TEST: CPT

## 2022-04-09 PROCEDURE — 2580000003 HC RX 258: Performed by: INTERNAL MEDICINE

## 2022-04-09 PROCEDURE — 80061 LIPID PANEL: CPT

## 2022-04-09 RX ORDER — INSULIN GLARGINE-YFGN 100 [IU]/ML
25 INJECTION, SOLUTION SUBCUTANEOUS 2 TIMES DAILY
Status: DISCONTINUED | OUTPATIENT
Start: 2022-04-09 | End: 2022-04-10

## 2022-04-09 RX ADMIN — INSULIN LISPRO 9 UNITS: 100 INJECTION, SOLUTION INTRAVENOUS; SUBCUTANEOUS at 22:29

## 2022-04-09 RX ADMIN — SODIUM CHLORIDE, PRESERVATIVE FREE 10 ML: 5 INJECTION INTRAVENOUS at 22:28

## 2022-04-09 RX ADMIN — ALBUTEROL SULFATE 2.5 MG: 2.5 SOLUTION RESPIRATORY (INHALATION) at 21:50

## 2022-04-09 RX ADMIN — GUAIFENESIN 400 MG: 400 TABLET ORAL at 14:44

## 2022-04-09 RX ADMIN — HYDRALAZINE HYDROCHLORIDE 50 MG: 50 TABLET, FILM COATED ORAL at 14:43

## 2022-04-09 RX ADMIN — HYDRALAZINE HYDROCHLORIDE 50 MG: 50 TABLET, FILM COATED ORAL at 06:02

## 2022-04-09 RX ADMIN — HYDRALAZINE HYDROCHLORIDE 50 MG: 50 TABLET, FILM COATED ORAL at 22:27

## 2022-04-09 RX ADMIN — INSULIN LISPRO 15 UNITS: 100 INJECTION, SOLUTION INTRAVENOUS; SUBCUTANEOUS at 13:19

## 2022-04-09 RX ADMIN — POLYETHYLENE GLYCOL 3350 17 G: 17 POWDER, FOR SOLUTION ORAL at 13:19

## 2022-04-09 RX ADMIN — LISINOPRIL 40 MG: 20 TABLET ORAL at 08:51

## 2022-04-09 RX ADMIN — INSULIN LISPRO 18 UNITS: 100 INJECTION, SOLUTION INTRAVENOUS; SUBCUTANEOUS at 06:19

## 2022-04-09 RX ADMIN — IPRATROPIUM BROMIDE AND ALBUTEROL SULFATE 1 AMPULE: .5; 2.5 SOLUTION RESPIRATORY (INHALATION) at 08:19

## 2022-04-09 RX ADMIN — ASPIRIN 81 MG: 81 TABLET, COATED ORAL at 08:51

## 2022-04-09 RX ADMIN — PREDNISONE 20 MG: 20 TABLET ORAL at 08:51

## 2022-04-09 RX ADMIN — INSULIN LISPRO 15 UNITS: 100 INJECTION, SOLUTION INTRAVENOUS; SUBCUTANEOUS at 18:24

## 2022-04-09 RX ADMIN — SODIUM CHLORIDE, PRESERVATIVE FREE 10 ML: 5 INJECTION INTRAVENOUS at 08:52

## 2022-04-09 RX ADMIN — INSULIN GLARGINE-YFGN 25 UNITS: 100 INJECTION, SOLUTION SUBCUTANEOUS at 22:28

## 2022-04-09 RX ADMIN — ATORVASTATIN CALCIUM 20 MG: 20 TABLET, FILM COATED ORAL at 08:52

## 2022-04-09 RX ADMIN — DILTIAZEM HYDROCHLORIDE 360 MG: 180 CAPSULE, EXTENDED RELEASE ORAL at 08:51

## 2022-04-09 RX ADMIN — GUAIFENESIN 400 MG: 400 TABLET ORAL at 22:27

## 2022-04-09 RX ADMIN — GUAIFENESIN 400 MG: 400 TABLET ORAL at 08:54

## 2022-04-09 ASSESSMENT — PAIN SCALES - GENERAL
PAINLEVEL_OUTOF10: 0

## 2022-04-09 NOTE — PROGRESS NOTES
Hospitalist Progress Note      SYNOPSIS: Patient admitted on 2022 for Acute asthma exacerbation      SUBJECTIVE:    Patient seen and examined  Records reviewed. Discussed you concerning asthma breathing is much improved but given hyperglycemia we will have endocrinology see her. Discussed she may benefit from insulin at the time of discharge patient was very adamant she does not want to do insulin but she may be able to do it. Discussed she may need endocrinology to see her as this is concern her blood sugars are also running high at home as she does not check her blood sugar because she does not have a glucometer. She mentioned her glucometer broke and her insurance would not pay for it as only pay for 1 every 5 years. She also refused any oral hypoglycemic as she mentioned that she has tried several and did not do well with them. Discussed will have endocrinology see her to help with this. Stable overnight. No other overnight issues reported. Temp (24hrs), Av.9 °F (36.6 °C), Min:97.7 °F (36.5 °C), Max:98.1 °F (36.7 °C)    DIET: ADULT DIET; Regular; 5 carb choices (75 gm/meal)  CODE: Full Code  No intake or output data in the 24 hours ending 22 1013    OBJECTIVE:    BP (!) 148/90   Pulse 88   Temp 98.1 °F (36.7 °C) (Oral)   Resp 18   Ht 5' 6\" (1.676 m)   Wt (!) 345 lb (156.5 kg)   LMP 10/01/2012 (Approximate) Comment:   SpO2 98%   BMI 55.68 kg/m²     General appearance: No apparent distress, appears stated age and cooperative. HEENT:  Conjunctivae/corneas clear. Neck: Supple. No jugular venous distention. Respiratory: Clear to auscultation bilaterally, normal respiratory effort  Cardiovascular: Regular rate rhythm, normal S1-S2  Abdomen: Soft, nontender, nondistended  Musculoskeletal: No clubbing, cyanosis, no bilateral lower extremity edema. Brisk capillary refill.    Skin:  No rashes  on visible skin  Neurologic: awake, alert and following commands       Assessment and plan:  1.  Asthma exacerbation: Continue prednisone last dose tomorrow. Continue breathing treatments. 2.  Essential hypertension: Blood pressure medication has been adjusted by cardiologyMetoprolol was discontinued with Cardizem CD added. Hydralazine dose increased. Continue lisinopril.     3. Diabetes type II with hyperglycemia: Blood sugar has been an issue running in the 400s will adjust insulin to 20 units twice daily of Lantus from 39 units. Patient is making her discharge difficult as she refused any oral hypoglycemic medications and also refuses to take insulin at home though she feels she may be added to the insulin but wants endocrinology to see as she is worried her blood sugars have been running high. She does not check her blood sugars at home as she does not have a glucometer.  Check A1c.    4.  Migraine with exacerbation: Continue NSAID     5.  Morbid obesity: BMI 55.6     6.  GERD without esophagitis: Continue PPI     7.  Staph bacteremia: Likely a contaminant, antibiotics discontinued by infectious disease on discussion with ID no need for antibiotics. 8.  Paroxysmal atrial fibrillation: Ruled out by cardiology     Noncompliance-concerning her diabetes there is concern the patient is noncompliant as she does not check her blood sugar at home and also refusing to be discharged home with oral hypoglycemics or insulin. She is willing to see what endocrinology has to offer as she feels maybe she may try insulin if no other choice. DISPOSITION: To have a better plan for blood sugar control as she is high risk for admission for DKA.     Medications:  REVIEWED DAILY    Infusion Medications    sodium chloride      dextrose       Scheduled Medications    insulin glargine  25 Units SubCUTAneous BID    predniSONE  20 mg Oral Daily    albuterol  2.5 mg Nebulization Q6H    guaiFENesin  400 mg Oral TID    enoxaparin  40 mg SubCUTAneous BID    hydrALAZINE  50 mg Oral 3 times per day    dilTIAZem  360 mg Oral Daily    aspirin EC  81 mg Oral Daily    atorvastatin  20 mg Oral Daily    lisinopril  40 mg Oral Daily    sodium chloride flush  5-40 mL IntraVENous 2 times per day    insulin lispro  0-18 Units SubCUTAneous TID WC    insulin lispro  0-9 Units SubCUTAneous Nightly     PRN Meds: benzonatate, benzocaine, sodium chloride flush, sodium chloride, ondansetron **OR** ondansetron, polyethylene glycol, ipratropium-albuterol, ibuprofen, hydrALAZINE, glucose, dextrose, glucagon (rDNA), dextrose    Labs:     Recent Labs     04/07/22  0510 04/08/22  1315 04/09/22  0453   WBC 16.3* 15.6* 14.6*   HGB 13.8 14.3 14.0   HCT 41.1 42.8 41.6    345 286       Recent Labs     04/07/22  0510 04/08/22  1315 04/09/22  0453   * 130* 135   K 4.6 4.6 5.0   CL 93* 94* 96*   CO2 26 24 22   BUN 36* 36* 39*   CREATININE 1.0 0.9 1.0   CALCIUM 9.5 9.3 9.1       Recent Labs     04/07/22  0510 04/08/22  1315 04/09/22  0453   PROT 7.0 6.9 6.4   ALKPHOS 118* 110* 108*   ALT 17 22 22   AST 9 15 17   BILITOT 0.4 0.7 0.5       No results for input(s): INR in the last 72 hours. No results for input(s): Justine Harding in the last 72 hours. Chronic labs:    Lab Results   Component Value Date    CHOL 213 (H) 04/01/2022    TRIG 101 04/01/2022    HDL 72 04/01/2022    LDLCALC 121 (H) 04/01/2022    TSH 3.480 07/15/2021    INR 1.1 07/15/2021    LABA1C 8.1 04/01/2022       Radiology: REVIEWED DAILY    +++++++++++++++++++++++++++++++++++++++++++++++++  Jose Schultz MD  [facility] Physician - [address] [place] [address], [place]  +++++++++++++++++++++++++++++++++++++++++++++++++  NOTE: This report was transcribed using voice recognition software. Every effort was made to ensure accuracy; however, inadvertent computerized transcription errors may be present.

## 2022-04-10 LAB
ALBUMIN SERPL-MCNC: 3.5 G/DL (ref 3.5–5.2)
ALP BLD-CCNC: 86 U/L (ref 35–104)
ALT SERPL-CCNC: 27 U/L (ref 0–32)
ANION GAP SERPL CALCULATED.3IONS-SCNC: 12 MMOL/L (ref 7–16)
ANISOCYTOSIS: ABNORMAL
AST SERPL-CCNC: 20 U/L (ref 0–31)
BASOPHILS ABSOLUTE: 0 E9/L (ref 0–0.2)
BASOPHILS RELATIVE PERCENT: 0.4 % (ref 0–2)
BILIRUB SERPL-MCNC: 0.5 MG/DL (ref 0–1.2)
BUN BLDV-MCNC: 33 MG/DL (ref 6–20)
CALCIUM SERPL-MCNC: 8.6 MG/DL (ref 8.6–10.2)
CHLORIDE BLD-SCNC: 97 MMOL/L (ref 98–107)
CO2: 27 MMOL/L (ref 22–29)
CREAT SERPL-MCNC: 0.9 MG/DL (ref 0.5–1)
EOSINOPHILS ABSOLUTE: 0 E9/L (ref 0.05–0.5)
EOSINOPHILS RELATIVE PERCENT: 0.2 % (ref 0–6)
GFR AFRICAN AMERICAN: >60
GFR NON-AFRICAN AMERICAN: >60 ML/MIN/1.73
GLUCOSE BLD-MCNC: 312 MG/DL (ref 74–99)
HCT VFR BLD CALC: 41.1 % (ref 34–48)
HEMOGLOBIN: 13.6 G/DL (ref 11.5–15.5)
LYMPHOCYTES ABSOLUTE: 4.63 E9/L (ref 1.5–4)
LYMPHOCYTES RELATIVE PERCENT: 37.1 % (ref 20–42)
MCH RBC QN AUTO: 30.2 PG (ref 26–35)
MCHC RBC AUTO-ENTMCNC: 33.1 % (ref 32–34.5)
MCV RBC AUTO: 91.3 FL (ref 80–99.9)
METER GLUCOSE: 289 MG/DL (ref 74–99)
METER GLUCOSE: 321 MG/DL (ref 74–99)
METER GLUCOSE: 325 MG/DL (ref 74–99)
METER GLUCOSE: 328 MG/DL (ref 74–99)
MONOCYTES ABSOLUTE: 1.12 E9/L (ref 0.1–0.95)
MONOCYTES RELATIVE PERCENT: 8.6 % (ref 2–12)
NEUTROPHILS ABSOLUTE: 6.75 E9/L (ref 1.8–7.3)
NEUTROPHILS RELATIVE PERCENT: 54.3 % (ref 43–80)
NUCLEATED RED BLOOD CELLS: 0.9 /100 WBC
ORGANISM: ABNORMAL
ORGANISM: ABNORMAL
PDW BLD-RTO: 12.7 FL (ref 11.5–15)
PLATELET # BLD: 219 E9/L (ref 130–450)
PMV BLD AUTO: 11.1 FL (ref 7–12)
POTASSIUM REFLEX MAGNESIUM: 4.1 MMOL/L (ref 3.5–5)
RBC # BLD: 4.5 E12/L (ref 3.5–5.5)
SODIUM BLD-SCNC: 136 MMOL/L (ref 132–146)
T4 FREE: 1.3 NG/DL (ref 0.93–1.7)
TOTAL PROTEIN: 5.8 G/DL (ref 6.4–8.3)
TSH SERPL DL<=0.05 MIU/L-ACNC: 2.16 UIU/ML (ref 0.27–4.2)
WBC # BLD: 12.5 E9/L (ref 4.5–11.5)

## 2022-04-10 PROCEDURE — 6370000000 HC RX 637 (ALT 250 FOR IP): Performed by: FAMILY MEDICINE

## 2022-04-10 PROCEDURE — 6360000002 HC RX W HCPCS: Performed by: FAMILY MEDICINE

## 2022-04-10 PROCEDURE — 6370000000 HC RX 637 (ALT 250 FOR IP): Performed by: INTERNAL MEDICINE

## 2022-04-10 PROCEDURE — 82962 GLUCOSE BLOOD TEST: CPT

## 2022-04-10 PROCEDURE — S5553 INSULIN LONG ACTING 5 U: HCPCS | Performed by: FAMILY MEDICINE

## 2022-04-10 PROCEDURE — 94640 AIRWAY INHALATION TREATMENT: CPT

## 2022-04-10 PROCEDURE — 80053 COMPREHEN METABOLIC PANEL: CPT

## 2022-04-10 PROCEDURE — S5553 INSULIN LONG ACTING 5 U: HCPCS | Performed by: INTERNAL MEDICINE

## 2022-04-10 PROCEDURE — 36415 COLL VENOUS BLD VENIPUNCTURE: CPT

## 2022-04-10 PROCEDURE — 85025 COMPLETE CBC W/AUTO DIFF WBC: CPT

## 2022-04-10 PROCEDURE — 84443 ASSAY THYROID STIM HORMONE: CPT

## 2022-04-10 PROCEDURE — 2580000003 HC RX 258: Performed by: INTERNAL MEDICINE

## 2022-04-10 PROCEDURE — 99254 IP/OBS CNSLTJ NEW/EST MOD 60: CPT | Performed by: INTERNAL MEDICINE

## 2022-04-10 PROCEDURE — 1200000000 HC SEMI PRIVATE

## 2022-04-10 PROCEDURE — 84439 ASSAY OF FREE THYROXINE: CPT

## 2022-04-10 RX ORDER — INSULIN GLARGINE-YFGN 100 [IU]/ML
40 INJECTION, SOLUTION SUBCUTANEOUS 2 TIMES DAILY
Status: DISCONTINUED | OUTPATIENT
Start: 2022-04-10 | End: 2022-04-12

## 2022-04-10 RX ORDER — HYDRALAZINE HYDROCHLORIDE 50 MG/1
100 TABLET, FILM COATED ORAL EVERY 8 HOURS SCHEDULED
Status: DISCONTINUED | OUTPATIENT
Start: 2022-04-10 | End: 2022-04-13 | Stop reason: HOSPADM

## 2022-04-10 RX ORDER — PREDNISONE 20 MG/1
20 TABLET ORAL DAILY
Status: COMPLETED | OUTPATIENT
Start: 2022-04-11 | End: 2022-04-13

## 2022-04-10 RX ORDER — INSULIN GLARGINE-YFGN 100 [IU]/ML
34 INJECTION, SOLUTION SUBCUTANEOUS 2 TIMES DAILY
Status: DISCONTINUED | OUTPATIENT
Start: 2022-04-10 | End: 2022-04-10

## 2022-04-10 RX ADMIN — INSULIN GLARGINE-YFGN 34 UNITS: 100 INJECTION, SOLUTION SUBCUTANEOUS at 10:06

## 2022-04-10 RX ADMIN — HYDRALAZINE HYDROCHLORIDE 50 MG: 50 TABLET, FILM COATED ORAL at 06:18

## 2022-04-10 RX ADMIN — INSULIN LISPRO 12 UNITS: 100 INJECTION, SOLUTION INTRAVENOUS; SUBCUTANEOUS at 21:26

## 2022-04-10 RX ADMIN — INSULIN LISPRO 15 UNITS: 100 INJECTION, SOLUTION INTRAVENOUS; SUBCUTANEOUS at 12:42

## 2022-04-10 RX ADMIN — GUAIFENESIN 400 MG: 400 TABLET ORAL at 08:22

## 2022-04-10 RX ADMIN — GUAIFENESIN 400 MG: 400 TABLET ORAL at 14:35

## 2022-04-10 RX ADMIN — INSULIN LISPRO 12 UNITS: 100 INJECTION, SOLUTION INTRAVENOUS; SUBCUTANEOUS at 17:44

## 2022-04-10 RX ADMIN — GUAIFENESIN 400 MG: 400 TABLET ORAL at 21:23

## 2022-04-10 RX ADMIN — DILTIAZEM HYDROCHLORIDE 360 MG: 180 CAPSULE, EXTENDED RELEASE ORAL at 08:21

## 2022-04-10 RX ADMIN — ASPIRIN 81 MG: 81 TABLET, COATED ORAL at 08:22

## 2022-04-10 RX ADMIN — SODIUM CHLORIDE, PRESERVATIVE FREE 10 ML: 5 INJECTION INTRAVENOUS at 21:25

## 2022-04-10 RX ADMIN — INSULIN LISPRO 22 UNITS: 100 INJECTION, SOLUTION INTRAVENOUS; SUBCUTANEOUS at 12:42

## 2022-04-10 RX ADMIN — INSULIN GLARGINE-YFGN 40 UNITS: 100 INJECTION, SOLUTION SUBCUTANEOUS at 21:25

## 2022-04-10 RX ADMIN — ALBUTEROL SULFATE 2.5 MG: 2.5 SOLUTION RESPIRATORY (INHALATION) at 11:54

## 2022-04-10 RX ADMIN — LISINOPRIL 40 MG: 20 TABLET ORAL at 08:21

## 2022-04-10 RX ADMIN — HYDRALAZINE HYDROCHLORIDE 100 MG: 50 TABLET, FILM COATED ORAL at 21:24

## 2022-04-10 RX ADMIN — INSULIN LISPRO 9 UNITS: 100 INJECTION, SOLUTION INTRAVENOUS; SUBCUTANEOUS at 08:22

## 2022-04-10 RX ADMIN — SODIUM CHLORIDE, PRESERVATIVE FREE 10 ML: 5 INJECTION INTRAVENOUS at 10:01

## 2022-04-10 RX ADMIN — ATORVASTATIN CALCIUM 20 MG: 20 TABLET, FILM COATED ORAL at 08:21

## 2022-04-10 RX ADMIN — HYDRALAZINE HYDROCHLORIDE 50 MG: 50 TABLET, FILM COATED ORAL at 14:35

## 2022-04-10 RX ADMIN — INSULIN LISPRO 22 UNITS: 100 INJECTION, SOLUTION INTRAVENOUS; SUBCUTANEOUS at 17:42

## 2022-04-10 RX ADMIN — PREDNISONE 20 MG: 20 TABLET ORAL at 08:22

## 2022-04-10 ASSESSMENT — PAIN SCALES - GENERAL
PAINLEVEL_OUTOF10: 0
PAINLEVEL_OUTOF10: 0

## 2022-04-10 NOTE — CONSULTS
ENDOCRINOLOGY INITIAL CONSULTATION NOTE      Date of admission: 4/4/2022  Date of service: 4/10/2022  Admitting physician: Grayson Owusu MD   Primary Care Physician: Jade Carlson MD  Consultant physician: Babar Monsalve MD     Reason for the consultation:  DM type 2, currently worsened with steroids     History of Present Illness: The history is provided by the patient. Accuracy of the patient data is excellent    Eliana Alvarez is a very pleasant 56 y.o. old female with PMH of DM type 2, HTN, HLD, Hypothyroidism, CAD and other  listed below admitted to [facility] on 4/4/2022 because of SOB, endocrine service was consulted for diabetes management. The patient was in hr usual state of health until about a week before admission when started c/o worsening SOB associated with cough and fever and chest discomfort     Up on arrival she was vitally stale, WBC 9.2, Hb 14.4, Na 131, K 3.6, Troponin of 14.  proBNP 16.   Blood cultures showed Gram-positive cocci in clusters    Prior to admission  The patient was diagnosed with type 2 DM long time ago. Prior to admission patient was on Trulicity 1.5 mg/wk. Patient has had no hypoglycemic episodes. Patient has not been eating consistent carbohydrate meals, self-blood glucose monitoring has been above goal prior to admission. In addition, patient reports macrovascular or microvascular complications.  Has been up to date with yearly diabetic eye exam.   Lab Results   Component Value Date    LABA1C 8.6 04/09/2022       Inpatient diet:   Carb Restricted diet     Point of care glucose monitoring   (Independently reviewed)   Recent Labs     04/09/22  0534 04/09/22  0702 04/09/22  1147 04/09/22  1711 04/09/22  2135 04/10/22  0451 04/10/22  1204 04/10/22  1729   GLUMET 441* 405* 390* 375* 428* 289* 325* 328*       Past medical history:   Past Medical History:   Diagnosis Date    Acute renal injury ([address] 9/4/2013    Analgesic overuse headache 12/19/2018    Anxiety     Arthritis     Asthma     CAD (coronary artery disease)     Depression 3/19/2013    Diabetes mellitus ([facility] [address]     A1C=6.7 on 14    Fracture of tooth 2018    GERD (gastroesophageal reflux disease)     Glaucoma     Hyperlipidemia 2021    Hypertension     Hypertensive urgency 3/19/2013    Hypothyroidism     Irritable bowel syndrome     Obesity     Obstructive sleep apnea     Pneumonia     Polycystic ovarian syndrome     Postcholecystectomy syndrome 2018    Spinal headache     Suicidal ideation 3/18/2016       Past surgical history:  Past Surgical History:   Procedure Laterality Date     SECTION      Dr. Grace Conn, [address], LAPAROSCOPIC      [facility]    COLONOSCOPY      DENTAL SURGERY  10/06/2011    4 extractions    ECHO COMPLETE  2014         ENDOSCOPY, COLON, DIAGNOSTIC      FOOT SURGERY      RECONSTRUCTION LEFT FOOT, Dr. Joan Fairbanks, [address]  9/3/13    incisional hernia repair with mesh    HYSTERECTOMY      KNEE CARTILAGE SURGERY      OVARY REMOVAL      left due to polycystic ovary, Dr. Verdin Phoenix, [address]  3/19/2013    Attempted [facility];RSO, Dr. Diana Lazaro, [address]       Social history:   Tobacco:   reports that she has never smoked. She has never used smokeless tobacco.  Alcohol:   reports no history of alcohol use. Drugs:   reports no history of drug use.     Family history:    Family History   Problem Relation Age of Onset    Asthma Mother     Diabetes Mother     High Blood Pressure Mother     High Blood Pressure Father     Heart Disease Father     Cancer Father     Depression Father     Diabetes Brother     Asthma Brother     Thyroid Disease Sister     Asthma Sister     Depression Maternal Grandmother     High Blood Pressure Maternal Grandmother     Mental Illness Maternal Grandmother     Thyroid Disease Maternal Grandmother     Heart Disease Maternal Grandfather     Depression Paternal Grandmother     High Blood Pressure Paternal Grandmother     Cancer Paternal Grandfather        Allergy and drug reactions: Allergies   Allergen Reactions    Percocet [Oxycodone-Acetaminophen] Itching    Metformin And Related Other (See Comments)     GI side effects     Tape [place] Tape] Itching       Scheduled Meds:   insulin glargine  40 Units SubCUTAneous BID    insulin lispro  22 Units SubCUTAneous TID WC    [START ON 4/11/2022] predniSONE  20 mg Oral Daily    hydrALAZINE  100 mg Oral 3 times per day    albuterol  2.5 mg Nebulization Q6H    guaiFENesin  400 mg Oral TID    enoxaparin  40 mg SubCUTAneous BID    dilTIAZem  360 mg Oral Daily    aspirin EC  81 mg Oral Daily    atorvastatin  20 mg Oral Daily    lisinopril  40 mg Oral Daily    sodium chloride flush  5-40 mL IntraVENous 2 times per day    insulin lispro  0-18 Units SubCUTAneous TID WC    insulin lispro  0-9 Units SubCUTAneous Nightly       PRN Meds:   benzonatate, 200 mg, TID PRN  benzocaine, , BID PRN  sodium chloride flush, 5-40 mL, PRN  sodium chloride, , PRN  ondansetron, 4 mg, Q8H PRN   Or  ondansetron, 4 mg, Q6H PRN  polyethylene glycol, 17 g, Daily PRN  ipratropium-albuterol, 1 ampule, Q4H PRN  ibuprofen, 400 mg, Q6H PRN  hydrALAZINE, 10 mg, Q6H PRN  glucose, 15 g, PRN  dextrose, 12.5 g, PRN  glucagon (rDNA), 1 mg, PRN  dextrose, 100 mL/hr, PRN      Continuous Infusions:   sodium chloride      dextrose         Review of Systems  All systems reviewed.  All negative except for symptoms mentioned in HPI     OBJECTIVE    BP (!) 172/77   Pulse 91   Temp 98.2 °F (36.8 °C) (Oral)   Resp 20   Ht 5' 6\" (1.676 m)   Wt (!) 345 lb (156.5 kg)   LMP 10/01/2012 (Approximate) Comment: 2013  SpO2 98%   BMI 55.68 kg/m²   No intake or output data in the 24 hours ending 04/10/22 2100    Physical examination:  General: awake alert, oriented x3  HEENT: normocephalic non traumatic, no exophthalmos Neck: supple, No thyroid tenderness,  Pulm: good equal air entry no added sounds  CVS: S1 + S2  Abd: soft lax, no tenderness  Skin: warm, no lesions, no rash. No open wounds, no ulcers   Neuro: CN intact, sensation decreased bilateral , muscle power normal  Psych: normal mood, and affect    Review of Laboratory Data:  I personally reviewed the following labs:   Recent Labs     04/08/22  1315 04/09/22  0453 04/10/22  0503   WBC 15.6* 14.6* 12.5*   RBC 4.78 4.61 4.50   HGB 14.3 14.0 13.6   HCT 42.8 41.6 41.1   MCV 89.5 90.2 91.3   MCH 29.9 30.4 30.2   MCHC 33.4 33.7 33.1   RDW 12.7 12.7 12.7    286 219   MPV 11.1 11.4 11.1     Recent Labs     04/08/22  1315 04/09/22  0453 04/10/22  0503   * 135 136   K 4.6 5.0 4.1   CL 94* 96* 97*   CO2 24 22 27   BUN 36* 39* 33*   CREATININE 0.9 1.0 0.9   GLUCOSE 493* 496* 312*   CALCIUM 9.3 9.1 8.6   PROT 6.9 6.4 5.8*   LABALBU 4.0 3.7 3.5   BILITOT 0.7 0.5 0.5   ALKPHOS 110* 108* 86   AST 15 17 20   ALT 22 22 27     Beta-Hydroxybutyrate   Date Value Ref Range Status   07/14/2021 0.13 0.02 - 0.27 mmol/L Final   02/27/2017 0.57 (H) 0.02 - 0.27 mmol/L Final     Lab Results   Component Value Date    LABA1C 8.6 04/09/2022    LABA1C 8.1 04/01/2022    LABA1C 8.2 12/07/2021     Lab Results   Component Value Date/Time    TSH 2.160 04/10/2022 05:03 AM    T4FREE 1.30 04/10/2022 05:03 AM    D1VCEDU 7.5 04/13/2016 04:30 PM     Lab Results   Component Value Date    LABA1C 8.6 04/09/2022    GLUCOSE 312 04/10/2022    MALBCR - 05/18/2021    LABMICR <12.0 05/18/2021    LABCREA 106 05/18/2021     Lab Results   Component Value Date    TRIG 221 04/09/2022    HDL 57 04/09/2022    LDLCALC 94 04/09/2022    CHOL 195 04/09/2022       Blood culture   Lab Results   Component Value Date    BC 24 Hours no growth 04/06/2022       Radiology:  XR CHEST PA LATERAL W BOTH OBLIQUE PROJECTIONS   Final Result   No acute process.          XR CHEST PORTABLE   Final Result   Prominence of the cardiac silhouette. No new cardiopulmonary pathology. Medical Records/Labs/Images review:   I personally reviewed and summarized previous records   All labs and imaging were reviewed independently     [address], a 56 y.o.-old female seen today for inpatient diabetes management     Diabetes Mellitus type 2   · Patient's diabetes is uncontrolled, currently worsened with steroids    · will change diabetes regimen to:  · Lantus 40 u BID  · Humalog 24 u with meals   · High dose sliding scale   · Continue glucose check with meals and at bedtime   · Will titrate insulin dose based on the blood glucose trend & insulin requirement  · Will arrange for patient to be seen in endocrinology clinic upon discharge for routine diabetes maintenance and prevention. Asthma exacerbation   · Management per primary service   · Will monitor BG and adjust steroids while on steroids     H/o Primary hypothyroidism   · Pt was on synthroid in the past  · Check TFT     Thank you for allowing us to participate in the care of this patient. Please do not hesitate to contact us with any additional questions. Jim Cevallos MD  Endocrinologist, [facility] - [facility] Diabetes Care and Endocrinology   [address] [place] [address]   Phone: [phone]  Fax: [phone]  --------------------------------------------  An electronic signature was used to authenticate this note.  Beto Albarado MD on 4/10/2022 at 9:00 PM

## 2022-04-10 NOTE — PROGRESS NOTES
Hospitalist Progress Note      SYNOPSIS: Patient admitted on 4/4/2022 for Acute asthma exacerbation      SUBJECTIVE:    Patient seen and examined  Records reviewed. 71-year-old lady past medical history of asthma, hypertension, type 2 diabetes on Trulicity, migraine, morbid obesity presented due to asthma exacerbation. She had been seen outpatient with no improvement given this she was admitted started on IV steroids with improvement. She will benefit from maintenance inhaler at the time of discharge for asthma. Also seen by cardiology at this is initial concern for atrial fibrillation which was ruled out. Patient had issues with blood pressure control given this patient's Lopressor was changed to Cardizem per cardiology and also hydralazine was increased. Lisinopril continued. Current concern is patient's blood sugar control as she is worried about her blood sugar running high. Of note on discussion with the patient she does not check her blood sugars at home as her glucometer broke and she has not had one in the last 3 years. Also on review of PCPs notes outpatient she has had some issues with noncompliance with taking her medications. Patient did mention that her insurance did not play for another glucometer as the only pay for 1 every 5 years. Discussed he had concern her blood sugar has been running high at home as she does not check her sugars though steroids are also not helping as these also cause blood sugar to be high. I did offer the patient trial of oral hypoglycemics but she adamantly refused as she mentioned she did not do well with Metformin as she mentioned it almost killed her. Also mentioned she has tried at least 9 oral hypoglycemics and none of them do well with her. Concerning insulin she did mention she does not want to go home with insulin as she does not feel she would do well with it. She has never seen an endocrinologist, I did offer her an endocrinology consultation. She was initially reluctant but later in agreement to see what they have to offer. Stable overnight. No other overnight issues reported. Temp (24hrs), Av.1 °F (36.7 °C), Min:97.9 °F (36.6 °C), Max:98.2 °F (36.8 °C)    DIET: ADULT DIET; Regular; 4 carb choices (60 gm/meal)  CODE: Full Code    Intake/Output Summary (Last 24 hours) at 4/10/2022 1504  Last data filed at 2022  Gross per 24 hour   Intake 120 ml   Output --   Net 120 ml       OBJECTIVE:    BP (!) 172/77   Pulse 91   Temp 98.2 °F (36.8 °C) (Oral)   Resp 20   Ht 5' 6\" (1.676 m)   Wt (!) 345 lb (156.5 kg)   LMP 10/01/2012 (Approximate) Comment:   SpO2 98%   BMI 55.68 kg/m²     General appearance: No apparent distress, appears stated age and cooperative. HEENT:  Conjunctivae/corneas clear. Neck: Supple. No jugular venous distention. Respiratory: Clear to auscultation bilaterally, normal respiratory effort  Cardiovascular: Regular rate rhythm, normal S1-S2  Abdomen: Soft, nontender, nondistended  Musculoskeletal: No clubbing, cyanosis, no bilateral lower extremity edema. Brisk capillary refill. Skin:  No rashes  on visible skin  Neurologic: awake, alert and following commands       Assessment and plan:  1.  Asthma exacerbation: stable on room air and no wheezing on exam. Continue prednisone 3 more days which can be finished at home at discharge. Continue breathing treatments.       2.  Essential hypertension: Blood pressure medication has been adjusted by cardiology as Metoprolol was discontinued with Cardizem CD added.  Continue lisinopril. BP still not controlled increase hydralazine from 50 mg TID to 100 mg TID. 3.  Diabetes type II with hyperglycemia:  A1c 8.6. With endocrinologist recommendation concerning blood sugar control.   Trulicity dose may be increased but will defer to endocrinology.     4.  Migraine with exacerbation: Continue NSAID     5.  Morbid obesity: BMI 55.6     6.  GERD without esophagitis: Continue PPI     7.  Staph bacteremia: Likely a contaminant, antibiotics discontinued by infectious disease on discussion with ID no need for antibiotics.        8.  Paroxysmal atrial fibrillation: Ruled out by cardiology     Noncompliance-patient has been written for glucometer and strips to help check her blood sugars at this to help keep a close eye on her blood sugars management at home. DISPOSITION:     Medications:  REVIEWED DAILY    Infusion Medications    sodium chloride      dextrose       Scheduled Medications    insulin glargine  40 Units SubCUTAneous BID    insulin lispro  22 Units SubCUTAneous TID WC    [START ON 4/11/2022] predniSONE  20 mg Oral Daily    albuterol  2.5 mg Nebulization Q6H    guaiFENesin  400 mg Oral TID    enoxaparin  40 mg SubCUTAneous BID    hydrALAZINE  50 mg Oral 3 times per day    dilTIAZem  360 mg Oral Daily    aspirin EC  81 mg Oral Daily    atorvastatin  20 mg Oral Daily    lisinopril  40 mg Oral Daily    sodium chloride flush  5-40 mL IntraVENous 2 times per day    insulin lispro  0-18 Units SubCUTAneous TID WC    insulin lispro  0-9 Units SubCUTAneous Nightly     PRN Meds: benzonatate, benzocaine, sodium chloride flush, sodium chloride, ondansetron **OR** ondansetron, polyethylene glycol, ipratropium-albuterol, ibuprofen, hydrALAZINE, glucose, dextrose, glucagon (rDNA), dextrose    Labs:     Recent Labs     04/08/22  1315 04/09/22  0453 04/10/22  0503   WBC 15.6* 14.6* 12.5*   HGB 14.3 14.0 13.6   HCT 42.8 41.6 41.1    286 219       Recent Labs     04/08/22  1315 04/09/22  0453 04/10/22  0503   * 135 136   K 4.6 5.0 4.1   CL 94* 96* 97*   CO2 24 22 27   BUN 36* 39* 33*   CREATININE 0.9 1.0 0.9   CALCIUM 9.3 9.1 8.6       Recent Labs     04/08/22  1315 04/09/22  0453 04/10/22  0503   PROT 6.9 6.4 5.8*   ALKPHOS 110* 108* 86   ALT 22 22 27   AST 15 17 20   BILITOT 0.7 0.5 0.5       No results for input(s): INR in the last 72 hours.     No results for input(s): Isabelle Mota in the last 72 hours. Chronic labs:    Lab Results   Component Value Date    CHOL 195 04/09/2022    TRIG 221 (H) 04/09/2022    HDL 57 04/09/2022    LDLCALC 94 04/09/2022    TSH 2.160 04/10/2022    INR 1.1 07/15/2021    LABA1C 8.6 (H) 04/09/2022       Radiology: REVIEWED DAILY    +++++++++++++++++++++++++++++++++++++++++++++++++  Eduardo Lyn MD  [facility] Physician - [address] [place] [address] Rd, [address]  +++++++++++++++++++++++++++++++++++++++++++++++++  NOTE: This report was transcribed using voice recognition software. Every effort was made to ensure accuracy; however, inadvertent computerized transcription errors may be present.

## 2022-04-11 LAB
ALBUMIN SERPL-MCNC: 3.1 G/DL (ref 3.5–5.2)
ALP BLD-CCNC: 84 U/L (ref 35–104)
ALT SERPL-CCNC: 34 U/L (ref 0–32)
ANION GAP SERPL CALCULATED.3IONS-SCNC: 9 MMOL/L (ref 7–16)
AST SERPL-CCNC: 14 U/L (ref 0–31)
BASOPHILS ABSOLUTE: 0.05 E9/L (ref 0–0.2)
BASOPHILS RELATIVE PERCENT: 0.4 % (ref 0–2)
BILIRUB SERPL-MCNC: 0.4 MG/DL (ref 0–1.2)
BLOOD CULTURE, ROUTINE: NORMAL
BUN BLDV-MCNC: 29 MG/DL (ref 6–20)
CALCIUM SERPL-MCNC: 8.2 MG/DL (ref 8.6–10.2)
CHLORIDE BLD-SCNC: 98 MMOL/L (ref 98–107)
CO2: 28 MMOL/L (ref 22–29)
CREAT SERPL-MCNC: 0.9 MG/DL (ref 0.5–1)
CULTURE, BLOOD 2: NORMAL
EOSINOPHILS ABSOLUTE: 0.04 E9/L (ref 0.05–0.5)
EOSINOPHILS RELATIVE PERCENT: 0.3 % (ref 0–6)
GFR AFRICAN AMERICAN: >60
GFR NON-AFRICAN AMERICAN: >60 ML/MIN/1.73
GLUCOSE BLD-MCNC: 190 MG/DL (ref 74–99)
HCT VFR BLD CALC: 42.4 % (ref 34–48)
HEMOGLOBIN: 14 G/DL (ref 11.5–15.5)
IMMATURE GRANULOCYTES #: 0.3 E9/L
IMMATURE GRANULOCYTES %: 2.2 % (ref 0–5)
LYMPHOCYTES ABSOLUTE: 4.85 E9/L (ref 1.5–4)
LYMPHOCYTES RELATIVE PERCENT: 34.9 % (ref 20–42)
MCH RBC QN AUTO: 30.4 PG (ref 26–35)
MCHC RBC AUTO-ENTMCNC: 33 % (ref 32–34.5)
MCV RBC AUTO: 92.2 FL (ref 80–99.9)
METER GLUCOSE: 161 MG/DL (ref 74–99)
METER GLUCOSE: 166 MG/DL (ref 74–99)
METER GLUCOSE: 244 MG/DL (ref 74–99)
METER GLUCOSE: 258 MG/DL (ref 74–99)
MONOCYTES ABSOLUTE: 0.98 E9/L (ref 0.1–0.95)
MONOCYTES RELATIVE PERCENT: 7.1 % (ref 2–12)
NEUTROPHILS ABSOLUTE: 7.66 E9/L (ref 1.8–7.3)
NEUTROPHILS RELATIVE PERCENT: 55.1 % (ref 43–80)
PDW BLD-RTO: 12.6 FL (ref 11.5–15)
PLATELET # BLD: 200 E9/L (ref 130–450)
PMV BLD AUTO: 11.1 FL (ref 7–12)
POTASSIUM REFLEX MAGNESIUM: 3.8 MMOL/L (ref 3.5–5)
RBC # BLD: 4.6 E12/L (ref 3.5–5.5)
SODIUM BLD-SCNC: 135 MMOL/L (ref 132–146)
TOTAL PROTEIN: 5.4 G/DL (ref 6.4–8.3)
WBC # BLD: 13.9 E9/L (ref 4.5–11.5)

## 2022-04-11 PROCEDURE — 1200000000 HC SEMI PRIVATE

## 2022-04-11 PROCEDURE — S5553 INSULIN LONG ACTING 5 U: HCPCS | Performed by: FAMILY MEDICINE

## 2022-04-11 PROCEDURE — 6370000000 HC RX 637 (ALT 250 FOR IP): Performed by: INTERNAL MEDICINE

## 2022-04-11 PROCEDURE — 2580000003 HC RX 258: Performed by: INTERNAL MEDICINE

## 2022-04-11 PROCEDURE — 99232 SBSQ HOSP IP/OBS MODERATE 35: CPT | Performed by: INTERNAL MEDICINE

## 2022-04-11 PROCEDURE — 6360000002 HC RX W HCPCS: Performed by: INTERNAL MEDICINE

## 2022-04-11 PROCEDURE — 6370000000 HC RX 637 (ALT 250 FOR IP): Performed by: FAMILY MEDICINE

## 2022-04-11 PROCEDURE — 36415 COLL VENOUS BLD VENIPUNCTURE: CPT

## 2022-04-11 PROCEDURE — 82962 GLUCOSE BLOOD TEST: CPT

## 2022-04-11 PROCEDURE — 85025 COMPLETE CBC W/AUTO DIFF WBC: CPT

## 2022-04-11 PROCEDURE — 80053 COMPREHEN METABOLIC PANEL: CPT

## 2022-04-11 PROCEDURE — 6360000002 HC RX W HCPCS: Performed by: FAMILY MEDICINE

## 2022-04-11 PROCEDURE — 94640 AIRWAY INHALATION TREATMENT: CPT

## 2022-04-11 RX ADMIN — INSULIN LISPRO 3 UNITS: 100 INJECTION, SOLUTION INTRAVENOUS; SUBCUTANEOUS at 13:21

## 2022-04-11 RX ADMIN — INSULIN LISPRO 24 UNITS: 100 INJECTION, SOLUTION INTRAVENOUS; SUBCUTANEOUS at 18:37

## 2022-04-11 RX ADMIN — GUAIFENESIN 400 MG: 400 TABLET ORAL at 09:53

## 2022-04-11 RX ADMIN — INSULIN LISPRO 24 UNITS: 100 INJECTION, SOLUTION INTRAVENOUS; SUBCUTANEOUS at 13:21

## 2022-04-11 RX ADMIN — INSULIN GLARGINE-YFGN 40 UNITS: 100 INJECTION, SOLUTION SUBCUTANEOUS at 21:35

## 2022-04-11 RX ADMIN — INSULIN LISPRO 9 UNITS: 100 INJECTION, SOLUTION INTRAVENOUS; SUBCUTANEOUS at 21:35

## 2022-04-11 RX ADMIN — ALBUTEROL SULFATE 2.5 MG: 2.5 SOLUTION RESPIRATORY (INHALATION) at 03:04

## 2022-04-11 RX ADMIN — ONDANSETRON 4 MG: 2 INJECTION INTRAMUSCULAR; INTRAVENOUS at 01:18

## 2022-04-11 RX ADMIN — HYDRALAZINE HYDROCHLORIDE 100 MG: 50 TABLET, FILM COATED ORAL at 21:33

## 2022-04-11 RX ADMIN — INSULIN GLARGINE-YFGN 40 UNITS: 100 INJECTION, SOLUTION SUBCUTANEOUS at 09:57

## 2022-04-11 RX ADMIN — INSULIN LISPRO 3 UNITS: 100 INJECTION, SOLUTION INTRAVENOUS; SUBCUTANEOUS at 09:58

## 2022-04-11 RX ADMIN — HYDRALAZINE HYDROCHLORIDE 100 MG: 50 TABLET, FILM COATED ORAL at 06:21

## 2022-04-11 RX ADMIN — GUAIFENESIN 400 MG: 400 TABLET ORAL at 21:34

## 2022-04-11 RX ADMIN — LISINOPRIL 40 MG: 20 TABLET ORAL at 09:53

## 2022-04-11 RX ADMIN — DILTIAZEM HYDROCHLORIDE 360 MG: 180 CAPSULE, EXTENDED RELEASE ORAL at 09:53

## 2022-04-11 RX ADMIN — ASPIRIN 81 MG: 81 TABLET, COATED ORAL at 09:53

## 2022-04-11 RX ADMIN — GUAIFENESIN 400 MG: 400 TABLET ORAL at 13:24

## 2022-04-11 RX ADMIN — ATORVASTATIN CALCIUM 20 MG: 20 TABLET, FILM COATED ORAL at 09:53

## 2022-04-11 RX ADMIN — ONDANSETRON 4 MG: 2 INJECTION INTRAMUSCULAR; INTRAVENOUS at 21:34

## 2022-04-11 RX ADMIN — POLYETHYLENE GLYCOL 3350 17 G: 17 POWDER, FOR SOLUTION ORAL at 13:28

## 2022-04-11 RX ADMIN — INSULIN LISPRO 6 UNITS: 100 INJECTION, SOLUTION INTRAVENOUS; SUBCUTANEOUS at 18:37

## 2022-04-11 RX ADMIN — INSULIN LISPRO 24 UNITS: 100 INJECTION, SOLUTION INTRAVENOUS; SUBCUTANEOUS at 09:58

## 2022-04-11 RX ADMIN — ALBUTEROL SULFATE 2.5 MG: 2.5 SOLUTION RESPIRATORY (INHALATION) at 16:14

## 2022-04-11 RX ADMIN — SODIUM CHLORIDE, PRESERVATIVE FREE 10 ML: 5 INJECTION INTRAVENOUS at 21:34

## 2022-04-11 RX ADMIN — HYDRALAZINE HYDROCHLORIDE 100 MG: 50 TABLET, FILM COATED ORAL at 13:24

## 2022-04-11 RX ADMIN — SODIUM CHLORIDE, PRESERVATIVE FREE 10 ML: 5 INJECTION INTRAVENOUS at 09:54

## 2022-04-11 RX ADMIN — PREDNISONE 20 MG: 20 TABLET ORAL at 09:53

## 2022-04-11 ASSESSMENT — PAIN SCALES - GENERAL
PAINLEVEL_OUTOF10: 0
PAINLEVEL_OUTOF10: 0

## 2022-04-11 NOTE — PROGRESS NOTES
Hospitalist Progress Note      SYNOPSIS: Patient admitted on 2022 for acute asthma exacerbation. Patient was started on breathing treatments and steroids. Patient also has history of uncontrolled diabetes mellitus. Patient has not been taking good care of herself since her per  passed away recently. Endocrinology has been consulted and the patient has been started on insulin. Cardiology were consulted as well and Lopressor has been stopped and Cardizem has been started. Hydralazine has been increased. Currently titrating inulin prior to discharge. SUBJECTIVE:    Patient seen and examined  Records reviewed. The patient reports that she has some abdominal discomfort since starting insulin. She states her breathing is stable at the moment. She is coping with the death of her  recently. Stable overnight. No other overnight issues reported. Temp (24hrs), Av.3 °F (36.3 °C), Min:96.6 °F (35.9 °C), Max:98.2 °F (36.8 °C)    DIET: ADULT DIET; Regular; 4 carb choices (60 gm/meal)  CODE: Full Code    Intake/Output Summary (Last 24 hours) at 2022 1036  Last data filed at 2022 0602  Gross per 24 hour   Intake --   Output 500 ml   Net -500 ml       OBJECTIVE:    BP (!) 142/65   Pulse 82   Temp 96.6 °F (35.9 °C) (Temporal)   Resp 20   Ht 5' 6\" (1.676 m)   Wt (!) 345 lb (156.5 kg)   LMP 10/01/2012 (Approximate) Comment: 2013  SpO2 95%   BMI 55.68 kg/m²     General appearance: No apparent distress, appears stated age and cooperative. HEENT:  Conjunctivae/corneas clear. Respiratory: Diminished but clear otherwise  Cardiovascular: Regular rate rhythm, normal S1-S2  Abdomen: Soft, nontender, nondistended  Musculoskeletal: No clubbing, cyanosis, trace bilateral lower extremity edema.   Skin:  No rashes  on visible skin  Neurologic: awake, alert and following commands     ASSESSMENT and plan:  Asthma exacerbation  -Continue prednisone for 1 more days  -Continue breathing treatments    Hypertension  -Continue Cardizem  -Continue lisinopril  -Hydralazine was increased by cardiology 100 mg 3 times daily  -Paroxysmal A. fib was ruled out by cardiology    Diabetes mellitus type 2  -Appreciate endocrinology recommendations  -Continue Lantus 40 units twice daily  -Continue Humalog 24 units with meals and high-dose sliding scale  -Endocrinology to titrate insulin dose based upon blood glucose and insulin  -Patient will need outpatient follow-up with endocrinology    Discharge plan Home once appropriate dosing of insulin has been titrated.  Likely tomorrow AM        DISPOSITION:     Medications:  REVIEWED DAILY    Infusion Medications    sodium chloride      dextrose       Scheduled Medications    insulin lispro  24 Units SubCUTAneous TID WC    insulin glargine  40 Units SubCUTAneous BID    predniSONE  20 mg Oral Daily    hydrALAZINE  100 mg Oral 3 times per day    insulin lispro  0-18 Units SubCUTAneous 4x Daily AC & HS    albuterol  2.5 mg Nebulization Q6H    guaiFENesin  400 mg Oral TID    enoxaparin  40 mg SubCUTAneous BID    dilTIAZem  360 mg Oral Daily    aspirin EC  81 mg Oral Daily    atorvastatin  20 mg Oral Daily    lisinopril  40 mg Oral Daily    sodium chloride flush  5-40 mL IntraVENous 2 times per day     PRN Meds: benzonatate, benzocaine, sodium chloride flush, sodium chloride, ondansetron **OR** ondansetron, polyethylene glycol, ipratropium-albuterol, ibuprofen, hydrALAZINE, glucose, dextrose, glucagon (rDNA), dextrose    Labs:     Recent Labs     04/09/22  0453 04/10/22  0503 04/11/22  0515   WBC 14.6* 12.5* 13.9*   HGB 14.0 13.6 14.0   HCT 41.6 41.1 42.4    219 200       Recent Labs     04/09/22  0453 04/10/22  0503 04/11/22  0515    136 135   K 5.0 4.1 3.8   CL 96* 97* 98   CO2 22 27 28   BUN 39* 33* 29*   CREATININE 1.0 0.9 0.9   CALCIUM 9.1 8.6 8.2*       Recent Labs     04/09/22  0453 04/10/22  0503 04/11/22  0515   PROT 6.4 5.8* 5.4*

## 2022-04-11 NOTE — CARE COORDINATION
Here for acute asthma exacerbation. also has history of uncontrolled diabetes mellitus  345 lbs. Endocrinology consult bs today 161on humalog insulin. cardiology consult- on lopressor. Discharge plan - home. Patient said she will need a Dr. Release on day of discharge to return to work. She said her brother will bring her truck here for her to drive home at time of discharge. Cm/marsha to follow. Electronically signed by Radha Bhatti RN ( [phone]) on 4/11/2022 at 1:38 PM PT Evaluation   3/9/20: D/c pt from skilled PT  Pt has not returned to therapy  Today's date: 2020  Patient name: Fabrizio Burton  : 1979  MRN: [id number]  Referring provider: QUE Navas  Dx:   Encounter Diagnosis     ICD-10-CM    1  Neck pain M54 2 Ambulatory referral to Physical Therapy   2  Frequent headaches R51 Ambulatory referral to Physical Therapy                  Assessment  Assessment details: Pt is a 45y o  year old female coming to outpatient PT with neck pain and HA  Pt presents with increased pain, decreased cervical ROM, increased muscle tone and TTP and overall decreased functional mobility  Pt would benefit from skilled PT services in order to address these deficits and reach maximum level of function  Thank you kindly for the referral!  Pt has been instructed in a stretching HEP  Pt may not be able to attend therapy on a regular basis due to high insurance co pay ( $75)  Impairments: abnormal muscle firing, abnormal muscle tone, abnormal or restricted ROM and lacks appropriate home exercise program  Barriers to therapy: 1  High insurance co pay ( $75)  Understanding of Dx/Px/POC: good   Prognosis: good    Goals  STG's ( 3-4 weeks)  1  Pt will be independent in HEP  2  Decrease pain to 4-5/10 with activity  LTG's ( 6- 8 weeks)  1  Improve FOTO score by 2-3 points  2  Improve cervical ROM to WFL's  3   Decrease frequency and intensity of HA    Plan  Patient would benefit from: PT eval and skilled physical therapy  Planned modality interventions: TENS  Planned therapy interventions: manual therapy, neuromuscular re-education, strengthening, stretching, therapeutic activities, therapeutic exercise, functional ROM exercises, flexibility and home exercise program  Frequency: 1x week  Duration in weeks: 8  Plan of Care beginning date: 2020  Plan of Care expiration date: 2020  Treatment plan discussed with: patient        Subjective Evaluation    History of Present Illness  Mechanism of injury: Pt reports her neck " has always bothered her"  Pt began to have increased HA over the past 3 weeks  Pt has increased stiffness when looking to read a book and increased intermittent pinching pain when looking up to the ceiling  Pt does not use a pillow at night when sleeping, but has increased pain when sleeping  Pt ordered new glasses and she should wear them on a daily basis  Pt has increased neck pain at times when driving  Pt denies head injury or concussion      Work: works in a billing department; computer work   Hobbies: bowling, going out with family or friends  Gait: no abnormalities  Pain  At best pain rating: 3  At worst pain ratin  Location: neck pain  Quality: sharp  Relieving factors: heat    Social Support    Employment status: working  Hand dominance: right      Diagnostic Tests  No diagnostic tests performed  Treatments  No previous or current treatments  Patient Goals  Patient goals for therapy: decreased pain  Patient goal: to learn appropriate exercises        Objective     Neurological Testing     Sensation   Cervical/Thoracic   Left   Intact: light touch    Right   Intact: light touch    Reflexes   Left   Biceps (C5/C6): normal (2+)  Brachioradialis (C6): normal (2+)  Triceps (C7): normal (2+)    Right   Biceps (C5/C6): normal (2+)  Brachioradialis (C6): normal (2+)  Triceps (C7): normal (2+)    Active Range of Motion   Cervical/Thoracic Spine       Cervical    Flexion: 28 degrees   Extension: 76 degrees      Left lateral flexion: 25 degrees      Right lateral flexion: 25 degrees      Left rotation: 58 degrees  Right rotation: 60 degrees           Additional Active Range of Motion Details  (+) TTP R > L upper trap/ levator scap/ rhomboid musculature  Mild hypomobility cervical spine with PA and lateral glides  Relief with cervical distraction  (-) Alar ligament laxity  (-) sharp vance test  (-) VAT  (-) B Gerda Hallpike test; pt reports dizziness, but no nystagmus noted on L  UE ROM and strength: WFL's      Flowsheet Rows      Most Recent Value   PT/OT G-Codes   Current Score  87   Projected Score  85            Dx: neck pain/ HA  EPOC: 4/13/20  CO-MORBIDITIES: high co pay  PERSONAL FACTORS:  Precautions: none      Manual              Cervical PA and lateral glides             B GISTM/ STM upper trap/levator scap/ rhomboid             Cervical distraction                                           Exercise Diary  2/17            HEP instruction 5'                         Chin tuck             Supine chin nod/ flexion             Upper trap stretch             Levator scap stretch             Rhomboid stretch             Cervical rotation sitting on pball                                                                                                                                                                             Modalities

## 2022-04-12 LAB
ALBUMIN SERPL-MCNC: 3.1 G/DL (ref 3.5–5.2)
ALP BLD-CCNC: 85 U/L (ref 35–104)
ALT SERPL-CCNC: 35 U/L (ref 0–32)
ANION GAP SERPL CALCULATED.3IONS-SCNC: 9 MMOL/L (ref 7–16)
AST SERPL-CCNC: 14 U/L (ref 0–31)
BASOPHILS ABSOLUTE: 0.04 E9/L (ref 0–0.2)
BASOPHILS RELATIVE PERCENT: 0.3 % (ref 0–2)
BILIRUB SERPL-MCNC: 0.5 MG/DL (ref 0–1.2)
BUN BLDV-MCNC: 35 MG/DL (ref 6–20)
CALCIUM SERPL-MCNC: 8.4 MG/DL (ref 8.6–10.2)
CHLORIDE BLD-SCNC: 98 MMOL/L (ref 98–107)
CO2: 28 MMOL/L (ref 22–29)
CREAT SERPL-MCNC: 1.2 MG/DL (ref 0.5–1)
EOSINOPHILS ABSOLUTE: 0.11 E9/L (ref 0.05–0.5)
EOSINOPHILS RELATIVE PERCENT: 0.8 % (ref 0–6)
GFR AFRICAN AMERICAN: 58
GFR NON-AFRICAN AMERICAN: 48 ML/MIN/1.73
GLUCOSE BLD-MCNC: 111 MG/DL (ref 74–99)
HCT VFR BLD CALC: 43.5 % (ref 34–48)
HEMOGLOBIN: 14 G/DL (ref 11.5–15.5)
IMMATURE GRANULOCYTES #: 0.31 E9/L
IMMATURE GRANULOCYTES %: 2.1 % (ref 0–5)
LYMPHOCYTES ABSOLUTE: 4.92 E9/L (ref 1.5–4)
LYMPHOCYTES RELATIVE PERCENT: 33.6 % (ref 20–42)
MCH RBC QN AUTO: 30 PG (ref 26–35)
MCHC RBC AUTO-ENTMCNC: 32.2 % (ref 32–34.5)
MCV RBC AUTO: 93.1 FL (ref 80–99.9)
METER GLUCOSE: 107 MG/DL (ref 74–99)
METER GLUCOSE: 123 MG/DL (ref 74–99)
METER GLUCOSE: 207 MG/DL (ref 74–99)
METER GLUCOSE: 298 MG/DL (ref 74–99)
MONOCYTES ABSOLUTE: 0.88 E9/L (ref 0.1–0.95)
MONOCYTES RELATIVE PERCENT: 6 % (ref 2–12)
NEUTROPHILS ABSOLUTE: 8.4 E9/L (ref 1.8–7.3)
NEUTROPHILS RELATIVE PERCENT: 57.2 % (ref 43–80)
PDW BLD-RTO: 12.7 FL (ref 11.5–15)
PLATELET # BLD: 192 E9/L (ref 130–450)
PMV BLD AUTO: 11.6 FL (ref 7–12)
POTASSIUM REFLEX MAGNESIUM: 4.1 MMOL/L (ref 3.5–5)
RBC # BLD: 4.67 E12/L (ref 3.5–5.5)
SODIUM BLD-SCNC: 135 MMOL/L (ref 132–146)
TOTAL PROTEIN: 5.7 G/DL (ref 6.4–8.3)
WBC # BLD: 14.7 E9/L (ref 4.5–11.5)

## 2022-04-12 PROCEDURE — 94640 AIRWAY INHALATION TREATMENT: CPT

## 2022-04-12 PROCEDURE — 99232 SBSQ HOSP IP/OBS MODERATE 35: CPT | Performed by: INTERNAL MEDICINE

## 2022-04-12 PROCEDURE — 6370000000 HC RX 637 (ALT 250 FOR IP): Performed by: INTERNAL MEDICINE

## 2022-04-12 PROCEDURE — S5553 INSULIN LONG ACTING 5 U: HCPCS | Performed by: INTERNAL MEDICINE

## 2022-04-12 PROCEDURE — 6370000000 HC RX 637 (ALT 250 FOR IP): Performed by: FAMILY MEDICINE

## 2022-04-12 PROCEDURE — 1200000000 HC SEMI PRIVATE

## 2022-04-12 PROCEDURE — 85025 COMPLETE CBC W/AUTO DIFF WBC: CPT

## 2022-04-12 PROCEDURE — S5553 INSULIN LONG ACTING 5 U: HCPCS | Performed by: FAMILY MEDICINE

## 2022-04-12 PROCEDURE — 80053 COMPREHEN METABOLIC PANEL: CPT

## 2022-04-12 PROCEDURE — 36415 COLL VENOUS BLD VENIPUNCTURE: CPT

## 2022-04-12 PROCEDURE — 6360000002 HC RX W HCPCS: Performed by: INTERNAL MEDICINE

## 2022-04-12 PROCEDURE — 82962 GLUCOSE BLOOD TEST: CPT

## 2022-04-12 PROCEDURE — 2580000003 HC RX 258: Performed by: INTERNAL MEDICINE

## 2022-04-12 RX ORDER — PREDNISONE 20 MG/1
20 TABLET ORAL DAILY
Qty: 1 TABLET | Refills: 0 | Status: SHIPPED | OUTPATIENT
Start: 2022-04-13 | End: 2022-04-14

## 2022-04-12 RX ORDER — PEN NEEDLE, DIABETIC 32 GX 1/4"
NEEDLE, DISPOSABLE MISCELLANEOUS
Qty: 250 EACH | Refills: 5 | Status: SHIPPED | OUTPATIENT
Start: 2022-04-12 | End: 2022-04-12 | Stop reason: SDUPTHER

## 2022-04-12 RX ORDER — GLUCOSAMINE HCL/CHONDROITIN SU 500-400 MG
CAPSULE ORAL
Qty: 100 STRIP | Refills: 0 | Status: SHIPPED | OUTPATIENT
Start: 2022-04-12 | End: 2022-04-14

## 2022-04-12 RX ORDER — HYDRALAZINE HYDROCHLORIDE 100 MG/1
100 TABLET, FILM COATED ORAL EVERY 8 HOURS SCHEDULED
Qty: 90 TABLET | Refills: 0 | Status: SHIPPED | OUTPATIENT
Start: 2022-04-12 | End: 2022-05-03 | Stop reason: SDUPTHER

## 2022-04-12 RX ORDER — PEN NEEDLE, DIABETIC 32 GX 1/4"
NEEDLE, DISPOSABLE MISCELLANEOUS
Qty: 250 EACH | Refills: 5 | Status: SHIPPED | OUTPATIENT
Start: 2022-04-12

## 2022-04-12 RX ORDER — INSULIN GLARGINE-YFGN 100 [IU]/ML
35 INJECTION, SOLUTION SUBCUTANEOUS 2 TIMES DAILY
Status: DISCONTINUED | OUTPATIENT
Start: 2022-04-12 | End: 2022-04-13 | Stop reason: HOSPADM

## 2022-04-12 RX ORDER — LANCETS 30 GAUGE
1 EACH MISCELLANEOUS DAILY
Qty: 100 EACH | Refills: 5 | Status: SHIPPED | OUTPATIENT
Start: 2022-04-12 | End: 2022-05-12 | Stop reason: DRUGHIGH

## 2022-04-12 RX ORDER — DILTIAZEM HYDROCHLORIDE 360 MG/1
360 CAPSULE, EXTENDED RELEASE ORAL DAILY
Qty: 30 CAPSULE | Refills: 0 | Status: SHIPPED | OUTPATIENT
Start: 2022-04-13 | End: 2022-04-12

## 2022-04-12 RX ORDER — INSULIN LISPRO 100 [IU]/ML
INJECTION, SOLUTION INTRAVENOUS; SUBCUTANEOUS
Qty: 15 PEN | Refills: 3 | Status: SHIPPED | OUTPATIENT
Start: 2022-04-12 | End: 2022-04-12 | Stop reason: SDUPTHER

## 2022-04-12 RX ORDER — INSULIN LISPRO 100 [IU]/ML
INJECTION, SOLUTION INTRAVENOUS; SUBCUTANEOUS
Qty: 15 PEN | Refills: 3 | Status: SHIPPED | OUTPATIENT
Start: 2022-04-12 | End: 2022-05-12 | Stop reason: ALTCHOICE

## 2022-04-12 RX ORDER — INSULIN GLARGINE 100 [IU]/ML
30 INJECTION, SOLUTION SUBCUTANEOUS 2 TIMES DAILY
Qty: 20 PEN | Refills: 3 | Status: SHIPPED | OUTPATIENT
Start: 2022-04-12 | End: 2022-04-12 | Stop reason: SDUPTHER

## 2022-04-12 RX ORDER — BENZONATATE 200 MG/1
200 CAPSULE ORAL 3 TIMES DAILY PRN
Qty: 21 CAPSULE | Refills: 0 | Status: SHIPPED | OUTPATIENT
Start: 2022-04-12 | End: 2022-04-12

## 2022-04-12 RX ORDER — HYDRALAZINE HYDROCHLORIDE 100 MG/1
100 TABLET, FILM COATED ORAL EVERY 8 HOURS SCHEDULED
Qty: 90 TABLET | Refills: 0 | Status: SHIPPED | OUTPATIENT
Start: 2022-04-12 | End: 2022-04-12

## 2022-04-12 RX ORDER — BENZONATATE 200 MG/1
200 CAPSULE ORAL 3 TIMES DAILY PRN
Qty: 21 CAPSULE | Refills: 0 | Status: SHIPPED | OUTPATIENT
Start: 2022-04-12 | End: 2022-04-19

## 2022-04-12 RX ORDER — DULAGLUTIDE 1.5 MG/.5ML
1.5 INJECTION, SOLUTION SUBCUTANEOUS WEEKLY
Qty: 2 ML | Refills: 3 | Status: SHIPPED | OUTPATIENT
Start: 2022-04-12 | End: 2022-05-12 | Stop reason: DRUGHIGH

## 2022-04-12 RX ORDER — DULAGLUTIDE 1.5 MG/.5ML
1.5 INJECTION, SOLUTION SUBCUTANEOUS WEEKLY
Qty: 2 ML | Refills: 3 | Status: SHIPPED | OUTPATIENT
Start: 2022-04-12 | End: 2022-04-12

## 2022-04-12 RX ORDER — BLOOD-GLUCOSE METER
1 KIT MISCELLANEOUS DAILY
Qty: 1 KIT | Refills: 0 | Status: SHIPPED | OUTPATIENT
Start: 2022-04-12

## 2022-04-12 RX ORDER — DILTIAZEM HYDROCHLORIDE 360 MG/1
360 CAPSULE, EXTENDED RELEASE ORAL DAILY
Qty: 30 CAPSULE | Refills: 0 | Status: SHIPPED | OUTPATIENT
Start: 2022-04-13 | End: 2022-05-03 | Stop reason: SDUPTHER

## 2022-04-12 RX ORDER — INSULIN GLARGINE 100 [IU]/ML
30 INJECTION, SOLUTION SUBCUTANEOUS 2 TIMES DAILY
Qty: 20 PEN | Refills: 3 | Status: SHIPPED | OUTPATIENT
Start: 2022-04-12 | End: 2022-09-20

## 2022-04-12 RX ORDER — PREDNISONE 20 MG/1
20 TABLET ORAL DAILY
Qty: 1 TABLET | Refills: 0 | Status: SHIPPED | OUTPATIENT
Start: 2022-04-13 | End: 2022-04-12

## 2022-04-12 RX ORDER — ASPIRIN 81 MG/1
81 TABLET ORAL DAILY
Qty: 90 TABLET | Refills: 1 | Status: SHIPPED | OUTPATIENT
Start: 2022-04-12

## 2022-04-12 RX ADMIN — LISINOPRIL 40 MG: 20 TABLET ORAL at 09:01

## 2022-04-12 RX ADMIN — GUAIFENESIN 400 MG: 400 TABLET ORAL at 09:02

## 2022-04-12 RX ADMIN — GUAIFENESIN 400 MG: 400 TABLET ORAL at 14:07

## 2022-04-12 RX ADMIN — ONDANSETRON 4 MG: 2 INJECTION INTRAMUSCULAR; INTRAVENOUS at 09:19

## 2022-04-12 RX ADMIN — HYDRALAZINE HYDROCHLORIDE 100 MG: 50 TABLET, FILM COATED ORAL at 14:07

## 2022-04-12 RX ADMIN — INSULIN LISPRO 9 UNITS: 100 INJECTION, SOLUTION INTRAVENOUS; SUBCUTANEOUS at 21:42

## 2022-04-12 RX ADMIN — INSULIN LISPRO 6 UNITS: 100 INJECTION, SOLUTION INTRAVENOUS; SUBCUTANEOUS at 18:17

## 2022-04-12 RX ADMIN — INSULIN LISPRO 24 UNITS: 100 INJECTION, SOLUTION INTRAVENOUS; SUBCUTANEOUS at 18:17

## 2022-04-12 RX ADMIN — HYDRALAZINE HYDROCHLORIDE 100 MG: 50 TABLET, FILM COATED ORAL at 06:40

## 2022-04-12 RX ADMIN — DILTIAZEM HYDROCHLORIDE 360 MG: 180 CAPSULE, EXTENDED RELEASE ORAL at 09:01

## 2022-04-12 RX ADMIN — IPRATROPIUM BROMIDE AND ALBUTEROL SULFATE 1 AMPULE: .5; 2.5 SOLUTION RESPIRATORY (INHALATION) at 17:10

## 2022-04-12 RX ADMIN — ASPIRIN 81 MG: 81 TABLET, COATED ORAL at 09:01

## 2022-04-12 RX ADMIN — GUAIFENESIN 400 MG: 400 TABLET ORAL at 21:39

## 2022-04-12 RX ADMIN — INSULIN GLARGINE-YFGN 40 UNITS: 100 INJECTION, SOLUTION SUBCUTANEOUS at 09:21

## 2022-04-12 RX ADMIN — PREDNISONE 20 MG: 20 TABLET ORAL at 09:01

## 2022-04-12 RX ADMIN — SODIUM CHLORIDE, PRESERVATIVE FREE 10 ML: 5 INJECTION INTRAVENOUS at 09:25

## 2022-04-12 RX ADMIN — HYDRALAZINE HYDROCHLORIDE 100 MG: 50 TABLET, FILM COATED ORAL at 21:39

## 2022-04-12 RX ADMIN — INSULIN GLARGINE-YFGN 35 UNITS: 100 INJECTION, SOLUTION SUBCUTANEOUS at 21:41

## 2022-04-12 RX ADMIN — INSULIN LISPRO 24 UNITS: 100 INJECTION, SOLUTION INTRAVENOUS; SUBCUTANEOUS at 09:02

## 2022-04-12 RX ADMIN — ATORVASTATIN CALCIUM 20 MG: 20 TABLET, FILM COATED ORAL at 09:02

## 2022-04-12 RX ADMIN — INSULIN LISPRO 24 UNITS: 100 INJECTION, SOLUTION INTRAVENOUS; SUBCUTANEOUS at 13:54

## 2022-04-12 ASSESSMENT — PAIN SCALES - GENERAL
PAINLEVEL_OUTOF10: 0

## 2022-04-12 NOTE — PLAN OF CARE
Problem: Skin Integrity:  Goal: Will show no infection signs and symptoms  Description: Will show no infection signs and symptoms  4/12/2022 1117 by Eliot Rodriguez RN  Outcome: Completed  4/12/2022 0445 by Angel Dominguez RN  Outcome: Met This Shift  4/12/2022 0445 by Angel Dominguez RN  Outcome: Met This Shift  Goal: Absence of new skin breakdown  Description: Absence of new skin breakdown  4/12/2022 1117 by Eliot Rodriguez RN  Outcome: Completed  4/12/2022 0445 by Angel Dominguez RN  Outcome: Met This Shift  4/12/2022 0445 by Angel Dominguez RN  Outcome: Met This Shift     Problem: Breathing Pattern - Ineffective:  Goal: Ability to achieve and maintain a regular respiratory rate will improve  Description: Ability to achieve and maintain a regular respiratory rate will improve  4/12/2022 1117 by Eliot Rodriguez RN  Outcome: Completed  4/12/2022 0445 by Angel Dominguez RN  Outcome: Met This Shift  4/12/2022 0445 by Angel Dominguez RN  Outcome: Met This Shift     Problem: Falls - Risk of:  Goal: Will remain free from falls  Description: Will remain free from falls  4/12/2022 1117 by Eliot Rodriguez RN  Outcome: Completed  4/12/2022 0445 by Angel Dominguez RN  Outcome: Met This Shift  4/12/2022 0445 by Angel Dominguez RN  Outcome: Met This Shift  Goal: Absence of physical injury  Description: Absence of physical injury  4/12/2022 1117 by Eliot Rodriguez RN  Outcome: Completed  4/12/2022 0445 by Angel Dominguez RN  Outcome: Met This Shift  4/12/2022 0445 by Angel Dominguez RN  Outcome: Met This Shift     Problem: Discharge Planning:  Goal: Discharged to appropriate level of care  Description: Discharged to appropriate level of care  4/12/2022 1117 by Eliot Rodriguez RN  Outcome: Completed  4/12/2022 0445 by Angel Dominguez RN  Outcome: Met This Shift     Problem: Serum Glucose Level - Abnormal:  Goal: Ability to maintain appropriate glucose levels will improve  Description: Ability to maintain appropriate glucose levels will improve  4/12/2022 1117 by Marco Venita Huynh RN  Outcome: Completed  4/12/2022 0445 by Jose Manuel Guido RN  Outcome: Met This Shift     Problem: Sensory Perception - Impaired:  Goal: Ability to maintain a stable neurologic state will improve  Description: Ability to maintain a stable neurologic state will improve  4/12/2022 1117 by Berle Mcardle, RN  Outcome: Completed  4/12/2022 0445 by Jose Manuel Guido RN  Outcome: Met This Shift

## 2022-04-12 NOTE — PROGRESS NOTES
ENDOCRINOLOGY PROGRESS NOTE  [facility]       Date of admission: 4/4/2022  Date of service: 4/12/2022  Admitting physician: Yessi Padron MD   Primary Care Physician: Jerry Sheldon MD  Consultant physician: Eula Patel MD     Reason for the consultation:  DM type 2, currently worsened with steroids     History of Present Illness: The history is provided by the patient. Accuracy of the patient data is excellent    Eliana Robles is a very pleasant 56 y.o. old female with PMH of DM type 2, HTN, HLD, Hypothyroidism, CAD and other  listed below admitted to [facility] on 4/4/2022 because of SOB, endocrine service was consulted for diabetes management.      Subjective   Seen today, no acute events, BG better today     Inpatient diet:   Carb Restricted diet     Point of care glucose monitoring   (Independently reviewed)   Recent Labs     04/10/22  2108 04/11/22  0628 04/11/22  1200 04/11/22  1640 04/11/22  2002 04/12/22  0638 04/12/22  1307 04/12/22  1654   GLUMET 321* 161* 166* 244* 258* 107* 123* 207*     Scheduled Meds:   insulin lispro  24 Units SubCUTAneous TID WC    insulin glargine  40 Units SubCUTAneous BID    predniSONE  20 mg Oral Daily    hydrALAZINE  100 mg Oral 3 times per day    insulin lispro  0-18 Units SubCUTAneous 4x Daily AC & HS    albuterol  2.5 mg Nebulization Q6H    guaiFENesin  400 mg Oral TID    enoxaparin  40 mg SubCUTAneous BID    dilTIAZem  360 mg Oral Daily    aspirin EC  81 mg Oral Daily    atorvastatin  20 mg Oral Daily    lisinopril  40 mg Oral Daily    sodium chloride flush  5-40 mL IntraVENous 2 times per day       PRN Meds:   benzonatate, 200 mg, TID PRN  benzocaine, , BID PRN  sodium chloride flush, 5-40 mL, PRN  sodium chloride, , PRN  ondansetron, 4 mg, Q8H PRN   Or  ondansetron, 4 mg, Q6H PRN  polyethylene glycol, 17 g, Daily PRN  ipratropium-albuterol, 1 ampule, Q4H PRN  ibuprofen, 400 mg, Q6H PRN  hydrALAZINE, 10 mg, Q6H PRN  glucose, 15 g, PRN  dextrose, 12.5 g, PRN  glucagon (rDNA), 1 mg, PRN  dextrose, 100 mL/hr, PRN      Continuous Infusions:   sodium chloride      dextrose         Review of Systems  All systems reviewed. All negative except for symptoms mentioned in HPI     OBJECTIVE    BP (!) 118/53   Pulse 89   Temp 97.9 °F (36.6 °C) (Oral)   Resp 18   Ht 5' 6\" (1.676 m)   Wt (!) 345 lb (156.5 kg)   LMP 10/01/2012 (Approximate) Comment: 2013  SpO2 95%   BMI 55.68 kg/m²   No intake or output data in the 24 hours ending 04/12/22 1919    Physical examination:  General: awake alert, oriented x3  HEENT: normocephalic non traumatic, no exophthalmos   Neck: supple, No thyroid tenderness,  Pulm: good equal air entry no added sounds  CVS: S1 + S2  Abd: soft lax, no tenderness  Skin: warm, no lesions, no rash.  No open wounds, no ulcers   Neuro: CN intact, sensation decreased bilateral , muscle power normal  Psych: normal mood, and affect    Review of Laboratory Data:  I personally reviewed the following labs:   Recent Labs     04/10/22  0503 04/11/22 0515 04/12/22 0447   WBC 12.5* 13.9* 14.7*   RBC 4.50 4.60 4.67   HGB 13.6 14.0 14.0   HCT 41.1 42.4 43.5   MCV 91.3 92.2 93.1   MCH 30.2 30.4 30.0   MCHC 33.1 33.0 32.2   RDW 12.7 12.6 12.7    200 192   MPV 11.1 11.1 11.6     Recent Labs     04/10/22  0503 04/11/22  0515 04/12/22 0447    135 135   K 4.1 3.8 4.1   CL 97* 98 98   CO2 27 28 28   BUN 33* 29* 35*   CREATININE 0.9 0.9 1.2*   GLUCOSE 312* 190* 111*   CALCIUM 8.6 8.2* 8.4*   PROT 5.8* 5.4* 5.7*   LABALBU 3.5 3.1* 3.1*   BILITOT 0.5 0.4 0.5   ALKPHOS 86 84 85   AST 20 14 14   ALT 27 34* 35*     Beta-Hydroxybutyrate   Date Value Ref Range Status   07/14/2021 0.13 0.02 - 0.27 mmol/L Final   02/27/2017 0.57 (H) 0.02 - 0.27 mmol/L Final     Lab Results   Component Value Date    LABA1C 8.6 04/09/2022    LABA1C 8.1 04/01/2022    LABA1C 8.2 12/07/2021     Lab Results   Component Value Date/Time    TSH 2.160 04/10/2022 05:03 AM    T4FREE 1.30 04/10/2022 05:03 AM    X9DLBZN 7.5 04/13/2016 04:30 PM     Lab Results   Component Value Date    LABA1C 8.6 04/09/2022    GLUCOSE 111 04/12/2022    MALBCR - 05/18/2021    LABMICR <12.0 05/18/2021    LABCREA 106 05/18/2021     Lab Results   Component Value Date    TRIG 221 04/09/2022    HDL 57 04/09/2022    LDLCALC 94 04/09/2022    CHOL 195 04/09/2022       Blood culture   Lab Results   Component Value Date    BC 5 Days no growth 04/06/2022       Radiology:  XR CHEST PA LATERAL W BOTH OBLIQUE PROJECTIONS   Final Result   No acute process. XR CHEST PORTABLE   Final Result   Prominence of the cardiac silhouette. No new cardiopulmonary pathology. Medical Records/Labs/Images review:   I personally reviewed and summarized previous records   All labs and imaging were reviewed independently     [address] [facility], a 56 y.o.-old female seen today for inpatient diabetes management     Diabetes Mellitus type 2   · Patient's diabetes is uncontrolled, currently worsened with steroids    · While inpatient we recommend the following diabetes regimen   · Lantus 35 u BID  · Humalog 24 u with meals   · High dose sliding scale   · Continue glucose check with meals and at bedtime   · Will titrate insulin dose based on the blood glucose trend & insulin requirement  · On discharge we recommend Lantus 30u BID, Humalog 18u with meals + high ss   Endocrine follow up visit, Tuesday 4/19 at 9:30am   Asthma exacerbation   · Management per primary service   · Will monitor BG and adjust steroids while on steroids     H/o Primary hypothyroidism   · Pt was on synthroid in the past  · TFT is normal now     Thank you for allowing us to participate in the care of this patient. Please do not hesitate to contact us with any additional questions.      Saad Kelly MD  Endocrinologist, [facility]   [address] [facility] [address]   Phone: [phone]  Fax: [phone]  --------------------------------------------  An electronic signature was used to authenticate this note.  Rohith Romano MD on 4/12/2022 at 7:19 PM

## 2022-04-12 NOTE — PLAN OF CARE
Problem: Skin Integrity:  Goal: Will show no infection signs and symptoms  4/12/2022 0445 by Sam Tello RN  Outcome: Met This Shift  4/12/2022 0445 by Sam Tello RN  Outcome: Met This Shift  Goal: Absence of new skin breakdown  4/12/2022 0445 by Sam Tello RN  Outcome: Met This Shift  4/12/2022 0445 by Sam Tello RN  Outcome: Met This Shift     Problem: Breathing Pattern - Ineffective:  Goal: Ability to achieve and maintain a regular respiratory rate will improve  4/12/2022 0445 by Sam Tello RN  Outcome: Met This Shift  4/12/2022 0445 by Sam Tello RN  Outcome: Met This Shift     Problem: Falls - Risk of:  Goal: Will remain free from falls  4/12/2022 0445 by Sam Tello RN  Outcome: Met This Shift  4/12/2022 0445 by Sam Tello RN  Outcome: Met This Shift  Goal: Absence of physical injury  4/12/2022 0445 by Sam Tello RN  Outcome: Met This Shift  4/12/2022 0445 by Sam Tello RN  Outcome: Met This Shift     Problem: Discharge Planning:  Goal: Discharged to appropriate level of care  Outcome: Met This Shift     Problem: Serum Glucose Level - Abnormal:  Goal: Ability to maintain appropriate glucose levels will improve  Outcome: Met This Shift     Problem: Sensory Perception - Impaired:  Goal: Ability to maintain a stable neurologic state will improve  Outcome: Met This Shift

## 2022-04-12 NOTE — PROGRESS NOTES
Hospitalist Progress Note      SYNOPSIS: Patient admitted on 2022 for Acute asthma exacerbation      SUBJECTIVE:    Patient seen and examined at bedside. Denies any acute concerns. Saturating well on room air. Records reviewed. Stable overnight. No other overnight issues reported. Temp (24hrs), Av.3 °F (36.8 °C), Min:98.2 °F (36.8 °C), Max:98.3 °F (36.8 °C)    DIET: ADULT DIET; Regular; 4 carb choices (60 gm/meal)  CODE: Full Code  No intake or output data in the 24 hours ending 22 1530    OBJECTIVE:    /76   Pulse 108   Temp 98.3 °F (36.8 °C) (Axillary)   Resp 18   Ht 5' 6\" (1.676 m)   Wt (!) 345 lb (156.5 kg)   LMP 10/01/2012 (Approximate) Comment: 2013  SpO2 95%   BMI 55.68 kg/m²       General-no acute distress  Respiratory-bilateral entry fair. No obvious wheezing or crackles  CVS-S1-S2, RRR  Abdomen, soft, nontender, nondistended  CNS-AOx3, NFND      Assessment and plan    Patient, 69-year-old female with past medical history of diabetes mellitus type 2, uncontrolled, hypertension, hyperlipidemia, hypothyroidism, CAD, morbid obesity with BMI 55.6 was admitted on 3/4/2022 for acute asthma exacerbation. Patient was treated per protocol including with steroids. Seen by cardiology for uncontrolled hypertension, started on Cardizem CD/hydralazine. Patient also history of medication noncompliance. Seen by endocrinologist, adjusted insulin regimen. ASSESSMENT:      Acute asthma exacerbation  Hypertension-controlled  Diabetes mellitus type 2, uncontrolled hemoglobin A1c 8.6%  Chronic migraine  Morbid obesity  GERD        PLAN:    Patient is medically stable for discharge home with discharge  As patient pharmacy cannot deliver her insulin till tomorrow.   Will keep the patient tonight on current insulin regimen  Discharge in a.m. after morning dose of insulin  Continue rest of the management        Medications:  REVIEWED DAILY    Infusion Medications    sodium chloride  dextrose       Scheduled Medications    insulin lispro  24 Units SubCUTAneous TID     insulin glargine  40 Units SubCUTAneous BID    predniSONE  20 mg Oral Daily    hydrALAZINE  100 mg Oral 3 times per day    insulin lispro  0-18 Units SubCUTAneous 4x Daily AC & HS    albuterol  2.5 mg Nebulization Q6H    guaiFENesin  400 mg Oral TID    enoxaparin  40 mg SubCUTAneous BID    dilTIAZem  360 mg Oral Daily    aspirin EC  81 mg Oral Daily    atorvastatin  20 mg Oral Daily    lisinopril  40 mg Oral Daily    sodium chloride flush  5-40 mL IntraVENous 2 times per day     PRN Meds: benzonatate, benzocaine, sodium chloride flush, sodium chloride, ondansetron **OR** ondansetron, polyethylene glycol, ipratropium-albuterol, ibuprofen, hydrALAZINE, glucose, dextrose, glucagon (rDNA), dextrose    Labs:     Recent Labs     04/10/22  0503 04/11/22  0515 04/12/22  0447   WBC 12.5* 13.9* 14.7*   HGB 13.6 14.0 14.0   HCT 41.1 42.4 43.5    200 192       Recent Labs     04/10/22  0503 04/11/22  0515 04/12/22  0447    135 135   K 4.1 3.8 4.1   CL 97* 98 98   CO2 27 28 28   BUN 33* 29* 35*   CREATININE 0.9 0.9 1.2*   CALCIUM 8.6 8.2* 8.4*       Recent Labs     04/10/22  0503 04/11/22  0515 04/12/22  0447   PROT 5.8* 5.4* 5.7*   ALKPHOS 86 84 85   ALT 27 34* 35*   AST 20 14 14   BILITOT 0.5 0.4 0.5       No results for input(s): INR in the last 72 hours. No results for input(s): Dave Kimberlee in the last 72 hours.     Chronic labs:    Lab Results   Component Value Date    CHOL 195 04/09/2022    TRIG 221 (H) 04/09/2022    HDL 57 04/09/2022    LDLCALC 94 04/09/2022    TSH 2.160 04/10/2022    INR 1.1 07/15/2021    LABA1C 8.6 (H) 04/09/2022       Radiology: REVIEWED DAILY    +++++++++++++++++++++++++++++++++++++++++++++++++  Stephen Landa MD  [facility] Phys[facility] - [address] [facility] [place]  +++++++++++++++++++++++++++++++++++++++++++++++++  NOTE: This report was transcribed using voice recognition software. Every effort was made to ensure accuracy; however, inadvertent computerized transcription errors may be present.

## 2022-04-12 NOTE — DISCHARGE SUMMARY
Labs     04/10/22  0503 04/11/22  0515 04/12/22  0447    135 135   K 4.1 3.8 4.1   CL 97* 98 98   CO2 27 28 28   BUN 33* 29* 35*   CREATININE 0.9 0.9 1.2*     LFT:  Recent Labs     04/10/22  0503 04/11/22  0515 04/12/22  0447   PROT 5.8* 5.4* 5.7*   ALKPHOS 86 84 85   ALT 27 34* 35*   AST 20 14 14   BILITOT 0.5 0.4 0.5     PT/INR: No results for input(s): INR, APTT in the last 72 hours. BNP: No results for input(s): BNP in the last 72 hours. Hgb A1C:   Lab Results   Component Value Date    LABA1C 8.6 (H) 04/09/2022     Folate and B12: No results found for: [id number], No results found for: FOLATE  Thyroid Studies:   Lab Results   Component Value Date    TSH 2.160 04/10/2022    G4RNEYU 7.5 04/13/2016       Urinalysis:    Lab Results   Component Value Date    NITRU Negative 02/17/2021    WBCUA NONE 02/17/2021    BACTERIA NONE SEEN 02/17/2021    RBCUA NONE 02/17/2021    RBCUA 0-1 01/09/2014    BLOODU Negative 02/17/2021    SPECGRAV 1.020 02/17/2021    GLUCOSEU Negative 02/17/2021       Imaging:  XR CHEST PA LATERAL W BOTH OBLIQUE PROJECTIONS    Result Date: 4/7/2022  EXAMINATION: CHEST XRAY SPECIAL VIEWS 4/7/2022 3:43 pm COMPARISON: 04/04/2022 HISTORY: ORDERING SYSTEM PROVIDED HISTORY: asthma, cough, pneumonia TECHNOLOGIST PROVIDED HISTORY: Reason for exam:->asthma, cough, pneumonia What reading provider will be dictating this exam?->CRC FINDINGS: The lungs are without acute focal process. There is no effusion or pneumothorax. Stable heart size. The osseous structures are without acute process. No acute process. XR CHEST PORTABLE    Result Date: 4/4/2022  EXAMINATION: ONE XRAY VIEW OF THE CHEST 4/4/2022 11:23 pm COMPARISON: Chest series from July 14, 2021 HISTORY: ORDERING SYSTEM PROVIDED HISTORY: chest pain sob TECHNOLOGIST PROVIDED HISTORY: Reason for exam:->chest pain sob What reading provider will be dictating this exam?->CRC FINDINGS: Adequate and symmetric aeration of the lungs.  There are no demonstrate no acute abnormality. There is no acute abnormality of the thoracic aorta. Lungs/pleura: The lungs are without acute process. No focal consolidation or pulmonary edema. No evidence of pleural effusion or pneumothorax. Upper Abdomen: Diffuse fatty infiltration of the liver. The gallbladder is surgically absent. Soft Tissues/Bones: No acute bone or soft tissue abnormality. No evidence of pulmonary embolism or acute pulmonary abnormality. Study was technically limited by obesity. RECOMMENDATIONS: Unavailable       Discharge Medications:      Medication List      START taking these medications    * BD Pen Needle Micro U/F 32G X 6 MM Misc  Generic drug: Insulin Pen Needle  Uses with insulin 4 times a day     * BD Pen Needle Micro U/F 32G X 6 MM Misc  Generic drug: Insulin Pen Needle  Uses with insulin 4 times a day     benzonatate 200 MG capsule  Commonly known as: TESSALON  Take 1 capsule by mouth 3 times daily as needed for Cough     blood glucose test strips  Test 3 times a day & as needed for symptoms of irregular blood glucose. Dispense sufficient amount for indicated testing frequency plus additional to accommodate PRN testing needs. dilTIAZem 360 MG extended release capsule  Commonly known as: CARDIZEM CD  Take 1 capsule by mouth daily  Start taking on: April 13, 2022     glucose monitoring kit  1 kit by Does not apply route daily     insulin lispro (1 Unit Dial) 100 UNIT/ML Sopn  Commonly known as: HumaLOG KwikPen  Inject 18 units with meals + sliding scale. -200 add 3U, -250 add 6U, -300 add 9U, -350 add 12U, -400 add 15U, BS over 400 add 18U.  MAC 100u/DAY     Lantus SoloStar 100 UNIT/ML injection pen  Generic drug: insulin glargine  Inject 30 Units into the skin 2 times daily     predniSONE 20 MG tablet  Commonly known as: DELTASONE  Take 1 tablet by mouth daily for 1 dose  Start taking on: April 13, 2022         * This list has 2 medication(s) that are the Jadiel Gomez    Phone: [phone]   · aspirin EC 81 MG EC tablet  · BD Pen Needle Micro U/F 32G X 6 MM Misc  · BD Pen Needle Micro U/F 32G X 6 MM Misc  · benzonatate 200 MG capsule  · blood glucose test strips  · dilTIAZem 360 MG extended release capsule  · glucose monitoring kit  · hydrALAZINE 100 MG tablet  · insulin lispro (1 Unit Dial) 100 UNIT/ML Sopn  · Lancets Misc  · Lantus SoloStar 100 UNIT/ML injection pen  · predniSONE 20 MG tablet  · Trulicity 1.5 WF/7.6KM Sopn         Time Spent on discharge is more than 35 minutes in the examination, evaluation, counseling and review of medications and discharge plan.    +++++++++++++++++++++++++++++++++++++++++++++++++  Christine Dotson MD  [facility] Physician - [address] [place] [address]  +++++++++++++++++++++++++++++++++++++++++++++++++  NOTE: This report was transcribed using voice recognition software. Every effort was made to ensure accuracy; however, inadvertent computerized transcription errors may be present.

## 2022-04-12 NOTE — CARE COORDINATION
Discharge order noted. Met with patient who states she does not have glucometer- hers is broken. Also c/o not having any money to pay for medications. \"I'll just be right back in here in the emergency room tonight. \" [address] [place] [address] Road  spoke with Milad Rowe - no co pays for any medications however meds teresa not be delivered until tomorrow and they do not allow  service. also faxed glucometer script  [phone] to see if she can get another. Await call  back. Message to attending. cm/sw to follow. Electronically signed by Rickie Healy RN on 4/12/2022 at 12:50 PM    Consult to diabetes ed called [phone]. Electronically signed by Rickie Healy RN on 4/12/2022 at 2:22 PM    Call from [employer] - insurance will allow only 1 glucometer per 720 days. They will deliver her meds tomorrow 4/13/22. Script sent to outpt pharmacy here and she can get glucometer and lancets. She was already delivered test strips and patient did verify that her son did get test strips yesterday. She was stable for discharge however she was unable to get her meds for discharge today. cm/sw to follow. Electronically signed by Rickie Healy RN on 4/12/2022 at 3:41 PM

## 2022-04-12 NOTE — PROGRESS NOTES
ENDOCRINOLOGY PROGRESS NOTE      Date of admission: 4/4/2022  Date of service: 4/11/2022  Admitting physician: Jaelyn Eugene MD   Primary Care Physician: Amedeo Ganser, MD  Consultant physician: Valeria Guillermo MD     Reason for the consultation:  DM type 2, currently worsened with steroids     History of Present Illness: The history is provided by the patient. Accuracy of the patient data is excellent    Eliana Medina is a very pleasant 56 y.o. old female with PMH of DM type 2, HTN, HLD, Hypothyroidism, CAD and other  listed below admitted to [facility] on 4/4/2022 because of SOB, endocrine service was consulted for diabetes management.      Subjective   Seen and examined, no acute events, BG improving     Inpatient diet:   Carb Restricted diet     Point of care glucose monitoring   (Independently reviewed)   Recent Labs     04/10/22  0451 04/10/22  1204 04/10/22  1729 04/10/22  2108 04/11/22  0628 04/11/22  1200 04/11/22  1640 04/11/22 2002   GLUMET 289* 325* 328* 321* 161* 166* 244* 258*     Scheduled Meds:   insulin lispro  24 Units SubCUTAneous TID WC    insulin glargine  40 Units SubCUTAneous BID    predniSONE  20 mg Oral Daily    hydrALAZINE  100 mg Oral 3 times per day    insulin lispro  0-18 Units SubCUTAneous 4x Daily AC & HS    albuterol  2.5 mg Nebulization Q6H    guaiFENesin  400 mg Oral TID    enoxaparin  40 mg SubCUTAneous BID    dilTIAZem  360 mg Oral Daily    aspirin EC  81 mg Oral Daily    atorvastatin  20 mg Oral Daily    lisinopril  40 mg Oral Daily    sodium chloride flush  5-40 mL IntraVENous 2 times per day       PRN Meds:   benzonatate, 200 mg, TID PRN  benzocaine, , BID PRN  sodium chloride flush, 5-40 mL, PRN  sodium chloride, , PRN  ondansetron, 4 mg, Q8H PRN   Or  ondansetron, 4 mg, Q6H PRN  polyethylene glycol, 17 g, Daily PRN  ipratropium-albuterol, 1 ampule, Q4H PRN  ibuprofen, 400 mg, Q6H PRN  hydrALAZINE, 10 mg, Q6H PRN  glucose, 15 g, PRN  dextrose, 12.5 g, PRN  glucagon Component Value Date/Time    TSH 2.160 04/10/2022 05:03 AM    T4FREE 1.30 04/10/2022 05:03 AM    Q4IXHUM 7.5 04/13/2016 04:30 PM     Lab Results   Component Value Date    LABA1C 8.6 04/09/2022    GLUCOSE 190 04/11/2022    MALBCR - 05/18/2021    LABMICR <12.0 05/18/2021    LABCREA 106 05/18/2021     Lab Results   Component Value Date    TRIG 221 04/09/2022    HDL 57 04/09/2022    LDLCALC 94 04/09/2022    CHOL 195 04/09/2022       Blood culture   Lab Results   Component Value Date    BC 5 Days no growth 04/06/2022       Radiology:  XR CHEST PA LATERAL W BOTH OBLIQUE PROJECTIONS   Final Result   No acute process. XR CHEST PORTABLE   Final Result   Prominence of the cardiac silhouette. No new cardiopulmonary pathology. Medical Records/Labs/Images review:   I personally reviewed and summarized previous records   All labs and imaging were reviewed independently     [address] [place] [address], a 56 y.o.-old female seen today for inpatient diabetes management     Diabetes Mellitus type 2   · Patient's diabetes is uncontrolled, currently worsened with steroids    · will change diabetes regimen to:  · Lantus 40 u BID  · Humalog 24 u with meals   · High dose sliding scale   · Continue glucose check with meals and at bedtime   · Will titrate insulin dose based on the blood glucose trend & insulin requirement  · Will arrange for patient to be seen in endocrinology clinic upon discharge for routine diabetes maintenance and prevention. Asthma exacerbation   · Management per primary service   · Will monitor BG and adjust steroids while on steroids     H/o Primary hypothyroidism   · Pt was on synthroid in the past  · Check TFT     Thank you for allowing us to participate in the care of this patient. Please do not hesitate to contact us with any additional questions.      Eula Patel MD  Endocrinologist, [facility] [[email or web address]] Diabetes Care and Endocrinology   [address] [facility] [address]   Phone: [phone]  Fax: [phone]  --------------------------------------------  An electronic signature was used to authenticate this note.  Surjit Young MD on 4/11/2022 at 8:54 PM

## 2022-04-12 NOTE — PROGRESS NOTES
Comprehensive Nutrition Assessment    Type and Reason for Visit:  Initial (LOS)    Nutrition Recommendations/Plan: No further recommendations at this time. Will continue to monitor. Nutrition Assessment:  Pt adm w/ SOB 2/2 asthma exacerbation. Note uncontrolled DM, poor PO PTA since loss of . Pt tolerating diet, consuming >75% most meals. Will continue to monitor. Nutrition Related Findings:  A&Ox4, active BS, round/soft abd, +2 BLE edema, I/Os not recorded      Wounds:  None       Current Nutrition Therapies:    ADULT DIET;  Regular; 4 carb choices (60 gm/meal)    Anthropometric Measures:  · Height: 5' 6\" (167.6 cm)  · Current Body Weight: 345 lb (156.5 kg) (4/5 bed scale)   · Admission Body Weight: 345 lb (156.5 kg) (4/5 first taken bed scale)    · Usual Body Weight: 354 lb 15 oz (161 kg) (7/2021 x 9 months)     · Ideal Body Weight: 130 lbs; % Ideal Body Weight 265.4 %   · BMI: 55.7   · BMI Categories: Obese Class 3 (BMI 40.0 or greater)       Nutrition Diagnosis:   No nutrition diagnosis at this time     Nutrition Interventions:   Food and/or Nutrient Delivery:  Continue Current Diet  Nutrition Education/Counseling:  Education not indicated   Coordination of Nutrition Care:  No recommendation at this time    Goals:  Pt continues to consume >75% meals       Nutrition Monitoring and Evaluation:   Behavioral-Environmental Outcomes:  None Identified   Food/Nutrient Intake Outcomes:  Food and Nutrient Intake  Physical Signs/Symptoms Outcomes:  Biochemical Data,Nutrition Focused Physical Findings,Skin,Weight,Chewing or Swallowing,GI Status,Fluid Status or Edema     Discharge Planning:    No discharge needs at this time     Electronically signed by Chico Ivy, MS, RD, LD on 4/12/22 at 8:37 AM EDT    Contact: [phone]

## 2022-04-12 NOTE — PROGRESS NOTES
Patient states the following concerns/barriers to diabetes self-management:     [] None       [x] Medication cost   [x] Food cost/availability        [] Reading  [] Hearing   [] Vision                [] Work    [] Transportation  [] No insurance  [] Physical limitations    [] Other:    Patient states the following about their diabetes/health:   [x] Meter is broken and has not tested since January. [x] Can only afford to eat one meal a day-usually evening. [x] Lost her job recently  [x] Son is available to  her glucometer and test strips at Dr. Lashay Escalona office 4/13/22 in the AM  [x] Had diabetes education about 16 years ago. Diabetes survival packet provided to:   [x] Patient     [] Other:    Information reviewed:   Definition of diabetes   Target glucose ranges/A1C   Self-monitoring of blood glucose   Prevention/symptoms/treatment of hypo-/hyperglycemia   Medication adherence   The plate method/meal planning guidelines   The benefits of exercise and recommendations   Reducing the risk of chronic complications    Stated she is aware of how to give herself insulin. Reviewed Humalog, Trulicity and Semglee Medications. She had no questions at this time. Reinforced the importance of eating 3 meals a day, about 60 grams of CHO per meal, especially when taking meal-time insulin. States she is currently not active, but plans to try to start. Stated she is looking into Bariatric Surgery since she has not been able to lose weight x 4 years. Stated she is interested in [facility] classes. Currently does not have a working glucometer. Dr Petar Alejandro called and he agreed to provide her a glucometer and test strip tomorrow. Patient stated her son Aiyana Muller will be able to pick them up in the morning. Floor nurse, Janina Soriano notified.            Post-education Assessment  [x]  Attentive to teaching  [x]  Answered questions appropriately when asked   [x]  Seems able to apply concepts to daily lifestyle  []  Seems motivated to do well  [x]  Verbalized an understanding of the plate method for portion control   [x]  Verbalized an understanding of prescribed antidiabetes medications   []  Verbalized an understanding of target glucose ranges/A1C level  [x]  Expresses an intent to comply with treatment plan   []  Showed very little interest in complying with treatment plan   []  Seems to have trouble applying concepts to daily lifestyle     Recommendations:   [x] Carbohydrate-controlled diet    [] Script for glucometer and supplies (per preference of patient's insurance)  [] Script for insulin pens and pen needles (if insulin is ordered at discharge for home use)   [] Consult to social work: patient has no insurance or has financial hardship  [] Inpatient consult to endocrinologist   [x] Follow up with endocrinologist as an outpatient   [] Home healthcare nursing to increase compliance to treatment plan   [x] Script for outpatient diabetes education classes (from doctor)    Thank you for this consult.

## 2022-04-13 VITALS
SYSTOLIC BLOOD PRESSURE: 109 MMHG | TEMPERATURE: 98.1 F | OXYGEN SATURATION: 93 % | BODY MASS INDEX: 47.09 KG/M2 | WEIGHT: 293 LBS | HEART RATE: 80 BPM | DIASTOLIC BLOOD PRESSURE: 51 MMHG | HEIGHT: 66 IN | RESPIRATION RATE: 19 BRPM

## 2022-04-13 LAB
ALBUMIN SERPL-MCNC: 3.1 G/DL (ref 3.5–5.2)
ALP BLD-CCNC: 78 U/L (ref 35–104)
ALT SERPL-CCNC: 32 U/L (ref 0–32)
ANION GAP SERPL CALCULATED.3IONS-SCNC: 8 MMOL/L (ref 7–16)
AST SERPL-CCNC: 13 U/L (ref 0–31)
BASOPHILS ABSOLUTE: 0.04 E9/L (ref 0–0.2)
BASOPHILS RELATIVE PERCENT: 0.2 % (ref 0–2)
BILIRUB SERPL-MCNC: 0.4 MG/DL (ref 0–1.2)
BUN BLDV-MCNC: 27 MG/DL (ref 6–20)
CALCIUM SERPL-MCNC: 8.2 MG/DL (ref 8.6–10.2)
CHLORIDE BLD-SCNC: 97 MMOL/L (ref 98–107)
CO2: 30 MMOL/L (ref 22–29)
CREAT SERPL-MCNC: 0.9 MG/DL (ref 0.5–1)
EOSINOPHILS ABSOLUTE: 0.13 E9/L (ref 0.05–0.5)
EOSINOPHILS RELATIVE PERCENT: 0.8 % (ref 0–6)
GFR AFRICAN AMERICAN: >60
GFR NON-AFRICAN AMERICAN: >60 ML/MIN/1.73
GLUCOSE BLD-MCNC: 87 MG/DL (ref 74–99)
HCT VFR BLD CALC: 41.7 % (ref 34–48)
HEMOGLOBIN: 13.3 G/DL (ref 11.5–15.5)
IMMATURE GRANULOCYTES #: 0.35 E9/L
IMMATURE GRANULOCYTES %: 2.2 % (ref 0–5)
LYMPHOCYTES ABSOLUTE: 5.97 E9/L (ref 1.5–4)
LYMPHOCYTES RELATIVE PERCENT: 36.8 % (ref 20–42)
MCH RBC QN AUTO: 30 PG (ref 26–35)
MCHC RBC AUTO-ENTMCNC: 31.9 % (ref 32–34.5)
MCV RBC AUTO: 93.9 FL (ref 80–99.9)
METER GLUCOSE: 114 MG/DL (ref 74–99)
METER GLUCOSE: 120 MG/DL (ref 74–99)
MONOCYTES ABSOLUTE: 1.05 E9/L (ref 0.1–0.95)
MONOCYTES RELATIVE PERCENT: 6.5 % (ref 2–12)
NEUTROPHILS ABSOLUTE: 8.69 E9/L (ref 1.8–7.3)
NEUTROPHILS RELATIVE PERCENT: 53.5 % (ref 43–80)
PDW BLD-RTO: 12.9 FL (ref 11.5–15)
PLATELET # BLD: 202 E9/L (ref 130–450)
PMV BLD AUTO: 11.8 FL (ref 7–12)
POTASSIUM REFLEX MAGNESIUM: 3.8 MMOL/L (ref 3.5–5)
RBC # BLD: 4.44 E12/L (ref 3.5–5.5)
SODIUM BLD-SCNC: 135 MMOL/L (ref 132–146)
TOTAL PROTEIN: 5.3 G/DL (ref 6.4–8.3)
WBC # BLD: 16.2 E9/L (ref 4.5–11.5)

## 2022-04-13 PROCEDURE — 82962 GLUCOSE BLOOD TEST: CPT

## 2022-04-13 PROCEDURE — 36415 COLL VENOUS BLD VENIPUNCTURE: CPT

## 2022-04-13 PROCEDURE — 6370000000 HC RX 637 (ALT 250 FOR IP): Performed by: INTERNAL MEDICINE

## 2022-04-13 PROCEDURE — 80053 COMPREHEN METABOLIC PANEL: CPT

## 2022-04-13 PROCEDURE — 6370000000 HC RX 637 (ALT 250 FOR IP): Performed by: FAMILY MEDICINE

## 2022-04-13 PROCEDURE — S5553 INSULIN LONG ACTING 5 U: HCPCS | Performed by: INTERNAL MEDICINE

## 2022-04-13 PROCEDURE — 85025 COMPLETE CBC W/AUTO DIFF WBC: CPT

## 2022-04-13 RX ADMIN — PREDNISONE 20 MG: 20 TABLET ORAL at 09:04

## 2022-04-13 RX ADMIN — ASPIRIN 81 MG: 81 TABLET, COATED ORAL at 09:05

## 2022-04-13 RX ADMIN — HYDRALAZINE HYDROCHLORIDE 100 MG: 50 TABLET, FILM COATED ORAL at 05:46

## 2022-04-13 RX ADMIN — ATORVASTATIN CALCIUM 20 MG: 20 TABLET, FILM COATED ORAL at 09:04

## 2022-04-13 RX ADMIN — INSULIN GLARGINE-YFGN 35 UNITS: 100 INJECTION, SOLUTION SUBCUTANEOUS at 09:29

## 2022-04-13 RX ADMIN — LISINOPRIL 40 MG: 20 TABLET ORAL at 09:04

## 2022-04-13 RX ADMIN — INSULIN LISPRO 24 UNITS: 100 INJECTION, SOLUTION INTRAVENOUS; SUBCUTANEOUS at 09:29

## 2022-04-13 RX ADMIN — DILTIAZEM HYDROCHLORIDE 360 MG: 180 CAPSULE, EXTENDED RELEASE ORAL at 09:04

## 2022-04-13 NOTE — PROGRESS NOTES
Brief progress note    Patient was discharged the same after dose of insulin. Discharge medication reconciliation complete.

## 2022-04-14 ENCOUNTER — TELEPHONE (OUTPATIENT)
Dept: FAMILY MEDICINE CLINIC | Age: 47
End: 2022-04-14

## 2022-04-14 DIAGNOSIS — E11.8 TYPE 2 DIABETES MELLITUS WITH COMPLICATION, WITHOUT LONG-TERM CURRENT USE OF INSULIN (HCC): Primary | ICD-10-CM

## 2022-04-14 LAB — MYCOPLASMA PNEUMONIAE IGM: PRESENT

## 2022-04-14 NOTE — TELEPHONE ENCOUNTER
[facility] Initial Follow Up Call    Outreach made within 2 business days of discharge: Yes    Patient: Deepak Corbin Patient : 1975   MRN: [id number]  Reason for Admission: Acute asthma exacerbation  Discharge Date: 22       Spoke with: Eliana    Discharge department/facility: [facility] Interactive Patient Contact:  Was patient able to fill all prescriptions: Yes  Was patient instructed to bring all medications to the follow-up visit: Yes  Patient stated do not want to schedule at this time. she would keep her appointment on 22. Is patient taking all medications as directed in the discharge summary?  Yes  Does patient understand their discharge instructions: Yes  Does patient have questions or concerns that need addressed prior to 7-14 day follow up office visit:    Scheduled appointment with PCP within 7-14 days    Follow Up  Future Appointments   Date Time Provider Jadiel Rose   2022  9:30 AM GEOVANI Fajardo NP [facility]   2022  3:20 PM Amedeo Ganser, MD [place] [address] [place] [address]

## 2022-04-15 ENCOUNTER — APPOINTMENT (OUTPATIENT)
Dept: CT IMAGING | Age: 47
End: 2022-04-15
Payer: MEDICAID

## 2022-04-15 ENCOUNTER — HOSPITAL ENCOUNTER (EMERGENCY)
Age: 47
Discharge: HOME | End: 2022-04-16
Attending: STUDENT IN AN ORGANIZED HEALTH CARE EDUCATION/TRAINING PROGRAM
Payer: MEDICAID

## 2022-04-15 DIAGNOSIS — R10.9 ABDOMINAL PAIN, UNSPECIFIED ABDOMINAL LOCATION: ICD-10-CM

## 2022-04-15 DIAGNOSIS — R55 SYNCOPE AND COLLAPSE: Primary | ICD-10-CM

## 2022-04-15 DIAGNOSIS — N30.00 ACUTE CYSTITIS WITHOUT HEMATURIA: ICD-10-CM

## 2022-04-15 DIAGNOSIS — K59.00 CONSTIPATION, UNSPECIFIED CONSTIPATION TYPE: ICD-10-CM

## 2022-04-15 LAB
ALBUMIN SERPL-MCNC: 3.3 G/DL (ref 3.5–5.2)
ALP BLD-CCNC: 92 U/L (ref 35–104)
ALT SERPL-CCNC: 33 U/L (ref 0–32)
ANION GAP SERPL CALCULATED.3IONS-SCNC: 16 MMOL/L (ref 7–16)
AST SERPL-CCNC: 14 U/L (ref 0–31)
BASOPHILS ABSOLUTE: 0.02 E9/L (ref 0–0.2)
BASOPHILS RELATIVE PERCENT: 0.1 % (ref 0–2)
BILIRUB SERPL-MCNC: 0.6 MG/DL (ref 0–1.2)
BUN BLDV-MCNC: 26 MG/DL (ref 6–20)
CALCIUM SERPL-MCNC: 8.3 MG/DL (ref 8.6–10.2)
CHLORIDE BLD-SCNC: 98 MMOL/L (ref 98–107)
CO2: 24 MMOL/L (ref 22–29)
CREAT SERPL-MCNC: 1.1 MG/DL (ref 0.5–1)
EOSINOPHILS ABSOLUTE: 0.07 E9/L (ref 0.05–0.5)
EOSINOPHILS RELATIVE PERCENT: 0.4 % (ref 0–6)
GFR AFRICAN AMERICAN: >60
GFR NON-AFRICAN AMERICAN: 53 ML/MIN/1.73
GLUCOSE BLD-MCNC: 296 MG/DL (ref 74–99)
HCT VFR BLD CALC: 43.7 % (ref 34–48)
HEMOGLOBIN: 14 G/DL (ref 11.5–15.5)
IMMATURE GRANULOCYTES #: 0.22 E9/L
IMMATURE GRANULOCYTES %: 1.3 % (ref 0–5)
LACTIC ACID: 1.9 MMOL/L (ref 0.5–2.2)
LYMPHOCYTES ABSOLUTE: 1.54 E9/L (ref 1.5–4)
LYMPHOCYTES RELATIVE PERCENT: 9 % (ref 20–42)
MCH RBC QN AUTO: 30 PG (ref 26–35)
MCHC RBC AUTO-ENTMCNC: 32 % (ref 32–34.5)
MCV RBC AUTO: 93.6 FL (ref 80–99.9)
MONOCYTES ABSOLUTE: 0.9 E9/L (ref 0.1–0.95)
MONOCYTES RELATIVE PERCENT: 5.2 % (ref 2–12)
NEUTROPHILS ABSOLUTE: 14.4 E9/L (ref 1.8–7.3)
NEUTROPHILS RELATIVE PERCENT: 84 % (ref 43–80)
PDW BLD-RTO: 13 FL (ref 11.5–15)
PLATELET # BLD: 187 E9/L (ref 130–450)
PMV BLD AUTO: 12.3 FL (ref 7–12)
POTASSIUM REFLEX MAGNESIUM: 4.1 MMOL/L (ref 3.5–5)
PRO-BNP: 15 PG/ML (ref 0–125)
RBC # BLD: 4.67 E12/L (ref 3.5–5.5)
SODIUM BLD-SCNC: 138 MMOL/L (ref 132–146)
TOTAL PROTEIN: 5.8 G/DL (ref 6.4–8.3)
TROPONIN, HIGH SENSITIVITY: 22 NG/L (ref 0–9)
TROPONIN, HIGH SENSITIVITY: 26 NG/L (ref 0–9)
WBC # BLD: 17.2 E9/L (ref 4.5–11.5)

## 2022-04-15 PROCEDURE — 99285 EMERGENCY DEPT VISIT HI MDM: CPT

## 2022-04-15 PROCEDURE — 93005 ELECTROCARDIOGRAM TRACING: CPT | Performed by: STUDENT IN AN ORGANIZED HEALTH CARE EDUCATION/TRAINING PROGRAM

## 2022-04-15 PROCEDURE — 74177 CT ABD & PELVIS W/CONTRAST: CPT

## 2022-04-15 PROCEDURE — 81001 URINALYSIS AUTO W/SCOPE: CPT

## 2022-04-15 PROCEDURE — 96372 THER/PROPH/DIAG INJ SC/IM: CPT

## 2022-04-15 PROCEDURE — 6360000004 HC RX CONTRAST MEDICATION: Performed by: RADIOLOGY

## 2022-04-15 PROCEDURE — 6360000002 HC RX W HCPCS: Performed by: STUDENT IN AN ORGANIZED HEALTH CARE EDUCATION/TRAINING PROGRAM

## 2022-04-15 PROCEDURE — 96374 THER/PROPH/DIAG INJ IV PUSH: CPT

## 2022-04-15 PROCEDURE — 51701 INSERT BLADDER CATHETER: CPT

## 2022-04-15 PROCEDURE — 2580000003 HC RX 258: Performed by: STUDENT IN AN ORGANIZED HEALTH CARE EDUCATION/TRAINING PROGRAM

## 2022-04-15 PROCEDURE — 83880 ASSAY OF NATRIURETIC PEPTIDE: CPT

## 2022-04-15 PROCEDURE — 83605 ASSAY OF LACTIC ACID: CPT

## 2022-04-15 PROCEDURE — 80053 COMPREHEN METABOLIC PANEL: CPT

## 2022-04-15 PROCEDURE — 84484 ASSAY OF TROPONIN QUANT: CPT

## 2022-04-15 PROCEDURE — 85025 COMPLETE CBC W/AUTO DIFF WBC: CPT

## 2022-04-15 PROCEDURE — 71275 CT ANGIOGRAPHY CHEST: CPT

## 2022-04-15 RX ORDER — DICYCLOMINE HYDROCHLORIDE 10 MG/1
10 CAPSULE ORAL 4 TIMES DAILY
Qty: 120 CAPSULE | Refills: 0 | Status: SHIPPED | OUTPATIENT
Start: 2022-04-15

## 2022-04-15 RX ORDER — 0.9 % SODIUM CHLORIDE 0.9 %
1000 INTRAVENOUS SOLUTION INTRAVENOUS ONCE
Status: COMPLETED | OUTPATIENT
Start: 2022-04-15 | End: 2022-04-15

## 2022-04-15 RX ORDER — KETOROLAC TROMETHAMINE 30 MG/ML
15 INJECTION, SOLUTION INTRAMUSCULAR; INTRAVENOUS ONCE
Status: COMPLETED | OUTPATIENT
Start: 2022-04-15 | End: 2022-04-15

## 2022-04-15 RX ORDER — DICYCLOMINE HYDROCHLORIDE 10 MG/ML
20 INJECTION INTRAMUSCULAR ONCE
Status: COMPLETED | OUTPATIENT
Start: 2022-04-15 | End: 2022-04-15

## 2022-04-15 RX ADMIN — SODIUM CHLORIDE 1000 ML: 9 INJECTION, SOLUTION INTRAVENOUS at 23:13

## 2022-04-15 RX ADMIN — DICYCLOMINE HYDROCHLORIDE 20 MG: 20 INJECTION, SOLUTION INTRAMUSCULAR at 20:06

## 2022-04-15 RX ADMIN — KETOROLAC TROMETHAMINE 15 MG: 30 INJECTION, SOLUTION INTRAMUSCULAR at 20:06

## 2022-04-15 RX ADMIN — IOPAMIDOL 90 ML: 755 INJECTION, SOLUTION INTRAVENOUS at 21:03

## 2022-04-15 ASSESSMENT — ENCOUNTER SYMPTOMS
BACK PAIN: 0
SORE THROAT: 0
COUGH: 0
SHORTNESS OF BREATH: 0
DIARRHEA: 0
NAUSEA: 0
ABDOMINAL DISTENTION: 0
CHEST TIGHTNESS: 0
ABDOMINAL PAIN: 1
VOMITING: 0

## 2022-04-15 ASSESSMENT — PAIN DESCRIPTION - PAIN TYPE: TYPE: ACUTE PAIN

## 2022-04-15 ASSESSMENT — PAIN DESCRIPTION - DESCRIPTORS: DESCRIPTORS: TENDER

## 2022-04-15 ASSESSMENT — PAIN DESCRIPTION - ORIENTATION: ORIENTATION: LEFT;LOWER

## 2022-04-15 ASSESSMENT — PAIN SCALES - GENERAL
PAINLEVEL_OUTOF10: 8
PAINLEVEL_OUTOF10: 8

## 2022-04-15 ASSESSMENT — PAIN DESCRIPTION - FREQUENCY: FREQUENCY: CONTINUOUS

## 2022-04-15 ASSESSMENT — PAIN DESCRIPTION - LOCATION
LOCATION: ABDOMEN
LOCATION: ABDOMEN

## 2022-04-15 NOTE — ED PROVIDER NOTES
HPI     Patient is a 56 y.o. female presents with a chief complaint of abdominal pain. This has been occurring for a few hours. Patient states that it gets better with nothing. Patient states that it gets worse with nothing. Patient states that it is moderate in severity. Patient states it was acute in onset. Patient was discharged hospital 3 days ago. Patient has history of small bowel obstruction. Patient has had multiple abdominal surgeries in the past.  Patient stated that she had a large bowel movement earlier today after not going for 2 weeks. Patient then stood up and passed out. Patient denies any lower leg swelling. Patient stated that her abdomen still hurts. Patient denies any fevers, chills, nausea, vomiting, changes in urinary habits. Patient stated to bleed on the ground for approximately 1 hour. Review of Systems   Constitutional: Negative for activity change, appetite change, chills, fatigue and fever. HENT: Negative for congestion, drooling and sore throat. Respiratory: Negative for cough, chest tightness and shortness of breath. Cardiovascular: Negative for chest pain and palpitations. Gastrointestinal: Positive for abdominal pain. Negative for abdominal distention, diarrhea, nausea and vomiting. Genitourinary: Negative for decreased urine volume, difficulty urinating, enuresis, flank pain, frequency and hematuria. Musculoskeletal: Negative for arthralgias, back pain and neck stiffness. Skin: Negative for rash and wound. Neurological: Positive for syncope. Negative for dizziness, facial asymmetry, light-headedness and headaches. Psychiatric/Behavioral: Negative for agitation, confusion and decreased concentration. Physical Exam  Vitals and nursing note reviewed. Constitutional:       General: She is not in acute distress. Appearance: She is well-developed. She is obese. She is not ill-appearing. HENT:      Head: Normocephalic and atraumatic. Eyes:      Conjunctiva/sclera: Conjunctivae normal.   Cardiovascular:      Rate and Rhythm: Normal rate and regular rhythm. Heart sounds: Normal heart sounds. No murmur heard. Pulmonary:      Effort: Pulmonary effort is normal. No respiratory distress. Breath sounds: Normal breath sounds. No wheezing or rales. Abdominal:      General: Bowel sounds are normal.      Palpations: Abdomen is soft. Tenderness: There is abdominal tenderness. There is no guarding or rebound. Comments: Diffuse abdominal tenderness palpation   Musculoskeletal:      Cervical back: Normal range of motion and neck supple. Skin:     General: Skin is warm and dry. Neurological:      Mental Status: She is alert and oriented to person, place, and time. Cranial Nerves: No cranial nerve deficit. Coordination: Coordination normal.          Procedures     [facility]     ED Course as of 04/15/22 2320   Fri Apr 15, 2022   1945 EKG read interpreted by me. Rate 80 bpm.  Left axis deviation. Poor R wave progression. Nonspecific T wave abnormality. Left anterior fascicular block. Normal WI interval.  Normal QRS. No ST elevations or depressions. No signs of WPW, Brugada, ventricular hypertrophy, compared to prior EKG with no acute changes [JM]   2320 Patient was reevaluated and stated that she feels much better. Patient stated that the Bentyl improved her symptoms. Patient is agreeable to being discharged and outpatient []      ED Course User Index  [JM] Dick Alonzo MD      Patient is a 56 y.o. female presenting with abdominal pain. Patient was presented to the hospital.  Patient had a lactic. Lactic is normal.  Patient CT of the abdomen and chest were benign. Patient was given fluids. Patient had significant improvement with Bentyl. Patient had a urine analysis. Urine analysis is benign. Patient syncopized and had a normal EKG.   Patient symptoms are likely vasovagal.  Patient had a CTA to rule out possible pulmonary embolism in the setting of recent hospitalization. Patient will be discharged home. Patient was given return precautions. Patient will follow up with their primary care provider. Patient is agreeable to this plan. Patient has remained stable throughout their stay in the ED. Patient was seen and evaluated by myself and my attending Velma Greene DO. Assessment and Plan discussed with attending provider, please see attestation for final plan of care. This note was done using dictation software and there may be some grammatical errors associated with this. Amber Ortiz MD       ED Course as of 04/15/22 2328   Fri Apr 15, 2022   1945 EKG read interpreted by me. Rate 80 bpm.  Left axis deviation. Poor R wave progression. Nonspecific T wave abnormality. Left anterior fascicular block. Normal IN interval.  Normal QRS. No ST elevations or depressions. No signs of WPW, Brugada, ventricular hypertrophy, compared to prior EKG with no acute changes [JM]   2320 Patient was reevaluated and stated that she feels much better. Patient stated that the Bentyl improved her symptoms. Patient is agreeable to being discharged and outpatient [JM]      ED Course User Index  [JM] Amber Ortiz MD       --------------------------------------------- PAST HISTORY ---------------------------------------------  Past Medical History:  has a past medical history of Acute renal injury ([address] Analgesic overuse headache, Anxiety, Arthritis, Asthma, CAD (coronary artery disease), Depression, Diabetes mellitus ([address] Fracture of tooth, GERD (gastroesophageal reflux disease), Glaucoma, Hyperlipidemia, Hypertension, Hypertensive urgency, Hypothyroidism, Irritable bowel syndrome, Obesity, Obstructive sleep apnea, Pneumonia, Polycystic ovarian syndrome, Postcholecystectomy syndrome, Spinal headache, and Suicidal ideation.     Past Surgical History:  has a past surgical history that includes Tonsillectomy (); Cholecystectomy, laparoscopic ();  section (); Foot surgery (); Ovary removal (); Dental surgery (10/06/2011); bernadette and bso (cervix removed) (3/19/2013); hernia repair (9/3/13); Hysterectomy; Echo Complete (2014); Knee cartilage surgery; Endoscopy, colon, diagnostic; and Colonoscopy. Social History:  reports that she has never smoked. She has never used smokeless tobacco. She reports that she does not drink alcohol and does not use drugs. Family History: family history includes Asthma in her brother, mother, and sister; Cancer in her father and paternal grandfather; Depression in her father, maternal grandmother, and paternal grandmother; Diabetes in her brother and mother; Heart Disease in her father and maternal grandfather; High Blood Pressure in her father, maternal grandmother, mother, and paternal grandmother; Mental Illness in her maternal grandmother; Thyroid Disease in her maternal grandmother and sister. The patients home medications have been reviewed.     Allergies: Percocet [oxycodone-acetaminophen], Metformin and related, and Tape [adhesive tape]    -------------------------------------------------- RESULTS -------------------------------------------------  Labs:  Results for orders placed or performed during the hospital encounter of 04/15/22   CBC with Auto Differential   Result Value Ref Range    WBC 17.2 (H) 4.5 - 11.5 E9/L    RBC 4.67 3.50 - 5.50 E12/L    Hemoglobin 14.0 11.5 - 15.5 g/dL    Hematocrit 43.7 34.0 - 48.0 %    MCV 93.6 80.0 - 99.9 fL    MCH 30.0 26.0 - 35.0 pg    MCHC 32.0 32.0 - 34.5 %    RDW 13.0 11.5 - 15.0 fL    Platelets 265 692 - 921 E9/L    MPV 12.3 (H) 7.0 - 12.0 fL    Neutrophils % 84.0 (H) 43.0 - 80.0 %    Immature Granulocytes % 1.3 0.0 - 5.0 %    Lymphocytes % 9.0 (L) 20.0 - 42.0 %    Monocytes % 5.2 2.0 - 12.0 %    Eosinophils % 0.4 0.0 - 6.0 %    Basophils % 0.1 0.0 - 2.0 %    Neutrophils Absolute 14.40 (H) 1.80 - 7.30 E9/L Immature Granulocytes # 0.22 E9/L    Lymphocytes Absolute 1.54 1.50 - 4.00 E9/L    Monocytes Absolute 0.90 0.10 - 0.95 E9/L    Eosinophils Absolute 0.07 0.05 - 0.50 E9/L    Basophils Absolute 0.02 0.00 - 0.20 E9/L   Comprehensive Metabolic Panel w/ Reflex to MG   Result Value Ref Range    Sodium 138 132 - 146 mmol/L    Potassium reflex Magnesium 4.1 3.5 - 5.0 mmol/L    Chloride 98 98 - 107 mmol/L    CO2 24 22 - 29 mmol/L    Anion Gap 16 7 - 16 mmol/L    Glucose 296 (H) 74 - 99 mg/dL    BUN 26 (H) 6 - 20 mg/dL    CREATININE 1.1 (H) 0.5 - 1.0 mg/dL    GFR Non-African American 53 >=60 mL/min/1.73    GFR African American >60     Calcium 8.3 (L) 8.6 - 10.2 mg/dL    Total Protein 5.8 (L) 6.4 - 8.3 g/dL    Albumin 3.3 (L) 3.5 - 5.2 g/dL    Total Bilirubin 0.6 0.0 - 1.2 mg/dL    Alkaline Phosphatase 92 35 - 104 U/L    ALT 33 (H) 0 - 32 U/L    AST 14 0 - 31 U/L   Troponin   Result Value Ref Range    Troponin, High Sensitivity 26 (H) 0 - 9 ng/L   Brain Natriuretic Peptide   Result Value Ref Range    Pro-BNP 15 0 - 125 pg/mL   Lactic Acid   Result Value Ref Range    Lactic Acid 1.9 0.5 - 2.2 mmol/L   Troponin   Result Value Ref Range    Troponin, High Sensitivity 22 (H) 0 - 9 ng/L       Radiology:  CT ABDOMEN PELVIS W IV CONTRAST Additional Contrast? None   Final Result   No specific acute abnormality is identified in the abdomen and pelvis. CTA PULMONARY W CONTRAST   Final Result   No evidence of pulmonary embolism or acute pulmonary abnormality.             ------------------------- NURSING NOTES AND VITALS REVIEWED ---------------------------  Date / Time Roomed:  4/15/2022  7:25 PM  ED Bed Assignment:  21/21    The nursing notes within the ED encounter and vital signs as below have been reviewed.    /79   Pulse 75   Temp 97.9 °F (36.6 °C)   Resp 18   Ht 5' 6\" (1.676 m)   Wt (!) 340 lb (154.2 kg)   LMP 10/01/2012 (Approximate) Comment: 2013  SpO2 92%   BMI 54.88 kg/m²   Oxygen Saturation Interpretation: Normal      ------------------------------------------ PROGRESS NOTES ------------------------------------------  11:28 PM EDT  I have spoken with the patient and family if present and discussed todays results, in addition to providing specific details for the plan of care and counseling regarding the diagnosis and prognosis. Their questions are answered at this time and they are agreeable with the plan. I discussed at length with them reasons for immediate return here for re evaluation. They will followup with their primary care provider by calling their office as soon as possible.      --------------------------------- ADDITIONAL PROVIDER NOTES ---------------------------------  At this time the patient is without objective evidence of an acute process requiring hospitalization or inpatient management. They have remained hemodynamically stable throughout their entire ED visit and are stable for discharge with outpatient follow-up. The plan has been discussed in detail and they are aware of the specific conditions for emergent return, as well as the importance of follow-up. New Prescriptions    DICYCLOMINE (BENTYL) 10 MG CAPSULE    Take 1 capsule by mouth 4 times daily       Diagnosis:  1. Syncope and collapse    2. Constipation, unspecified constipation type    3. Abdominal pain, unspecified abdominal location        Disposition:  Patient's disposition: Discharge to home  Patient's condition is stable.          Neelam Nagel MD  Resident  04/15/22 0780

## 2022-04-16 VITALS
HEIGHT: 66 IN | BODY MASS INDEX: 47.09 KG/M2 | SYSTOLIC BLOOD PRESSURE: 139 MMHG | HEART RATE: 75 BPM | DIASTOLIC BLOOD PRESSURE: 82 MMHG | TEMPERATURE: 97.9 F | RESPIRATION RATE: 16 BRPM | WEIGHT: 293 LBS | OXYGEN SATURATION: 93 %

## 2022-04-16 VITALS
HEART RATE: 88 BPM | BODY MASS INDEX: 47.09 KG/M2 | HEIGHT: 66 IN | DIASTOLIC BLOOD PRESSURE: 84 MMHG | WEIGHT: 293 LBS | RESPIRATION RATE: 16 BRPM | TEMPERATURE: 97.2 F | OXYGEN SATURATION: 94 % | SYSTOLIC BLOOD PRESSURE: 172 MMHG

## 2022-04-16 DIAGNOSIS — R10.30 LOWER ABDOMINAL PAIN: ICD-10-CM

## 2022-04-16 DIAGNOSIS — K62.5 RECTAL BLEEDING: Primary | ICD-10-CM

## 2022-04-16 LAB
ALBUMIN SERPL-MCNC: 3.3 G/DL (ref 3.5–5.2)
ALP BLD-CCNC: 80 U/L (ref 35–104)
ALT SERPL-CCNC: 25 U/L (ref 0–32)
ANION GAP SERPL CALCULATED.3IONS-SCNC: 8 MMOL/L (ref 7–16)
AST SERPL-CCNC: 15 U/L (ref 0–31)
BACTERIA: ABNORMAL /HPF
BASOPHILS ABSOLUTE: 0.01 E9/L (ref 0–0.2)
BASOPHILS RELATIVE PERCENT: 0.1 % (ref 0–2)
BILIRUB SERPL-MCNC: 0.6 MG/DL (ref 0–1.2)
BILIRUBIN URINE: NEGATIVE
BLOOD, URINE: NEGATIVE
BUN BLDV-MCNC: 23 MG/DL (ref 6–20)
CALCIUM SERPL-MCNC: 8.5 MG/DL (ref 8.6–10.2)
CHLORIDE BLD-SCNC: 103 MMOL/L (ref 98–107)
CLARITY: ABNORMAL
CO2: 28 MMOL/L (ref 22–29)
COLOR: YELLOW
CREAT SERPL-MCNC: 1 MG/DL (ref 0.5–1)
EOSINOPHILS ABSOLUTE: 0.04 E9/L (ref 0.05–0.5)
EOSINOPHILS RELATIVE PERCENT: 0.4 % (ref 0–6)
GFR AFRICAN AMERICAN: >60
GFR NON-AFRICAN AMERICAN: 60 ML/MIN/1.73
GLUCOSE BLD-MCNC: 133 MG/DL (ref 74–99)
GLUCOSE URINE: 500 MG/DL
HCT VFR BLD CALC: 40.9 % (ref 34–48)
HEMOGLOBIN: 13 G/DL (ref 11.5–15.5)
IMMATURE GRANULOCYTES #: 0.09 E9/L
IMMATURE GRANULOCYTES %: 0.9 % (ref 0–5)
KETONES, URINE: NEGATIVE MG/DL
LEUKOCYTE ESTERASE, URINE: NEGATIVE
LIPASE: 69 U/L (ref 13–60)
LYMPHOCYTES ABSOLUTE: 1.64 E9/L (ref 1.5–4)
LYMPHOCYTES RELATIVE PERCENT: 15.9 % (ref 20–42)
MCH RBC QN AUTO: 30 PG (ref 26–35)
MCHC RBC AUTO-ENTMCNC: 31.8 % (ref 32–34.5)
MCV RBC AUTO: 94.2 FL (ref 80–99.9)
MONOCYTES ABSOLUTE: 0.84 E9/L (ref 0.1–0.95)
MONOCYTES RELATIVE PERCENT: 8.1 % (ref 2–12)
NEUTROPHILS ABSOLUTE: 7.71 E9/L (ref 1.8–7.3)
NEUTROPHILS RELATIVE PERCENT: 74.6 % (ref 43–80)
NITRITE, URINE: POSITIVE
PDW BLD-RTO: 12.8 FL (ref 11.5–15)
PH UA: 5.5 (ref 5–9)
PLATELET # BLD: 136 E9/L (ref 130–450)
PMV BLD AUTO: 11.9 FL (ref 7–12)
POTASSIUM SERPL-SCNC: 4.5 MMOL/L (ref 3.5–5)
PROTEIN UA: NEGATIVE MG/DL
RBC # BLD: 4.34 E12/L (ref 3.5–5.5)
RBC UA: ABNORMAL /HPF (ref 0–2)
SODIUM BLD-SCNC: 139 MMOL/L (ref 132–146)
SPECIFIC GRAVITY UA: 1.02 (ref 1–1.03)
TOTAL PROTEIN: 5.8 G/DL (ref 6.4–8.3)
UROBILINOGEN, URINE: 0.2 E.U./DL
WBC # BLD: 10.3 E9/L (ref 4.5–11.5)
WBC UA: ABNORMAL /HPF (ref 0–5)

## 2022-04-16 PROCEDURE — 85025 COMPLETE CBC W/AUTO DIFF WBC: CPT

## 2022-04-16 PROCEDURE — 80053 COMPREHEN METABOLIC PANEL: CPT

## 2022-04-16 PROCEDURE — 96372 THER/PROPH/DIAG INJ SC/IM: CPT

## 2022-04-16 PROCEDURE — 96374 THER/PROPH/DIAG INJ IV PUSH: CPT

## 2022-04-16 PROCEDURE — 83690 ASSAY OF LIPASE: CPT

## 2022-04-16 PROCEDURE — 6360000002 HC RX W HCPCS: Performed by: STUDENT IN AN ORGANIZED HEALTH CARE EDUCATION/TRAINING PROGRAM

## 2022-04-16 PROCEDURE — 6370000000 HC RX 637 (ALT 250 FOR IP): Performed by: STUDENT IN AN ORGANIZED HEALTH CARE EDUCATION/TRAINING PROGRAM

## 2022-04-16 PROCEDURE — 99284 EMERGENCY DEPT VISIT MOD MDM: CPT

## 2022-04-16 RX ORDER — ONDANSETRON 2 MG/ML
4 INJECTION INTRAMUSCULAR; INTRAVENOUS ONCE
Status: COMPLETED | OUTPATIENT
Start: 2022-04-16 | End: 2022-04-16

## 2022-04-16 RX ORDER — HYDROCORTISONE 25 MG/G
CREAM TOPICAL
Qty: 28 G | Refills: 0 | Status: SHIPPED | OUTPATIENT
Start: 2022-04-16

## 2022-04-16 RX ORDER — CEFDINIR 300 MG/1
300 CAPSULE ORAL 2 TIMES DAILY
Qty: 14 CAPSULE | Refills: 0 | Status: SHIPPED | OUTPATIENT
Start: 2022-04-16 | End: 2022-04-23

## 2022-04-16 RX ORDER — CEFDINIR 300 MG/1
300 CAPSULE ORAL ONCE
Status: COMPLETED | OUTPATIENT
Start: 2022-04-16 | End: 2022-04-16

## 2022-04-16 RX ORDER — DICYCLOMINE HYDROCHLORIDE 10 MG/ML
20 INJECTION INTRAMUSCULAR ONCE
Status: COMPLETED | OUTPATIENT
Start: 2022-04-16 | End: 2022-04-16

## 2022-04-16 RX ADMIN — DICYCLOMINE HYDROCHLORIDE 20 MG: 20 INJECTION, SOLUTION INTRAMUSCULAR at 16:43

## 2022-04-16 RX ADMIN — CEFDINIR 300 MG: 300 CAPSULE ORAL at 00:23

## 2022-04-16 RX ADMIN — ONDANSETRON 4 MG: 2 INJECTION INTRAMUSCULAR; INTRAVENOUS at 16:41

## 2022-04-16 ASSESSMENT — PAIN DESCRIPTION - PAIN TYPE: TYPE: ACUTE PAIN

## 2022-04-16 ASSESSMENT — PAIN SCALES - GENERAL: PAINLEVEL_OUTOF10: 9

## 2022-04-16 ASSESSMENT — PAIN DESCRIPTION - LOCATION: LOCATION: ABDOMEN

## 2022-04-16 ASSESSMENT — PAIN - FUNCTIONAL ASSESSMENT: PAIN_FUNCTIONAL_ASSESSMENT: 0-10

## 2022-04-16 NOTE — ED PROVIDER NOTES
Department of Emergency Medicine   ED  Provider Note  Admit Date/RoomTime: 4/16/2022  4:11 PM  ED Room: 05/05          History of Present Illness:  4/16/22, Time: 4:27 PM EDT  Chief Complaint   Patient presents with    Rectal Bleeding     started last night, seen yesteray for same thing was told she was constipated    Abdominal Pain     lower abdominal pain    Fatigue         Eliana Treviño is a 56 y.o. female presenting to the ED for Several grossly bloody stools. The patient reports that she has lower abdominal aching. It is the same as yesterday. She states she has chronic constipation. She has a history of irritable bowel syndrome but no history of GI bleeding in the past.  No history of hemorrhoids that she describes. No lightheadedness or dizziness. She had 2 bloody bowel movements. Nothing is better or worse is constant duration. She has mild lower abdominal cramping. This is consistent with her normal IBS. This is a chronic issue for her. No change in her bowel or bladder habits otherwise however she has been starting to move her bowels. No upper abdominal pain. However, the patient is mildly nauseous. Review of Systems:  Review of systems obtained and negative unless stated otherwise above in the HPI.    --------------------------------------------- PAST HISTORY ---------------------------------------------  Past Medical History:  has a past medical history of Acute renal injury ([facility] [address] Analgesic overuse headache, Anxiety, Arthritis, Asthma, CAD (coronary artery disease), Depression, Diabetes mellitus ([facility] [address] Fracture of tooth, GERD (gastroesophageal reflux disease), Glaucoma, Hyperlipidemia, Hypertension, Hypertensive urgency, Hypothyroidism, Irritable bowel syndrome, Obesity, Obstructive sleep apnea, Pneumonia, Polycystic ovarian syndrome, Postcholecystectomy syndrome, Spinal headache, and Suicidal ideation.     Past Surgical History:  has a past surgical history that includes Tonsillectomy (); Cholecystectomy, laparoscopic ();  section (); Foot surgery (); Ovary removal (); Dental surgery (10/06/2011); bernadette and bso (cervix removed) (3/19/2013); hernia repair (9/3/13); Hysterectomy; Echo Complete (2014); Knee cartilage surgery; Endoscopy, colon, diagnostic; and Colonoscopy. Social History:  reports that she has never smoked. She has never used smokeless tobacco. She reports that she does not drink alcohol and does not use drugs. Family History: family history includes Asthma in her brother, mother, and sister; Cancer in her father and paternal grandfather; Depression in her father, maternal grandmother, and paternal grandmother; Diabetes in her brother and mother; Heart Disease in her father and maternal grandfather; High Blood Pressure in her father, maternal grandmother, mother, and paternal grandmother; Mental Illness in her maternal grandmother; Thyroid Disease in her maternal grandmother and sister. . Unless otherwise noted, family history is non contributory    The patients home medications have been reviewed. Allergies: Percocet [oxycodone-acetaminophen], Metformin and related, and Tape [adhesive tape]    I have reviewed the past medical history, past surgical history, social history, and family history    ---------------------------------------------------PHYSICAL EXAM--------------------------------------    Constitutional: Appears in no distress  Head: Normocephalic, atraumatic  Eyes: Non-icteric slcera, no conjunctival injection  ENT: Moist mucous membranes,  Neck: Trachea midline, no JVD  Respiratory: Nonlabored respirations. Lungs clear to auscultation bilaterally, no wheezes, rales, or rhonchi. Cardiovascular: Regular rate. Regular rhythm. No murmurs, no gallops, no rubs. Gastrointestinal: Obese, abdomen Soft, Non tender, Non distended. No rebound tenderness, guarding, or rigidity.   Extremities: No lower extremity edema  Genitourinary: No CVA tenderness, no suprapubic tenderness  Musculoskeletal: Moves all extremities, no deformity  Skin: Pink, warm, dry without rash. Neurologic: Alert, symmetric facies, no aphasia    -------------------------------------------------- RESULTS -------------------------------------------------  I have personally reviewed all laboratory and imaging results for this patient. Results are listed below.      LABS: (Lab results interpreted by me)  Results for orders placed or performed during the hospital encounter of 04/16/22   CBC with Auto Differential   Result Value Ref Range    WBC 10.3 4.5 - 11.5 E9/L    RBC 4.34 3.50 - 5.50 E12/L    Hemoglobin 13.0 11.5 - 15.5 g/dL    Hematocrit 40.9 34.0 - 48.0 %    MCV 94.2 80.0 - 99.9 fL    MCH 30.0 26.0 - 35.0 pg    MCHC 31.8 (L) 32.0 - 34.5 %    RDW 12.8 11.5 - 15.0 fL    Platelets 096 310 - 024 E9/L    MPV 11.9 7.0 - 12.0 fL    Neutrophils % 74.6 43.0 - 80.0 %    Immature Granulocytes % 0.9 0.0 - 5.0 %    Lymphocytes % 15.9 (L) 20.0 - 42.0 %    Monocytes % 8.1 2.0 - 12.0 %    Eosinophils % 0.4 0.0 - 6.0 %    Basophils % 0.1 0.0 - 2.0 %    Neutrophils Absolute 7.71 (H) 1.80 - 7.30 E9/L    Immature Granulocytes # 0.09 E9/L    Lymphocytes Absolute 1.64 1.50 - 4.00 E9/L    Monocytes Absolute 0.84 0.10 - 0.95 E9/L    Eosinophils Absolute 0.04 (L) 0.05 - 0.50 E9/L    Basophils Absolute 0.01 0.00 - 0.20 E9/L   Comprehensive Metabolic Panel   Result Value Ref Range    Sodium 139 132 - 146 mmol/L    Potassium 4.5 3.5 - 5.0 mmol/L    Chloride 103 98 - 107 mmol/L    CO2 28 22 - 29 mmol/L    Anion Gap 8 7 - 16 mmol/L    Glucose 133 (H) 74 - 99 mg/dL    BUN 23 (H) 6 - 20 mg/dL    CREATININE 1.0 0.5 - 1.0 mg/dL    GFR Non-African American 60 >=60 mL/min/1.73    GFR African American >60     Calcium 8.5 (L) 8.6 - 10.2 mg/dL    Total Protein 5.8 (L) 6.4 - 8.3 g/dL    Albumin 3.3 (L) 3.5 - 5.2 g/dL    Total Bilirubin 0.6 0.0 - 1.2 mg/dL    Alkaline Phosphatase 80 35 - 104 She is advised to continue the Bentyl. She is given Anusol cream as she does have a very small external hemorrhoid. Counseling: The emergency provider has spoken with the patient and discussed todays results, in addition to providing specific details for the plan of care and counseling regarding the diagnosis and prognosis. Questions are answered at this time and they are agreeable with the plan.       --------------------------------- IMPRESSION AND DISPOSITION ---------------------------------    IMPRESSION  1. Rectal bleeding    2. Lower abdominal pain        DISPOSITION  Disposition: Discharge to home  Patient condition is stable    Dr. Jadiel ADDISON, am the primary provider of record    Jadiel Morocho DO  Emergency Medicine    NOTE: This report was transcribed using voice recognition software.  Every effort was made to ensure accuracy; however, inadvertent computerized transcription errors may be present         Randi Ba DO  04/16/22 [id number]

## 2022-04-16 NOTE — ED PROVIDER NOTES
ATTENDING PROVIDER ATTESTATION:     Eliana Seo presented to the emergency department for evaluation of Loss of Consciousness ((went to use bathroom and passed out) Abdominal pain, +N)   and was initially evaluated by the Medical Resident. See Original ED Note for H&P and ED course above. I have reviewed and discussed the case, including pertinent history (medical, surgical, family and social) and exam findings with the Medical Resident assigned to Clemente Agustin. I have personally performed and/or participated in the history, exam, medical decision making, and procedures and agree with all pertinent clinical information and any additional changes or corrections are noted below in my assessment and plan. I have discussed this patient in detail with the resident, and provided the instruction and education,    I have reviewed my findings and recommendations with the assigned Medical Resident, Clemente Agustin and members of family present at the time of disposition. I have performed a history and physical examination of this patient and directly supervised the resident caring for this patient. History of Present Illness: This patient presents to the ED for Syncopal episode with bowel movement. Apparently, the patient has a history of IBS. She was at a gas station bathroom. She was bearing down. She stated she started to feel like she was going to pass out and fell on the ground. Denies any headache nausea or vomiting. She denies any chest pain nausea vomiting or shortness of breath. The patient describes that she has a history of IBS and frequently will be constipated and have to bear down to have bowel movements. This is normal for her.     Review of Systems:   A complete review of systems was performed and pertinent positives and negatives are stated within HPI, all other systems reviewed and are negative.    --------------------------------------------- PAST HISTORY ---------------------------------------------  Past Medical History:  has a past medical history of Acute renal injury ([facility] [address] Analgesic overuse headache, Anxiety, Arthritis, Asthma, CAD (coronary artery disease), Depression, Diabetes mellitus [facility] Fracture of tooth, GERD (gastroesophageal reflux disease), Glaucoma, Hyperlipidemia, Hypertension, Hypertensive urgency, Hypothyroidism, Irritable bowel syndrome, Obesity, Obstructive sleep apnea, Pneumonia, Polycystic ovarian syndrome, Postcholecystectomy syndrome, Spinal headache, and Suicidal ideation. Past Surgical History:  has a past surgical history that includes Tonsillectomy (); Cholecystectomy, laparoscopic ();  section (); Foot surgery (); Ovary removal (); Dental surgery (10/06/2011); bernadette and bso (cervix removed) (3/19/2013); hernia repair (9/3/13); Hysterectomy; Echo Complete (2014); Knee cartilage surgery; Endoscopy, colon, diagnostic; and Colonoscopy. Social History:  reports that she has never smoked. She has never used smokeless tobacco. She reports that she does not drink alcohol and does not use drugs. Family History: family history includes Asthma in her brother, mother, and sister; Cancer in her father and paternal grandfather; Depression in her father, maternal grandmother, and paternal grandmother; Diabetes in her brother and mother; Heart Disease in her father and maternal grandfather; High Blood Pressure in her father, maternal grandmother, mother, and paternal grandmother; Mental Illness in her maternal grandmother; Thyroid Disease in her maternal grandmother and sister. Unless otherwise noted, family history is non contributory    The patients home medications have been reviewed.     Allergies: Percocet [oxycodone-acetaminophen], Metformin and related, and Tape [adhesive tape]    Physical Exam:  Constitutional: Appears in no distress  Head: Normocephalic, atraumatic  Eyes: Non-icteric slcera, no conjunctival injection  ENT: Moist mucous membranes,  Neck: Trachea midline, no JVD  Respiratory: Nonlabored respirations. Lungs clear to auscultation bilaterally, no wheezes, rales, or rhonchi. Cardiovascular: Regular rate. Regular rhythm. No murmurs, no gallops, no rubs. Gastrointestinal: Abdomen Soft, Non tender, Non distended. No rebound tenderness, guarding, or rigidity. Extremities: No lower extremity edema  Genitourinary: No CVA tenderness, no suprapubic tenderness  Musculoskeletal: Moves all extremities, no deformity  Skin: Pink, warm, dry without rash. Neurologic: Alert, symmetric facies, no aphasia    My Medical Decision Making:   Patient presents emerged department with the information complaint. She denies any symptoms at present aside from lower abdominal cramping which is chronic for her. Labs and imaging reviewed unremarkable for any acute concerning process. She does have a leukocytosis of 17.2 although this does appear somewhat chronic as well. She is a mild IVETT at 26 and 1.1 respectively with aggressive BUN and creatinine. The patient is hydrated. Troponin is 26 repeat troponin is 22. CTA as well as CT abdomen pelvis unremarkable for any acute process. EKG:  EKG is interpreted myself as ventricular rate of 80 beats per there is a P wave before every QRS complex castration is over the QT/QTc is normal.  No ST segment elevation depression no T wave inversions. His EKG is interpreted myself as normal sinus rhythm no evidence of ischemia or infarct. Patient has evidence of urinary tract infection. She will be started on antibiotics and discharged with outpatient follow-up    Reassessed reveals patient's abdominal cramping is improved. She will be discharged with outpatient follow-up    IMPRESSION:   1. Syncope and collapse    2. Constipation, unspecified constipation type    3.  Abdominal pain, unspecified abdominal location        I, Dr. Jadiel Morocho, am the primary provider of record. I directly supervised any procedures performed by the resident and was present for the procedure including all critical portions of the procedure. Yecenia Rizzo, DO  Emergency Medicine    NOTE: This report was transcribed using voice recognition software.  Every effort was made to ensure accuracy; however, inadvertent computerized transcription errors may be present       Ginna Fraga,   04/15/22 [address] [place] [address],   04/16/22 0008

## 2022-04-18 LAB
EKG ATRIAL RATE: 80 BPM
EKG P AXIS: 55 DEGREES
EKG P-R INTERVAL: 160 MS
EKG Q-T INTERVAL: 400 MS
EKG QRS DURATION: 88 MS
EKG QTC CALCULATION (BAZETT): 461 MS
EKG R AXIS: -6 DEGREES
EKG T AXIS: 76 DEGREES
EKG VENTRICULAR RATE: 80 BPM

## 2022-04-19 ENCOUNTER — OFFICE VISIT (OUTPATIENT)
Dept: ENDOCRINOLOGY | Age: 47
End: 2022-04-19
Payer: MEDICAID

## 2022-04-19 VITALS
HEART RATE: 92 BPM | HEIGHT: 66 IN | WEIGHT: 293 LBS | DIASTOLIC BLOOD PRESSURE: 84 MMHG | RESPIRATION RATE: 16 BRPM | BODY MASS INDEX: 47.09 KG/M2 | SYSTOLIC BLOOD PRESSURE: 148 MMHG

## 2022-04-19 DIAGNOSIS — E11.65 UNCONTROLLED TYPE 2 DIABETES MELLITUS WITH HYPERGLYCEMIA (HCC): ICD-10-CM

## 2022-04-19 DIAGNOSIS — E66.01 MORBID OBESITY WITH BMI OF 50.0-59.9, ADULT (HCC): ICD-10-CM

## 2022-04-19 DIAGNOSIS — E11.65 UNCONTROLLED TYPE 2 DIABETES MELLITUS WITH HYPERGLYCEMIA (HCC): Primary | ICD-10-CM

## 2022-04-19 DIAGNOSIS — Z91.199 NONCOMPLIANCE: ICD-10-CM

## 2022-04-19 LAB
ANION GAP SERPL CALCULATED.3IONS-SCNC: 9 MMOL/L (ref 7–16)
BUN BLDV-MCNC: 9 MG/DL (ref 6–20)
CALCIUM SERPL-MCNC: 9 MG/DL (ref 8.6–10.2)
CHLORIDE BLD-SCNC: 98 MMOL/L (ref 98–107)
CO2: 30 MMOL/L (ref 22–29)
CREAT SERPL-MCNC: 0.8 MG/DL (ref 0.5–1)
GFR AFRICAN AMERICAN: >60
GFR NON-AFRICAN AMERICAN: >60 ML/MIN/1.73
GLUCOSE BLD-MCNC: 143 MG/DL (ref 74–99)
POTASSIUM SERPL-SCNC: 3.7 MMOL/L (ref 3.5–5)
SODIUM BLD-SCNC: 137 MMOL/L (ref 132–146)

## 2022-04-19 PROCEDURE — 99214 OFFICE O/P EST MOD 30 MIN: CPT | Performed by: NURSE PRACTITIONER

## 2022-04-19 PROCEDURE — 3052F HG A1C>EQUAL 8.0%<EQUAL 9.0%: CPT | Performed by: NURSE PRACTITIONER

## 2022-04-19 RX ORDER — DULAGLUTIDE 3 MG/.5ML
3 INJECTION, SOLUTION SUBCUTANEOUS WEEKLY
Qty: 4 PEN | Refills: 3 | Status: SHIPPED
Start: 2022-04-19 | End: 2022-07-08

## 2022-04-19 NOTE — PROGRESS NOTES
[address] Department of Endocrinology Diabetes and Metabolism   [address] [facility] [place] [address]   Phone: [phone]  Fax: [phone]    Date of Service: 4/19/2022    Primary Care Physician: Lidia Moreau MD  Referring physician: No ref. provider found  Provider: GEOVANI Briggs NP     Reason for the visit:    Type 2 DM,  Hospital f/u      History of Present Illness: The history is provided by the patient. No  was used. Accuracy of the patient data is excellent. Yuni Wagner is a very pleasant 56 y.o. female seen today for diabetes management. PMH of DM type 2, HTN, HLD, Hypothyroidism, CAD and other  listed below admitted to [facility] on 4/4/2022 - 4/13/22 because of SOB. She was DX with acute asthma exacerbation. She was tx with steroids. Her PCP has been managing her DM. Yuni Wagner was diagnosed with diabetes at age 26  and currently on Lantus 30 units BID, Lispro 18 units + SS high SS, Trulicity 1.5 mg weekly. She has not taken her insulin for the past 3 days because she feels like she is dying when she administers insulin. She states her neck burn    Prior to hospitalization she was on Trulicity only. She reports a poor appetite. She is under stress as her  passed away in January. The patient has been checking blood sugar 1- 4x a day   Most recent A1c results summarized below  Lab Results   Component Value Date    LABA1C 8.6 04/09/2022    LABA1C 8.1 04/01/2022    LABA1C 8.2 12/07/2021       Patient has had no hypoglycemic episodes   The patient hasn't been mindful of what has been eating and wasn't following diabetes diet    She drinks sugary drinks. She has limited her CHO.  She does admit to eating sweets   She eats once a day at dinner   I reviewed current medications and the patient has no issues with diabetes medications  Eliana Parsons has an eye appt coming up  and denied any history of diabetic retinopathy The patient  performs her  own feet care  Microvascular complications:  No Retinopathy, Nephropathy or Neuropathy   Macrovascular complications: no CAD, PVD, or Stroke    PAST MEDICAL HISTORY   Past Medical History:   Diagnosis Date    Acute renal injury ([facility] [address] 2013    Analgesic overuse headache 2018    Anxiety     Arthritis     Asthma     CAD (coronary artery disease)     Depression 3/19/2013    Diabetes mellitus ([facility] [address]     A1C=6.7 on 14    Fracture of tooth 2018    GERD (gastroesophageal reflux disease)     Glaucoma     Hyperlipidemia 2021    Hypertension     Hypertensive urgency 3/19/2013    Hypothyroidism     Irritable bowel syndrome     Obesity     Obstructive sleep apnea     Pneumonia     Polycystic ovarian syndrome     Postcholecystectomy syndrome 2018    Spinal headache     Suicidal ideation 3/18/2016       PAST SURGICAL HISTORY   Past Surgical History:   Procedure Laterality Date     SECTION      Dr. Waqas Mccullough, [address], [place]      [facility]    COLONOSCOPY      DENTAL SURGERY  10/06/2011    4 extractions    ECHO COMPLETE  2014         ENDOSCOPY, COLON, DIAGNOSTIC      FOOT SURGERY      RECONSTRUCTION LEFT FOOT, Dr. Deacon Sandoval, [address]  9/3/13    incisional hernia repair with mesh    HYSTERECTOMY      KNEE CARTILAGE SURGERY      OVARY REMOVAL      left due to polycystic ovary, Dr. Rico Labrum, [address]  3/19/2013    Attempted [place] BRYANT;RSO, Dr. De La Cruz [facility], [address]    Tobacco:   reports that she has never smoked. She has never used smokeless tobacco.  Alcohol:   reports no history of alcohol use. Drugs:   reports no history of drug use.     FAMILY HISTORY   Family History   Problem Relation Age of Onset    Asthma Mother     Diabetes Mother     High Blood Pressure Mother     High Blood Pressure Father     Heart Disease Father     Cancer Father     Depression Father     Diabetes Brother     Asthma Brother     Thyroid Disease Sister     Asthma Sister     Depression Maternal Grandmother     High Blood Pressure Maternal Grandmother     Mental Illness Maternal Grandmother     Thyroid Disease Maternal Grandmother     Heart Disease Maternal Grandfather     Depression Paternal Grandmother     High Blood Pressure Paternal Grandmother     Cancer Paternal Grandfather        ALLERGIES AND DRUG REACTIONS   Allergies   Allergen Reactions    Percocet [Oxycodone-Acetaminophen] Itching    Metformin And Related Other (See Comments)     GI side effects     Tape [Adhesive Tape] Itching       CURRENT MEDICATIONS   Current Outpatient Medications   Medication Sig Dispense Refill    cefdinir (OMNICEF) 300 MG capsule Take 1 capsule by mouth 2 times daily for 7 days 14 capsule 0    hydrocortisone (ANUSOL-HC) 2.5 % CREA rectal cream Apply to affected area twice daily for rectal pain from external hemorrhoid.  28 g 0    dicyclomine (BENTYL) 10 MG capsule Take 1 capsule by mouth 4 times daily 120 capsule 0    blood glucose test strips (ASCENSIA AUTODISC VI;ONE TOUCH ULTRA TEST VI) strip 1 each by In Vitro route 5 times daily 500 each 3    aspirin EC 81 MG EC tablet Take 1 tablet by mouth daily 90 tablet 1    Dulaglutide (TRULICITY) 1.5 PG/3.3LP SOPN Inject 1.5 mg into the skin once a week 2 mL 3    hydrALAZINE (APRESOLINE) 100 MG tablet Take 1 tablet by mouth every 8 hours 90 tablet 0    dilTIAZem (CARDIZEM CD) 360 MG extended release capsule Take 1 capsule by mouth daily 30 capsule 0    benzonatate (TESSALON) 200 MG capsule Take 1 capsule by mouth 3 times daily as needed for Cough 21 capsule 0    Insulin Pen Needle (BD PEN NEEDLE MICRO U/F) 32G X 6 MM MISC Uses with insulin 4 times a day 250 each 5    Insulin Pen Needle (BD PEN NEEDLE MICRO U/F) 32G X 6 MM MISC Uses with insulin 4 times a day 250 each 5    Lancets MISC 1 each by Does not apply route daily 100 each 5    glucose monitoring (FREESTYLE FREEDOM) kit 1 kit by Does not apply route daily 1 kit 0    mometasone (ELOCON) 0.1 % cream Apply topically daily as needed (apply to both hands and under breasts)      mupirocin (BACTROBAN) 2 % ointment Apply topically 3 times daily as needed (apply to both hands and under breasts)      triamcinolone (ARISTOCORT) 0.5 % cream Apply topically 3 times daily as needed (apply to both hands and under breasts)      atorvastatin (LIPITOR) 20 MG tablet Take 1 tablet by mouth daily 90 tablet 1    lisinopril (PRINIVIL;ZESTRIL) 40 MG tablet Take 1 tablet by mouth daily 30 tablet 2    albuterol sulfate HFA (VENTOLIN HFA) 108 (90 Base) MCG/ACT inhaler Inhale 2 puffs into the lungs 4 times daily as needed for Wheezing 18 g 0    ondansetron (ZOFRAN-ODT) 4 MG disintegrating tablet Take 1 tablet by mouth 3 times daily as needed for Nausea or Vomiting 21 tablet 0    insulin glargine (LANTUS SOLOSTAR) 100 UNIT/ML injection pen Inject 30 Units into the skin 2 times daily (Patient not taking: Reported on 4/19/2022) 20 pen 3    insulin lispro, 1 Unit Dial, (HUMALOG KWIKPEN) 100 UNIT/ML SOPN Inject 18 units with meals + sliding scale. -200 add 3U, -250 add 6U, -300 add 9U, -350 add 12U, -400 add 15U, BS over 400 add 18U. MAC 100u/DAY (Patient not taking: Reported on 4/19/2022) 15 pen 3     No current facility-administered medications for this visit. Review of Systems  Constitutional: No fever, no chills, no diaphoresis, no generalized weakness. HEENT: No blurred vision, No sore throat, no ear pain, no hair loss  Neck: denied any neck swelling, difficulty swallowing,   Cardio-pulmonary: No CP, SOB or palpitation, No orthopnea or PND. No cough or wheezing. GI: No N/V/D, no constipation, No abdominal pain, no melena or hematochezia   : Denied any dysuria, hematuria, flank pain, discharge, or incontinence.    Skin: denied any rash, ulcer, Hirsute, or hyperpigmentation. MSK: denied any joint deformity, joint pain/swelling, muscle pain, or back pain. Neuro: no numbness, no tingling, no weakness, _    OBJECTIVE    BP (!) 148/84   Pulse 92   Resp 16   Ht 5' 6\" (1.676 m)   Wt (!) 347 lb 1.6 oz (157.4 kg)   LMP 10/01/2012 (Approximate) Comment: 2013  BMI 56.02 kg/m²   BP Readings from Last 4 Encounters:   04/19/22 (!) 148/84   04/16/22 (!) 172/84   04/16/22 139/82   04/13/22 (!) 109/51     Wt Readings from Last 6 Encounters:   04/19/22 (!) 347 lb 1.6 oz (157.4 kg)   04/16/22 (!) 340 lb (154.2 kg)   04/15/22 (!) 340 lb (154.2 kg)   04/05/22 (!) 345 lb (156.5 kg)   04/01/22 (!) 346 lb (156.9 kg)   03/29/22 (!) 340 lb (154.2 kg)       Physical examination:  General: awake alert, oriented x3, no abnormal position or movements. HEENT: normocephalic non-traumatic, no exophthalmos   Neck: supple, no LN enlargement, no thyromegaly, no thyroid tenderness, no JVD. Pulm: Clear equal air entry no added sounds, no wheezing or rhonchi    CVS: S1 + S2, no murmur, no heave. Dorsalis pedis pulse palpable   Abd: soft lax, no tenderness, no organomegaly, audible bowel sounds. Skin: warm, no lesions, no rash.  No callus, no Ulcers, No acanthosis nigricans  Musculoskeletal: No back tenderness, no kyphosis/scoliosis    Neuro: CN intact, Monofilament sensation decreased bilateral , muscle power normal  Psych: normal mood, and affect      Review of Laboratory Data:  I personally reviewed the following lab:  Lab Results   Component Value Date/Time    WBC 10.3 04/16/2022 04:35 PM    RBC 4.34 04/16/2022 04:35 PM    HGB 13.0 04/16/2022 04:35 PM    HCT 40.9 04/16/2022 04:35 PM    MCV 94.2 04/16/2022 04:35 PM    MCH 30.0 04/16/2022 04:35 PM    MCHC 31.8 (L) 04/16/2022 04:35 PM    RDW 12.8 04/16/2022 04:35 PM     04/16/2022 04:35 PM    MPV 11.9 04/16/2022 04:35 PM      Lab Results   Component Value Date/Time     04/16/2022 04:35 PM    K 4.5 04/16/2022 04:35 PM    K 4.1 04/15/2022 08:01 PM    CO2 28 04/16/2022 04:35 PM    BUN 23 (H) 04/16/2022 04:35 PM    CREATININE 1.0 04/16/2022 04:35 PM    CALCIUM 8.5 (L) 04/16/2022 04:35 PM    LABGLOM 60 04/16/2022 04:35 PM    GFRAA >60 04/16/2022 04:35 PM      Lab Results   Component Value Date/Time    TSH 2.160 04/10/2022 05:03 AM    T4FREE 1.30 04/10/2022 05:03 AM    D7ZNYGF 7.5 04/13/2016 04:30 PM     Lab Results   Component Value Date    LABA1C 8.6 04/09/2022    GLUCOSE 133 04/16/2022    MALBCR - 05/18/2021    LABMICR <12.0 05/18/2021    LABCREA 106 05/18/2021     Lab Results   Component Value Date    LABA1C 8.6 04/09/2022    LABA1C 8.1 04/01/2022    LABA1C 8.2 12/07/2021     Lab Results   Component Value Date    TRIG 221 04/09/2022    HDL 57 04/09/2022    LDLCALC 94 04/09/2022    CHOL 195 04/09/2022     Lab Results   Component Value Date    VITD25 8 04/01/2014       ASSESSMENT & RECOMMENDATIONS   Eliana Roche, a 56 y.o.-old female seen in for the following issues       Assessment:      Diagnosis Orders   1. Uncontrolled type 2 diabetes mellitus with hyperglycemia (HCC)  C-Peptide    GLUTAMIC ACID DECARBOXYLASE    Basic Metabolic Panel   2. Morbid obesity with BMI of 50.0-59.9, adult ([address]     3. Noncompliance         Plan:     1. Uncontrolled type 2 diabetes mellitus with hyperglycemia ([address]   · Patient's diabetes is not controlled in the setting of steroid use. No longer on therapy since hospitalization  · Pt refusing insulins. Suggested increasing Trulicity and decreasing insulin. Pt agreeable. · Off insulin, -180  · Will change DM regimen to: Increase Trulicity 3.0 mg weekly, decrease  Lantus 20 units BID, Lispro 10 + SS  · The patient was advised to check blood sugars 4 times a day before meals and at bedtime and send BS readings to our office in a week. · Suggested pump therapy and pt interested.  [employer] RN to speak with pt today at OV  · Discussed with patient A1c and blood sugar goals · Optimal blood sugars: 100-140 pre-prandial, < 180 peak post-prandial  · The patient counseled about the complications of uncontrolled diabetes   · Patient was counselled about the importance of self-blood glucose monitoring and eating consistent carb diet to avoid blood sugar fluctuations   · Patient will need routine diabetes maintenance and prevention  · Discussed lifestyle changes including diet and exercise with patient  · Diabetes labs before next visit      2. Morbid obesity with BMI of 50.0-59.9, adult (HCC)   · Discussed lifestyle changes including diet and exercise with patient in depth. Also discussed with patient cardiovascular risk associated with obesity     3. Noncompliance   · Discussed with patient the importance of eating consistent carbohydrate meals, avoiding high glycemic index food. Also, discussed with patient the risk and negative consequences of dietary noncompliance on blood glucose control, blood pressure and weight     I personally reviewed external notes from PCP and other patient's care team providers, and personally interpreted labs associated with the above diagnosis. I also ordered labs to further assess and manage the above addressed medical conditions. Return in about 3 months (around 7/19/2022). The above issues were reviewed with the patient who understood and agreed with the plan. Thank you for allowing us to participate in the care of this patient. Please do not hesitate to contact us with any additional questions. GEOVANI Bland NP     [facility] Diabetes Care and Endocrinology   [address] [place] [address]   Phone: [phone]  Fax: [phone]  --------------------------------------------  An electronic signature was used to authenticate this note.  GEOVANI Bland NP on 4/19/2022 at 10:15 AM

## 2022-04-20 DIAGNOSIS — E11.65 UNCONTROLLED TYPE 2 DIABETES MELLITUS WITH HYPERGLYCEMIA (HCC): Primary | ICD-10-CM

## 2022-04-20 RX ORDER — BLOOD-GLUCOSE TRANSMITTER
EACH MISCELLANEOUS
Qty: 1 EACH | Refills: 0 | Status: SHIPPED | OUTPATIENT
Start: 2022-04-20

## 2022-04-20 RX ORDER — BLOOD-GLUCOSE SENSOR
EACH MISCELLANEOUS
Qty: 12 EACH | Refills: 3 | Status: SHIPPED | OUTPATIENT
Start: 2022-04-20

## 2022-04-20 RX ORDER — SUBCUTANEOUS INSULIN PUMP
EACH MISCELLANEOUS
Qty: 1 KIT | Refills: 0 | Status: SHIPPED | OUTPATIENT
Start: 2022-04-20

## 2022-04-22 LAB — C-PEPTIDE: 2.8 NG/ML (ref 0.5–3.3)

## 2022-04-23 LAB — GLUTAMIC ACID DECARB AB: <5 IU/ML (ref 0–5)

## 2022-04-26 ENCOUNTER — TELEPHONE (OUTPATIENT)
Dept: ENDOCRINOLOGY | Age: 47
End: 2022-04-26

## 2022-04-26 NOTE — TELEPHONE ENCOUNTER
----- Message from GEOVANI Ambriz NP sent at 4/25/2022  8:38 AM EDT -----  Please call and inform pt that I reviewed her labs and her SIMONA antibody and cpeptide are WNL and we will treat her for DM type 2

## 2022-04-28 DIAGNOSIS — E11.65 UNCONTROLLED TYPE 2 DIABETES MELLITUS WITH HYPERGLYCEMIA (HCC): Primary | ICD-10-CM

## 2022-04-28 RX ORDER — INSULIN LISPRO 100 [IU]/ML
INJECTION, SOLUTION INTRAVENOUS; SUBCUTANEOUS
Qty: 30 ML | Refills: 5 | Status: SHIPPED
Start: 2022-04-28 | End: 2022-09-20

## 2022-05-03 RX ORDER — HYDRALAZINE HYDROCHLORIDE 100 MG/1
100 TABLET, FILM COATED ORAL EVERY 8 HOURS SCHEDULED
Qty: 90 TABLET | Refills: 0 | Status: SHIPPED | OUTPATIENT
Start: 2022-05-03

## 2022-05-03 RX ORDER — DILTIAZEM HYDROCHLORIDE 360 MG/1
360 CAPSULE, EXTENDED RELEASE ORAL DAILY
Qty: 30 CAPSULE | Refills: 0 | Status: SHIPPED | OUTPATIENT
Start: 2022-05-03

## 2022-05-12 DIAGNOSIS — E11.65 UNCONTROLLED TYPE 2 DIABETES MELLITUS WITH HYPERGLYCEMIA (HCC): Primary | ICD-10-CM

## 2022-05-12 RX ORDER — LANCETS
EACH MISCELLANEOUS
Qty: 200 EACH | Refills: 6 | Status: SHIPPED | OUTPATIENT
Start: 2022-05-12

## 2022-05-12 RX ORDER — BLOOD SUGAR DIAGNOSTIC
STRIP MISCELLANEOUS
Qty: 200 EACH | Refills: 6 | Status: SHIPPED | OUTPATIENT
Start: 2022-05-12

## 2022-05-16 DIAGNOSIS — E11.65 UNCONTROLLED TYPE 2 DIABETES MELLITUS WITH HYPERGLYCEMIA (HCC): Primary | ICD-10-CM

## 2022-05-16 RX ORDER — LANCETS
EACH MISCELLANEOUS
Qty: 200 EACH | Refills: 5 | Status: SHIPPED | OUTPATIENT
Start: 2022-05-16

## 2022-05-19 ENCOUNTER — OFFICE VISIT (OUTPATIENT)
Dept: ENDOCRINOLOGY | Age: 47
End: 2022-05-19
Payer: MEDICAID

## 2022-05-19 VITALS
WEIGHT: 293 LBS | HEART RATE: 131 BPM | DIASTOLIC BLOOD PRESSURE: 107 MMHG | BODY MASS INDEX: 47.09 KG/M2 | SYSTOLIC BLOOD PRESSURE: 198 MMHG | HEIGHT: 66 IN

## 2022-05-19 DIAGNOSIS — Z96.41 INSULIN PUMP IN PLACE: ICD-10-CM

## 2022-05-19 DIAGNOSIS — E11.9 CONTROLLED TYPE 2 DIABETES MELLITUS WITHOUT COMPLICATION, WITH LONG-TERM CURRENT USE OF INSULIN (HCC): ICD-10-CM

## 2022-05-19 DIAGNOSIS — Z91.119 DIETARY NONCOMPLIANCE: ICD-10-CM

## 2022-05-19 DIAGNOSIS — E66.01 MORBID OBESITY WITH BMI OF 50.0-59.9, ADULT (HCC): ICD-10-CM

## 2022-05-19 DIAGNOSIS — Z79.4 CONTROLLED TYPE 2 DIABETES MELLITUS WITHOUT COMPLICATION, WITH LONG-TERM CURRENT USE OF INSULIN (HCC): ICD-10-CM

## 2022-05-19 PROCEDURE — 99214 OFFICE O/P EST MOD 30 MIN: CPT | Performed by: NURSE PRACTITIONER

## 2022-05-19 PROCEDURE — 3052F HG A1C>EQUAL 8.0%<EQUAL 9.0%: CPT | Performed by: NURSE PRACTITIONER

## 2022-05-19 PROCEDURE — 95251 CONT GLUC MNTR ANALYSIS I&R: CPT | Performed by: NURSE PRACTITIONER

## 2022-05-19 NOTE — PROGRESS NOTES
2021    Hypertension     Hypertensive urgency 3/19/2013    Hypothyroidism     Irritable bowel syndrome     Obesity     Obstructive sleep apnea     Pneumonia     Polycystic ovarian syndrome     Postcholecystectomy syndrome 2018    Spinal headache     Suicidal ideation 3/18/2016       PAST SURGICAL HISTORY   Past Surgical History:   Procedure Laterality Date     SECTION      Dr. Sunshine Fields, [address], LAPAROSCOPIC      [facility]    COLONOSCOPY      DENTAL SURGERY  10/06/2011    4 extractions    ECHO COMPLETE  2014         ENDOSCOPY, COLON, DIAGNOSTIC      FOOT SURGERY      RECONSTRUCTION LEFT FOOT, Dr. Clayton Boyd, [address]  9/3/13    incisional hernia repair with mesh    HYSTERECTOMY      KNEE CARTILAGE SURGERY      OVARY REMOVAL      left due to polycystic ovary, Dr. Maris Back, [facility] AND BSO  3/19/2013    Attempted [place], Dr. Hodan Ko, [address]   Tobacco:   reports that she has never smoked. She has never used smokeless tobacco.  Alcohol:   reports no history of alcohol use. Drugs:   reports no history of drug use.     FAMILY HISTORY   Family History   Problem Relation Age of Onset    Asthma Mother     Diabetes Mother     High Blood Pressure Mother     High Blood Pressure Father     Heart Disease Father     Cancer Father     Depression Father     Diabetes Brother     Asthma Brother     Thyroid Disease Sister     Asthma Sister     Depression Maternal Grandmother     High Blood Pressure Maternal Grandmother     Mental Illness Maternal Grandmother     Thyroid Disease Maternal Grandmother     Heart Disease Maternal Grandfather     Depression Paternal Grandmother     High Blood Pressure Paternal Grandmother     Cancer Paternal Grandfather        ALLERGIES AND DRUG REACTIONS   Allergies   Allergen Reactions    Percocet [Oxycodone-Acetaminophen] Itching    Metformin And Related Other (See Comments)     GI side effects     Tape Beth Schilder Tape] Itching       CURRENT MEDICATIONS   Current Outpatient Medications   Medication Sig Dispense Refill    Accu-Chek FastClix Lancets MISC Use to check blood glucose 4x daily 200 each 5    insulin lispro (HUMALOG) 100 UNIT/ML SOLN injection vial Use via insulin pump. Max 85 units daily 30 mL 5    Dulaglutide (TRULICITY) 3 WW/9.8UW SOPN Inject 3 mg into the skin once a week 4 pen 3    Accu-Chek Softclix Lancets MISC Check blood sugar 4x daily 200 each 6    blood glucose test strips (ACCU-CHEK GUIDE) strip Use to check blood sugars 4x daily 200 each 6    dilTIAZem (CARDIZEM CD) 360 MG extended release capsule Take 1 capsule by mouth daily 30 capsule 0    hydrALAZINE (APRESOLINE) 100 MG tablet Take 1 tablet by mouth every 8 hours 90 tablet 0    Insulin Infusion Pump (MINIMED 770G INSULIN PUMP SYS) KIT To infuse insulin 1 kit 0    Continuous Blood Gluc Sensor (GUARDIAN SENSOR 3) MISC To change every 7 days 12 each 3    Continuous Blood Gluc Transmit (GUARDIAN LINK 3 TRANSMITTER) MISC To use with sensors 1 each 0    hydrocortisone (ANUSOL-HC) 2.5 % CREA rectal cream Apply to affected area twice daily for rectal pain from external hemorrhoid.  28 g 0    dicyclomine (BENTYL) 10 MG capsule Take 1 capsule by mouth 4 times daily 120 capsule 0    aspirin EC 81 MG EC tablet Take 1 tablet by mouth daily 90 tablet 1    insulin glargine (LANTUS SOLOSTAR) 100 UNIT/ML injection pen Inject 30 Units into the skin 2 times daily (Patient not taking: Reported on 4/19/2022) 20 pen 3    Insulin Pen Needle (BD PEN NEEDLE MICRO U/F) 32G X 6 MM MISC Uses with insulin 4 times a day 250 each 5    Insulin Pen Needle (BD PEN NEEDLE MICRO U/F) 32G X 6 MM MISC Uses with insulin 4 times a day 250 each 5    glucose monitoring (FREESTYLE FREEDOM) kit 1 kit by Does not apply route daily 1 kit 0    mometasone (ELOCON) 0.1 % cream Apply topically daily as needed (apply to both hands and under breasts)      mupirocin (BACTROBAN) 2 % ointment Apply topically 3 times daily as needed (apply to both hands and under breasts)      triamcinolone (ARISTOCORT) 0.5 % cream Apply topically 3 times daily as needed (apply to both hands and under breasts)      atorvastatin (LIPITOR) 20 MG tablet Take 1 tablet by mouth daily 90 tablet 1    lisinopril (PRINIVIL;ZESTRIL) 40 MG tablet Take 1 tablet by mouth daily 30 tablet 2    albuterol sulfate HFA (VENTOLIN HFA) 108 (90 Base) MCG/ACT inhaler Inhale 2 puffs into the lungs 4 times daily as needed for Wheezing 18 g 0    ondansetron (ZOFRAN-ODT) 4 MG disintegrating tablet Take 1 tablet by mouth 3 times daily as needed for Nausea or Vomiting 21 tablet 0     No current facility-administered medications for this visit. Review of Systems  Constitutional: No fever, no chills, no diaphoresis, no generalized weakness. HEENT: No blurred vision, No sore throat, no ear pain, no hair loss  Neck: denied any neck swelling, difficulty swallowing,   Cardio-pulmonary: No CP, SOB or palpitation, No orthopnea or PND. No cough or wheezing. GI: No N/V/D, no constipation, No abdominal pain, no melena or hematochezia   : Denied any dysuria, hematuria, flank pain, discharge, or incontinence. Skin: denied any rash, ulcer, Hirsute, or hyperpigmentation. MSK: denied any joint deformity, joint pain/swelling, muscle pain, or back pain.   Neuro: no numbness, no tingling, no weakness    OBJECTIVE    BP (!) 198/107   Pulse 131   Ht 5' 6\" (1.676 m)   Wt (!) 348 lb (157.9 kg)   LMP 10/01/2012 (Approximate) Comment: 2013  BMI 56.17 kg/m²   BP Readings from Last 4 Encounters:   05/19/22 (!) 198/107   04/19/22 (!) 148/84   04/16/22 (!) 172/84   04/16/22 139/82     Wt Readings from Last 6 Encounters:   05/19/22 (!) 348 lb (157.9 kg)   04/19/22 (!) 347 lb 1.6 oz (157.4 kg)   04/16/22 (!) 340 lb (154.2 kg)   04/15/22 (!) 340 lb (154.2 kg)   04/05/22 (!) 345 lb (156.5 kg)   04/01/22 (!) 346 lb (156.9 kg)       Physical examination:  General: awake alert, oriented x3, no abnormal position or movements. HEENT: normocephalic non-traumatic, no exophthalmos   Neck: supple, no LN enlargement, no thyromegaly, no thyroid tenderness, no JVD. Pulm: Clear equal air entry no added sounds, no wheezing or rhonchi    CVS: S1 + S2, no murmur, no heave. Dorsalis pedis pulse palpable   Abd: soft lax, no tenderness, no organomegaly, audible bowel sounds. Skin: warm, no lesions, no rash.  No callus, no Ulcers, No acanthosis nigricans  Musculoskeletal: No back tenderness, no kyphosis/scoliosis    Neuro: CN intact,  sensation decreased bilateral , muscle power normal  Psych: normal mood, and affect      Review of Laboratory Data:  I personally reviewed the following lab:  Lab Results   Component Value Date/Time    WBC 10.3 04/16/2022 04:35 PM    RBC 4.34 04/16/2022 04:35 PM    HGB 13.0 04/16/2022 04:35 PM    HCT 40.9 04/16/2022 04:35 PM    MCV 94.2 04/16/2022 04:35 PM    MCH 30.0 04/16/2022 04:35 PM    MCHC 31.8 (L) 04/16/2022 04:35 PM    RDW 12.8 04/16/2022 04:35 PM     04/16/2022 04:35 PM    MPV 11.9 04/16/2022 04:35 PM      Lab Results   Component Value Date/Time     04/19/2022 10:57 AM    K 3.7 04/19/2022 10:57 AM    K 4.1 04/15/2022 08:01 PM    CO2 30 (H) 04/19/2022 10:57 AM    BUN 9 04/19/2022 10:57 AM    CREATININE 0.8 04/19/2022 10:57 AM    CALCIUM 9.0 04/19/2022 10:57 AM    LABGLOM >60 04/19/2022 10:57 AM    GFRAA >60 04/19/2022 10:57 AM      Lab Results   Component Value Date/Time    TSH 2.160 04/10/2022 05:03 AM    T4FREE 1.30 04/10/2022 05:03 AM    O6AMIKZ 7.5 04/13/2016 04:30 PM     Lab Results   Component Value Date    LABA1C 8.6 04/09/2022    GLUCOSE 143 04/19/2022    MALBCR - 05/18/2021    LABMICR <12.0 05/18/2021    LABCREA 106 05/18/2021     Lab Results   Component Value Date    LABA1C 8.6 04/09/2022    LABA1C 8.1 04/01/2022 LABA1C 8.2 12/07/2021     Lab Results   Component Value Date    TRIG 221 04/09/2022    HDL 57 04/09/2022    LDLCALC 94 04/09/2022    CHOL 195 04/09/2022     Lab Results   Component Value Date    VITD25 8 04/01/2014       ASSESSMENT & RECOMMENDATIONS   Eliana Treviño, a 56 y.o.-old female seen in for the following issues       Assessment:      Diagnosis Orders   1. Controlled type 2 diabetes mellitus without complication, with long-term current use of insulin ([address]     2. Dietary noncompliance     3. Morbid obesity with BMI of 50.0-59.9, adult ([facility] [address]     4. Insulin pump in place         Plan:     1. Uncontrolled type 2 diabetes mellitus with hyperglycemia (HCC)   · Glycemic control greatly improved and pt congratulated  · Pump reviewed %, ABG   · No changes to current pump therapy: Pump Settings: Basal 1.20, CR 8, IS 32, AIT  3, BG goal 100-150  · Continue CGM monitoring  · Discussed with patient A1c and blood sugar goals   · Optimal blood sugars: 100-140 pre-prandial, < 180 peak post-prandial  · The patient counseled about the complications of uncontrolled diabetes   · Patient was counselled about the importance of self-blood glucose monitoring and eating consistent carb diet to avoid blood sugar fluctuations   · Patient will need routine diabetes maintenance and prevention  · Discussed lifestyle changes including diet and exercise with patient  · Diabetes labs before next visit     Continuous Glucose Monitoring (CGM) download and interpretation    I personally reviewed and interpreted continuous glucose monitor (CGM) download. CGM report was discussed with patient including blood glucose patterns, percentages of blood glucose at goal, above goal and below goal. Insulin dosages/antidiabetic regimen was adjusted according to CGM download. Full CGM was scanned under media.         2. Morbid obesity with BMI of 50.0-59.9, adult ([address]   · Discussed lifestyle changes including diet and exercise with patient in depth. Also discussed with patient cardiovascular risk associated with obesity     3. Noncompliance   · Improving  · Discussed with patient the importance of eating consistent carbohydrate meals, avoiding high glycemic index food. Also, discussed with patient the risk and negative consequences of dietary noncompliance on blood glucose control, blood pressure and weight     I personally reviewed external notes from PCP and other patient's care team providers, and personally interpreted labs associated with the above diagnosis. I also ordered labs to further assess and manage the above addressed medical conditions. Return in about 3 months (around 8/19/2022) for DM Type 2. The above issues were reviewed with the patient who understood and agreed with the plan. Thank you for allowing us to participate in the care of this patient. Please do not hesitate to contact us with any additional questions. GEOVANI Flynn NP     [facility] Diabetes Care and Endocrinology   [address] [place] [id number]   Phone: [phone]  Fax: [phone]  --------------------------------------------  An electronic signature was used to authenticate this note.  GEOVANI Flynn NP on 5/19/2022 at 3:13 PM

## 2022-06-17 ENCOUNTER — TELEPHONE (OUTPATIENT)
Dept: ENDOCRINOLOGY | Age: 47
End: 2022-06-17

## 2022-07-07 DIAGNOSIS — E11.65 UNCONTROLLED TYPE 2 DIABETES MELLITUS WITH HYPERGLYCEMIA (HCC): ICD-10-CM

## 2022-07-08 RX ORDER — DULAGLUTIDE 3 MG/.5ML
3 INJECTION, SOLUTION SUBCUTANEOUS WEEKLY
Qty: 2 ML | Refills: 3 | Status: SHIPPED
Start: 2022-07-08 | End: 2022-10-28 | Stop reason: SDUPTHER

## 2022-08-04 ENCOUNTER — TELEPHONE (OUTPATIENT)
Dept: BARIATRICS/WEIGHT MGMT | Age: 47
End: 2022-08-04

## 2022-08-04 ENCOUNTER — INITIAL CONSULT (OUTPATIENT)
Dept: BARIATRICS/WEIGHT MGMT | Age: 47
End: 2022-08-04
Payer: MEDICAID

## 2022-08-04 VITALS
HEIGHT: 66 IN | DIASTOLIC BLOOD PRESSURE: 132 MMHG | RESPIRATION RATE: 20 BRPM | HEART RATE: 84 BPM | SYSTOLIC BLOOD PRESSURE: 253 MMHG | BODY MASS INDEX: 47.09 KG/M2 | WEIGHT: 293 LBS | TEMPERATURE: 98.2 F

## 2022-08-04 DIAGNOSIS — E66.01 MORBID OBESITY DUE TO EXCESS CALORIES (HCC): Primary | ICD-10-CM

## 2022-08-04 DIAGNOSIS — K21.9 GASTROESOPHAGEAL REFLUX DISEASE WITHOUT ESOPHAGITIS: ICD-10-CM

## 2022-08-04 PROBLEM — J06.9 VIRAL URI: Status: RESOLVED | Noted: 2022-04-06 | Resolved: 2022-08-04

## 2022-08-04 PROBLEM — R07.9 CHEST PAIN: Status: RESOLVED | Noted: 2021-07-14 | Resolved: 2022-08-04

## 2022-08-04 PROBLEM — J45.901 ACUTE ASTHMA EXACERBATION: Status: RESOLVED | Noted: 2022-04-05 | Resolved: 2022-08-04

## 2022-08-04 PROBLEM — R07.9 CHEST PAIN IN ADULT: Status: RESOLVED | Noted: 2021-07-15 | Resolved: 2022-08-04

## 2022-08-04 PROBLEM — E87.70 VOLUME OVERLOAD: Status: RESOLVED | Noted: 2021-07-15 | Resolved: 2022-08-04

## 2022-08-04 PROBLEM — A49.02 INFECTION WITH METHICILLIN-RESISTANT STAPHYLOCOCCUS AUREUS: Status: RESOLVED | Noted: 2018-12-19 | Resolved: 2022-08-04

## 2022-08-04 PROBLEM — J45.901 ASTHMA EXACERBATION ATTACKS: Status: RESOLVED | Noted: 2022-04-05 | Resolved: 2022-08-04

## 2022-08-04 PROBLEM — E55.9 VITAMIN D DEFICIENCY: Status: RESOLVED | Noted: 2018-12-19 | Resolved: 2022-08-04

## 2022-08-04 PROBLEM — D22.9 ATYPICAL NEVUS: Status: RESOLVED | Noted: 2018-12-19 | Resolved: 2022-08-04

## 2022-08-04 PROBLEM — Z20.822 CLOSE EXPOSURE TO COVID-19 VIRUS: Status: RESOLVED | Noted: 2022-01-03 | Resolved: 2022-08-04

## 2022-08-04 PROCEDURE — G8427 DOCREV CUR MEDS BY ELIG CLIN: HCPCS | Performed by: SURGERY

## 2022-08-04 PROCEDURE — 99204 OFFICE O/P NEW MOD 45 MIN: CPT | Performed by: SURGERY

## 2022-08-04 PROCEDURE — G8417 CALC BMI ABV UP PARAM F/U: HCPCS | Performed by: SURGERY

## 2022-08-04 PROCEDURE — 99202 OFFICE O/P NEW SF 15 MIN: CPT

## 2022-08-04 RX ORDER — SODIUM CHLORIDE, SODIUM LACTATE, POTASSIUM CHLORIDE, CALCIUM CHLORIDE 600; 310; 30; 20 MG/100ML; MG/100ML; MG/100ML; MG/100ML
INJECTION, SOLUTION INTRAVENOUS CONTINUOUS
Status: CANCELLED | OUTPATIENT
Start: 2022-08-04

## 2022-08-04 NOTE — PATIENT INSTRUCTIONS
What is the next step to proceed with weight loss surgery? Please be aware that any co-pays or deductibles may be requested prior to testing and / or procedures. You will need to schedule a psychological evaluation for weight loss surgery. Patients will be required to complete all psychological testing as required by the mental health provider. Patients must also follow all of the provider's recommendations before weight loss surgery can be scheduled. The evaluation must be done a standard way for weight loss surgery. We strongly recommend that you contact one of our preferred providers listed below to arrange this:      Jordin Abdi, [facility]  [address], [place]   [phone]    [facility] and [address]   [phone]    Dr. Reagan Merino, PhD    [address] [place], [place]    [phone]      You will also need to plan on attending a 2 hour nutrition class at the [facility] prior to your surgery. We will schedule this for you when we schedule your surgery. Please remember to have your labs drawn 10 days prior to your first scheduled dietary appointment. Please remember, that while we will submit your case to insurance for surgery authorization, it is your responsibility to know if your plan covers weight loss surgery and keep up-to-date with changes to your insurance coverage. We will do everything possible to help you get approved for weight loss surgery, but cannot guarantee an approval.     Please note that you will not be submitted to your insurance company until all pre-operative testing requirements are met.

## 2022-08-04 NOTE — PROGRESS NOTES
Eliana Stroud Rosemary Nick  2022  [facility]    Initial Evaluation  History and Physical   EGD       CHIEF COMPLAINT: Morbid obesity, malnutrition, Type 2 Diabetes Mellitus, Hypertension, Hyperlipidemia, Asthma, Obstructive Sleep Apnea, and Hypothyroidism    HISTORY OF PRESENT ILLNESS: Eliana Copeland is a morbidly-obese 56 y.o.  female, who weighs (!) 345 lb (156.5 kg). She is 186 pounds over her ideal body weight. The Body mass index is 55.68 kg/m². She has multiple medical problems aggravated by her obesity. She wishes to have bariatric surgery so that she can lose a large amount of weight and keep the weight off. I have met with [facility] in [facility] where we discussed the surgery in great detail and went over the risks and benefits. She has watched our informational video so she understands all of the extensive risks involved. She states that she understands all of the risks and wishes to proceed with the evaluation. Weight:  345 lb 2022  initial    Past medical history:      Morbid Obesity     Hypothyroidism     Depression with anxiety     ALO (obstructive sleep apnea)     Type 2 diabetes mellitus (HCC)     Asthma     Arteriosclerosis of coronary artery     Essential hypertension     PCOS (polycystic ovarian syndrome)     Lymphedema of both lower extremities     Irritable bowel syndrome with diarrhea     Hyperlipidemia    Past Surgical History:   Procedure Laterality Date     SECTION      Dr. Bill Godfrey, [address] [facility], LAPAROSCOPIC      [facility]    COLONOSCOPY      DENTAL SURGERY  10/06/2011    4 extractions    ECHO COMPLETE  2014         ENDOSCOPY, COLON, DIAGNOSTIC      FOOT SURGERY      RECONSTRUCTION LEFT FOOT, Dr. Karl Celis, [address]  9/3/13    incisional hernia repair with mesh    HYSTERECTOMY (CERVIX STATUS UNKNOWN)      KNEE CARTILAGE SURGERY      OVARY REMOVAL      left due to polycystic ovary, Dr. Freda Carrasco, [facility]    BRYANT AND BSO (CERVIX REMOVED)  3/19/2013    Attempted Lap-Open BRYANT;RSO, Dr. Cyrus Ferreira, [address]  [address]      Allergies: Percocet [oxycodone-acetaminophen], Metformin and related, and Tape [adhesive tape]     Home Medications  Prior to Visit Medications    Medication Sig Taking? Authorizing Provider   [facility] FP/9.4GM SOPN INJECT 3 MG INTO THE SKIN ONCE A WEEK Yes GEOVANI Weems NP   Accu-Chek FastClix Lancets MISC Use to check blood glucose 4x daily Yes Wale Reeves MD   Accu-Chek Softclix Lancets MISC Check blood sugar 4x daily Yes Wale Reeves MD   blood glucose test strips (ACCU-CHEK GUIDE) strip Use to check blood sugars 4x daily Yes Wale Reeves MD   dilTIAZem (CARDIZEM CD) 360 MG extended release capsule Take 1 capsule by mouth daily Yes Colby Matias MD   hydrALAZINE (APRESOLINE) 100 MG tablet Take 1 tablet by mouth every 8 hours Yes Colby Matias MD   insulin lispro (HUMALOG) 100 UNIT/ML SOLN injection vial Use via insulin pump. Max 85 units daily Yes Wale Reeves MD   Insulin Infusion Pump (MINIMED 770G INSULIN PUMP SYS) KIT To infuse insulin Yes GEOVANI Weems NP   Continuous Blood Gluc Sensor (GUARDIAN SENSOR 3) MISC To change every 7 days Yes GEOVANI Weems NP   Continuous Blood Gluc Transmit (GUARDIAN LINK 3 TRANSMITTER) MISC To use with sensors Yes GEOVANI Weems NP   hydrocortisone (ANUSOL-HC) 2.5 % CREA rectal cream Apply to affected area twice daily for rectal pain from external hemorrhoid.  Yes Enrique BalDO ana   dicyclomine (BENTYL) 10 MG capsule Take 1 capsule by mouth 4 times daily Yes Edwin Wyman MD   aspirin EC 81 MG EC tablet Take 1 tablet by mouth daily Yes Lawrecne Vora MD   insulin glargine (LANTUS SOLOSTAR) 100 UNIT/ML injection pen Inject 30 Units into the skin 2 times daily Yes Lawrence Vora MD   Insulin Pen Needle (BD PEN NEEDLE MICRO U/F) 32G X 6 MM MISC Uses with insulin 4 times a day Yes Radha Hernandez MD   Insulin Pen Needle (BD PEN NEEDLE MICRO U/F) 32G X 6 MM MISC Uses with insulin 4 times a day Yes Radha Hernandez MD   glucose monitoring (FREESTYLE FREEDOM) kit 1 kit by Does not apply route daily Yes Radha Hernandez MD   mometasone (ELOCON) 0.1 % cream Apply topically daily as needed (apply to both hands and under breasts) Yes Historical Provider, MD   mupirocin (BACTROBAN) 2 % ointment Apply topically 3 times daily as needed (apply to both hands and under breasts) Yes Historical Provider, MD   triamcinolone (ARISTOCORT) 0.5 % cream Apply topically 3 times daily as needed (apply to both hands and under breasts) Yes Historical Provider, MD   atorvastatin (LIPITOR) 20 MG tablet Take 1 tablet by mouth daily Yes Samy Hardin MD   lisinopril (PRINIVIL;ZESTRIL) 40 MG tablet Take 1 tablet by mouth daily Yes Samy Hardin MD   albuterol sulfate HFA (VENTOLIN HFA) 108 (90 Base) MCG/ACT inhaler Inhale 2 puffs into the lungs 4 times daily as needed for Wheezing Yes Pinky Nathan DO   ondansetron (ZOFRAN-ODT) 4 MG disintegrating tablet Take 1 tablet by mouth 3 times daily as needed for Nausea or Vomiting Yes CRYSTAL Tucker   pantoprazole (PROTONIX) 40 MG tablet Take 1 tablet by mouth daily for 10 days  Maciel Mathew DO     Social History:   TOBACCO:   reports that she has never smoked. She has never used smokeless tobacco.  All smokers must join the free smoking cessation program and stop smoking for 3 months before having any Bariatric surgery. ETOH:    reports no history of alcohol use.    Family History   Problem Relation Age of Onset    Asthma Mother     Diabetes Mother     High Blood Pressure Mother     High Blood Pressure Father     Heart Disease Father     Cancer Father     Depression Father     Diabetes Brother     Asthma Brother     Thyroid Disease Sister     Asthma Sister     Depression Maternal Grandmother     High Blood Pressure Maternal Grandmother     Mental Illness Maternal Grandmother     Thyroid Disease Maternal Grandmother     Heart Disease Maternal Grandfather     Depression Paternal Grandmother     High Blood Pressure Paternal Grandmother     Cancer Paternal Grandfather      Review of Systems:  Psychiatric:  depression and anxiety  Respiratory: negative  Cardiovascular: negative  Gastrointestinal: negative  Musculoskeletal:negative  All others reviewed, negative    Physical Exam:   VITALS: Blood pressure (!) 253/132, pulse 84, temperature 98.2 °F (36.8 °C), temperature source Temporal, resp. rate 20, height 5' 6\" (1.676 m), weight (!) 345 lb (156.5 kg), last menstrual period 10/01/2012, not currently breastfeeding. General appearance: alert, appears stated age and cooperative, does ambulate easily  Head: Normocephalic, without obvious abnormality, atraumatic  Eyes: PERRL  Ears/mouth/throat:  Ears clear, mouth normal, throat no redness  Neck: no adenopathy, no JVD, supple, symmetrical, trachea midline and thyroid not enlarged  Lungs: clear to auscultation bilaterally  Heart: regular rate and rhythm  Abdomen: soft, non-tender; bowel sounds normal; no masses,  no organomegaly  Extremities: extremities normal, atraumatic, no cyanosis or edema  Skin: no open wounds    Assessment:  Morbid obesity with inability to keep the weight off with diet and exercise. She is interested in Laparoscopic Otilio-en- Y Gastric Bypass or Sleeve Gastrectomy. We discussed that our goal is to ameliorate the medical problems and not to obtain a specific body mass index. She understands the risks and benefits, and wishes to proceed with the evaluation. Plan:  EGD to check for hiatal hernias, ulcers and H. Pylori. Psych and dietary evaluation, medical and cardiac clearance. These patients often have vitamin deficiencies so I will do screening labs for malnutrition while we wait for insurance approval for the surgery.     Physician Signature: Electronically signed by Dr. Cedrick Muniz MD    Send copy of H&P to PCP, Rebecca Wu MD

## 2022-08-04 NOTE — PROGRESS NOTES
First BP lower left arm - 253/132    Second BP upper right - Unable to read - stopped machine at 300 MAP.     Third BP lower right (taken manually) - 257/120

## 2022-08-04 NOTE — TELEPHONE ENCOUNTER
Prior Authorization Form  DEMOGRAPHICS:    Patient Name:  Nancy Lind  Patient :  1975            Insurance:  Payor: [employer] [facility] / Plan: Demi Brooks / Product Type: *No Product type* /   Insurance ID Number:    Payer/Plan Subscr  Sex Relation Sub.  Ins. ID Effective Group Num   1. [address] [facility] [facility] 1975 Female Self [id number]                                   [address]         DIAGNOSIS & PROCEDURE:    Procedure/Operation: egd           CPT Code: 27555    Diagnosis:  gerd    ICD10 Code: k21.9    Location:  [facility]    Surgeon:  Joe Dunlap INFORMATION:    Date: 22    Time:               Anesthesia:  MAC/TIVA                                                       Status:  Outpatient        Special Comments:         Electronically signed by Quinton Shearer MA on 2022 at 3:08 PM

## 2022-08-12 ENCOUNTER — APPOINTMENT (OUTPATIENT)
Dept: GENERAL RADIOLOGY | Age: 47
End: 2022-08-12
Payer: MEDICAID

## 2022-08-12 ENCOUNTER — HOSPITAL ENCOUNTER (EMERGENCY)
Age: 47
Discharge: HOME | End: 2022-08-12
Attending: EMERGENCY MEDICINE
Payer: MEDICAID

## 2022-08-12 VITALS
OXYGEN SATURATION: 100 % | SYSTOLIC BLOOD PRESSURE: 232 MMHG | WEIGHT: 293 LBS | BODY MASS INDEX: 47.09 KG/M2 | RESPIRATION RATE: 14 BRPM | DIASTOLIC BLOOD PRESSURE: 110 MMHG | HEART RATE: 86 BPM | TEMPERATURE: 97.6 F | HEIGHT: 66 IN

## 2022-08-12 DIAGNOSIS — S69.91XA FINGER INJURY, RIGHT, INITIAL ENCOUNTER: ICD-10-CM

## 2022-08-12 DIAGNOSIS — S60.221A CONTUSION OF RIGHT HAND, INITIAL ENCOUNTER: Primary | ICD-10-CM

## 2022-08-12 PROCEDURE — 73130 X-RAY EXAM OF HAND: CPT

## 2022-08-12 PROCEDURE — 99283 EMERGENCY DEPT VISIT LOW MDM: CPT

## 2022-08-12 PROCEDURE — 6370000000 HC RX 637 (ALT 250 FOR IP): Performed by: PHYSICIAN ASSISTANT

## 2022-08-12 RX ORDER — IBUPROFEN 800 MG/1
800 TABLET ORAL ONCE
Status: COMPLETED | OUTPATIENT
Start: 2022-08-12 | End: 2022-08-12

## 2022-08-12 RX ADMIN — IBUPROFEN 800 MG: 800 TABLET, FILM COATED ORAL at 15:53

## 2022-08-12 ASSESSMENT — PAIN SCALES - GENERAL: PAINLEVEL_OUTOF10: 4

## 2022-08-12 NOTE — ED PROVIDER NOTES
HPI:  22,   Time: 4:50 PM EDT         Kelli Abraham is a 56 y.o. female presenting to the ED for Injury to the RT index , beginning several minutes ago. The complaint has been persistent, mild in severity, and worsened by nothing. Patient stated she dropped a brick on her right hand injuring the right index finger. She is right-hand dominant. No other injuries reported. She has no deficits of the right hand. She suffered no abrasions or injuries to the skin. ROS:   Pertinent positives and negatives are stated within HPI, all other systems reviewed and are negative.  --------------------------------------------- PAST HISTORY ---------------------------------------------  Past Medical History:  has a past medical history of Acute renal injury ([address] Analgesic overuse headache, Anxiety, Arthritis, Asthma, CAD (coronary artery disease), Depression, Diabetes mellitus ([address] Fracture of tooth, GERD (gastroesophageal reflux disease), Glaucoma, Hyperlipidemia, Hypertension, Hypertensive urgency, Hypothyroidism, Irritable bowel syndrome, Morbid obesity due to excess calories ([address] Obesity, Obstructive sleep apnea, Pneumonia, Polycystic ovarian syndrome, Postcholecystectomy syndrome, Spinal headache, and Suicidal ideation. Past Surgical History:  has a past surgical history that includes Tonsillectomy (); Cholecystectomy, laparoscopic ();  section (); Foot surgery (); Ovary removal (); Dental surgery (10/06/2011); Total abdominal hysterectomy w/ bilateral salpingoophorectomy (3/19/2013); hernia repair (9/3/13); Hysterectomy; Echo Complete (2014); Knee cartilage surgery; Endoscopy, colon, diagnostic; and Colonoscopy. Social History:  reports that she has never smoked. She has never used smokeless tobacco. She reports that she does not drink alcohol and does not use drugs.     Family History: family history includes Asthma in her brother, mother, and sister; Cancer in her father and paternal grandfather; Depression in her father, maternal grandmother, and paternal grandmother; Diabetes in her brother and mother; Heart Disease in her father and maternal grandfather; High Blood Pressure in her father, maternal grandmother, mother, and paternal grandmother; Mental Illness in her maternal grandmother; Thyroid Disease in her maternal grandmother and sister. The patients home medications have been reviewed. Allergies: Percocet [oxycodone-acetaminophen], Metformin and related, and Tape [adhesive tape]    -------------------------------------------------- RESULTS -------------------------------------------------  All laboratory and radiology results have been personally reviewed by myself   LABS:  No results found for this visit on 08/12/22. RADIOLOGY:  Interpreted by Radiologist.  XR HAND RIGHT (MIN 3 VIEWS)   Final Result   No acute osseous findings seen about the right hand nor specifically about   the right index finger on this exam.  Normal alignment. Mild soft tissue   swelling about the right index finger. RECOMMENDATION:   In the setting of trauma, if there is persistent symptoms and physical exam   warrants a repeat radiograph in 10-14 days could be considered as occult   fractures may not be evident on initial imaging evaluation.             ------------------------- NURSING NOTES AND VITALS REVIEWED ---------------------------   The nursing notes within the ED encounter and vital signs as below have been reviewed.    BP (!) 232/110   Pulse 86   Temp 97.6 °F (36.4 °C) (Temporal)   Resp 14   Ht 5' 6\" (1.676 m)   Wt (!) 342 lb (155.1 kg)   LMP 10/01/2012 (Approximate) Comment: 2013  SpO2 100%   BMI 55.20 kg/m²   Oxygen Saturation Interpretation: Normal      ---------------------------------------------------PHYSICAL EXAM--------------------------------------      Constitutional/General: Alert and oriented x3, well appearing, non toxic in NAD  Head: NC/AT  Pulmonary:  Not in respiratory distress  Cardiovascular: . 2+ distal pulses  Extremities: Moves all extremities x 4. Warm and well perfused, patient has no swelling or deformity noted to the right index finger. Skin: warm and dry without rash but no ecchymosis or swelling  Neurologic: GCS 15, no focal deficits of right hand.        ------------------------------ ED COURSE/MEDICAL DECISION MAKING----------------------  Medications   ibuprofen (ADVIL;MOTRIN) tablet 800 mg (800 mg Oral Given 8/12/22 7095)         Medical Decision Making:    Patient's x-rays of her right hand demonstrate no acute fracture or injury. I did review it and interpreted. Patient aware of findings. She is given Motrin for pain control. She be discharged home with buddy tape of her fingers she is to follow-up with her PCP as needed. Counseling: The emergency provider has spoken with the patient and discussed todays results, in addition to providing specific details for the plan of care and counseling regarding the diagnosis and prognosis. Questions are answered at this time and they are agreeable with the plan.      --------------------------------- IMPRESSION AND DISPOSITION ---------------------------------    IMPRESSION  1. Contusion of right hand, initial encounter    2.  Finger injury, right, initial encounter        DISPOSITION  Disposition: Discharge to home  Patient condition is stable                 Anila Lorenz DO  08/12/22 9491

## 2022-08-12 NOTE — ED NOTES
(R) hand and wrist wrapped w ace wrap and ice applied.  Pt tolerated well     Delmy Herndon LPN  95/25/43 6832

## 2022-08-25 ENCOUNTER — OFFICE VISIT (OUTPATIENT)
Dept: ENDOCRINOLOGY | Age: 47
End: 2022-08-25
Payer: MEDICAID

## 2022-08-25 VITALS
DIASTOLIC BLOOD PRESSURE: 113 MMHG | BODY MASS INDEX: 47.09 KG/M2 | HEART RATE: 85 BPM | WEIGHT: 293 LBS | SYSTOLIC BLOOD PRESSURE: 206 MMHG | HEIGHT: 66 IN

## 2022-08-25 DIAGNOSIS — E66.01 MORBID OBESITY DUE TO EXCESS CALORIES (HCC): ICD-10-CM

## 2022-08-25 DIAGNOSIS — Z96.41 INSULIN PUMP IN PLACE: ICD-10-CM

## 2022-08-25 DIAGNOSIS — Z79.4 CONTROLLED TYPE 2 DIABETES MELLITUS WITHOUT COMPLICATION, WITH LONG-TERM CURRENT USE OF INSULIN (HCC): Primary | ICD-10-CM

## 2022-08-25 DIAGNOSIS — E11.9 CONTROLLED TYPE 2 DIABETES MELLITUS WITHOUT COMPLICATION, WITH LONG-TERM CURRENT USE OF INSULIN (HCC): Primary | ICD-10-CM

## 2022-08-25 DIAGNOSIS — E66.01 MORBID OBESITY WITH BMI OF 50.0-59.9, ADULT (HCC): ICD-10-CM

## 2022-08-25 DIAGNOSIS — Z91.119 DIETARY NONCOMPLIANCE: ICD-10-CM

## 2022-08-25 LAB
ALBUMIN SERPL-MCNC: 4.1 G/DL (ref 3.5–5.2)
ALP BLD-CCNC: 97 U/L (ref 35–104)
ALT SERPL-CCNC: 20 U/L (ref 0–32)
ANION GAP SERPL CALCULATED.3IONS-SCNC: 14 MMOL/L (ref 7–16)
AST SERPL-CCNC: 18 U/L (ref 0–31)
BILIRUB SERPL-MCNC: 0.5 MG/DL (ref 0–1.2)
BUN BLDV-MCNC: 12 MG/DL (ref 6–20)
CALCIUM SERPL-MCNC: 9.3 MG/DL (ref 8.6–10.2)
CHLORIDE BLD-SCNC: 103 MMOL/L (ref 98–107)
CHOLESTEROL, TOTAL: 168 MG/DL (ref 0–199)
CO2: 26 MMOL/L (ref 22–29)
CREAT SERPL-MCNC: 0.9 MG/DL (ref 0.5–1)
FERRITIN: 149 NG/ML
GFR AFRICAN AMERICAN: >60
GFR NON-AFRICAN AMERICAN: >60 ML/MIN/1.73
GLUCOSE BLD-MCNC: 106 MG/DL (ref 74–99)
HBA1C MFR BLD: 5.9 %
HBA1C MFR BLD: 6.1 % (ref 4–5.6)
HCT VFR BLD CALC: 43.2 % (ref 34–48)
HEMOGLOBIN: 14.2 G/DL (ref 11.5–15.5)
MCH RBC QN AUTO: 29.7 PG (ref 26–35)
MCHC RBC AUTO-ENTMCNC: 32.9 % (ref 32–34.5)
MCV RBC AUTO: 90.4 FL (ref 80–99.9)
PDW BLD-RTO: 13.4 FL (ref 11.5–15)
PLATELET # BLD: 234 E9/L (ref 130–450)
PMV BLD AUTO: 12.1 FL (ref 7–12)
POTASSIUM SERPL-SCNC: 4.1 MMOL/L (ref 3.5–5)
PREALBUMIN: 30 MG/DL (ref 20–40)
RBC # BLD: 4.78 E12/L (ref 3.5–5.5)
SODIUM BLD-SCNC: 143 MMOL/L (ref 132–146)
TOTAL PROTEIN: 7 G/DL (ref 6.4–8.3)
TRIGL SERPL-MCNC: 117 MG/DL (ref 0–149)
WBC # BLD: 8.9 E9/L (ref 4.5–11.5)

## 2022-08-25 PROCEDURE — 3044F HG A1C LEVEL LT 7.0%: CPT | Performed by: NURSE PRACTITIONER

## 2022-08-25 PROCEDURE — 95251 CONT GLUC MNTR ANALYSIS I&R: CPT | Performed by: NURSE PRACTITIONER

## 2022-08-25 PROCEDURE — 83036 HEMOGLOBIN GLYCOSYLATED A1C: CPT | Performed by: NURSE PRACTITIONER

## 2022-08-25 PROCEDURE — 99214 OFFICE O/P EST MOD 30 MIN: CPT | Performed by: NURSE PRACTITIONER

## 2022-08-25 NOTE — PROGRESS NOTES
(gastroesophageal reflux disease)     Glaucoma     Hyperlipidemia 2021    Hypertension     Hypertensive urgency 2013    Hypothyroidism     Irritable bowel syndrome     Morbid obesity due to excess calories ([address]     Obesity     Obstructive sleep apnea     Pneumonia     Polycystic ovarian syndrome     Postcholecystectomy syndrome 2018    Spinal headache     Suicidal ideation 2016       PAST SURGICAL HISTORY   Past Surgical History:   Procedure Laterality Date     SECTION      Dr. Yogesh Mcfarlane, [address] [facility] [address], LAPAROSCOPIC      [facility]    COLONOSCOPY      DENTAL SURGERY  10/06/2011    4 extractions    ECHO COMPLETE  2014         ENDOSCOPY, COLON, DIAGNOSTIC      FOOT SURGERY      RECONSTRUCTION LEFT FOOT, Dr. Georges Palomo, [address]  9/3/13    incisional hernia repair with mesh    HYSTERECTOMY (CERVIX STATUS UNKNOWN)      KNEE CARTILAGE SURGERY      OVARY REMOVAL      left due to polycystic ovary, Dr. Marixa Drummond, [facility]    BRYANT AND BSO (CERVIX REMOVED)  3/19/2013    Attempted [facility];RSO, Dr. Shawn Benedict, [address]   Tobacco:   reports that she has never smoked. She has never used smokeless tobacco.  Alcohol:   reports no history of alcohol use. Drugs:   reports no history of drug use.     FAMILY HISTORY   Family History   Problem Relation Age of Onset    Asthma Mother     Diabetes Mother     High Blood Pressure Mother     High Blood Pressure Father     Heart Disease Father     Cancer Father     Depression Father     Diabetes Brother     Asthma Brother     Thyroid Disease Sister     Asthma Sister     Depression Maternal Grandmother     High Blood Pressure Maternal Grandmother     Mental Illness Maternal Grandmother     Thyroid Disease Maternal Grandmother     Heart Disease Maternal Grandfather     Depression Paternal Grandmother     High Blood Pressure Paternal Grandmother     Cancer Paternal Grandfather        ALLERGIES AND DRUG REACTIONS   Allergies   Allergen Reactions    Percocet [Oxycodone-Acetaminophen] Itching    Metformin And Related Other (See Comments)     GI side effects     Tape Halley Ivan Tape] Itching       CURRENT MEDICATIONS   Current Outpatient Medications   Medication Sig Dispense Refill    TRULICITY 3 JM/7.7VA SOPN INJECT 3 MG INTO THE SKIN ONCE A WEEK 2 mL 3    insulin lispro (HUMALOG) 100 UNIT/ML SOLN injection vial Use via insulin pump. Max 85 units daily 30 mL 5    Continuous Blood Gluc Sensor (GUARDIAN SENSOR 3) MISC To change every 7 days 12 each 3    Continuous Blood Gluc Transmit (GUARDIAN LINK 3 TRANSMITTER) MISC To use with sensors 1 each 0    Accu-Chek FastClix Lancets MISC Use to check blood glucose 4x daily 200 each 5    Accu-Chek Softclix Lancets MISC Check blood sugar 4x daily 200 each 6    blood glucose test strips (ACCU-CHEK GUIDE) strip Use to check blood sugars 4x daily 200 each 6    dilTIAZem (CARDIZEM CD) 360 MG extended release capsule Take 1 capsule by mouth daily 30 capsule 0    hydrALAZINE (APRESOLINE) 100 MG tablet Take 1 tablet by mouth every 8 hours 90 tablet 0    Insulin Infusion Pump (MINIMED 770G INSULIN PUMP SYS) KIT To infuse insulin 1 kit 0    hydrocortisone (ANUSOL-HC) 2.5 % CREA rectal cream Apply to affected area twice daily for rectal pain from external hemorrhoid.  28 g 0    dicyclomine (BENTYL) 10 MG capsule Take 1 capsule by mouth 4 times daily 120 capsule 0    aspirin EC 81 MG EC tablet Take 1 tablet by mouth daily 90 tablet 1    insulin glargine (LANTUS SOLOSTAR) 100 UNIT/ML injection pen Inject 30 Units into the skin 2 times daily (Patient not taking: Reported on 8/25/2022) 20 pen 3    Insulin Pen Needle (BD PEN NEEDLE MICRO U/F) 32G X 6 MM MISC Uses with insulin 4 times a day 250 each 5    Insulin Pen Needle (BD PEN NEEDLE MICRO U/F) 32G X 6 MM MISC Uses with insulin 4 times a day 250 each 5    glucose monitoring (FREESTYLE FREEDOM) kit 1 kit by Does not apply route daily 1 kit 0    mometasone (ELOCON) 0.1 % cream Apply topically daily as needed (apply to both hands and under breasts)      mupirocin (BACTROBAN) 2 % ointment Apply topically 3 times daily as needed (apply to both hands and under breasts)      triamcinolone (ARISTOCORT) 0.5 % cream Apply topically 3 times daily as needed (apply to both hands and under breasts)      atorvastatin (LIPITOR) 20 MG tablet Take 1 tablet by mouth daily 90 tablet 1    lisinopril (PRINIVIL;ZESTRIL) 40 MG tablet Take 1 tablet by mouth daily 30 tablet 2    albuterol sulfate HFA (VENTOLIN HFA) 108 (90 Base) MCG/ACT inhaler Inhale 2 puffs into the lungs 4 times daily as needed for Wheezing 18 g 0    ondansetron (ZOFRAN-ODT) 4 MG disintegrating tablet Take 1 tablet by mouth 3 times daily as needed for Nausea or Vomiting 21 tablet 0     No current facility-administered medications for this visit. Review of Systems  Constitutional: No fever, no chills, no diaphoresis, no generalized weakness. HEENT: No blurred vision, No sore throat, no ear pain, no hair loss  Neck: denied any neck swelling, difficulty swallowing,   Cardio-pulmonary: No CP, SOB or palpitation, No orthopnea or PND. No cough or wheezing. GI: No N/V/D, no constipation, No abdominal pain, no melena or hematochezia   : Denied any dysuria, hematuria, flank pain, discharge, or incontinence. Skin: denied any rash, ulcer, Hirsute, or hyperpigmentation. MSK: denied any joint deformity, joint pain/swelling, muscle pain, or back pain.   Neuro: no numbness, no tingling, no weakness    OBJECTIVE    BP (!) 206/113 Comment: pt states its normal  Pulse 85   Ht 5' 6\" (1.676 m)   Wt (!) 351 lb (159.2 kg)   LMP 10/01/2012 (Approximate) Comment: 2013  BMI 56.65 kg/m²   BP Readings from Last 4 Encounters:   08/25/22 (!) 206/113   08/12/22 (!) 232/110   08/04/22 (!) 253/132   05/19/22 (!) 198/107     Wt Readings from Last 6 Encounters:   08/25/22 (!) 351 lb LABMICR <12.0 05/18/2021 08:08 AM    LABCREA 106 05/18/2021 08:08 AM     Lab Results   Component Value Date/Time    LABA1C 5.9 08/25/2022 03:03 PM    LABA1C 8.6 04/09/2022 04:53 AM    LABA1C 8.1 04/01/2022 03:24 PM     Lab Results   Component Value Date/Time    TRIG 221 04/09/2022 04:53 AM    HDL 57 04/09/2022 04:53 AM    LDLCALC 94 04/09/2022 04:53 AM    CHOL 195 04/09/2022 04:53 AM     Lab Results   Component Value Date/Time    VITD25 8 04/01/2014 01:10 PM       ASSESSMENT & RECOMMENDATIONS   Eliana Romero, a 56 y.o.-old female seen in for the following issues       Assessment:      Diagnosis Orders   1. Controlled type 2 diabetes mellitus without complication, with long-term current use of insulin ([address]        2. Dietary noncompliance        3. Morbid obesity with BMI of 50.0-59.9, adult ([address]        4. Insulin pump in place            Plan:     1. Controlled type 2 diabetes mellitus ([address]   Glycemic control greatly improved and pt congratulated  GMI 6.0%   A1c 5.9% from 8.6% - pt congratulated  Pump reviewed TIR 99%, Avg BG   No changes to current pump therapy: Pump Settings: Basal 1.20, CR 8, IS 32, AIT  3, BG goal 100-150  Continues on trulicity 3 mg weekly  Continue CGM monitoring  Discussed with patient A1c and blood sugar goals   Optimal blood sugars: 100-140 pre-prandial, < 180 peak post-prandial  The patient counseled about the complications of uncontrolled diabetes   Patient was counselled about the importance of self-blood glucose monitoring and eating consistent carb diet to avoid blood sugar fluctuations   Patient will need routine diabetes maintenance and prevention  Discussed lifestyle changes including diet and exercise with patient  Diabetes labs before next visit     Continuous Glucose Monitoring (CGM) download and interpretation   I personally reviewed and interpreted continuous glucose monitor (CGM) download.  CGM report was discussed with patient including blood glucose patterns, percentages of blood glucose at goal, above goal and below goal. Insulin dosages/antidiabetic regimen was adjusted according to CGM download. Full CGM was scanned under media. 2. Morbid obesity with BMI of 50.0-59.9, adult ([address]   Discussed lifestyle changes including diet and exercise with patient in depth. Also discussed with patient cardiovascular risk associated with obesity  Following for [facility] Program, planning bariatric surgery     3. Noncompliance   Improving  Discussed with patient the importance of eating consistent carbohydrate meals, avoiding high glycemic index food. Also, discussed with patient the risk and negative consequences of dietary noncompliance on blood glucose control, blood pressure and weight     I personally reviewed external notes from PCP and other patient's care team providers, and personally interpreted labs associated with the above diagnosis. I also ordered labs to further assess and manage the above addressed medical conditions. Return in about 3 months (around 11/25/2022) for Type 2 DM . The above issues were reviewed with the patient who understood and agreed with the plan. Thank you for allowing us to participate in the care of this patient. Please do not hesitate to contact us with any additional questions. GEOVANI Cheng NP [facility] Diabetes Care and Endocrinology   [address] [facility] [address]   Phone: [phone]  Fax: [phone]  --------------------------------------------  An electronic signature was used to authenticate this note.  GEOVANI Cheng NP on 8/25/2022 at 3:13 PM

## 2022-08-26 LAB
FOLATE: 7.6 NG/ML (ref 4.8–24.2)
VITAMIN B-12: 669 PG/ML (ref 211–946)

## 2022-08-29 LAB — ZINC: 77.9 UG/DL (ref 60–120)

## 2022-08-31 LAB — VITAMIN B1 WHOLE BLOOD: 102 NMOL/L (ref 70–180)

## 2022-09-19 DIAGNOSIS — F41.9 ANXIETY: ICD-10-CM

## 2022-09-19 DIAGNOSIS — I10 ESSENTIAL HYPERTENSION: ICD-10-CM

## 2022-09-19 RX ORDER — LISINOPRIL 40 MG/1
40 TABLET ORAL DAILY
Qty: 30 TABLET | Refills: 2 | Status: SHIPPED | OUTPATIENT
Start: 2022-09-19

## 2022-09-19 RX ORDER — VENLAFAXINE HYDROCHLORIDE 37.5 MG/1
37.5 CAPSULE, EXTENDED RELEASE ORAL DAILY
Qty: 30 CAPSULE | Refills: 3 | OUTPATIENT
Start: 2022-09-19

## 2022-09-20 NOTE — PROGRESS NOTES
[facility] [facility]                                                                                                                     PRE OP INSTRUCTIONS FOR  Eliana Souza        Date: 9/20/2022    Date and time of surgery: 9/21/22   Arrival Time: [facility] will call with time between 4:30-5. Do not eat or drink anything after 12 midnight prior to surgery. This includes no water, chewing gum, mints or ice chips. Take the following pills with a small sip of water on the morning of Surgery: None Stop insulin pump at midnight. Diabetics may take evening dose of insulin but none after midnight. If you feel symptomatic or low blood sugar take 1-2 ounces of apple juice only. Aspirin, Ibuprofen, Advil, Naproxen, Vitamin E and other Anti-inflammatory products should be stopped  before surgery  as directed by your physician. Check with your Doctor regarding stopping Plavix, Coumadin, Lovenox, Fragmin or other blood thinners. Do not smoke,use illicit drugs and do not drink any alcoholic beverages 24 hours prior to surgery. You may brush your teeth and gargle the morning of surgery. DO NOT SWALLOW WATER    You MUST make arrangements for a responsible adult to take you home after your surgery. You will not be allowed to leave alone or drive yourself home. It is strongly suggested someone stay with you the first 24 hrs. Your surgery will be cancelled if you do not have a ride home. A parent/legal guardian must accompany a child scheduled for surgery and plan to stay at the hospital until the child is discharged. Please do not bring other children with you. Please wear simple, loose fitting clothing to the hospital.  Cindy Lo not bring valuables (money, credit cards, checkbooks, etc.) Do not wear any makeup (including no eye makeup) or nail polish on your fingers or toes. DO NOT wear any jewelry or piercings on day of surgery. All body piercing jewelry must be removed.     Shower the night before surgery with _X__Antibacterial soap ___Hibiclens. Remember to bring Blood Bank bracelet to the hospital on the day of surgery. If you have a Living Will and Durable Power of  for Healthcare, please bring in a copy. If appropriate bring crutches, inspirex, etc... Notify your Surgeon if you develop any illness between now and surgery time, cough, cold, fever, sore throat, nausea, vomiting, etc.  Please notify your surgeon if you experience dizziness, shortness of breath or blurred vision between now & the time of your surgery. If you have ___dentures, they will be removed before going to the OR; we will provide you a container. If you wear ___contact lenses or ___glasses, they will be removed; please bring a case for them. To provide excellent care visitors will be limited to one in the room at any given time. Please bring picture ID and insurance card. Sleep apnea patients need to bring CPAP to hospital on day of surgery. Visit our web site for additional information: [email or web address]_[email or web address]    During flu season no children under the age of 15 are permitted in the hospital for the safety of all patients. Other Come in through main lobby, go to information desk. Please call pre admission testing if you have any further questions.    [address] [facility]     [address]

## 2022-09-21 ENCOUNTER — ANESTHESIA EVENT (OUTPATIENT)
Dept: ENDOSCOPY | Age: 47
End: 2022-09-21
Payer: MEDICAID

## 2022-09-21 ENCOUNTER — ANESTHESIA (OUTPATIENT)
Dept: ENDOSCOPY | Age: 47
End: 2022-09-21
Payer: MEDICAID

## 2022-09-21 ENCOUNTER — HOSPITAL ENCOUNTER (OUTPATIENT)
Age: 47
Setting detail: OUTPATIENT SURGERY
Discharge: HOME | End: 2022-09-21
Attending: SURGERY | Admitting: SURGERY
Payer: MEDICAID

## 2022-09-21 VITALS
DIASTOLIC BLOOD PRESSURE: 77 MMHG | WEIGHT: 293 LBS | HEIGHT: 66 IN | TEMPERATURE: 97.2 F | HEART RATE: 67 BPM | OXYGEN SATURATION: 98 % | BODY MASS INDEX: 47.09 KG/M2 | SYSTOLIC BLOOD PRESSURE: 168 MMHG | RESPIRATION RATE: 15 BRPM

## 2022-09-21 DIAGNOSIS — K21.00 GASTROESOPHAGEAL REFLUX DISEASE WITH ESOPHAGITIS, UNSPECIFIED WHETHER HEMORRHAGE: ICD-10-CM

## 2022-09-21 LAB — METER GLUCOSE: 107 MG/DL (ref 74–99)

## 2022-09-21 PROCEDURE — 7100000010 HC PHASE II RECOVERY - FIRST 15 MIN: Performed by: SURGERY

## 2022-09-21 PROCEDURE — 43239 EGD BIOPSY SINGLE/MULTIPLE: CPT | Performed by: SURGERY

## 2022-09-21 PROCEDURE — 82962 GLUCOSE BLOOD TEST: CPT

## 2022-09-21 PROCEDURE — 2709999900 HC NON-CHARGEABLE SUPPLY: Performed by: SURGERY

## 2022-09-21 PROCEDURE — 3700000000 HC ANESTHESIA ATTENDED CARE: Performed by: SURGERY

## 2022-09-21 PROCEDURE — 6360000002 HC RX W HCPCS: Performed by: NURSE ANESTHETIST, CERTIFIED REGISTERED

## 2022-09-21 PROCEDURE — 7100000011 HC PHASE II RECOVERY - ADDTL 15 MIN: Performed by: SURGERY

## 2022-09-21 PROCEDURE — 87081 CULTURE SCREEN ONLY: CPT

## 2022-09-21 PROCEDURE — 2500000003 HC RX 250 WO HCPCS: Performed by: NURSE ANESTHETIST, CERTIFIED REGISTERED

## 2022-09-21 PROCEDURE — 2580000003 HC RX 258: Performed by: SURGERY

## 2022-09-21 PROCEDURE — 3609012400 HC EGD TRANSORAL BIOPSY SINGLE/MULTIPLE: Performed by: SURGERY

## 2022-09-21 RX ORDER — SODIUM CHLORIDE, SODIUM LACTATE, POTASSIUM CHLORIDE, CALCIUM CHLORIDE 600; 310; 30; 20 MG/100ML; MG/100ML; MG/100ML; MG/100ML
INJECTION, SOLUTION INTRAVENOUS CONTINUOUS
Status: DISCONTINUED | OUTPATIENT
Start: 2022-09-21 | End: 2022-09-21 | Stop reason: HOSPADM

## 2022-09-21 RX ORDER — LIDOCAINE HYDROCHLORIDE 20 MG/ML
INJECTION, SOLUTION EPIDURAL; INFILTRATION; INTRACAUDAL; PERINEURAL PRN
Status: DISCONTINUED | OUTPATIENT
Start: 2022-09-21 | End: 2022-09-21 | Stop reason: SDUPTHER

## 2022-09-21 RX ORDER — PROPOFOL 10 MG/ML
INJECTION, EMULSION INTRAVENOUS PRN
Status: DISCONTINUED | OUTPATIENT
Start: 2022-09-21 | End: 2022-09-21 | Stop reason: SDUPTHER

## 2022-09-21 RX ORDER — GLYCOPYRROLATE 0.2 MG/ML
INJECTION INTRAMUSCULAR; INTRAVENOUS PRN
Status: DISCONTINUED | OUTPATIENT
Start: 2022-09-21 | End: 2022-09-21 | Stop reason: SDUPTHER

## 2022-09-21 RX ORDER — KETAMINE HYDROCHLORIDE 50 MG/ML
INJECTION, SOLUTION, CONCENTRATE INTRAMUSCULAR; INTRAVENOUS PRN
Status: DISCONTINUED | OUTPATIENT
Start: 2022-09-21 | End: 2022-09-21 | Stop reason: SDUPTHER

## 2022-09-21 RX ADMIN — SODIUM CHLORIDE, POTASSIUM CHLORIDE, SODIUM LACTATE AND CALCIUM CHLORIDE: 600; 310; 30; 20 INJECTION, SOLUTION INTRAVENOUS at 12:59

## 2022-09-21 RX ADMIN — PROPOFOL 150 MG: 10 INJECTION, EMULSION INTRAVENOUS at 14:25

## 2022-09-21 RX ADMIN — LIDOCAINE HYDROCHLORIDE 100 MG: 20 INJECTION, SOLUTION EPIDURAL; INFILTRATION; INTRACAUDAL; PERINEURAL at 14:25

## 2022-09-21 RX ADMIN — KETAMINE HYDROCHLORIDE 25 MG: 50 INJECTION INTRAMUSCULAR; INTRAVENOUS at 14:25

## 2022-09-21 RX ADMIN — GLYCOPYRROLATE 0.2 MG: 0.2 INJECTION INTRAMUSCULAR; INTRAVENOUS at 14:25

## 2022-09-21 ASSESSMENT — PAIN - FUNCTIONAL ASSESSMENT: PAIN_FUNCTIONAL_ASSESSMENT: 0-10

## 2022-09-21 NOTE — H&P
Dr. Lilyan Felty, [facility]    BRYANT AND BSO (CERVIX REMOVED)  3/19/2013    Attempted Charles Parson BRYANT;RSO, Dr. Abhishek Garcia, [facility] [address]  [address]      Allergies: Percocet [oxycodone-acetaminophen], Metformin and related, and Tape [adhesive tape]     Home Medications  Prior to Visit Medications    Medication Sig Taking? Authorizing Provider   lisinopril (PRINIVIL;ZESTRIL) 40 MG tablet TAKE 1 TABLET BY MOUTH DAILY  Ariella Humphreys MD   TRULICITY 3 OQ/8.4IK SOPN INJECT 3 MG INTO THE SKIN ONCE A WEEK  GEOVANI Weems NP   Accu-Chek FastClix Lancets MISC Use to check blood glucose 4x daily  Ewelina Dunn MD   Accu-Chek Softclix Lancets MISC Check blood sugar 4x daily  Ewelina Dunn MD   blood glucose test strips (ACCU-CHEK GUIDE) strip Use to check blood sugars 4x daily  Ewelina Dunn MD   dilTIAZem (CARDIZEM CD) 360 MG extended release capsule Take 1 capsule by mouth daily  Ariella Humphreys MD   hydrALAZINE (APRESOLINE) 100 MG tablet Take 1 tablet by mouth every 8 hours  Ariella Humphreys MD   Insulin Infusion Pump (MINIMED 770G INSULIN PUMP SYS) KIT To infuse insulin  GEOVANI Weems NP   Continuous Blood Gluc Sensor (GUARDIAN SENSOR 3) MISC To change every 7 days  GEOVANI Weems NP   Continuous Blood Gluc Transmit (GUARDIAN LINK 3 TRANSMITTER) MISC To use with sensors  GEOVANI Weems NP   hydrocortisone (ANUSOL-HC) 2.5 % CREA rectal cream Apply to affected area twice daily for rectal pain from external hemorrhoid.   Tiffany Mayfield DO   dicyclomine (BENTYL) 10 MG capsule Take 1 capsule by mouth 4 times daily  Rubia López MD   aspirin EC 81 MG EC tablet Take 1 tablet by mouth daily  Spring Ramírez MD   Insulin Pen Needle (BD PEN NEEDLE MICRO U/F) 32G X 6 MM MISC Uses with insulin 4 times a day  Spring Ramírez MD   Insulin Pen Needle (BD PEN NEEDLE MICRO U/F) 32G X 6 MM MISC Uses with insulin 4 times a day  Spring Ramírez MD   glucose monitoring (FREESTYLE FREEDOM) kit 1 kit by Does not apply route daily  Grecia John MD   mometasone (ELOCON) 0.1 % cream Apply topically daily as needed (apply to both hands and under breasts)  Historical Provider, MD   mupirocin (BACTROBAN) 2 % ointment Apply topically 3 times daily as needed (apply to both hands and under breasts)  Historical Provider, MD   triamcinolone (ARISTOCORT) 0.5 % cream Apply topically 3 times daily as needed (apply to both hands and under breasts)  Historical Provider, MD   atorvastatin (LIPITOR) 20 MG tablet Take 1 tablet by mouth daily  Jose Martin Houston MD   albuterol sulfate HFA (VENTOLIN HFA) 108 (90 Base) MCG/ACT inhaler Inhale 2 puffs into the lungs 4 times daily as needed for Wheezing  Gaye Nathan,    pantoprazole (PROTONIX) 40 MG tablet Take 1 tablet by mouth daily for 10 days  Orysnacho Becerra, DO   ondansetron (ZOFRAN-ODT) 4 MG disintegrating tablet Take 1 tablet by mouth 3 times daily as needed for Nausea or Vomiting  CRYSTAL Hall     Social History:   TOBACCO:   reports that she has never smoked. She has never used smokeless tobacco.  All smokers must join the free smoking cessation program and stop smoking for 3 months before having any Bariatric surgery. ETOH:    reports no history of alcohol use.    Family History   Problem Relation Age of Onset    Asthma Mother     Diabetes Mother     High Blood Pressure Mother     High Blood Pressure Father     Heart Disease Father     Cancer Father     Depression Father     Diabetes Brother     Asthma Brother     Thyroid Disease Sister     Asthma Sister     Depression Maternal Grandmother     High Blood Pressure Maternal Grandmother     Mental Illness Maternal Grandmother     Thyroid Disease Maternal Grandmother     Heart Disease Maternal Grandfather     Depression Paternal Grandmother     High Blood Pressure Paternal Grandmother     Cancer Paternal Grandfather      Review of Systems:  Psychiatric:  depression and anxiety  Respiratory: negative  Cardiovascular: negative  Gastrointestinal: negative  Musculoskeletal:negative  All others reviewed, negative    Physical Exam:   VITALS: Blood pressure (!) 146/70, pulse 74, temperature 97.2 °F (36.2 °C), temperature source Temporal, resp. rate 16, height 5' 6\" (1.676 m), weight (!) 346 lb 9.6 oz (157.2 kg), last menstrual period 10/01/2012, SpO2 99 %, not currently breastfeeding. General appearance: alert, appears stated age and cooperative, does ambulate easily  Head: Normocephalic, without obvious abnormality, atraumatic  Eyes: PERRL  Ears/mouth/throat:  Ears clear, mouth normal, throat no redness  Neck: no adenopathy, no JVD, supple, symmetrical, trachea midline and thyroid not enlarged  Lungs: clear to auscultation bilaterally  Heart: regular rate and rhythm  Abdomen: soft, non-tender; bowel sounds normal; no masses,  no organomegaly  Extremities: extremities normal, atraumatic, no cyanosis or edema  Skin: no open wounds    Assessment:  Morbid obesity, acid reflux on Protonix. Plan:  EGD to check for hiatal hernias, ulcers and H. Pylori.      Physician Signature: Electronically signed by Dr. Henry Jerome MD    Send copy of H&P to PCP, Anjel Guardado MD

## 2022-09-21 NOTE — ANESTHESIA PRE PROCEDURE
Department of Anesthesiology  Preprocedure Note       Name:  Aditi Linda   Age:  56 y.o.  :  1975                                          MRN:  [id number]         Date:  2022      Surgeon: Alicia Knowles):  Osiris Atkinson MD    Procedure: Procedure(s):  EGD ESOPHAGOGASTRODUODENOSCOPY    Medications prior to admission:   Prior to Admission medications    Medication Sig Start Date End Date Taking? Authorizing Provider   lisinopril (PRINIVIL;ZESTRIL) 40 MG tablet TAKE 1 TABLET BY MOUTH DAILY 22   Tae Saleem MD   TRULICITY 3 ES/6.4GA SOPN INJECT 3 MG INTO THE SKIN ONCE A WEEK 22   Hugoon GEOVANI Issa NP   Accu-Chek FastClix Lancets MISC Use to check blood glucose 4x daily 22   Muna Figueredo MD   Accu-Chek Softclix Lancets MISC Check blood sugar 4x daily 22   Muna Figueredo MD   blood glucose test strips (ACCU-CHEK GUIDE) strip Use to check blood sugars 4x daily 22   Muna Figueredo MD   dilTIAZem (CARDIZEM CD) 360 MG extended release capsule Take 1 capsule by mouth daily 5/3/22   Tae Saleem MD   hydrALAZINE (APRESOLINE) 100 MG tablet Take 1 tablet by mouth every 8 hours 5/3/22   Tae Saleem MD   Insulin Infusion Pump (MINIMED 770G INSULIN PUMP SYS) KIT To infuse insulin 22   GEOVANI Weems NP   Continuous Blood Gluc Sensor (GUARDIAN SENSOR 3) MISC To change every 7 days 22   GEOVANI Weems NP   Continuous Blood Gluc Transmit (GUARDIAN LINK 3 TRANSMITTER) MISC To use with sensors 22   GEOVANI Weems NP   hydrocortisone (ANUSOL-HC) 2.5 % CREA rectal cream Apply to affected area twice daily for rectal pain from external hemorrhoid.  22   Yury Tony DO   dicyclomine (BENTYL) 10 MG capsule Take 1 capsule by mouth 4 times daily 4/15/22   Claudia Miller MD   aspirin EC 81 MG EC tablet Take 1 tablet by mouth daily 22   Jennifer Karimi MD   Insulin Pen Needle (BD PEN NEEDLE MICRO U/F) 32G X 6 MM MISC Uses with insulin 4 times a day 4/12/22   Tyrell Horton MD   Insulin Pen Needle (BD PEN NEEDLE MICRO U/F) 32G X 6 MM MISC Uses with insulin 4 times a day 4/12/22   Tyrell Horton MD   glucose monitoring (FREESTYLE FREEDOM) kit 1 kit by Does not apply route daily 4/12/22   Tyrell Horton MD   mometasone (ELOCON) 0.1 % cream Apply topically daily as needed (apply to both hands and under breasts)    Historical Provider, MD   mupirocin (BACTROBAN) 2 % ointment Apply topically 3 times daily as needed (apply to both hands and under breasts)    Historical Provider, MD   triamcinolone (ARISTOCORT) 0.5 % cream Apply topically 3 times daily as needed (apply to both hands and under breasts)    Historical Provider, MD   atorvastatin (LIPITOR) 20 MG tablet Take 1 tablet by mouth daily 4/1/22   Waqas Ramachandran MD   albuterol sulfate HFA (VENTOLIN HFA) 108 (90 Base) MCG/ACT inhaler Inhale 2 puffs into the lungs 4 times daily as needed for Wheezing 3/29/22   Mihir Nathan, DO   pantoprazole (PROTONIX) 40 MG tablet Take 1 tablet by mouth daily for 10 days 7/10/21 7/10/21  Christo Fox, DO   ondansetron (ZOFRAN-ODT) 4 MG disintegrating tablet Take 1 tablet by mouth 3 times daily as needed for Nausea or Vomiting 2/17/21   CRYSTAL Hsu       Current medications:    Current Facility-Administered Medications   Medication Dose Route Frequency Provider Last Rate Last Admin    lactated ringers infusion   IntraVENous Continuous Jordanendyoel Rojo  mL/hr at 09/21/22 1259 New Bag at 09/21/22 1259       Allergies:     Allergies   Allergen Reactions    Percocet [Oxycodone-Acetaminophen] Itching    Metformin And Related Other (See Comments)     GI side effects     Tape Frantz Wheat Tape] Itching       Problem List:    Patient Active Problem List   Diagnosis Code    Morbid Obesity E66.9    Hypothyroidism E03.9    Depression with anxiety F41.8    ALO (obstructive sleep apnea) G47.33    Type 2 diabetes mellitus with complication, without long-term current use of insulin (HCC) E11.8    Asthma J45.909    Arteriosclerosis of coronary artery I25.10    Essential hypertension I10    PCOS (polycystic ovarian syndrome) E28.2    Lymphedema of both lower extremities I89.0    Irritable bowel syndrome with diarrhea K58.0    Hyperlipidemia E78.5    Gastroesophageal reflux disease with esophagitis K21.00       Past Medical History:        Diagnosis Date    [facility] [address] 2013    Analgesic overuse headache 2018    Anxiety     Arthritis     Asthma     CAD (coronary artery disease)     Depression 2013    Diabetes mellitus ([facility] [address]     A1C=6.7 on 14    Fracture of tooth 2018    GERD (gastroesophageal reflux disease)     Glaucoma     Hyperlipidemia 2021    Hypertension     Hypertensive urgency 2013    Hypothyroidism     Irritable bowel syndrome     Morbid obesity due to excess calories ([facility] [address]     Obesity     Obstructive sleep apnea     Pneumonia     Polycystic ovarian syndrome     Postcholecystectomy syndrome 2018    Spinal headache     Suicidal ideation 2016       Past Surgical History:        Procedure Laterality Date     SECTION      Dr. Rani Natarajan, [address], LAPAROSCOPIC      [facility]    COLONOSCOPY      DENTAL SURGERY  10/06/2011    4 extractions    ECHO COMPLETE  2014         ENDOSCOPY, COLON, DIAGNOSTIC      FOOT SURGERY      RECONSTRUCTION LEFT FOOT, Dr. Cece Hoffman, [address]  9/3/13    incisional hernia repair with mesh    HYSTERECTOMY (CERVIX STATUS UNKNOWN)      KNEE CARTILAGE SURGERY      OVARY REMOVAL      left due to polycystic ovary, Dr. Chris Krabbe, [place]    BRYANT AND BSO (CERVIX REMOVED)  3/19/2013    Attempted Lap-Open BRYANT;RSO, Dr. Curtis Rubalcava, [facility] [address]         Social History:    Social History     Tobacco Use    Smoking status: Never    Smokeless tobacco: Never   Substance Use Topics    Alcohol use: No     Comment: no alcohol since age 26;  drinks 2-3 glasses Coke/Pepsi daily & 2-3 glasses of iced tea daily                                 Counseling given: Not Answered      Vital Signs (Current):   Vitals:    09/21/22 1209 09/21/22 1226   BP: (!) 146/70    Pulse: 74    Resp: 16    Temp: 97.2 °F (36.2 °C)    TempSrc: Temporal    SpO2: 99%    Weight: (!) 346 lb 9.6 oz (157.2 kg) (!) 346 lb 9.6 oz (157.2 kg)   Height: 5' 6\" (1.676 m) 5' 6\" (1.676 m)                                              BP Readings from Last 3 Encounters:   09/21/22 (!) 146/70   08/25/22 (!) 206/113   08/12/22 (!) 232/110       NPO Status: Time of last liquid consumption: 0500 (sip of water with pills)                        Time of last solid consumption: 1930                        Date of last liquid consumption: 09/21/22                        Date of last solid food consumption: 09/20/22    BMI:   Wt Readings from Last 3 Encounters:   09/21/22 (!) 346 lb 9.6 oz (157.2 kg)   08/25/22 (!) 351 lb (159.2 kg)   08/12/22 (!) 342 lb (155.1 kg)     Body mass index is 55.94 kg/m².     CBC:   Lab Results   Component Value Date/Time    WBC 8.9 08/25/2022 03:37 PM    RBC 4.78 08/25/2022 03:37 PM    HGB 14.2 08/25/2022 03:37 PM    HCT 43.2 08/25/2022 03:37 PM    MCV 90.4 08/25/2022 03:37 PM    RDW 13.4 08/25/2022 03:37 PM     08/25/2022 03:37 PM       CMP:   Lab Results   Component Value Date/Time     08/25/2022 03:37 PM    K 4.1 08/25/2022 03:37 PM    K 4.1 04/15/2022 08:01 PM     08/25/2022 03:37 PM    CO2 26 08/25/2022 03:37 PM    BUN 12 08/25/2022 03:37 PM    CREATININE 0.9 08/25/2022 03:37 PM    GFRAA >60 08/25/2022 03:37 PM    LABGLOM >60 08/25/2022 03:37 PM    GLUCOSE 106 08/25/2022 03:37 PM    PROT 7.0 08/25/2022 03:37 PM    CALCIUM 9.3 08/25/2022 03:37 PM    BILITOT 0.5 08/25/2022 03:37 PM    ALKPHOS 97 08/25/2022 03:37 PM    AST 18 08/25/2022 03:37 PM    ALT 20 08/25/2022 03:37 PM       POC Tests: No results for input(s): POCGLU, POCNA, POCK, POCCL, POCBUN, POCHEMO, POCHCT in the last 72 hours. Coags:   Lab Results   Component Value Date/Time    PROTIME 11.6 07/15/2021 04:15 AM    INR 1.1 07/15/2021 04:15 AM    APTT 29.0 06/07/2015 01:52 PM       HCG (If Applicable):   Lab Results   Component Value Date    PREGTESTUR negative 05/04/2017    PREGSERUM NEGATIVE 08/21/2012        ABGs: No results found for: PHART, PO2ART, SUM4KSL, MOF8QNT, BEART, A2HTCXNL     Type & Screen (If Applicable):  No results found for: LABABO, LABRH    Drug/Infectious Status (If Applicable):  No results found for: HIV, HEPCAB    COVID-19 Screening (If Applicable):   Lab Results   Component Value Date/Time    COVID19 Not Detected 04/08/2022 12:59 PM           Anesthesia Evaluation  Patient summary reviewed  Airway: Mallampati: II  TM distance: >3 FB   Neck ROM: full  Mouth opening: > = 3 FB   Dental:          Pulmonary:   (+) sleep apnea:  asthma:                            Cardiovascular:    (+) hypertension:, hyperlipidemia                  Neuro/Psych:   (+) headaches:, psychiatric history:depression/anxiety             GI/Hepatic/Renal:   (+) GERD:, morbid obesity         ROS comment: Irritable bowel syndrome with diarrhea. Endo/Other:    (+) Diabetes (insulin pump)Type II DM, using insulin, hypothyroidism::., .    (-) blood dyscrasia, no electrolyte abnormalities                ROS comment: PCOS (polycystic ovarian syndrome) Abdominal:             Vascular: negative vascular ROS. Other Findings: Large nicki-scapular fad pad          Anesthesia Plan      MAC     ASA 3             Anesthetic plan and risks discussed with patient. Plan discussed with CRNA. DOS STAFF ADDENDUM:    Pt seen and examined, chart reviewed (including anesthesia, drug and allergy history). Anesthetic plan, risks, benefits, alternatives, and personnel involved discussed with patient. Patient verbalized an understanding and agrees to proceed. Plan discussed with care team members and agreed upon.     Hardik Klein MD  Staff Anesthesiologist  2:08 PM      Hardik Klein MD   9/21/2022

## 2022-09-21 NOTE — OP NOTE
[address] [facility],[address]    Operative Report      SURGEON: Dr. Cm Shipley MD    Surgical Assistant: Reymundo Carter RN     PRE-OP DIAGNOSIS:     Morbid Obesity     Hypothyroidism     Depression with anxiety     ALO (obstructive sleep apnea)     Type 2 diabetes mellitus      Essential hypertension     PCOS (polycystic ovarian syndrome)     Hyperlipidemia     POSTOPERATIVE DIAGNOSES:    EGD with biopsy 9/21/2022 showed mild esophagitis, 2 cm hiatal hernia, there was no gastritis, biopsy done to check for H.pylori, she does complain of acid reflux on Protonix, and the gallbladder is out. OPERATION:    EGD esophagogastroduodenoscopy    with biopsy                 [id number]     ANESTHESIA: LMAC. BLOOD LOSS: none  SPECIMEN: gastric biopsy for ELROY    CONSENT AND INDICATIONS:  Patrick Cervantes is a 56y.o. year old female who weighs (!) 346 lb 9.6 oz (157.2 kg) who needs to have an EGD as part of the work up for bariatric surgery. I have discussed with the patient the indication, alternatives, and the possible risks and/or complications of the planned procedure and the anesthesia methods. The patient and/or patient representative appear to understand and agree to proceed. Monitoring and Safety: Anaesthesia monitored vital signs, BP cuff, pulse oximetry, cardiac monitor and level of consciousness until recovered. Operation: The patient was placed on the table and sedated by Anaesthesia. Bite block was placed. A lubricated scope was easily passed into the upper esophagus and distal esophagus which had mild esophagitis. The Z line was measured at 38 cm. The scope was passed into the stomach and down toward the pylorus. The antral mucosa was examined and there was no gastritis. Biopsy was taken to check for H. pylori.  The scope was passed through the pylorus into the duodenal bulb and distal duodenum which looked normal. The scope was pulled back to the stomach and retroflexed. There was a 2 cm hiatal hernia, picture was taken. The scope was withdrawn. The patient tolerated the procedure well. Complications: none    Plan:   She may proceed with bariatric surgery. Electronically signed Dr. Vicente Kc M.D.

## 2022-09-21 NOTE — ANESTHESIA POSTPROCEDURE EVALUATION
Department of Anesthesiology  Postprocedure Note    Patient: Patrick Luor  MRN: [id number]  YOB: 1975  Date of evaluation: 9/21/2022      Procedure Summary     Date: 09/21/22 Room / Location: [address] [place] [address] / [facility]    Anesthesia Start: 0036 Anesthesia Stop: 8220    Procedure: EGD BIOPSY Diagnosis:       Gastroesophageal reflux disease with esophagitis, unspecified whether hemorrhage      (Gastroesophageal reflux disease with esophagitis, unspecified whether hemorrhage [K21.00])    Surgeons: Leonora Rice MD Responsible Provider: Darlene Carrasquillo MD    Anesthesia Type: MAC ASA Status: 3          Anesthesia Type: No value filed.     Svetlana Phase I: Svetlana Score: 10    Svetlana Phase II: Svetlana Score: 10      Anesthesia Post Evaluation    Patient location during evaluation: PACU  Patient participation: complete - patient participated  Level of consciousness: awake  Airway patency: patent  Nausea & Vomiting: no nausea and no vomiting  Complications: no  Cardiovascular status: hemodynamically stable  Respiratory status: acceptable  Hydration status: euvolemic

## 2022-09-22 ENCOUNTER — OFFICE VISIT (OUTPATIENT)
Dept: FAMILY MEDICINE CLINIC | Age: 47
End: 2022-09-22
Payer: MEDICAID

## 2022-09-22 VITALS
DIASTOLIC BLOOD PRESSURE: 83 MMHG | OXYGEN SATURATION: 97 % | HEART RATE: 77 BPM | RESPIRATION RATE: 18 BRPM | BODY MASS INDEX: 47.09 KG/M2 | WEIGHT: 293 LBS | SYSTOLIC BLOOD PRESSURE: 149 MMHG | HEIGHT: 66 IN | TEMPERATURE: 97.5 F

## 2022-09-22 DIAGNOSIS — I10 ESSENTIAL HYPERTENSION: Primary | ICD-10-CM

## 2022-09-22 PROCEDURE — G8417 CALC BMI ABV UP PARAM F/U: HCPCS | Performed by: STUDENT IN AN ORGANIZED HEALTH CARE EDUCATION/TRAINING PROGRAM

## 2022-09-22 PROCEDURE — 1036F TOBACCO NON-USER: CPT | Performed by: STUDENT IN AN ORGANIZED HEALTH CARE EDUCATION/TRAINING PROGRAM

## 2022-09-22 PROCEDURE — 99213 OFFICE O/P EST LOW 20 MIN: CPT | Performed by: STUDENT IN AN ORGANIZED HEALTH CARE EDUCATION/TRAINING PROGRAM

## 2022-09-22 PROCEDURE — G8427 DOCREV CUR MEDS BY ELIG CLIN: HCPCS | Performed by: STUDENT IN AN ORGANIZED HEALTH CARE EDUCATION/TRAINING PROGRAM

## 2022-09-22 RX ORDER — HYDROCHLOROTHIAZIDE 12.5 MG/1
12.5 TABLET ORAL DAILY
Qty: 30 TABLET | Refills: 0 | Status: SHIPPED
Start: 2022-09-22 | End: 2022-10-17

## 2022-09-22 RX ORDER — BLOOD PRESSURE TEST KIT
1 KIT MISCELLANEOUS
Qty: 1 KIT | Refills: 0 | Status: SHIPPED
Start: 2022-09-22 | End: 2022-10-20

## 2022-09-22 RX ORDER — HYDROCHLOROTHIAZIDE 12.5 MG/1
12.5 TABLET ORAL DAILY
Qty: 30 TABLET | Refills: 0 | Status: SHIPPED | OUTPATIENT
Start: 2022-09-22 | End: 2022-09-22

## 2022-09-22 ASSESSMENT — PATIENT HEALTH QUESTIONNAIRE - PHQ9
SUM OF ALL RESPONSES TO PHQ QUESTIONS 1-9: 4
SUM OF ALL RESPONSES TO PHQ QUESTIONS 1-9: 4
6. FEELING BAD ABOUT YOURSELF - OR THAT YOU ARE A FAILURE OR HAVE LET YOURSELF OR YOUR FAMILY DOWN: 1
SUM OF ALL RESPONSES TO PHQ QUESTIONS 1-9: 4
10. IF YOU CHECKED OFF ANY PROBLEMS, HOW DIFFICULT HAVE THESE PROBLEMS MADE IT FOR YOU TO DO YOUR WORK, TAKE CARE OF THINGS AT HOME, OR GET ALONG WITH OTHER PEOPLE: 0
1. LITTLE INTEREST OR PLEASURE IN DOING THINGS: 0
3. TROUBLE FALLING OR STAYING ASLEEP: 1
4. FEELING TIRED OR HAVING LITTLE ENERGY: 1
9. THOUGHTS THAT YOU WOULD BE BETTER OFF DEAD, OR OF HURTING YOURSELF: 0
SUM OF ALL RESPONSES TO PHQ QUESTIONS 1-9: 4
7. TROUBLE CONCENTRATING ON THINGS, SUCH AS READING THE NEWSPAPER OR WATCHING TELEVISION: 0
SUM OF ALL RESPONSES TO PHQ9 QUESTIONS 1 & 2: 0
2. FEELING DOWN, DEPRESSED OR HOPELESS: 0
8. MOVING OR SPEAKING SO SLOWLY THAT OTHER PEOPLE COULD HAVE NOTICED. OR THE OPPOSITE, BEING SO FIGETY OR RESTLESS THAT YOU HAVE BEEN MOVING AROUND A LOT MORE THAN USUAL: 1
5. POOR APPETITE OR OVEREATING: 0

## 2022-09-22 ASSESSMENT — ENCOUNTER SYMPTOMS
COUGH: 0
RHINORRHEA: 0
ABDOMINAL PAIN: 0
EYE REDNESS: 0
VOMITING: 0
CHEST TIGHTNESS: 0
SORE THROAT: 0
NAUSEA: 0
DIARRHEA: 0

## 2022-09-22 NOTE — PROGRESS NOTES
[place]  Department of Family Medicine  Family Medicine Residency Program      Patient: Edmundo Hodgkins Raburn 56 y.o. female     Date of Service: 9/22/22      Chief complaint:   Chief Complaint   Patient presents with    Hypertension     HISTORY OF PRESENTING ILLNESS     56 y.o. female presented to the clinic to follow up on HTN. Hypertension  -He is taking Lisinopril 40 mg daily, Hydralazine 100 mg TID as instructed. No medication side effects noted. -She does not checks his BP at home.  -Patient is not compliant with lifestyle modifications. She does some walking  -She does not follow a low salt diet.   -She denies TIA's,chest pain on exertion, dyspnea on exertion, swelling of ankles.     -She is in the process of getting gastric bypass/sleeve surgery scheduled for later this year.      Health Maintenance:  Health Maintenance Due   Topic Date Due    HIV screen  Never done    Hepatitis C screen  Never done    Hepatitis B vaccine (1 of 3 - Risk 3-dose series) Never done    Pneumococcal 0-64 years Vaccine (2 - PCV) 10/24/2014    Diabetic foot exam  04/13/2017    Colorectal Cancer Screen  Never done    Diabetic retinal exam  07/18/2021    COVID-19 Vaccine (3 - Booster for Pfizer series) 09/29/2021    Diabetic microalbuminuria test  05/18/2022    Flu vaccine (1) 09/01/2022     Past Medical History:      Diagnosis Date    Acute renal injury ([address] 09/04/2013    Analgesic overuse headache 12/19/2018    Anxiety     Arthritis     Asthma     CAD (coronary artery disease)     Depression 03/19/2013    Diabetes mellitus ([address]     A1C=6.7 on 1/14/14    Fracture of tooth 12/19/2018    GERD (gastroesophageal reflux disease)     Glaucoma     Hyperlipidemia 12/08/2021    Hypertension     Hypertensive urgency 03/19/2013    Hypothyroidism     Irritable bowel syndrome     Morbid obesity due to excess calories (HCC)     Obesity     Obstructive sleep apnea     Pneumonia     Polycystic ovarian syndrome Postcholecystectomy syndrome 2018    Spinal headache     Suicidal ideation 2016     Past Surgical History:        Procedure Laterality Date     SECTION      Dr. Moore Smoker, [address] [facility] [address], LAPAROSCOPIC      [address] SURGERY  10/06/2011    4 extractions    ECHO COMPLETE  2014         ENDOSCOPY, COLON, DIAGNOSTIC      FOOT SURGERY      RECONSTRUCTION LEFT FOOT, Dr. Hayden Rowe, [address]  9/3/13    incisional hernia repair with mesh    HYSTERECTOMY (CERVIX STATUS UNKNOWN)      KNEE CARTILAGE SURGERY      OVARY REMOVAL      left due to polycystic ovary, Dr. Beverly Taylor, [facility] BSO (CERVIX REMOVED)  3/19/2013    Attempted Lap-Open Roberto Duran, Dr. Berta Hein, [facility] N/A 2022    EGD BIOPSY performed by Gurpreet Pineda MD at [facility]:    Percocet [oxycodone-acetaminophen], Metformin and related, and Tape [place] tape]  Social History:   Social History     Socioeconomic History    Marital status:       Spouse name: Not on file    Number of children: 1    Years of education: 12    Highest education level: Not on file   Occupational History    Not on file   Tobacco Use    Smoking status: Never    Smokeless tobacco: Never   Vaping Use    Vaping Use: Never used   Substance and Sexual Activity    Alcohol use: No     Comment: no alcohol since age 26;  drinks 2-3 glasses Coke/Pepsi daily & 2-3 glasses of iced tea daily     Drug use: Never    Sexual activity: Not Currently     Partners: Male   Other Topics Concern    Not on file   Social History Narrative    Not on file     Social Determinants of Health     Financial Resource Strain: Low Risk     Difficulty of Paying Living Expenses: Not hard at all   Food Insecurity: No Food Insecurity    Worried About [address] in the Last Year: Never true    [address] [facility] [address] in the Last Year: Never true   Transportation Needs: Not on file   Physical Activity: Not on file   Stress: Not on file   Social Connections: Not on file   Intimate Partner Violence: Not on file   Housing Stability: Not on file      Family History:       Problem Relation Age of Onset    Asthma Mother     Diabetes Mother     High Blood Pressure Mother     High Blood Pressure Father     Heart Disease Father     Cancer Father     Depression Father     Diabetes Brother     Asthma Brother     Thyroid Disease Sister     Asthma Sister     Depression Maternal Grandmother     High Blood Pressure Maternal Grandmother     Mental Illness Maternal Grandmother     Thyroid Disease Maternal Grandmother     Heart Disease Maternal Grandfather     Depression Paternal Grandmother     High Blood Pressure Paternal Grandmother     Cancer Paternal Grandfather      Review of Systems:   Review of Systems   Constitutional:  Negative for activity change, chills and fever. HENT:  Negative for rhinorrhea, sneezing and sore throat. Eyes:  Negative for redness. Respiratory:  Negative for cough and chest tightness. Cardiovascular:  Negative for chest pain, palpitations and leg swelling. Gastrointestinal:  Negative for abdominal pain, diarrhea, nausea and vomiting. Genitourinary:  Negative for decreased urine volume, dysuria, flank pain, frequency, hematuria and urgency. Musculoskeletal:  Negative for arthralgias and myalgias. Neurological:  Negative for dizziness, syncope, weakness and headaches. Psychiatric/Behavioral:  Negative for agitation. The patient is not nervous/anxious. PHYSICAL EXAM   Vitals: BP (!) 149/83   Pulse 77   Temp 97.5 °F (36.4 °C) (Temporal)   Resp 18   Ht 5' 6\" (1.676 m)   Wt (!) 343 lb (155.6 kg)   LMP 10/01/2012 (Approximate) Comment: 2013  SpO2 97%   BMI 55.36 kg/m²   Physical Exam  Constitutional:       Appearance: She is obese. Cardiovascular:      Rate and Rhythm: Normal rate and regular rhythm.       Pulses: Normal pulses. Heart sounds: Normal heart sounds. No murmur heard. Pulmonary:      Effort: Pulmonary effort is normal. No respiratory distress. Breath sounds: Normal breath sounds. No wheezing or rales. Skin:     General: Skin is warm. Neurological:      General: No focal deficit present. Mental Status: She is alert and oriented to person, place, and time. Psychiatric:         Mood and Affect: Mood normal.         Behavior: Behavior normal.     ASSESSMENT AND PLAN     1. Essential hypertension  -Elevated and above goal  -Will add low dose HCTZ 12.5 mg daily. Continue Lisinopril 40 mg daily, Hydralazine 100 mg TID   -Instructed patient to check BP twice a week  -Blood Pressure KIT; 1 each by Does not apply route Twice a Week  Dispense: 1 kit; Refill: 0  -Basic Metabolic Panel; Future  -hydroCHLOROthiazide (HYDRODIURIL) 12.5 MG tablet; Take 1 tablet by mouth daily  Dispense: 30 tablet; Refill: 0      Counseled regarding above diagnosis, including possible risks and complications, especially if left uncontrolled. Counseled regarding the possible side effects, risks, benefits and alternatives to treatment; patient and/or guardian verbalizes understanding, agrees, feels comfortable with and wishes to proceed with above treatment plan. Call or go to ED immediately if symptoms worsen or persist. Advised patient to call with any new medication issues, and, as applicable, read all Rx info from pharmacy to assure aware of all possible risks and side effects of medication before taking. Patient and/or guardian given opportunity to ask questions/raise concerns. The patient verbalized comfort and understanding of instructions. I encourage further reading and education about your health conditions. Information on many health conditions is provided by the American Academy of Family Physicians: https://familydoctor. org/  Please bring any questions to me at your next visit.     Medication List: Current Outpatient Medications   Medication Sig Dispense Refill    Blood Pressure KIT 1 each by Does not apply route Twice a Week 1 kit 0    hydroCHLOROthiazide (HYDRODIURIL) 12.5 MG tablet Take 1 tablet by mouth daily 30 tablet 0    lisinopril (PRINIVIL;ZESTRIL) 40 MG tablet TAKE 1 TABLET BY MOUTH DAILY 30 tablet 2    TRULICITY 3 OQ/9.6TV SOPN INJECT 3 MG INTO THE SKIN ONCE A WEEK 2 mL 3    Accu-Chek FastClix Lancets MISC Use to check blood glucose 4x daily 200 each 5    Accu-Chek Softclix Lancets MISC Check blood sugar 4x daily 200 each 6    blood glucose test strips (ACCU-CHEK GUIDE) strip Use to check blood sugars 4x daily 200 each 6    dilTIAZem (CARDIZEM CD) 360 MG extended release capsule Take 1 capsule by mouth daily 30 capsule 0    hydrALAZINE (APRESOLINE) 100 MG tablet Take 1 tablet by mouth every 8 hours 90 tablet 0    Insulin Infusion Pump (MINIMED 770G INSULIN PUMP SYS) KIT To infuse insulin 1 kit 0    Continuous Blood Gluc Sensor (GUARDIAN SENSOR 3) MISC To change every 7 days 12 each 3    Continuous Blood Gluc Transmit (GUARDIAN LINK 3 TRANSMITTER) MISC To use with sensors 1 each 0    hydrocortisone (ANUSOL-HC) 2.5 % CREA rectal cream Apply to affected area twice daily for rectal pain from external hemorrhoid.  28 g 0    dicyclomine (BENTYL) 10 MG capsule Take 1 capsule by mouth 4 times daily 120 capsule 0    aspirin EC 81 MG EC tablet Take 1 tablet by mouth daily 90 tablet 1    Insulin Pen Needle (BD PEN NEEDLE MICRO U/F) 32G X 6 MM MISC Uses with insulin 4 times a day 250 each 5    Insulin Pen Needle (BD PEN NEEDLE MICRO U/F) 32G X 6 MM MISC Uses with insulin 4 times a day 250 each 5    glucose monitoring (FREESTYLE FREEDOM) kit 1 kit by Does not apply route daily 1 kit 0    mometasone (ELOCON) 0.1 % cream Apply topically daily as needed (apply to both hands and under breasts)      mupirocin (BACTROBAN) 2 % ointment Apply topically 3 times daily as needed (apply to both hands and under breasts) triamcinolone (ARISTOCORT) 0.5 % cream Apply topically 3 times daily as needed (apply to both hands and under breasts)      atorvastatin (LIPITOR) 20 MG tablet Take 1 tablet by mouth daily 90 tablet 1    albuterol sulfate HFA (VENTOLIN HFA) 108 (90 Base) MCG/ACT inhaler Inhale 2 puffs into the lungs 4 times daily as needed for Wheezing 18 g 0    ondansetron (ZOFRAN-ODT) 4 MG disintegrating tablet Take 1 tablet by mouth 3 times daily as needed for Nausea or Vomiting 21 tablet 0     No current facility-administered medications for this visit. Return to Office: Return in about 2 weeks (around 10/6/2022) for Blood pressure follow-up. This document may have been prepared at least partially through the use of voice recognition software. Although effort is taken to assure the accuracy of this document, it is possible that grammatical, syntax,  or spelling errors may occur.     Delmi Alvarado MD

## 2022-09-22 NOTE — PROGRESS NOTES
[facility] [address]  Precepting Note    Subjective:  HTN follow up currently on Diltiazem and Hydralazine  Getting cleared for bariatric surgery     ROS otherwise negative     Past medical, surgical, family and social history were reviewed, non-contributory, and unchanged unless otherwise stated. Objective:    BP (!) 149/83   Pulse 77   Temp 97.5 °F (36.4 °C) (Temporal)   Resp 18   Ht 5' 6\" (1.676 m)   Wt (!) 343 lb (155.6 kg)   LMP 10/01/2012 (Approximate) Comment: 2013  SpO2 97%   BMI 55.36 kg/m²     Exam is as noted by resident with the following changes, additions or corrections:    General:  NAD; alert & oriented x 3   Heart:  RRR, no murmurs, gallops, or rubs. Lungs:  CTA bilaterally, no wheeze, rales or rhonchi  Abd: bowel sounds present, nontender, nondistended, no masses  Extrem:  No clubbing, cyanosis, or edema    Assessment/Plan:  HTN- uncontrolled. Add HCTZ  Follow up 2 weeks     Attending Physician Statement  I have reviewed the chart, including any radiology or labs. I have discussed the case, including pertinent history and exam findings with the resident. I agree with the assessment, plan and orders as documented by the resident. Please refer to the resident note for additional information.       Electronically signed by Patrick Lambert MD on 9/22/2022 at 2:03 PM

## 2022-09-23 ENCOUNTER — TELEPHONE (OUTPATIENT)
Dept: FAMILY MEDICINE CLINIC | Age: 47
End: 2022-09-23

## 2022-09-23 LAB — CLOTEST: NORMAL

## 2022-09-23 NOTE — TELEPHONE ENCOUNTER
Blood Pressure monitors are not filled at the pharmacy. Patient will have to use a DME company approved by insurance or purchase the BP cuff out of pocket. Patient notified via [address]

## 2022-09-29 ENCOUNTER — OFFICE VISIT (OUTPATIENT)
Dept: BARIATRICS/WEIGHT MGMT | Age: 47
End: 2022-09-29
Payer: MEDICAID

## 2022-09-29 ENCOUNTER — INITIAL CONSULT (OUTPATIENT)
Dept: BARIATRICS/WEIGHT MGMT | Age: 47
End: 2022-09-29

## 2022-09-29 VITALS
HEART RATE: 80 BPM | RESPIRATION RATE: 20 BRPM | TEMPERATURE: 97.4 F | SYSTOLIC BLOOD PRESSURE: 190 MMHG | WEIGHT: 293 LBS | BODY MASS INDEX: 47.09 KG/M2 | DIASTOLIC BLOOD PRESSURE: 88 MMHG | HEIGHT: 66 IN

## 2022-09-29 VITALS — HEIGHT: 66 IN | BODY MASS INDEX: 47.09 KG/M2 | WEIGHT: 293 LBS

## 2022-09-29 DIAGNOSIS — Z01.818 PRE-OP EVALUATION: Primary | ICD-10-CM

## 2022-09-29 DIAGNOSIS — Z00.8 NUTRITIONAL ASSESSMENT: Primary | ICD-10-CM

## 2022-09-29 DIAGNOSIS — Z71.3 NUTRITIONAL COUNSELING: ICD-10-CM

## 2022-09-29 DIAGNOSIS — E11.8 TYPE 2 DIABETES MELLITUS WITH COMPLICATION, WITHOUT LONG-TERM CURRENT USE OF INSULIN (HCC): ICD-10-CM

## 2022-09-29 DIAGNOSIS — E66.01 MORBID OBESITY DUE TO EXCESS CALORIES (HCC): ICD-10-CM

## 2022-09-29 PROCEDURE — 99999 PR OFFICE/OUTPT VISIT,PROCEDURE ONLY: CPT | Performed by: SURGERY

## 2022-09-29 PROCEDURE — 3044F HG A1C LEVEL LT 7.0%: CPT | Performed by: NURSE PRACTITIONER

## 2022-09-29 PROCEDURE — G8417 CALC BMI ABV UP PARAM F/U: HCPCS | Performed by: NURSE PRACTITIONER

## 2022-09-29 PROCEDURE — 1036F TOBACCO NON-USER: CPT | Performed by: NURSE PRACTITIONER

## 2022-09-29 PROCEDURE — G8427 DOCREV CUR MEDS BY ELIG CLIN: HCPCS | Performed by: NURSE PRACTITIONER

## 2022-09-29 PROCEDURE — 2022F DILAT RTA XM EVC RTNOPTHY: CPT | Performed by: NURSE PRACTITIONER

## 2022-09-29 PROCEDURE — 99213 OFFICE O/P EST LOW 20 MIN: CPT | Performed by: NURSE PRACTITIONER

## 2022-09-29 PROCEDURE — 99211 OFF/OP EST MAY X REQ PHY/QHP: CPT | Performed by: NURSE PRACTITIONER

## 2022-09-29 NOTE — PROGRESS NOTES
[id number] [facility]:  Initial Nutrition Consultation    Today's Date: 9/29/2022  Patients Name:Eliana Romero     Height: 5' 6\" (1.676 m)   Weight: (!) 346 lb (156.9 kg)   IBW: 159 lbs  %IBW: 218%  Excess Weight: 187 lbs  BMI: Body mass index is 55.85 kg/m². Subjective Information:   Patient has tried multiple means of weight loss including diet and exercise in the past and has been unable to maintain a healthy weight. Pt states he / she has tried the following  - Diabetic Diet, Low Sodium and Self-Imposed Weight Loss Plan. Patients overall goal is to be able to lose weight with diet and exercise by changing lifestyle, habits and maintain a healthy weight for a lifetime. Are your currently Diabetic  - Yes  Type 2 Diabetic  - Yes  Medication to Control Diabetes  - Insulin Pump    Most successful diet in the past? Diabetic Diet  Length of time patient was on the diet listed above? 2020  Weight lost on the diet listed above? 102 lbs    Patient takes the following vitamins, minerals and dietary supplements? No - I do not currently take a daily complete multi-vitamin. Manju Dubois / Yareli Bowie has educated the patient that we recommend in order to decrease infection rates patient start a daily complete multi-vitamin from now until surgery if proceeding with surgery. Patient consumes the following beverage daily? Water and Coffee    Patient states he / she has the following problems:  no - Eating  no - Chewing  no - Swallowing  no - Nausea  no - Vomiting  yes - Diarrhea - Etiology - IBS  no - Constipation  no - Hair Changes  yes - Missing Teeth  yes - Wears Dentures  Food Allergies: no  -   Food Intolerances: yes - Lactose Intolerance - Dairy - Diarrhea    Pt identifies his / her eating habits as the following:     Eats some fruits and or vegetables daily. Tries to eat healthy - whole wheat bread. Moderate to high intake of empty-calorie foods (sweets, salty / fatty foods).     Yes - Past medically assisted weight loss history reviewed. Yes - Provided education regarding the basic role of nutrition in junction with Bariatric Surgery. Yes - Provided overview of required dietary and behavioral changes pre and post operatively. Yes - Stated / reinforced that changes in dietary behaviors are critical to successful, long term weight Loss. Patient is aware that in order to proceed with bariatric surgery he / she will need to follow a low-fat diet prior to surgery. Patient is aware that bariatric surgery is a lifetime change that will require the patient to follow a low-fat, low-calorie, low-sugar, portion controlled diet with at least 30 minutes of physical activity daily. Patient is aware that certain foods may not be tolerated after bariatric surgery and certain foods will need avoided all together. Pt is aware supplementation of certain micro nutrients is lifelong along with the use of tracking both macro and micro nutrient intake daily after weight loss surgery. Pt is part of a comprehensive surgical weight loss program that is educating the patient on proper food choices, meal planning, portion control and label reading for use both before and after weight loss surgery. Pt is able to set measurable attainable nutrition goals that reinforce desired post-operative eating patterns when it comes to family, home based, or work settings. Mindful eating skills are being developed by the patient and how to identify the patients sense of hunger and fullness are being addressed. [address] / LD feels that this patient knowledge / readiness to make appropriate diet / lifestyle changes assessed to be? - Good    RD / LD feels this patients expected adherence for post surgical diet? - Good    Patient was instructed and signed consents on a low-fat diet and required supplementation at initial consult. Comments: Patient is able to verbalize diet concepts for bariatric surgery.   Patient is aware diet concepts will be reinforced at monthly pre-op weight loss counseling appointments. Pt will also attend a 3-hour nutrition education class once scheduled for surgery . RD / LD will monitor progress.

## 2022-09-29 NOTE — PATIENT INSTRUCTIONS
[facility]  Dietary Initial Appointment Patient Instructions    By: Alexa Cordova RD / MIHAI  [phone]      At your initial dietary appointment you have received the following information packets:    Please be aware at each visit you have been instructed that in order for your insurance company to approve your surgery you must show a consistent weight loss of 2 lbs or greater at each visit. We can not guarantee an approval by your insurance company we can only provide the information given to us it is up to you the patient to show compliancy to your insurance company. If you do gain weight during your supervised weight loss counseling sessions insurance companies starting in 2018 are denying patients for not showing consistent weight loss results when part of a supervised weight loss counseling program. Pt was instructed on 9/29/22. Pt verbalized understanding. Low-Fat Diet  - Please be sure to begin your low-fat diet from today's date until your scheduled surgery date. If you are not at goal weight by your history and physical appointment with your surgeon your surgery will be cancelled. Goal Weight: 330 lbs BMI: 53.2  Low-Fat Diet Contract - Patient Acknowledge and Signed  Supplement List - Please be sure to be saving to purchase your 3 month supply of supplements. If you do not bring supplements to your history and physical appointment your surgery will be cancelled. Supplement Contract - Patient Acknowledge and Signed  Please be sure to take a daily Multi-vitamin from now until surgery to reduce your incidence of infection. If your insurance company requires additional dietary counseling you will be scheduled to see the dietitian the following month. If your psychological evaluation is completed and all your dietary requirements for your individual insurance company have been completed you will be submitted to insurance.  Please make sure to check with us to see if we have received your psychological evaluation. Please be aware that any co-pays or deductibles may be requested prior to testing and / or procedures. You will need to schedule a psychological evaluation for weight loss surgery. Patients will be required to complete all psychological testing as required by the psychologist. Patients must follow all of the psychologist's recommendations before weight loss surgery can be scheduled. The evaluation must be done a standard way for weight loss surgery. We strongly recommend that you contact one of our preferred providers listed below to arrange this:    Lauren Mckeon, [address] [facility]                                                              [address] [place] [address]                                                                                      [phone]     Lucille Madrid [address] [place] [address]                                                                                                [phone]        Dr. Malini Soliz, PhD                         Jeanne Palma. [place], [place]                                                                                              [phone]     You will also need to plan on attending a 2 hour nutrition class at the [facility] prior to your surgery. We will schedule this for you when we schedule your surgery. Please remember to have your labs drawn prior to your scheduled appointment to review the results of your testing. Please remember, that while we will submit your case to insurance for surgery authorization, it is your responsibility to know if your plan covers weight loss surgery and keep up-to-date with changes to your insurance coverage.   We will do everything possible to help you get approved for weight loss surgery, but cannot guarantee an approval.      Please note that you will not be submitted to your insurance company until all   pre-operative testing requirements are met. Please remember you need to schedule to attend one Support Group meeting held at the [facility] before surgery. This one meeting is mandatory. You can attend as many support group meetings before and after surgery. Support group meetings are held at the [facility] from 6:00 - 8:00pm 1st, and 3rd Tuesday of every month. See dates listed below. Each individual person has his / her own medical requirements that need fulfilled before being able to schedule bariatric surgery . Please finalize these requirements by contacting our Bariatric Nurse at [phone].

## 2022-09-29 NOTE — PROGRESS NOTES
Eliana Pelayo Alpmoncho  9/29/2022  [address]        Chief Complaint   Patient presents with    Results       Post-EGD Follow-up. Subjective:   Pradip Roche is a 56 y.o. female post EGD done 2 weeks ago. She did have a 2 cm hiatal hernia, did not have gastritis. Biopsy did not show Helicobacter pylori. No gallbladder per patient. Patient has no concerns at this time with test results. Patient is planning on LRYGB with Dr. Eddie Baer    Weight: (!) 346 lb (156.9 kg). ROS  General: denies concerns  Heart: denies chest pain, pressure, palpitations  Lungs: denies shortness of breath, denies cough  Abdomen: denies n/v, constipation/diarrhea  Physical exam:    BP (!) 190/88 (Site: Right Lower Arm, Position: Sitting, Cuff Size: Large Adult)   Pulse 80   Temp 97.4 °F (36.3 °C) (Temporal)   Resp 20   Ht 5' 6\" (1.676 m)   Wt (!) 346 lb (156.9 kg)   LMP 10/01/2012 (Approximate) Comment: 2013  BMI 55.85 kg/m²   General appearance: alert, appears stated age and cooperative  Lungs: clear to auscultation bilaterally  Heart: regular rate and rhythm  Abdomen: soft, non-tender; bowel sounds normal; no masses,  no organomegaly    Assessment:    Doing well two weeks post EGD      Plan:     1. Pre-op evaluation    - Alexa Leal MD, Cardiology, [place]    2. Morbid obesity due to excess calories ([address]  See below    3. Type 2 diabetes mellitus with complication, without long-term current use of insulin ([address]  See below     Stay on the high protein, low calorie diet while waiting for insurance approval. Walk as much as possible but keep your legs elevated when sitting. Continue deep breathing exercizes. Aim for 60 gm Protein and 90 oz of liquids per day. Track protein and fluid intake. Make sure the bowel move daily by taking fiber such as Metamucil.       Provider Signature: Electronically signed by  GEOVANI Royal - CNP   Cc:  Jose Castro MD

## 2022-09-30 DIAGNOSIS — Z01.818 PRE-OP EVALUATION: Primary | ICD-10-CM

## 2022-10-04 ENCOUNTER — TELEPHONE (OUTPATIENT)
Dept: FAMILY MEDICINE CLINIC | Age: 47
End: 2022-10-04

## 2022-10-04 NOTE — TELEPHONE ENCOUNTER
Patient is seeing Dr. Jim Celeste for the weight loss program and needs to lose 15 lbs. She has tried weight loss programs, dieting and exercise and has hit a plateau where she cannot lose any more. She would like any suggestions from Dr. Aracely Rogers please.

## 2022-10-05 ENCOUNTER — TELEPHONE (OUTPATIENT)
Dept: BARIATRICS/WEIGHT MGMT | Age: 47
End: 2022-10-05

## 2022-10-05 DIAGNOSIS — Z00.8 NUTRITIONAL ASSESSMENT: Primary | ICD-10-CM

## 2022-10-05 DIAGNOSIS — Z71.3 NUTRITIONAL COUNSELING: ICD-10-CM

## 2022-10-05 DIAGNOSIS — E11.65 UNCONTROLLED TYPE 2 DIABETES MELLITUS WITH HYPERGLYCEMIA (HCC): ICD-10-CM

## 2022-10-05 NOTE — TELEPHONE ENCOUNTER
Pt called back in and states she is not losing any wt on the diet plan that she received at her initial appointment. Pt received the diet plan on 9/29/22. Pt states this is too long to be on this diet and what can she follow to lose wt. Mao Hall reviewed with the pt she needs to schedule an appt with her Endocrinologist. Take the diet plan to the Endocrinologist to adjust her diabetes medication that she receives with her insulin pump instruction and be cleared by her Endocrinologist to follow the diet plan in place. Pt then can start the diet and f/u with us with any question she may have. Pt verbalized understanding and knows she can not start the plan until she see's her Endocrinologist to make the necessary changes and provide clear pt instruction. Pt is a poorly controlled diabetic. Pt is on an Insulin Pump with poor understanding of how the diet affects her insulin levels and poor understanding of the use of her pump and diabetes medications.

## 2022-10-07 ENCOUNTER — OFFICE VISIT (OUTPATIENT)
Dept: CARDIOLOGY CLINIC | Age: 47
End: 2022-10-07
Payer: MEDICAID

## 2022-10-07 VITALS
HEIGHT: 66 IN | DIASTOLIC BLOOD PRESSURE: 80 MMHG | RESPIRATION RATE: 20 BRPM | WEIGHT: 293 LBS | BODY MASS INDEX: 47.09 KG/M2 | SYSTOLIC BLOOD PRESSURE: 140 MMHG | HEART RATE: 71 BPM

## 2022-10-07 DIAGNOSIS — G47.33 OBSTRUCTIVE SLEEP APNEA: ICD-10-CM

## 2022-10-07 DIAGNOSIS — I89.0 LYMPHEDEMA OF BOTH LOWER EXTREMITIES: ICD-10-CM

## 2022-10-07 DIAGNOSIS — Z01.818 PREOP TESTING: ICD-10-CM

## 2022-10-07 DIAGNOSIS — I10 ESSENTIAL HYPERTENSION: ICD-10-CM

## 2022-10-07 DIAGNOSIS — Z01.810 PREOPERATIVE CARDIOVASCULAR EXAMINATION: Primary | ICD-10-CM

## 2022-10-07 DIAGNOSIS — R06.02 SHORTNESS OF BREATH: ICD-10-CM

## 2022-10-07 PROCEDURE — G8427 DOCREV CUR MEDS BY ELIG CLIN: HCPCS | Performed by: INTERNAL MEDICINE

## 2022-10-07 PROCEDURE — 3078F DIAST BP <80 MM HG: CPT | Performed by: INTERNAL MEDICINE

## 2022-10-07 PROCEDURE — G8417 CALC BMI ABV UP PARAM F/U: HCPCS | Performed by: INTERNAL MEDICINE

## 2022-10-07 PROCEDURE — 99204 OFFICE O/P NEW MOD 45 MIN: CPT | Performed by: INTERNAL MEDICINE

## 2022-10-07 PROCEDURE — 1036F TOBACCO NON-USER: CPT | Performed by: INTERNAL MEDICINE

## 2022-10-07 PROCEDURE — G8484 FLU IMMUNIZE NO ADMIN: HCPCS | Performed by: INTERNAL MEDICINE

## 2022-10-07 PROCEDURE — 3074F SYST BP LT 130 MM HG: CPT | Performed by: INTERNAL MEDICINE

## 2022-10-07 PROCEDURE — 93000 ELECTROCARDIOGRAM COMPLETE: CPT | Performed by: INTERNAL MEDICINE

## 2022-10-07 RX ORDER — INSULIN LISPRO 100 [IU]/ML
INJECTION, SOLUTION INTRAVENOUS; SUBCUTANEOUS
Qty: 30 ML | Refills: 5 | Status: SHIPPED | OUTPATIENT
Start: 2022-10-07

## 2022-10-07 NOTE — PROGRESS NOTES
OFFICE VISIT     PRIMARY CARE PHYSICIAN:      Xander Smiley MD       ALLERGIES / SENSITIVITIES:        Allergies   Allergen Reactions    Percocet [Oxycodone-Acetaminophen] Itching    Food Diarrhea     Food Intolerance - Dairy - Diarrhea    Metformin And Related Other (See Comments)     GI side effects     Tape Nicola Ladan Tape] Itching          REVIEWED MEDICATIONS:        Current Outpatient Medications:     insulin lispro (HUMALOG) 100 UNIT/ML SOLN injection vial, USE VIA INSULIN PUMP.  MAX 85 UNITS DAILY, Disp: 30 mL, Rfl: 5    lisinopril (PRINIVIL;ZESTRIL) 40 MG tablet, TAKE 1 TABLET BY MOUTH DAILY, Disp: 30 tablet, Rfl: 2    TRULICITY 3 BQ/8.3PT SOPN, INJECT 3 MG INTO THE SKIN ONCE A WEEK, Disp: 2 mL, Rfl: 3    hydrALAZINE (APRESOLINE) 100 MG tablet, Take 1 tablet by mouth every 8 hours, Disp: 90 tablet, Rfl: 0    Insulin Infusion Pump (MINIMED 770G INSULIN PUMP SYS) KIT, To infuse insulin, Disp: 1 kit, Rfl: 0    Continuous Blood Gluc Sensor (GUARDIAN SENSOR 3) MISC, To change every 7 days, Disp: 12 each, Rfl: 3    dicyclomine (BENTYL) 10 MG capsule, Take 1 capsule by mouth 4 times daily, Disp: 120 capsule, Rfl: 0    aspirin EC 81 MG EC tablet, Take 1 tablet by mouth daily, Disp: 90 tablet, Rfl: 1    mometasone (ELOCON) 0.1 % cream, Apply topically daily as needed (apply to both hands and under breasts), Disp: , Rfl:     mupirocin (BACTROBAN) 2 % ointment, Apply topically 3 times daily as needed (apply to both hands and under breasts), Disp: , Rfl:     triamcinolone (ARISTOCORT) 0.5 % cream, Apply topically 3 times daily as needed (apply to both hands and under breasts), Disp: , Rfl:     atorvastatin (LIPITOR) 20 MG tablet, Take 1 tablet by mouth daily, Disp: 90 tablet, Rfl: 1    albuterol sulfate HFA (VENTOLIN HFA) 108 (90 Base) MCG/ACT inhaler, Inhale 2 puffs into the lungs 4 times daily as needed for Wheezing, Disp: 18 g, Rfl: 0    Blood Pressure KIT, 1 each by Does not apply route Twice a Week, Disp: 1 kit, Rfl: 0    hydroCHLOROthiazide (HYDRODIURIL) 12.5 MG tablet, Take 1 tablet by mouth daily, Disp: 30 tablet, Rfl: 0    Accu-Chek FastClix Lancets MISC, Use to check blood glucose 4x daily, Disp: 200 each, Rfl: 5    Accu-Chek Softclix Lancets MISC, Check blood sugar 4x daily, Disp: 200 each, Rfl: 6    blood glucose test strips (ACCU-CHEK GUIDE) strip, Use to check blood sugars 4x daily, Disp: 200 each, Rfl: 6    dilTIAZem (CARDIZEM CD) 360 MG extended release capsule, Take 1 capsule by mouth daily, Disp: 30 capsule, Rfl: 0    Continuous Blood Gluc Transmit (GUARDIAN LINK 3 TRANSMITTER) MISC, To use with sensors, Disp: 1 each, Rfl: 0    hydrocortisone (ANUSOL-HC) 2.5 % CREA rectal cream, Apply to affected area twice daily for rectal pain from external hemorrhoid., Disp: 28 g, Rfl: 0    Insulin Pen Needle (BD PEN NEEDLE MICRO U/F) 32G X 6 MM MISC, Uses with insulin 4 times a day, Disp: 250 each, Rfl: 5    Insulin Pen Needle (BD PEN NEEDLE MICRO U/F) 32G X 6 MM MISC, Uses with insulin 4 times a day, Disp: 250 each, Rfl: 5    glucose monitoring (FREESTYLE FREEDOM) kit, 1 kit by Does not apply route daily, Disp: 1 kit, Rfl: 0    ondansetron (ZOFRAN-ODT) 4 MG disintegrating tablet, Take 1 tablet by mouth 3 times daily as needed for Nausea or Vomiting (Patient not taking: Reported on 10/7/2022), Disp: 21 tablet, Rfl: 0      S: REASON FOR VISIT:       Chief Complaint   Patient presents with    Cardiac Clearance     Needs CC for Bariatric surgery- no c/o           History of Present Illness:       New referral for cardiac clearance for surgery  56 yr old with Hx of HTN, diabetes, Dyslipidemia, ALO, obesity referred for Preop cardiac evaluation  HTN, T2DM insulin requiring, morbid obesity, ALO non compliant with C-pap, GERD, anxiety, recurrent admission of atypical CP with [facility] in 2014 (minimal disease) most recent stress 2016 (non-ischemic with EF 70%),  Seen by Dr Gomez Cos 7/2021, and 4/2022   No hospitalizations or surgeries recently   Patient is compliant with all medications   Nicolas any exertional chest pain   C/o mild short of breath   No palpitations, dizzy or syncope.    Active at home; Quit working 2 days ago   Try to watch diet          Past Medical History:   Diagnosis Date    Acute renal injury ([facility] 2013    Analgesic overuse headache 2018    Anxiety     Arthritis     Asthma     CAD (coronary artery disease)     Depression 2013    Diabetes mellitus ([facility] [address]     A1C=6.7 on 14    Fracture of tooth 2018    GERD (gastroesophageal reflux disease)     Glaucoma     Hyperlipidemia 2021    Hypertension     Hypertensive urgency 2013    Hypothyroidism     Irritable bowel syndrome     Morbid obesity due to excess calories ([facility] [address]     Obesity     Obstructive sleep apnea     Pneumonia     Polycystic ovarian syndrome     Postcholecystectomy syndrome 2018    Spinal headache     Suicidal ideation 2016            Past Surgical History:   Procedure Laterality Date     SECTION      Dr. Carias Notice, [address], LAPAROSCOPIC      [address]  10/06/2011    4 extractions    ECHO COMPLETE  2014         ENDOSCOPY, COLON, DIAGNOSTIC      FOOT SURGERY      RECONSTRUCTION LEFT FOOT, Dr. Maria Luz Roldan, [address]  9/3/13    incisional hernia repair with mesh    HYSTERECTOMY (CERVIX STATUS UNKNOWN)      KNEE CARTILAGE SURGERY      OVARY REMOVAL      left due to polycystic ovary, Dr. Karla Jarquin, [facility]    BRYANT AND BSO (CERVIX REMOVED)  3/19/2013    Attempted Lap-Open Edil Shook, Dr. Danielle Swanson, [address] [facility] ENDOSCOPY N/A 2022    EGD BIOPSY performed by Mando Simmons MD at [facility] ENDOSCOPY          Family History   Problem Relation Age of Onset    Asthma Mother     Diabetes Mother     High Blood Pressure Mother     High Blood Pressure Father     Heart Disease Father     Cancer Father Depression Father     Diabetes Brother     Asthma Brother     Thyroid Disease Sister     Asthma Sister     Depression Maternal Grandmother     High Blood Pressure Maternal Grandmother     Mental Illness Maternal Grandmother     Thyroid Disease Maternal Grandmother     Heart Disease Maternal Grandfather     Depression Paternal Grandmother     High Blood Pressure Paternal Grandmother     Cancer Paternal Grandfather           Social History     Tobacco Use    Smoking status: Never    Smokeless tobacco: Never   Substance Use Topics    Alcohol use: No     Comment: no alcohol since age 26;  drinks 2-3 glasses Coke/Pepsi daily & 2-3 glasses of iced tea daily          Review of Systems:    Constitutional: negative for fever and chills, or significant weight loss  HEENT: negative for acute visual symptoms or auditory problems, no dysphagia  Respiratory: negative for cough, wheezing, or hemoptysis  Cardiovascular: negative for chest pain, palpitations, and +ve dyspnea  Gastrointestinal: negative for abdominal pain, diarrhea, melena, nausea and vomiting  Endocrine: Negative for polyuria and polydyspsia  Genitourinary: negative for dysuria and hematuria  Derm: negative for rash and skin lesion(s)  Neurological: negative for tingling, numbness, weakness, seizures  Endocrine: negative for polydipsia and polyuria  Musculoskeletal: negative for pain or tenderness  Psychiatric: negative for anxiety, depression, or suicidal ideations         O:  COMPLETE PHYSICAL EXAM:       BP (!) 140/80   Pulse 71   Resp 20   Ht 5' 6\" (1.676 m)   Wt (!) 344 lb (156 kg)   LMP 10/01/2012 (Approximate) Comment: 2013  BMI 55.52 kg/m²       General:   Patient alert, comfortable, no distress. Appears stated age. HEENT:    Pupils equal, no icterus; Tongue moist.   Neck:              No masses, Thyroid not palpable. No elevated JVD, No carotid bruit. Chest:   Normal configuration, non tender.    Lungs:   Clear to auscultation bilaterally except few scattered rhonchi. Cardiovascular:  Regular rhythm, 1/6 systolic murmur, No S3, no palpable thrills. Abdomen:  Soft, Bowel sounds normal, obese, can not fell pulsatile abdominal aorta, no palpable masses. Extremities:  Nonpitting edema. Distal pulses palpable. No cyanosis, no clubbing. Skin:   Good turgor, warm and dry, no cyanosis. Musculoskeletal: No joint swelling or deformity. Neuro:   Cranial nerves grossly intact; No focal neurologic deficit. Psych:   Alert, good mood and effect. REVIEW OF DIAGNOSTIC TESTS:        Electrocardiogram: Reviewed    2D Echo: Feb 2019   Technically sub-optimal images. Left ventricular size is normal.   Normal LV segmental wall motion, EF 60% . Moderate left ventricular concentric hypertrophy noted. There is doppler evidence of stage I diastolic dysfunction. Left atrium is of normal size. Interatrial septum not well visualized but appears intact. Normal right ventricle structure and function. No mitral valve prolapse. No significant valvular pathology. Normal aortic root size. No evidence of pericardial effusion. No intracardiac mass. No recent study to compare. Stress Test: 7/2016 - Nonischemic, EF 70%     Cardiac cath, right femoral approach, Dr. Vamsi Levy, [facility] 06/26/2014. Minimal CAD. Normal LV        ASSESSMENT / PLAN:    Eliana was seen today for cardiac clearance. Diagnoses and all orders for this visit:    Preoperative cardiovascular examination - No chest pain; Cardiac clearance after her stress test  -     Cardiac Stress Test - w/Pharm; Future    Essential hypertension - Monitor BP  - NM Cardiac Stress Test Nuclear Imaging; Future  -     Cardiac Stress Test - w/Pharm; Future    Shortness of breath - Multifactorial  -     NM Cardiac Stress Test Nuclear Imaging; Future  -     Cardiac Stress Test - w/Pharm;  Future    Obstructive sleep apnea    Lymphedema of both lower extremities    BMI 50.0-59.9, adult (HCC) - Diet, exercise and weight loss discussed. Preop testing  -     NM Cardiac Stress Test Nuclear Imaging; Future    Other orders  -     EKG 12 Lead  -     Beta Blocker Management Prior to Cardiac Stress Test; Standing  -     regadenoson (LEXISCAN) injection 0.4 mg  -     Beta Blocker Management Prior to Cardiac Stress Test     Type 2 diabetes  -on Insulin  -Follows with Endocrinologist      Preventive Cardiology: Low cholesterol diet, regular exercise as tolerate, and gradual weight loss discussed. Above recommendations discussed. The patient's current medication list, allergies, problem list and results of prior tests (as available) were reviewed at today's visit   All questions answered about cardiac diagnoses and cardiac medications. Continue current medications. Monitor BP and heart rates. Compliance with medications and f/u with all physicians discussed. Risk factor modification based on risk profile discussed. Call if any exertional chest pain, short of breath, dizzy or palpitations. Follow up in 12 months or earlier if needed.          [facility]  [address] [place], [address] [place] [address]  [phone]

## 2022-10-13 ENCOUNTER — TELEPHONE (OUTPATIENT)
Dept: BARIATRICS/WEIGHT MGMT | Age: 47
End: 2022-10-13

## 2022-10-13 ENCOUNTER — INITIAL CONSULT (OUTPATIENT)
Dept: BARIATRICS/WEIGHT MGMT | Age: 47
End: 2022-10-13
Payer: MEDICAID

## 2022-10-13 DIAGNOSIS — Z71.89 ENCOUNTER FOR PSYCHOLOGICAL ASSESSMENT PRIOR TO BARIATRIC SURGERY: Primary | ICD-10-CM

## 2022-10-13 DIAGNOSIS — F50.9 COMPULSIVE EATING PATTERNS: ICD-10-CM

## 2022-10-13 PROCEDURE — 90791 PSYCH DIAGNOSTIC EVALUATION: CPT | Performed by: SOCIAL WORKER

## 2022-10-13 PROCEDURE — 1036F TOBACCO NON-USER: CPT | Performed by: SOCIAL WORKER

## 2022-10-13 NOTE — PATIENT INSTRUCTIONS
Assignments/Recommendations: 1-2 follow-up sessions with SW for further education/evaluation. Complete entries in \"Why We Eat\", \"Reality Journal\" and \"Identifying and Handling Cravings\" to discuss next session. Attend [facility] support group. Follow-up with: referrals/present providers/all scheduled appointments at [facility]. Handouts provided in office.

## 2022-10-13 NOTE — TELEPHONE ENCOUNTER
Pt was scheduled for stress test on 10/28 but test was canceled. Dr Adelfo Hawkins wanted this prior to clearing pt for surgery. no

## 2022-10-13 NOTE — PROGRESS NOTES
Diagnostic Evaluation without Medical Services. Joyce Membreno is a 56 y.o. ,   female, referred by Self  for evaluation and treatment. Patient identify verified by Name and . Those attending session : patient      Chief Complaint   Patient presents with    Consultation     Evaluation for bariatric surgery         Motivation for surgery: Motivated for surgery by medical problems diabetes and high blood pressure. \"I have been trying to lose weight for 10 years\"      Understanding of procedure: The patient has researched the procedure and understands the possibility of potential weight gain. , The patient  has talked with other people who have undergone the procedure. , and The patient  has not attended a gastric bypas surgery group. Reported Current weight 342. Wt Readings from Last 3 Encounters:   10/07/22 (!) 344 lb (156 kg)   22 (!) 346 lb (156.9 kg)   22 (!) 346 lb (156.9 kg)       Expectations: The patient expects to loose 100-150 lbs following surgery over 12 months. Goal weight post surgery: 200 lbs. Other expectations: Improvement in health, Decreased need for medication, and \"I want to be healthier for me\"    HISTORY OF PRESENT ILLNESS       EAT/WEIGHT HISTORY    How old were you when you first became concerned with your weight? PT stated that most of her life but more concerned when  became ill. Most successful diet in the past? Cut soda and sweets, higher protein diet. Weight lost on the diet listed above? 150  Patient stated he / she maintained his / her weight for the following time? current  How much control over your eating do you feel you Have? \"I have a sweet tooth\"      Cravings: For what types of food: sweets                  Strategies used to deal with cravings: substitutes a healthier snack.        Eating habits: Breakfast:  yes, Lunch:  yes, and Dinner:  yes PT stated that she tries to stay away from snacks but Emotional eating:  could not identify any patterns of emotional eating. BINGE EATING    Recurrent episodes of binge eating: An episode is characterized by:  1. Eating a larger amount of food than normal during a short period of time (within any two hour period): No  2. Lack of control over eating during the binge episode (i.e. The feeling that one cannot stop eating): No  Both Symptoms Met: No    Binge eating episodes are associated with three or more of the followin. Eating until feeling uncomfortably full: No  2. Eating large amounts of food when not physically hungary: No  3. Eating much more rapidly than normal: No  4. Eating alone because you are embarrassed by how much you are eating; No  5. Feeling disgusted, depressed, or guilty after eating: No  THREE ASSOCIATED SYMPTOMS MET:No    Marked distress regarding binge eating is present: No    Binge eating occurs at least once a week for 3 months: No  The patient reports at least na binge episodes per week for the past na months. *It should be noted that PT reported a distant past.     COMPULSIVE EATING PATTERN    Compulsive overeating without thoughts to harmful consequences (weight gain, diabetes, chronic pain, low self esteem); No  Inability to reduce or change continuous patterns of food intake (snacking/grazing, overeating foods that are high in simple carbs and/or fats); Yes  Continued compulsive eating in spite of negative consequences. (Obesity, diabetes complications, others); Yes    Pattern of inappropriate compensatory behavior (i.e. Purging, excessive exercise etc): No    PATIENT MEETS ABOVE CRITERIA FOR  EATING DISORDER: Yes    What lifestyle changes started: PT has lost 150 pounds in the last year and a half.        MEDICAL PROBLEMS    Medical History:  Past Medical History:   Diagnosis Date    Acute renal injury ([facility] [address] 2013    Analgesic overuse headache 2018    Anxiety     Arthritis     Asthma     CAD (coronary artery disease)     Depression 03/19/2013    Diabetes mellitus ([facility] [address]     A1C=6.7 on 1/14/14    Fracture of tooth 12/19/2018    GERD (gastroesophageal reflux disease)     Glaucoma     Hyperlipidemia 12/08/2021    Hypertension     Hypertensive urgency 03/19/2013    Hypothyroidism     Irritable bowel syndrome     Morbid obesity due to excess calories (HCC)     Obesity     Obstructive sleep apnea     Pneumonia     Polycystic ovarian syndrome     Postcholecystectomy syndrome 12/19/2018    Spinal headache     Suicidal ideation 03/18/2016        Current Outpatient Medications   Medication Sig Dispense Refill    hydroCHLOROthiazide (HYDRODIURIL) 12.5 MG tablet TAKE ONE TABLET BY MOUTH EVERY DAY 30 tablet 0    insulin lispro (HUMALOG) 100 UNIT/ML SOLN injection vial USE VIA INSULIN PUMP. MAX 85 UNITS DAILY 30 mL 5    Blood Pressure KIT 1 each by Does not apply route Twice a Week 1 kit 0    lisinopril (PRINIVIL;ZESTRIL) 40 MG tablet TAKE 1 TABLET BY MOUTH DAILY 30 tablet 2    TRULICITY 3 MC/8.7LW SOPN INJECT 3 MG INTO THE SKIN ONCE A WEEK 2 mL 3    Accu-Chek FastClix Lancets MISC Use to check blood glucose 4x daily 200 each 5    Accu-Chek Softclix Lancets MISC Check blood sugar 4x daily 200 each 6    blood glucose test strips (ACCU-CHEK GUIDE) strip Use to check blood sugars 4x daily 200 each 6    dilTIAZem (CARDIZEM CD) 360 MG extended release capsule Take 1 capsule by mouth daily 30 capsule 0    hydrALAZINE (APRESOLINE) 100 MG tablet Take 1 tablet by mouth every 8 hours 90 tablet 0    Insulin Infusion Pump (MINIMED 770G INSULIN PUMP SYS) KIT To infuse insulin 1 kit 0    Continuous Blood Gluc Sensor (GUARDIAN SENSOR 3) MISC To change every 7 days 12 each 3    Continuous Blood Gluc Transmit (GUARDIAN LINK 3 TRANSMITTER) MISC To use with sensors 1 each 0    hydrocortisone (ANUSOL-HC) 2.5 % CREA rectal cream Apply to affected area twice daily for rectal pain from external hemorrhoid.  28 g 0    dicyclomine (BENTYL) 10 MG capsule Take 1 capsule by mouth 4 times daily 120 capsule 0    aspirin EC 81 MG EC tablet Take 1 tablet by mouth daily 90 tablet 1    Insulin Pen Needle (BD PEN NEEDLE MICRO U/F) 32G X 6 MM MISC Uses with insulin 4 times a day 250 each 5    Insulin Pen Needle (BD PEN NEEDLE MICRO U/F) 32G X 6 MM MISC Uses with insulin 4 times a day 250 each 5    glucose monitoring (FREESTYLE FREEDOM) kit 1 kit by Does not apply route daily 1 kit 0    mometasone (ELOCON) 0.1 % cream Apply topically daily as needed (apply to both hands and under breasts)      mupirocin (BACTROBAN) 2 % ointment Apply topically 3 times daily as needed (apply to both hands and under breasts)      triamcinolone (ARISTOCORT) 0.5 % cream Apply topically 3 times daily as needed (apply to both hands and under breasts)      atorvastatin (LIPITOR) 20 MG tablet Take 1 tablet by mouth daily 90 tablet 1    albuterol sulfate HFA (VENTOLIN HFA) 108 (90 Base) MCG/ACT inhaler Inhale 2 puffs into the lungs 4 times daily as needed for Wheezing 18 g 0    ondansetron (ZOFRAN-ODT) 4 MG disintegrating tablet Take 1 tablet by mouth 3 times daily as needed for Nausea or Vomiting (Patient not taking: Reported on 10/7/2022) 21 tablet 0     Current Facility-Administered Medications   Medication Dose Route Frequency Provider Last Rate Last Admin    regadenoson (LEXISCAN) injection 0.4 mg  0.4 mg IntraVENous ONCE PRN Reid Funk MD            PSYCHIATRIC HISTORY    Past Psychiatric History: MH: PT stated that at age 41 and 56  she was seeing a therapist at [facility] a couple of times due to grief . She stated that she is being treated for anxiety and depression by her PCP. Hx of treatment for ED: No  HX of treatment for GAB: No  Date of last visit with mental health provider: na      Family Psychiatric History: Biological mother:  Includes:  Anxiety and Depression and Maternal family relatives: Includes:  Grandfather, Substance abuse    Family History of Obesity?  Yes Other family members with weight problems: none        CURRENT/RECENT CHEMICAL USE: Alcohol:  quit 22 years ago  tobacco:  never used  caffeine:  was drinking coffee but has quit. Recreational drug use:  No     PSYCHOSOCIAL STRESSORS    Living Arrangements: Lives in her own home with her brother(Keon)  Children: Boyd Loveless 24, lives on his own  Employment: Unemployed, seeking work  Financial  unemployment, food stamps, child support. Legal none  Domestic Assessment   none reported  The patient reports the following stressors: none    PSYCHOSOCIAL HISTORY    The patient was born and raised in [facility]. The patient raised in a step parent home, parents were  when she was an infant, and mom's S/O raised her  Describes childhood as: PT reported having a normal childhood, parents worked, she did have contact with her biological father frequently. Siblings: Jimenez Money   Family relationships: Eliana shared that she has good relationships with her mom and brother and sister but some what distant from sister. She has a good relationship with her son. Sexual orientation/gender identification: Heterosexual   history:none  Spiritual/ Roman Catholic orientation: Dipesh Headings but had not been attending Synagogue but is starting back. Cultural beliefs: none   Educational history: Eliana stated that she did have some learning disabilities, poss dyslexia and ADHD per her report, she also stated that she attended [employer]. Abuse History: No  Trauma: yes, husbands death    The patient reports the following strengths: I'm determined, If I set my mind to it I am going to do it, If I make a promise I keep it, I am very loyal, honest and faithful.      Mental Status Exam: appearance:  appropriately dressed and appropriately groomed, behavior:  normal, attitude:  cooperative, speech:  appropriate, mood:  euthymic, affect:  congruent with mood, thought content:  no evidence of psychosis, thought process: logical and coherent, orientation:  oriented in all spheres, memory:  recent:  good and remote:  good, insight:  fair , judgment:  fair , and cognitive:  intact and intelligent    RISK ASSESSMENT    Suicide screen: denies current suicidal ideation, plan and intent    Protective factors are NA     Self Injurious Behavior: denies    Homicide screen: denies current homicidal ideation, plan and intent    History of Violence: denies    Access to Guns/Weapons: yes    CLINICAL ASSESSMENT    Major Psychiatric Contraindications for surgery: The patient acknowledged a history of mental health issues:  depression and anxiety        The patient appeared to have reasonable expectations regarding surgery. Patient was fairly informed about the surgery and changes needed post surgery. The patient appears to be motivated to make lifestyle changes as evidenced by  weight loss. The patient appears to have fair social support as evidenced by family and friends supporting    Family's and friends  evidence of level of support for surgery: agree with decision for surgery     Other social supports: friends    DIAGNOSIS:   Encounter Diagnoses   Name Primary? Encounter for psychological assessment prior to bariatric surgery Yes    Compulsive eating patterns         TREATMENT PLAN    Patient Goals: Increased understanding of role of emotional factors contributing to issues with food and obesity and strategies to cope with these. Interventions in session: Explored emotional, behavioral, cognitive and environmental factors contributing to issues with food and obesity, with goal of promoting optimal post bariatric surgery outcome. Discussed the importance of attending support group and reinforced the benefits of attending.  Provided pt. with hand out \"Why We Eat\", \"Reality Journal\" and \"Identifying and Handling Cravings\"     Safety Plan: not applicable     Assignments/Recommendations: 1-2 follow-up sessions with SW for further education/evaluation. Complete entries in \"Why We Eat\", \"Reality Journal\" and \"Identifying and Handling Cravings\" to discuss next session. Attend [facility] support group. Follow-up with: referrals/present providers/all scheduled appointments at [facility]. Handouts provided in office. Next steps: Schedule follow up with me for  60MIN in 10 months and Continue with dietitian and bariatric support group    Bariatric Surgery: Based on the information gathered through the interview process - there is no current evidence of mental health or substance abuse issues that would impact on the patient receiving bariatric surgery.     Patient and/or family/guardian verbalizes understanding of and agreement with treatment recommendations and plan: yes    Start time: 1:58 pm          End time: 3:15 pm    Visit Time:  [address]

## 2022-10-14 DIAGNOSIS — I10 ESSENTIAL HYPERTENSION: ICD-10-CM

## 2022-10-14 DIAGNOSIS — F41.9 ANXIETY: ICD-10-CM

## 2022-10-14 RX ORDER — VENLAFAXINE HYDROCHLORIDE 37.5 MG/1
37.5 CAPSULE, EXTENDED RELEASE ORAL DAILY
Qty: 30 CAPSULE | Refills: 3 | OUTPATIENT
Start: 2022-10-14

## 2022-10-17 RX ORDER — HYDROCHLOROTHIAZIDE 12.5 MG/1
TABLET ORAL
Qty: 30 TABLET | Refills: 0 | Status: SHIPPED
Start: 2022-10-17 | End: 2022-11-01

## 2022-10-20 ENCOUNTER — OFFICE VISIT (OUTPATIENT)
Dept: BARIATRICS/WEIGHT MGMT | Age: 47
End: 2022-10-20
Payer: MEDICAID

## 2022-10-20 VITALS
BODY MASS INDEX: 47.09 KG/M2 | RESPIRATION RATE: 20 BRPM | HEIGHT: 66 IN | WEIGHT: 293 LBS | DIASTOLIC BLOOD PRESSURE: 97 MMHG | HEART RATE: 77 BPM | SYSTOLIC BLOOD PRESSURE: 178 MMHG | TEMPERATURE: 96.4 F

## 2022-10-20 DIAGNOSIS — Z71.3 NUTRITIONAL COUNSELING: ICD-10-CM

## 2022-10-20 DIAGNOSIS — K21.9 GASTROESOPHAGEAL REFLUX DISEASE WITHOUT ESOPHAGITIS: ICD-10-CM

## 2022-10-20 DIAGNOSIS — E66.01 MORBID OBESITY DUE TO EXCESS CALORIES (HCC): Primary | ICD-10-CM

## 2022-10-20 DIAGNOSIS — E11.8 TYPE 2 DIABETES MELLITUS WITH COMPLICATION, WITHOUT LONG-TERM CURRENT USE OF INSULIN (HCC): ICD-10-CM

## 2022-10-20 PROCEDURE — G8427 DOCREV CUR MEDS BY ELIG CLIN: HCPCS | Performed by: NURSE PRACTITIONER

## 2022-10-20 PROCEDURE — G8417 CALC BMI ABV UP PARAM F/U: HCPCS | Performed by: NURSE PRACTITIONER

## 2022-10-20 PROCEDURE — G8484 FLU IMMUNIZE NO ADMIN: HCPCS | Performed by: NURSE PRACTITIONER

## 2022-10-20 PROCEDURE — 99213 OFFICE O/P EST LOW 20 MIN: CPT | Performed by: NURSE PRACTITIONER

## 2022-10-20 PROCEDURE — 2022F DILAT RTA XM EVC RTNOPTHY: CPT | Performed by: NURSE PRACTITIONER

## 2022-10-20 PROCEDURE — 99211 OFF/OP EST MAY X REQ PHY/QHP: CPT

## 2022-10-20 PROCEDURE — 1036F TOBACCO NON-USER: CPT | Performed by: NURSE PRACTITIONER

## 2022-10-20 PROCEDURE — 3044F HG A1C LEVEL LT 7.0%: CPT | Performed by: NURSE PRACTITIONER

## 2022-10-20 NOTE — PROGRESS NOTES
Eliana Hines  10/20/2022  Bariatric Weight loss appointment for Obesity  Nutrition Education Session      Subjective:   Perfecto Andrea is a 56 y.o. female here for pre-surgical dietary education today. Patient is accompanied by her son at today's visit. Patient reports that they are doing good following their previous dietary education. Questions today include weight loss goal.   Weight=(!) 342 lb (155.1 kg)  Today's weight represents a total weight loss to date of 4 pounds. Today we will discuss the topics of protein intake, portion control. Patients previous 24 hour dietary recall includes:  Breakfast: 1/2 bowl shredded wheat, 2% milk  Snack: none  Lunch: boost with protein - 20 g protein. Reports that her sugar was low so that is why she had that, no sugar added juice  Snack: none  Dinner: hot dogs and beans   Snack: none  Water intake: no regular weight -   Other beverages:crystal light x 4-20 ounce bottles  Exercise: none    Prior to Admission medications    Medication Sig Start Date End Date Taking? Authorizing Provider   hydroCHLOROthiazide (HYDRODIURIL) 12.5 MG tablet TAKE ONE TABLET BY MOUTH EVERY DAY 10/17/22  Yes Ray Cowart MD   insulin lispro (HUMALOG) 100 UNIT/ML SOLN injection vial USE VIA INSULIN PUMP.  MAX 85 UNITS DAILY 10/7/22  Yes Vanita Grimaldo MD   lisinopril (PRINIVIL;ZESTRIL) 40 MG tablet TAKE 1 TABLET BY MOUTH DAILY 9/19/22  Yes Ray Cowart MD   TRULICITY 3 OM/5.1RN SOPN INJECT 3 MG INTO THE SKIN ONCE A WEEK 7/8/22  Yes GEOVANI Weems - VITO   Accu-Chek FastClix Lancets MISC Use to check blood glucose 4x daily 5/16/22  Yes Vanita Grimaldo MD   Accu-Chek Softclix Lancets MISC Check blood sugar 4x daily 5/12/22  Yes Vanita Grimaldo MD   blood glucose test strips (ACCU-CHEK GUIDE) strip Use to check blood sugars 4x daily 5/12/22  Yes Vanita Grimaldo MD   dilTIAZem (CARDIZEM CD) 360 MG extended release capsule Take 1 capsule by mouth daily 5/3/22  Yes Vanessa Thrasher MD   hydrALAZINE (APRESOLINE) 100 MG tablet Take 1 tablet by mouth every 8 hours 5/3/22  Yes Vanessa Thrasher MD   Insulin Infusion Pump (MINIMED 770G INSULIN PUMP SYS) KIT To infuse insulin 4/20/22  Yes GEOVANI Weems NP   Continuous Blood Gluc Sensor (GUARDIAN SENSOR 3) MISC To change every 7 days 4/20/22  Yes GEOVANI Weems NP   Continuous Blood Gluc Transmit (GUARDIAN LINK 3 TRANSMITTER) MISC To use with sensors 4/20/22  Yes GEOVANI Weems NP   hydrocortisone (ANUSOL-HC) 2.5 % CREA rectal cream Apply to affected area twice daily for rectal pain from external hemorrhoid.  4/16/22  Yes Maddie Palacios,    dicyclomine (BENTYL) 10 MG capsule Take 1 capsule by mouth 4 times daily 4/15/22  Yes Sue Waldrop MD   aspirin EC 81 MG EC tablet Take 1 tablet by mouth daily 4/12/22  Yes Denton Perea MD   Insulin Pen Needle (BD PEN NEEDLE MICRO U/F) 32G X 6 MM MISC Uses with insulin 4 times a day 4/12/22  Yes Denton Perea MD   Insulin Pen Needle (BD PEN NEEDLE MICRO U/F) 32G X 6 MM MISC Uses with insulin 4 times a day 4/12/22  Yes Denton Perea MD   glucose monitoring (FREESTYLE FREEDOM) kit 1 kit by Does not apply route daily 4/12/22  Yes Denton Perea MD   mupirocin (BACTROBAN) 2 % ointment Apply topically 3 times daily as needed (apply to both hands and under breasts)   Yes Historical Provider, MD   triamcinolone (ARISTOCORT) 0.5 % cream Apply topically 3 times daily as needed (apply to both hands and under breasts)   Yes Historical Provider, MD   atorvastatin (LIPITOR) 20 MG tablet Take 1 tablet by mouth daily 4/1/22  Yes Vanessa Thrasher MD   albuterol sulfate HFA (VENTOLIN HFA) 108 (90 Base) MCG/ACT inhaler Inhale 2 puffs into the lungs 4 times daily as needed for Wheezing 3/29/22  Yes Melinda Nathan,    ondansetron (ZOFRAN-ODT) 4 MG disintegrating tablet Take 1 tablet by mouth 3 times daily as needed for Nausea or Vomiting 2/17/21  Yes CRYSTAL Campa pantoprazole (PROTONIX) 40 MG tablet Take 1 tablet by mouth daily for 10 days 7/10/21 7/10/21  Laquita Lombard,          Allergies   Allergen Reactions    Percocet [Oxycodone-Acetaminophen] Itching    Food Diarrhea     Food Intolerance - Dairy - Diarrhea    Metformin And Related Other (See Comments)     GI side effects     Tape [Adhesive Tape] Itching     Past Medical History:   Diagnosis Date    Acute renal injury ([email or web address] 2013    Analgesic overuse headache 2018    Anxiety     Arthritis     Asthma     CAD (coronary artery disease)     Depression 2013    Diabetes mellitus ([facility] [address]     A1C=6.7 on 14    Fracture of tooth 2018    GERD (gastroesophageal reflux disease)     Glaucoma     Hyperlipidemia 2021    Hypertension     Hypertensive urgency 2013    Hypothyroidism     Irritable bowel syndrome     Morbid obesity due to excess calories ([facility] [address]     Obesity     Obstructive sleep apnea     Pneumonia     Polycystic ovarian syndrome     Postcholecystectomy syndrome 2018    Spinal headache     Suicidal ideation 2016       Past Surgical History:   Procedure Laterality Date     SECTION      Dr. Carias Notice, [address], LAPAROSCOPIC      [facility]    COLONOSCOPY      DENTAL SURGERY  10/06/2011    4 extractions    ECHO COMPLETE  2014         ENDOSCOPY, COLON, DIAGNOSTIC      FOOT SURGERY      RECONSTRUCTION LEFT FOOT, Dr. Maria Luz Roldan, [address]  9/3/13    incisional hernia repair with mesh    HYSTERECTOMY (CERVIX STATUS UNKNOWN)      KNEE CARTILAGE SURGERY      OVARY REMOVAL      left due to polycystic ovary, Dr. Karla Jarquin, [address] [facility] (CERVIX REMOVED)  3/19/2013    [facility]    EGD BIOPSY performed by Mando Simmons MD at [facility] ENDOSCOPY       Current Outpatient Medications   Medication Sig Dispense Refill    hydroCHLOROthiazide (HYDRODIURIL) 12.5 MG tablet TAKE ONE TABLET BY MOUTH EVERY DAY 30 tablet 0    insulin lispro (HUMALOG) 100 UNIT/ML SOLN injection vial USE VIA INSULIN PUMP. MAX 85 UNITS DAILY 30 mL 5    lisinopril (PRINIVIL;ZESTRIL) 40 MG tablet TAKE 1 TABLET BY MOUTH DAILY 30 tablet 2    TRULICITY 3 HY/8.0OZ SOPN INJECT 3 MG INTO THE SKIN ONCE A WEEK 2 mL 3    Accu-Chek FastClix Lancets MISC Use to check blood glucose 4x daily 200 each 5    Accu-Chek Softclix Lancets MISC Check blood sugar 4x daily 200 each 6    blood glucose test strips (ACCU-CHEK GUIDE) strip Use to check blood sugars 4x daily 200 each 6    dilTIAZem (CARDIZEM CD) 360 MG extended release capsule Take 1 capsule by mouth daily 30 capsule 0    hydrALAZINE (APRESOLINE) 100 MG tablet Take 1 tablet by mouth every 8 hours 90 tablet 0    Insulin Infusion Pump (MINIMED 770G INSULIN PUMP SYS) KIT To infuse insulin 1 kit 0    Continuous Blood Gluc Sensor (GUARDIAN SENSOR 3) MISC To change every 7 days 12 each 3    Continuous Blood Gluc Transmit (GUARDIAN LINK 3 TRANSMITTER) MISC To use with sensors 1 each 0    hydrocortisone (ANUSOL-HC) 2.5 % CREA rectal cream Apply to affected area twice daily for rectal pain from external hemorrhoid.  28 g 0    dicyclomine (BENTYL) 10 MG capsule Take 1 capsule by mouth 4 times daily 120 capsule 0    aspirin EC 81 MG EC tablet Take 1 tablet by mouth daily 90 tablet 1    Insulin Pen Needle (BD PEN NEEDLE MICRO U/F) 32G X 6 MM MISC Uses with insulin 4 times a day 250 each 5    Insulin Pen Needle (BD PEN NEEDLE MICRO U/F) 32G X 6 MM MISC Uses with insulin 4 times a day 250 each 5    glucose monitoring (FREESTYLE FREEDOM) kit 1 kit by Does not apply route daily 1 kit 0    mupirocin (BACTROBAN) 2 % ointment Apply topically 3 times daily as needed (apply to both hands and under breasts)      triamcinolone (ARISTOCORT) 0.5 % cream Apply topically 3 times daily as needed (apply to both hands and under breasts) atorvastatin (LIPITOR) 20 MG tablet Take 1 tablet by mouth daily 90 tablet 1    albuterol sulfate HFA (VENTOLIN HFA) 108 (90 Base) MCG/ACT inhaler Inhale 2 puffs into the lungs 4 times daily as needed for Wheezing 18 g 0    ondansetron (ZOFRAN-ODT) 4 MG disintegrating tablet Take 1 tablet by mouth 3 times daily as needed for Nausea or Vomiting 21 tablet 0     Current Facility-Administered Medications   Medication Dose Route Frequency Provider Last Rate Last Admin    regadenoson (LEXISCAN) injection 0.4 mg  0.4 mg IntraVENous ONCE PRN Olinda Mills MD           Allergies   Allergen Reactions    Percocet [Oxycodone-Acetaminophen] Itching    Food Diarrhea     Food Intolerance - Dairy - Diarrhea    Metformin And Related Other (See Comments)     GI side effects     Tape [Adhesive Tape] Itching       Family History   Problem Relation Age of Onset    Asthma Mother     Diabetes Mother     High Blood Pressure Mother     High Blood Pressure Father     Heart Disease Father     Cancer Father     Depression Father     Diabetes Brother     Asthma Brother     Thyroid Disease Sister     Asthma Sister     Depression Maternal Grandmother     High Blood Pressure Maternal Grandmother     Mental Illness Maternal Grandmother     Thyroid Disease Maternal Grandmother     Heart Disease Maternal Grandfather     Depression Paternal Grandmother     High Blood Pressure Paternal Grandmother     Cancer Paternal Grandfather        Social History     Socioeconomic History    Marital status:       Spouse name: Not on file    Number of children: 1    Years of education: 12    Highest education level: Not on file   Occupational History    Not on file   Tobacco Use    Smoking status: Never    Smokeless tobacco: Never   Vaping Use    Vaping Use: Never used   Substance and Sexual Activity    Alcohol use: No     Comment: no alcohol since age 26;  drinks 2-3 glasses Coke/Pepsi daily & 2-3 glasses of iced tea daily     Drug use: Never    Sexual activity: Not Currently     Partners: Male   Other Topics Concern    Not on file   Social History Narrative    Not on file     Social Determinants of Health     Financial Resource Strain: Not on file   Food Insecurity: Not on file   Transportation Needs: Not on file   Physical Activity: Not on file   Stress: Not on file   Social Connections: Not on file   Intimate Partner Violence: Not on file   Housing Stability: Not on file           A complete 10 system review was performed and are otherwise negative unless mentioned in the above HPI. Specific negatives are listed below but may not include all those reviewed.     General ROS: negative obtundation, AMS  ENT ROS: negative rhinorrhea, epistaxis  Allergy and Immunology ROS: negative itchy/watery eyes or nasal congestion  Hematological and Lymphatic ROS: negative spontaneous bleeding or bruising  Endocrine ROS: negative  lethargy, mood swings, palpitations or polydipsia/polyuria  Respiratory ROS: negative sputum changes, stridor, tachypnea or wheezing  Cardiovascular ROS: negative for - loss of consciousness, murmur or orthopnea  Gastrointestinal ROS: negative for - hematochezia or hematemesis  Genito-Urinary ROS: negative for -  genital discharge or hematuria  Musculoskeletal ROS: negative for - focal weakness, gangrene  Psych/Neuro ROS: negative for - visual or auditory hallucinations, suicidal ideation    Physical exam:   BP (!) 178/97 (Site: Right Lower Arm, Position: Sitting, Cuff Size: Medium Adult)   Pulse 77   Temp (!) 96.4 °F (35.8 °C) (Temporal)   Resp 20   Ht 5' 6\" (1.676 m)   Wt (!) 342 lb (155.1 kg)   LMP 10/01/2012 (Approximate) Comment: 2013  BMI 55.20 kg/m²   General appearance: AAO, NAD  Eyes: PERRL, EOMI, red conjunctiva  Lungs: Equal chest rise bilateral  Chest wall: atraumatic, no tenderness, no echymosis or abrasions  Heart: reg rate, no murmur  Abdomen: soft, nondistended, tender minimal, no hernias, no peritioneal signs  Extremities: full ROM all 4 ext, no gross motor or sensory deficits  Pulses: 2+ distal  Skin: warm and dry  Neurologic: spontanous eye opening, purposeful, follows complex commands  Psych: No tremor, no suicidal ideation, no hallucinations      Assessment:     1. Morbid obesity due to excess calories ([address]  See below    2. Nutritional counseling  Patient was instructed on types of protein supplements and usage of protein supplements before and after surgery. The goal of protein intake after bariatric surgery is 60-80 grams daily. Patient was able to verbalize the instruction of how to take the supplements and the importance of their use before and after surgery. Portion Control:  Pt states he / she wants to work on decreasing the volume of food he / she is consuming daily. Pt states he / she is going to purchase a scale and measuring cups to start to weigh and measure all foods he / she is consuming. Pt is going to prepare meals and snacks ahead of time that are portion controlled. Pt is going to reduce his / her plate size to a saucer for meals to hep with head hunger and portion distortion. Pt states if he / she is being served a meal pt is going to cut the meals in half in places where he / she is not able to use a scale in order to reduce the volume of food he / she is consuming. Pt is encouraged to eat the meal slowly and chew all bites 30 - 35 times per bite in order to increase his / her feeling of fullness and satiety. Pt is also encouraged to use smaller silverware and serving utensils to reduce the volume of food he / she is consuming. Pt states he / she is going to keep accurate food records to help with accurate portion consumption. Portions before surgery pt can review the following reference - Serving Size versus Portion Size Is There a Difference - Academy of Nutrition and Dietetics.   Portions after Weight Loss Surgery pt can review the following reference - Sample menus offered in the [facility] Diet Manual. Pt verbalized understanding. 3. Gastroesophageal reflux disease without esophagitis  See below    4. Type 2 diabetes mellitus with complication, without long-term current use of insulin (HCC)  See below    2nd session of dietary education pre weight loss surgery. Patient has 1 more sessions to attend. Goals:  Consider following the VLDL diet - protein shake diet  Increase water intake to 64 ounces per day - consider getting a 32 ounce water bottle to help measure  Increase physical activity to 10-15 minutes 3 times per week    Plan:   Continue to keep food diet, bring to next appointment with Nurse Practitioner or RD/LD  Continue to read food labels and make smart food choices  Increase protein and fluid intake as discussed in the Patient Guide to Bariatric Surgery  Increase physical activity at home    I have spent 20 minutes educating patient on nutrition. All questions were answered to patients and family's satisfaction. They verbalize the importance of pre surgical weight loss at this time.      Provider Signature: Electronically signed by GEOVANI Zuniga CNP

## 2022-10-20 NOTE — PATIENT INSTRUCTIONS
Goals:  Consider following the VLDL diet - protein shake diet  Increase water intake to 64 ounces per day - consider getting a 32 ounce water bottle to help measure  Increase physical activity to 10-15 minutes 3 times per week    What is the next step to proceed with weight loss surgery? Please be aware that any co-pays or deductibles may be requested prior to testing and / or procedures. You will need to schedule a psychological evaluation for weight loss surgery. Patients will be required to complete all psychological testing as required by the mental health provider. Patients must also follow all of the provider's recommendations before weight loss surgery can be scheduled. The evaluation must be done a standard way for weight loss surgery. We strongly recommend that you contact one of our preferred providers listed below to arrange this:      Jordin Diaz, [facility]  [address] [place], [place]   [phone]    [facility] and [address]   [phone]    Dr. Kiya Hanks, PhD    [facility]. [place], [place]    [phone]      You will also need to plan on attending a 2 hour nutrition class at the [facility] prior to your surgery. We will schedule this for you when we schedule your surgery. Please remember to have your labs drawn 10 days prior to your first scheduled dietary appointment. Please remember, that while we will submit your case to insurance for surgery authorization, it is your responsibility to know if your plan covers weight loss surgery and keep up-to-date with changes to your insurance coverage. We will do everything possible to help you get approved for weight loss surgery, but cannot guarantee an approval.     Please note that you will not be submitted to your insurance company until all pre-operative testing requirements are met.

## 2022-10-24 DIAGNOSIS — E11.65 UNCONTROLLED TYPE 2 DIABETES MELLITUS WITH HYPERGLYCEMIA (HCC): ICD-10-CM

## 2022-10-26 ENCOUNTER — TELEPHONE (OUTPATIENT)
Dept: CARDIOLOGY | Age: 47
End: 2022-10-26

## 2022-10-28 ENCOUNTER — HOSPITAL ENCOUNTER (OUTPATIENT)
Dept: CARDIOLOGY | Age: 47
Discharge: HOME | End: 2022-10-28
Payer: MEDICAID

## 2022-10-28 VITALS — BODY MASS INDEX: 47.09 KG/M2 | WEIGHT: 293 LBS | HEART RATE: 68 BPM | HEIGHT: 66 IN | RESPIRATION RATE: 16 BRPM

## 2022-10-28 DIAGNOSIS — Z01.810 PREOPERATIVE CARDIOVASCULAR EXAMINATION: ICD-10-CM

## 2022-10-28 DIAGNOSIS — I10 ESSENTIAL HYPERTENSION: ICD-10-CM

## 2022-10-28 DIAGNOSIS — R06.02 SHORTNESS OF BREATH: ICD-10-CM

## 2022-10-28 DIAGNOSIS — E11.65 UNCONTROLLED TYPE 2 DIABETES MELLITUS WITH HYPERGLYCEMIA (HCC): ICD-10-CM

## 2022-10-28 DIAGNOSIS — Z01.818 PREOP TESTING: ICD-10-CM

## 2022-10-28 PROCEDURE — 93017 CV STRESS TEST TRACING ONLY: CPT

## 2022-10-28 PROCEDURE — A9502 TC99M TETROFOSMIN: HCPCS | Performed by: INTERNAL MEDICINE

## 2022-10-28 PROCEDURE — 2580000003 HC RX 258: Performed by: INTERNAL MEDICINE

## 2022-10-28 PROCEDURE — 3430000000 HC RX DIAGNOSTIC RADIOPHARMACEUTICAL: Performed by: INTERNAL MEDICINE

## 2022-10-28 PROCEDURE — 6360000002 HC RX W HCPCS: Performed by: INTERNAL MEDICINE

## 2022-10-28 PROCEDURE — 78452 HT MUSCLE IMAGE SPECT MULT: CPT

## 2022-10-28 RX ORDER — SODIUM CHLORIDE 0.9 % (FLUSH) 0.9 %
10 SYRINGE (ML) INJECTION PRN
Status: DISCONTINUED | OUTPATIENT
Start: 2022-10-28 | End: 2022-10-29 | Stop reason: HOSPADM

## 2022-10-28 RX ORDER — DULAGLUTIDE 3 MG/.5ML
3 INJECTION, SOLUTION SUBCUTANEOUS WEEKLY
Qty: 2 ML | Refills: 5 | Status: SHIPPED | OUTPATIENT
Start: 2022-10-28

## 2022-10-28 RX ADMIN — SODIUM CHLORIDE, PRESERVATIVE FREE 10 ML: 5 INJECTION INTRAVENOUS at 09:34

## 2022-10-28 RX ADMIN — TETROFOSMIN 10.6 MILLICURIE: 0.23 INJECTION, POWDER, LYOPHILIZED, FOR SOLUTION INTRAVENOUS at 07:43

## 2022-10-28 RX ADMIN — SODIUM CHLORIDE, PRESERVATIVE FREE 10 ML: 5 INJECTION INTRAVENOUS at 09:33

## 2022-10-28 RX ADMIN — SODIUM CHLORIDE, PRESERVATIVE FREE 10 ML: 5 INJECTION INTRAVENOUS at 07:42

## 2022-10-28 RX ADMIN — REGADENOSON 0.4 MG: 0.08 INJECTION, SOLUTION INTRAVENOUS at 09:33

## 2022-10-28 RX ADMIN — TETROFOSMIN 32.6 MILLICURIE: 0.23 INJECTION, POWDER, LYOPHILIZED, FOR SOLUTION INTRAVENOUS at 09:33

## 2022-10-28 NOTE — DISCHARGE INSTRUCTIONS
[address] and V[facility] Lab      Instructions to Patients    The following are the instructions for patients who have had a procedure in our office today. Patient name: Birtha Breath    Radionuclide Activity: 40mCi of 99mTc-Tetrofosmin (Myoview)    Date Administered: 10/28/2022    Expires: 48 hours after scheduled appointment time      Patient may resume normal activity unless otherwise instructed. Patient may resume medications as normal.  If the need should arise, patient may call [phone] between the hours of 8:00am-4:30pm.  After hours there is at least one physician on-call at all times for those patients needing assistance. Patients may call [phone] and the answering service will direct the patient to one of our physicians for assistance. After the patient's test if they are going to be leaving from an airport in the near future they should take this letter with them to verify the test and radionuclide used for their test.      This letter verifies that the above named bearer received an injection of a radionuclide for medical purpose/usage only.         Electronically signed by Irma Villar on 10/28/2022 at 9:19 AM

## 2022-10-28 NOTE — PROCEDURES
[address] [facility] - [address]. [facility], [address] [place] [address]  [phone]               Pharmacologic Stress Nuclear Gated SPECT Study    [facility]    :  1975          Sex: female         Date of Study:  10/28/2022    Height: 5' 6\" (167.6 cm)         Weight: (!) 342 lb (155.1 kg)     Ordering Provider: Erickson Martínez MD          PCP: Ade Olvera MD      Cardiologist: Erickson Martínez MD             Interpreting Physician: Rebecca Wallace MD  _________________________________________________________________________________    Indication:   Detecting the presence and location of coronary artery disease    Clinical History:   Patient has no known history of coronary artery disease. Resting ECG:    Normal sinus rhythm  Normal sinus rhythm  Procedure:   Pharmacologic stress testing was performed with regadenoson 0.4 mg for 15 seconds. The heart rate was 68 at baseline and bebeto to 88 beats during the infusion. The blood pressure at baseline was 116/80 and blood pressure at the end of infusion was 130/70. Blood pressure response was normal during the stress procedure. The patient tolerated the infusion well. ECG during the infusion did not change. IMAGING: Myocardial perfusion imaging was performed at rest 30-35 minutes following the intravenous injection of 10.6 mCi of (Tc-tetrofosmin) followed by 10 ml of Normal Saline. As per infusion protocol, the patient was injected intravenously with 32.6 mCi of (Tc-tetrofosmin) followed by 10 ml of Normal Saline. Gated post-stress tomographic imaging was performed 45 minutes after stress. FINDINGS: The overall quality of the study was good.      Left ventricular cavity size was noted to be normal.    Rotational analog analysis demonstrated abnormal patient motion, extracardiac radioactivity, soft tissue breast attenuation, and soft tissue diaphragmatic attenuation. The gated SPECT stress imaging in the short, vertical long, and horizontal long axis demonstrated normal homogeneous tracer distribution throughout the myocardium both on the post regadenoson and resting images. The resting images are normal.     Gated SPECT left ventricular ejection fraction was calculated to be 59%, with normal myocardial thickening and wall motion. Impression:    Electrocardiographically normal regadenoson infusion with a clinically nonischemic response. Myocardial perfusion imaging was normal.  There is no evidence of ischemia or infarction. Overall left ventricular systolic function was normal without regional wall motion abnormalities. 4. Low risk general pharmacologic stress test    Thank you for sending your patient to this [employer].      Electronically signed by Hazel Sauceda MD on 10/28/22 at 12:18 PM EDT

## 2022-10-31 ENCOUNTER — TELEPHONE (OUTPATIENT)
Dept: CARDIOLOGY CLINIC | Age: 47
End: 2022-10-31

## 2022-10-31 RX ORDER — DULAGLUTIDE 3 MG/.5ML
3 INJECTION, SOLUTION SUBCUTANEOUS WEEKLY
Qty: 2 ML | Refills: 3 | OUTPATIENT
Start: 2022-10-31

## 2022-11-01 ENCOUNTER — TELEPHONE (OUTPATIENT)
Dept: CARDIOLOGY CLINIC | Age: 47
End: 2022-11-01

## 2022-11-01 DIAGNOSIS — I10 ESSENTIAL HYPERTENSION: ICD-10-CM

## 2022-11-01 RX ORDER — HYDROCHLOROTHIAZIDE 12.5 MG/1
TABLET ORAL
Qty: 30 TABLET | Refills: 0 | Status: SHIPPED | OUTPATIENT
Start: 2022-11-01

## 2022-11-11 DIAGNOSIS — I10 ESSENTIAL HYPERTENSION: ICD-10-CM

## 2022-11-11 RX ORDER — HYDROCHLOROTHIAZIDE 12.5 MG/1
TABLET ORAL
Qty: 30 TABLET | Refills: 0 | OUTPATIENT
Start: 2022-11-11

## 2022-11-16 ENCOUNTER — TELEPHONE (OUTPATIENT)
Dept: BARIATRICS/WEIGHT MGMT | Age: 47
End: 2022-11-16

## 2022-11-16 ENCOUNTER — INITIAL CONSULT (OUTPATIENT)
Dept: BARIATRICS/WEIGHT MGMT | Age: 47
End: 2022-11-16

## 2022-11-16 VITALS — HEIGHT: 66 IN | WEIGHT: 293 LBS | BODY MASS INDEX: 47.09 KG/M2

## 2022-11-16 DIAGNOSIS — Z78.9 NONSMOKER: ICD-10-CM

## 2022-11-16 DIAGNOSIS — Z71.3 NUTRITIONAL COUNSELING: ICD-10-CM

## 2022-11-16 DIAGNOSIS — Z13.89 SCREENING FOR SUBSTANCE ABUSE: ICD-10-CM

## 2022-11-16 DIAGNOSIS — Z02.6 ENCOUNTER FOR EXAMINATION FOR INSURANCE PURPOSES: Primary | ICD-10-CM

## 2022-11-16 DIAGNOSIS — Z00.8 NUTRITIONAL ASSESSMENT: Primary | ICD-10-CM

## 2022-11-16 PROCEDURE — 99999 PR OFFICE/OUTPT VISIT,PROCEDURE ONLY: CPT | Performed by: DIETITIAN, REGISTERED

## 2022-11-16 NOTE — TELEPHONE ENCOUNTER
Last Dietary Appointment Notes: 22    [place] [address]: [address]    Surgery Requested by Patient: As of 22 - RYGB    Date: 2 Hour Nutrition Class: Once all testing is complete    Rd / Ld reviewed the following with the patient:    Rd / Ld at the [facility] reviewed with the patient that he / she has not completed the following in order to proceed with bariatric surgery:     - Initial Appt with Surgeon was 22. Initial Appt is only good until 23. Testing will be required again after this date per your insurance company policy. Please remember just because you finished all of your requirements if you did not finish the requirements in a timely manner they can  and you can be required to complete these requirements over again. Each [employer] has its own set of requirements with its own set of deadlines. Once everything listed below is completed you will need to complete the following to proceed with sx. The [facility] will contact you to complete this process. You will be called in order to select your surgery date for insurance submission. You will be called and scheduled to attend a 3 Hour Nutrition Class on the type of surgery you are having completed. These are always scheduled on  from 10:00 am to 1:00 pm and dates vary depending on the type of surgery you are having completed. You will need to purchase your bariatric supplements at this appointment cost is $140.00 to $240.00. Failure to purchase supplements or attend the class will lead to your surgery being cancelled. You will need final Medical Clearance from your Primary Care Physician. Failure to complete will lead to your surgery being cancelled. You will be scheduled for a H&P appointment with your surgeon . It is at this appointment you will need to make goal weight. Failure to complete will lead to your surgery being cancelled.     You will be scheduled for PAT at [address] usually the week before your scheduled surgery. Failure to complete will lead to your surgery being cancelled. Remember after all testing that is required it is your responsibility as a patient to call The [facility] to review that we received any testing results or requirements that you had completed. The [facility] is not responsible for tracking of results and testing. Your phone call will help facilitate if what is required was received and completed. - Nicotine Script Given 11/16/22 //  Draw Date 11/16/22 // Pt instructed to call 10 day's after to review results. - Pt is working on completing her Psych Eval. Appt scheduled with KATHERINE Marquez. Mao / Rafael at the [facility] reviewed that he / she is not at his / her pre-surgery goal weight of 330 lbs. Patient currently weighs 351 lbs and must return to the [facility] for a weight check on H&P before the patient can be scheduled for surgery. This has been reviewed with the patient and the patient is in agreement. 11/16/22 - VLCD Diet Appt with NP    Mao / Rafael reviewed with the patient that he / she must purchase a 3 month supply of supplements before his / her surgery or at the time of his / her H&P appointment and the patient states he / she is going to purchase the 3 month supply of supplements on H&P. Patient is aware that failure to purchase the supplements at this appointment will cancel the patients surgery date. Patient states at this time from the time of his / her initial consult here at the [facility] there has been no changes in his / her medical history. Patient is aware failure to disclose information can lead to his / her surgery being cancelled. Patient received a copy of this at the time of his / her final dietary consult.

## 2022-11-16 NOTE — PROGRESS NOTES
Weight Loss Assessment: Completed by Nutrition Services RD/LD Certified in Adult Weight Management:  Phone Number:  [phone]  Fax Number:   [phone]    Clemente Agustin   11/16/22  Weight Loss Appointment: 3rd Weight Loss Appointment      Wt Readings from Last 3 Encounters:   11/16/22 (!) 351 lb (159.2 kg)   10/28/22 (!) 342 lb (155.1 kg)   10/20/22 (!) 342 lb (155.1 kg)        (!) 351 lb (159.2 kg)  IBW: 159 lbs         % IBW: 221%       % EBWL: 0%           ABW: 207 lbs  % ABW: 170%       BMI: Body mass index is 56.65 kg/m². Patient's 24 Hour Recall:  Breakfast: Protein Shake  Snack: None  Lunch: 2 Slices of Meat Pizza  Snack: None  Dinner: 10 piece chicken Nugget and Small Linward Barton  Snack: None  Water Intake: 3 - 4  // 32 oz Bottles  Other Beverages: Crystal Light - 16.09 oz and Floyd  Exercise: ADL's - Pt has not been walking d/t being laid off from work    Education:   1. Weigh yourself daily and record it. 2. Keep documented food records daily   3. 220-225 minutes a week of moderate physical activity   4. Just be more active in day to day routine   5. Higher protein intake and a higher fiber intake. Not a high protein or a high fiber diet just a higher intake. Mao Hall addressed the following with the pt:  - Mao Hall enc pt to comply with nutrition recommendations  - Mao Hall enc to go back to maintenance of regular physical activity  - Periodic assessment to prevent and treat eating or other psychiatric disorders   - Mao / Rafael enc participation in support group meetings  - Mao Hall enc pt to go back to maintenance of daily food records and weight monitoring records   - Mao Hall reviewed the importance of adequate sleep and stress management  - Mao Hall reviewed nonfood strategies to cope with emotions and stress  - Mao Hall encouraged pt to practice the following: Mindful eating: Eating slowly:  Focusing on the eating experience without distraction  - Mao Hall enc. pt to pay attention to hunger and fullness cues  - Mao / Ld enc meal planning  - Rd / Ld  enc pt to chose nutrient dense whole foods instead of soft, high calorie foods  - Rd / Ld enc not drinking large amounts of fluids with or immediately after meals    Portion control, meal planning and avoiding empty calorie consumption. Weight loss goal for next follow-up appointment: 342 - 345 lbs    Patient has established the following three goals for the next follow-up appointment. 1.Pt states she wants to not eat. Pt states she wants to starve herself. Patient has established the following exercise goal for next follow-up appointment:  ADL's - Walking - 3 Flights of stairs. 6 to 7 times daily. Did the patient keep food records:Yes Pt was instructed by the Mao Hall that she / he needs to keep daily food records and bring the food records to all f/u appointments. Pt was instructed this is an important part of compliancy and long-term lifestyle changes and will help establish long-term weight loss and weight maintenance success after weight loss surgery. Mao Hall reviewed with the pt how to keep track of protein intake along with calories, fat and sugar content. Pt needs to be able to see that he / she is meeting his / her macro nutrient needs daily. Mao Hall reviewed different ways to keep foods records either manually or with computer apps. Pt was able to verbalize understanding. Failure to keep food records show poor compliancy following weight loss surgery. Pt has been given all the tools and education necessary to complete this task. Education spent with pt just on food records 20 minutes.          Pt. is aware if they do not comply with [facility] Guidelines that this can lead to the patient being dismissed from the program.    The registered dietitian spent the following time 60 minutes educating the patient and providing the patient with nutritional handouts to follow. __________________________________________________________________________________  Primary Care Physician Follow-up:  Pt. was seen by Yared Rowe RD/MIHAI regarding weight loss education and follow-up on 11/16/22. This was the patients 3rd appointment with the registered dietitian. The registered dietitian spent the following amount of time with the patient 60 minutes. Please [place] the following: The Primary Care Physician reviewed the above nutrition assessment and patient education and agrees with current diet plan. The Primary Care Physician wants the current diet plan changed to the following:_____________________________________________________________________________________________________________________________________________________________________________________________________________. Physician Signature:__________________________ Date:______________  Once signed please fax back to the [facility] [phone]. We thank you for allowing us to participate in your patients care.

## 2022-12-14 DIAGNOSIS — E78.5 HYPERLIPIDEMIA, UNSPECIFIED HYPERLIPIDEMIA TYPE: ICD-10-CM

## 2022-12-14 DIAGNOSIS — E11.8 TYPE 2 DIABETES MELLITUS WITH COMPLICATION, WITHOUT LONG-TERM CURRENT USE OF INSULIN (HCC): ICD-10-CM

## 2022-12-14 DIAGNOSIS — I10 ESSENTIAL HYPERTENSION: ICD-10-CM

## 2022-12-15 DIAGNOSIS — I10 ESSENTIAL HYPERTENSION: ICD-10-CM

## 2022-12-15 RX ORDER — HYDROCHLOROTHIAZIDE 12.5 MG/1
TABLET ORAL
Qty: 30 TABLET | Refills: 0 | Status: SHIPPED
Start: 2022-12-15 | End: 2022-12-15

## 2022-12-15 RX ORDER — LISINOPRIL 40 MG/1
TABLET ORAL
Qty: 30 TABLET | Refills: 2 | Status: SHIPPED | OUTPATIENT
Start: 2022-12-15

## 2022-12-15 RX ORDER — HYDROCHLOROTHIAZIDE 12.5 MG/1
TABLET ORAL
Qty: 30 TABLET | Refills: 0 | Status: SHIPPED | OUTPATIENT
Start: 2022-12-15

## 2022-12-15 RX ORDER — ATORVASTATIN CALCIUM 20 MG/1
20 TABLET, FILM COATED ORAL DAILY
Qty: 30 TABLET | Refills: 5 | Status: SHIPPED | OUTPATIENT
Start: 2022-12-15

## 2022-12-19 ENCOUNTER — OFFICE VISIT (OUTPATIENT)
Dept: BARIATRICS/WEIGHT MGMT | Age: 47
End: 2022-12-19
Payer: MEDICAID

## 2022-12-19 VITALS
TEMPERATURE: 97.2 F | BODY MASS INDEX: 47.09 KG/M2 | RESPIRATION RATE: 20 BRPM | HEIGHT: 66 IN | HEART RATE: 83 BPM | DIASTOLIC BLOOD PRESSURE: 110 MMHG | WEIGHT: 293 LBS | SYSTOLIC BLOOD PRESSURE: 180 MMHG

## 2022-12-19 DIAGNOSIS — Z71.3 NUTRITIONAL COUNSELING: Primary | ICD-10-CM

## 2022-12-19 DIAGNOSIS — E11.8 TYPE 2 DIABETES MELLITUS WITH COMPLICATION, WITHOUT LONG-TERM CURRENT USE OF INSULIN (HCC): ICD-10-CM

## 2022-12-19 DIAGNOSIS — E66.01 MORBID OBESITY DUE TO EXCESS CALORIES (HCC): ICD-10-CM

## 2022-12-19 DIAGNOSIS — F50.9 COMPULSIVE EATING PATTERNS: Primary | ICD-10-CM

## 2022-12-19 PROCEDURE — 99213 OFFICE O/P EST LOW 20 MIN: CPT | Performed by: NURSE PRACTITIONER

## 2022-12-19 PROCEDURE — G8427 DOCREV CUR MEDS BY ELIG CLIN: HCPCS | Performed by: NURSE PRACTITIONER

## 2022-12-19 PROCEDURE — 1036F TOBACCO NON-USER: CPT | Performed by: SOCIAL WORKER

## 2022-12-19 PROCEDURE — 90834 PSYTX W PT 45 MINUTES: CPT | Performed by: SOCIAL WORKER

## 2022-12-19 PROCEDURE — 3074F SYST BP LT 130 MM HG: CPT | Performed by: NURSE PRACTITIONER

## 2022-12-19 PROCEDURE — 99211 OFF/OP EST MAY X REQ PHY/QHP: CPT

## 2022-12-19 PROCEDURE — 3044F HG A1C LEVEL LT 7.0%: CPT | Performed by: NURSE PRACTITIONER

## 2022-12-19 PROCEDURE — 3078F DIAST BP <80 MM HG: CPT | Performed by: NURSE PRACTITIONER

## 2022-12-19 PROCEDURE — 2022F DILAT RTA XM EVC RTNOPTHY: CPT | Performed by: NURSE PRACTITIONER

## 2022-12-19 PROCEDURE — G8417 CALC BMI ABV UP PARAM F/U: HCPCS | Performed by: NURSE PRACTITIONER

## 2022-12-19 PROCEDURE — G8484 FLU IMMUNIZE NO ADMIN: HCPCS | Performed by: NURSE PRACTITIONER

## 2022-12-19 PROCEDURE — 1036F TOBACCO NON-USER: CPT | Performed by: NURSE PRACTITIONER

## 2022-12-19 NOTE — PATIENT INSTRUCTIONS
Assignments/Recommendations: Continue follow-up with SW for education further evaluation. Continue with entries in hand-outs \"Why We Eat\", \"Reality Journal\" and \"Identifying and Handling Cravings\". Attend [facility] support group. Follow up with referrals/present providers/all scheduled appointment at [facility].

## 2022-12-19 NOTE — PATIENT INSTRUCTIONS
What is the next step to proceed with weight loss surgery? Please be aware that any co-pays or deductibles may be requested prior to testing and / or procedures. You will need to schedule a psychological evaluation for weight loss surgery. Patients will be required to complete all psychological testing as required by the mental health provider. Patients must also follow all of the provider's recommendations before weight loss surgery can be scheduled. The evaluation must be done a standard way for weight loss surgery. We strongly recommend that you contact one of our preferred providers listed below to arrange this:      Jordin Partida, [facility]  [address] [place], [place]   [phone]    [facility] and [address]   [phone]    Dr. Bev Amaral, PhD    [address]. [place], [place]    [phone]      You will also need to plan on attending a 2 hour nutrition class at the [facility] prior to your surgery. We will schedule this for you when we schedule your surgery. Please remember to have your labs drawn 10 days prior to your first scheduled dietary appointment. Please remember, that while we will submit your case to insurance for surgery authorization, it is your responsibility to know if your plan covers weight loss surgery and keep up-to-date with changes to your insurance coverage. We will do everything possible to help you get approved for weight loss surgery, but cannot guarantee an approval.     Please note that you will not be submitted to your insurance company until all pre-operative testing requirements are met.

## 2022-12-19 NOTE — PROGRESS NOTES
INDIVIDUAL SESSION: EVALUATION/PSYCHOEDUCATION (2nd visit)    Nurys Nino is a 56 y.o. ,   female,   Patient identify verified by Name and . Those attending session : patient      Chief Complaint   Patient presents with    Follow-up     2nd visit follow up         DX:   Encounter Diagnosis   Name Primary? Compulsive eating patterns Yes          Wt Readings from Last 3 Encounters:   22 (!) 347 lb (157.4 kg)   22 (!) 351 lb (159.2 kg)   10/28/22 (!) 342 lb (155.1 kg)         Narrative: Gay Cogan stated that she had considered dropping out of the program after last visit as she had gained weight, due to lipidemia per her report. She is concerned that she will not be able to meet the pre surgery goal weight that she was given. She stated that after today's visit with NP she is feeling more hopeful. She stated that she is learning to listen to her body for hunger signals before she eats. She has put some structure into her day so that she is following sample weight loss diet that is provided. She stated that she did learn about her own emotional eating patterns. She identified that anger can be a trigger for over eating. She also stated that she is learning to distract her self when she is angry. She stated that she is snacking on cereal occasionally but has lost some weight since her last appointment.        Mental Status Exam: appearance:  appropriately dressed and appropriately groomed, behavior:  normal, attitude:  cooperative, speech:  appropriate, mood:  euthymic, affect:  congruent with mood, thought content:  no evidence of psychosis, thought process:  logical and coherent, orientation:  oriented in all spheres, memory:  recent:  good and remote:  good, insight:  fair , judgment:  fair , and cognitive:  intact and intelligent    RISK ASSESSMENT    Suicide screen: denies current suicidal ideation, plan and intent    Self Injurious Behavior: denies    Homicide screen: denies current homicidal ideation, plan and intent    TREATMENT PLAN:  Goal: Increase understanding of role of emotional factors contributing to issues with food and obesity and strategies to cope. Interventions in session:  Began Reviewing \"Why We Eat\", \"Reality Journal\" and \"Identifying and Handling Cravings\" and processed pt. insights. Provided psychoeducation/reinforcement  regarding the benefits of identifying patterns of emotional eating prior to having bariatric surgery,  attending support groups and practicing meal prep/planning to support new behaviors after surgery. Assignments/Recommendations: Continue follow-up with SW for education further evaluation. Continue with entries in hand-outs \"Why We Eat\", \"Reality Journal\" and \"Identifying and Handling Cravings\". Attend [address] [place] [address] support group. Follow up with referrals/present providers/all scheduled appointment at [address] [place] [address]. Next steps: Schedule follow up with me for  60MIN in 8 weeks and Continue with dietitian and bariatric support group    Bariatric Surgery: Based on the information gathered through the interview process - there is no current evidence of mental health or substance abuse issues that would impact on the patient receiving bariatric surgery.     Patient and/or family/guardian verbalizes understanding of and agreement with treatment recommendations and plan: yes    Start time: 1:10 pm         End time: 1:57 pm     Visit Time:  [address] [place] [address]

## 2022-12-19 NOTE — PROGRESS NOTES
Eliana Fang  12/19/2022  Bariatric Weight loss appointment for Obesity  Nutrition Education Session      Subjective:   Masood Sanford is a 56 y.o. female here for pre-surgical dietary education today and weight check. Patient is accompanied by herself at today's visit. She reports that she has been doing 2 protein shakes per day as well as a lean cuisine at dinner. She has increased her fluids to 64 ounces per day. She tells me that she has lymphedema that causes her to retain fluid and weight. Weight=(!) 347 lb (157.4 kg)  Today's weight represents a total weight loss to date of +2 pounds. Today we will discuss the topics of having weight loss surgery. 8/4/2022     345 lb  12/19/2022 347 lb    Prior to Admission medications    Medication Sig Start Date End Date Taking? Authorizing Provider   atorvastatin (LIPITOR) 20 MG tablet TAKE 1 TABLET BY MOUTH DAILY 12/15/22  Yes Raul Mcmullen MD   lisinopril (PRINIVIL;ZESTRIL) 40 MG tablet TAKE ONE TABLET BY MOUTH EVERY DAY 12/15/22  Yes Raul Mcmullen MD   hydroCHLOROthiazide (HYDRODIURIL) 12.5 MG tablet TAKE ONE TABLET BY MOUTH EVERY DAY 12/15/22  Yes Raul Mcmullen MD   Dulaglutide (TRULICITY) 3 AP/5.2KT SOPN Inject 3 mg into the skin once a week 10/28/22  Yes GEOVANI Weems - NP   insulin lispro (HUMALOG) 100 UNIT/ML SOLN injection vial USE VIA INSULIN PUMP.  MAX 85 UNITS DAILY 10/7/22  Yes Ramon Houston MD   Accu-Chek FastClix Lancets MISC Use to check blood glucose 4x daily 5/16/22  Yes Emerita Payan MD   Accu-Chek Softclix Lancets MISC Check blood sugar 4x daily 5/12/22  Yes Emerita Payan MD   blood glucose test strips (ACCU-CHEK GUIDE) strip Use to check blood sugars 4x daily 5/12/22  Yes Emerita Payan MD   dilTIAZem (CARDIZEM CD) 360 MG extended release capsule Take 1 capsule by mouth daily 5/3/22  Yes Raul Mcmullen MD   hydrALAZINE (APRESOLINE) 100 MG tablet Take 1 tablet by mouth every 8 hours 5/3/22  Yes Evelia Escudero Kumar Dinero MD   Insulin Infusion Pump (MINIMED 770G INSULIN PUMP SYS) KIT To infuse insulin 4/20/22  Yes GEOVANI Weems NP   Continuous Blood Gluc Sensor (GUARDIAN SENSOR 3) MISC To change every 7 days 4/20/22  Yes GEOVANI Weems NP   Continuous Blood Gluc Transmit (GUARDIAN LINK 3 TRANSMITTER) MISC To use with sensors 4/20/22  Yes GEOVANI Weems NP   hydrocortisone (ANUSOL-HC) 2.5 % CREA rectal cream Apply to affected area twice daily for rectal pain from external hemorrhoid.  4/16/22  Yes Maddie Palacios,    dicyclomine (BENTYL) 10 MG capsule Take 1 capsule by mouth 4 times daily 4/15/22  Yes Sue Waldrop MD   aspirin EC 81 MG EC tablet Take 1 tablet by mouth daily 4/12/22  Yes Denton Perea MD   Insulin Pen Needle (BD PEN NEEDLE MICRO U/F) 32G X 6 MM MISC Uses with insulin 4 times a day 4/12/22  Yes Denton Perea MD   Insulin Pen Needle (BD PEN NEEDLE MICRO U/F) 32G X 6 MM MISC Uses with insulin 4 times a day 4/12/22  Yes Denton Perea MD   glucose monitoring (FREESTYLE FREEDOM) kit 1 kit by Does not apply route daily 4/12/22  Yes Denton Perea MD   mupirocin (BACTROBAN) 2 % ointment Apply topically 3 times daily as needed (apply to both hands and under breasts)   Yes Historical Provider, MD   triamcinolone (ARISTOCORT) 0.5 % cream Apply topically 3 times daily as needed (apply to both hands and under breasts)   Yes Historical Provider, MD   albuterol sulfate HFA (VENTOLIN HFA) 108 (90 Base) MCG/ACT inhaler Inhale 2 puffs into the lungs 4 times daily as needed for Wheezing 3/29/22  Yes Ray Butts, DO   ondansetron (ZOFRAN-ODT) 4 MG disintegrating tablet Take 1 tablet by mouth 3 times daily as needed for Nausea or Vomiting 2/17/21  Yes CRYSTAL Campa   pantoprazole (PROTONIX) 40 MG tablet Take 1 tablet by mouth daily for 10 days 7/10/21 7/10/21  Talat Farfan, DO         Allergies   Allergen Reactions    Percocet [Oxycodone-Acetaminophen] Itching    Food Diarrhea Food Intolerance - Dairy - Diarrhea    Metformin And Related Other (See Comments)     GI side effects     Tape [Adhesive Tape] Itching     Past Medical History:   Diagnosis Date    Acute renal injury ([facility] [address] 2013    Analgesic overuse headache 2018    Anxiety     Arthritis     Asthma     CAD (coronary artery disease)     Depression 2013    Diabetes mellitus ([facility] [address]     A1C=6.7 on 14    Fracture of tooth 2018    GERD (gastroesophageal reflux disease)     Glaucoma     Hyperlipidemia 2021    Hypertension     Hypertensive urgency 2013    Hypothyroidism     Irritable bowel syndrome     Morbid obesity due to excess calories ([facility] [address]     Obesity     Obstructive sleep apnea     Pneumonia     Polycystic ovarian syndrome     Postcholecystectomy syndrome 2018    Spinal headache     Suicidal ideation 2016       Past Surgical History:   Procedure Laterality Date     SECTION      Dr. Dale Bergeron, [address] [facility], LAPAROSCOPIC      [address]  10/06/2011    4 extractions    ECHO COMPLETE  2014         ENDOSCOPY, COLON, DIAGNOSTIC      FOOT SURGERY      RECONSTRUCTION LEFT FOOT, Dr. Kamille Meyer, [address]  9/3/13    incisional hernia repair with mesh    HYSTERECTOMY (CERVIX STATUS UNKNOWN)      KNEE CARTILAGE SURGERY      OVARY REMOVAL      left due to polycystic ovary, Dr. Meng Eugene, [facility]    BRYANT AND BSO (CERVIX REMOVED)  3/19/2013    Attempted Lap-Open BRYANT;RSO, Dr. Tamir Read, [facility] N/A 2022    EGD BIOPSY performed by Dash Hilario MD at [place] [address]       Current Outpatient Medications   Medication Sig Dispense Refill    atorvastatin (LIPITOR) 20 MG tablet TAKE 1 TABLET BY MOUTH DAILY 30 tablet 5    lisinopril (PRINIVIL;ZESTRIL) 40 MG tablet TAKE ONE TABLET BY MOUTH EVERY DAY 30 tablet 2    hydroCHLOROthiazide (HYDRODIURIL) 12.5 MG tablet TAKE ONE TABLET BY MOUTH EVERY DAY 30 tablet 0    Dulaglutide (TRULICITY) 3 CE/2.6AQ SOPN Inject 3 mg into the skin once a week 2 mL 5    insulin lispro (HUMALOG) 100 UNIT/ML SOLN injection vial USE VIA INSULIN PUMP. MAX 85 UNITS DAILY 30 mL 5    Accu-Chek FastClix Lancets MISC Use to check blood glucose 4x daily 200 each 5    Accu-Chek Softclix Lancets MISC Check blood sugar 4x daily 200 each 6    blood glucose test strips (ACCU-CHEK GUIDE) strip Use to check blood sugars 4x daily 200 each 6    dilTIAZem (CARDIZEM CD) 360 MG extended release capsule Take 1 capsule by mouth daily 30 capsule 0    hydrALAZINE (APRESOLINE) 100 MG tablet Take 1 tablet by mouth every 8 hours 90 tablet 0    Insulin Infusion Pump (MINIMED 770G INSULIN PUMP SYS) KIT To infuse insulin 1 kit 0    Continuous Blood Gluc Sensor (GUARDIAN SENSOR 3) MISC To change every 7 days 12 each 3    Continuous Blood Gluc Transmit ([employer] LINK 3 TRANSMITTER) MISC To use with sensors 1 each 0    hydrocortisone (ANUSOL-HC) 2.5 % CREA rectal cream Apply to affected area twice daily for rectal pain from external hemorrhoid.  28 g 0    dicyclomine (BENTYL) 10 MG capsule Take 1 capsule by mouth 4 times daily 120 capsule 0    aspirin EC 81 MG EC tablet Take 1 tablet by mouth daily 90 tablet 1    Insulin Pen Needle (BD PEN NEEDLE MICRO U/F) 32G X 6 MM MISC Uses with insulin 4 times a day 250 each 5    Insulin Pen Needle (BD PEN NEEDLE MICRO U/F) 32G X 6 MM MISC Uses with insulin 4 times a day 250 each 5    glucose monitoring (FREESTYLE FREEDOM) kit 1 kit by Does not apply route daily 1 kit 0    mupirocin (BACTROBAN) 2 % ointment Apply topically 3 times daily as needed (apply to both hands and under breasts)      triamcinolone (ARISTOCORT) 0.5 % cream Apply topically 3 times daily as needed (apply to both hands and under breasts)      albuterol sulfate HFA (VENTOLIN HFA) 108 (90 Base) MCG/ACT inhaler Inhale 2 puffs into the lungs 4 times daily as needed for Wheezing 18 g 0    ondansetron (ZOFRAN-ODT) 4 MG disintegrating tablet Take 1 tablet by mouth 3 times daily as needed for Nausea or Vomiting 21 tablet 0     Current Facility-Administered Medications   Medication Dose Route Frequency Provider Last Rate Last Admin    regadenoson (LEXISCAN) injection 0.4 mg  0.4 mg IntraVENous ONCE PRN Barrett Russell MD           Allergies   Allergen Reactions    Percocet [Oxycodone-Acetaminophen] Itching    Food Diarrhea     Food Intolerance - Dairy - Diarrhea    Metformin And Related Other (See Comments)     GI side effects     Tape [Adhesive Tape] Itching       Family History   Problem Relation Age of Onset    Asthma Mother     Diabetes Mother     High Blood Pressure Mother     High Blood Pressure Father     Heart Disease Father     Cancer Father     Depression Father     Diabetes Brother     Asthma Brother     Thyroid Disease Sister     Asthma Sister     Depression Maternal Grandmother     High Blood Pressure Maternal Grandmother     Mental Illness Maternal Grandmother     Thyroid Disease Maternal Grandmother     Heart Disease Maternal Grandfather     Depression Paternal Grandmother     High Blood Pressure Paternal Grandmother     Cancer Paternal Grandfather        Social History     Socioeconomic History    Marital status:       Spouse name: Not on file    Number of children: 1    Years of education: 12    Highest education level: Not on file   Occupational History    Not on file   Tobacco Use    Smoking status: Never    Smokeless tobacco: Never   Vaping Use    Vaping Use: Never used   Substance and Sexual Activity    Alcohol use: No     Comment: no alcohol since age 26;  drinks 2-3 glasses Coke/Pepsi daily & 2-3 glasses of iced tea daily     Drug use: Never    Sexual activity: Not Currently     Partners: Male   Other Topics Concern    Not on file   Social History Narrative    Not on file     Social Determinants of Health     Financial Resource Strain: Not on file   Food Insecurity: Not on file   Transportation Needs: Not on file   Physical Activity: Not on file   Stress: Not on file   Social Connections: Not on file   Intimate Partner Violence: Not on file   Housing Stability: Not on file           A complete 10 system review was performed and are otherwise negative unless mentioned in the above HPI. Specific negatives are listed below but may not include all those reviewed. General ROS: negative obtundation, AMS  ENT ROS: negative rhinorrhea, epistaxis  Allergy and Immunology ROS: negative itchy/watery eyes or nasal congestion  Hematological and Lymphatic ROS: negative spontaneous bleeding or bruising  Endocrine ROS: negative  lethargy, mood swings, palpitations or polydipsia/polyuria  Respiratory ROS: negative sputum changes, stridor, tachypnea or wheezing  Cardiovascular ROS: negative for - loss of consciousness, murmur or orthopnea  Gastrointestinal ROS: negative for - hematochezia or hematemesis  Genito-Urinary ROS: negative for -  genital discharge or hematuria  Musculoskeletal ROS: negative for - focal weakness, gangrene  Psych/Neuro ROS: negative for - visual or auditory hallucinations, suicidal ideation    Physical exam:   BP (!) 180/110 (Site: Left Upper Arm)   Pulse 83   Temp 97.2 °F (36.2 °C) (Temporal)   Resp 20   Ht 5' 6\" (1.676 m)   Wt (!) 347 lb (157.4 kg)   LMP 10/01/2012 (Approximate) Comment: 2013  BMI 56.01 kg/m²   General appearance: AAO, NAD  Eyes: PERRL, EOMI, red conjunctiva  Lungs: Equal chest rise bilateral  Chest wall: atraumatic, no tenderness, no echymosis or abrasions  Heart: reg rate, no murmur  Abdomen: soft, nondistended, tender minimal, no hernias, no peritioneal signs  Extremities: full ROM all 4 ext, no gross motor or sensory deficits  Pulses: 2+ distal  Skin: warm and dry  Neurologic: spontanous eye opening, purposeful, follows complex commands  Psych: No tremor, no suicidal ideation, no hallucinations      Assessment:   1.  Nutritional counseling  Patient is insistent that she has been doing the [employer] for the past month. Sometimes she goes to bed hungry. She reports that the more water she drinks the more she retains due to her lymphedema. She reports that her son is ready to take out a loan so that she can go to [facility] and have weight loss surgery. She reports that she is ready to give up on the process at this office. Informed patient that she needs to finish with her psych clearance and we will discuss with case with Dr. Kiki German. 2. Morbid obesity due to excess calories ([address]  See below       Plan:   Continue to keep food diet, bring to next appointment with Nurse Practitioner or RD/LD    Continue to read food labels and make smart food choices    Increase physical activity at home    I have spent 20 minutes educating patient on nutrition. All questions were answered to patients and family's satisfaction. They verbalize the importance of pre surgical weight loss at this time.      Provider Signature: Electronically signed by GEOVANI Wagner CNP

## 2022-12-22 ENCOUNTER — HOSPITAL ENCOUNTER (OUTPATIENT)
Age: 47
Discharge: HOME | End: 2022-12-22
Payer: MEDICAID

## 2022-12-22 DIAGNOSIS — Z02.6 ENCOUNTER FOR EXAMINATION FOR INSURANCE PURPOSES: ICD-10-CM

## 2022-12-22 DIAGNOSIS — Z78.9 NONSMOKER: ICD-10-CM

## 2022-12-22 DIAGNOSIS — I10 ESSENTIAL HYPERTENSION: ICD-10-CM

## 2022-12-22 DIAGNOSIS — Z13.89 SCREENING FOR SUBSTANCE ABUSE: ICD-10-CM

## 2022-12-22 LAB
ANION GAP SERPL CALCULATED.3IONS-SCNC: 9 MMOL/L (ref 7–16)
BUN BLDV-MCNC: 11 MG/DL (ref 6–20)
CALCIUM SERPL-MCNC: 9.2 MG/DL (ref 8.6–10.2)
CHLORIDE BLD-SCNC: 101 MMOL/L (ref 98–107)
CO2: 28 MMOL/L (ref 22–29)
CREAT SERPL-MCNC: 0.8 MG/DL (ref 0.5–1)
GFR SERPL CREATININE-BSD FRML MDRD: >60 ML/MIN/1.73
GLUCOSE BLD-MCNC: 98 MG/DL (ref 74–99)
POTASSIUM SERPL-SCNC: 4.1 MMOL/L (ref 3.5–5)
SODIUM BLD-SCNC: 138 MMOL/L (ref 132–146)

## 2022-12-22 PROCEDURE — 36415 COLL VENOUS BLD VENIPUNCTURE: CPT

## 2022-12-22 PROCEDURE — G0480 DRUG TEST DEF 1-7 CLASSES: HCPCS

## 2022-12-22 PROCEDURE — 80048 BASIC METABOLIC PNL TOTAL CA: CPT

## 2023-01-09 DIAGNOSIS — E11.65 UNCONTROLLED TYPE 2 DIABETES MELLITUS WITH HYPERGLYCEMIA (HCC): ICD-10-CM

## 2023-01-09 RX ORDER — LANCETS
EACH MISCELLANEOUS
Qty: 204 EACH | Refills: 5 | Status: SHIPPED | OUTPATIENT
Start: 2023-01-09

## 2023-01-13 DIAGNOSIS — I10 ESSENTIAL HYPERTENSION: ICD-10-CM

## 2023-01-15 RX ORDER — ASPIRIN 81 MG/1
TABLET, COATED ORAL
Qty: 30 TABLET | Refills: 5 | Status: SHIPPED | OUTPATIENT
Start: 2023-01-15

## 2023-01-15 RX ORDER — HYDROCHLOROTHIAZIDE 12.5 MG/1
TABLET ORAL
Qty: 30 TABLET | Refills: 0 | Status: SHIPPED | OUTPATIENT
Start: 2023-01-15

## 2023-01-17 ENCOUNTER — TELEPHONE (OUTPATIENT)
Dept: FAMILY MEDICINE CLINIC | Age: 48
End: 2023-01-17

## 2023-01-17 NOTE — TELEPHONE ENCOUNTER
----- Message from Danica Cuba LPN sent at 5/24/0106  8:48 AM EST -----  Subject: Message to Provider    QUESTIONS  Information for Provider? Patient was calling to check on paper work from   [address] [place] [address] weight loss [place] to see if it was completed and sent back with   signature please call and let her know the status   ---------------------------------------------------------------------------  --------------  [id number] [facility]  [phone]; OK to leave message on voicemail  ---------------------------------------------------------------------------  --------------  SCRIPT ANSWERS  Relationship to Patient?  Self

## 2023-01-23 ENCOUNTER — TELEPHONE (OUTPATIENT)
Dept: BARIATRICS/WEIGHT MGMT | Age: 48
End: 2023-01-23

## 2023-01-23 NOTE — TELEPHONE ENCOUNTER
ERICKA called PT regarding MH clearance from Dr Mendel Emery and informed her that the paperwork was faxed back but not completed for the second time. Informed PT that this clearance will be needed before going forward and asked her contact Dr Deja Mcdermott office and ask that it back completed and fax to me prior to our next appointment.   Eliana stated that she would call Dr. Deja Mcdermott office and make the request.        LIZZ Ross

## 2023-01-26 ENCOUNTER — E-VISIT (OUTPATIENT)
Dept: PRIMARY CARE CLINIC | Age: 48
End: 2023-01-26
Payer: MEDICAID

## 2023-01-26 DIAGNOSIS — B37.31 YEAST VAGINITIS: Primary | ICD-10-CM

## 2023-01-26 PROCEDURE — 99421 OL DIG E/M SVC 5-10 MIN: CPT | Performed by: NURSE PRACTITIONER

## 2023-01-27 RX ORDER — FLUCONAZOLE 150 MG/1
150 TABLET ORAL ONCE
Qty: 1 TABLET | Refills: 0 | Status: SHIPPED | OUTPATIENT
Start: 2023-01-27 | End: 2023-01-27

## 2023-01-27 NOTE — PROGRESS NOTES
Leda Herndon (1975) initiated an asynchronous digital communication through [employer]. HPI: per patient questionnaire     Exam: not applicable    Diagnoses and all orders for this visit:  Diagnoses and all orders for this visit:    Yeast vaginitis  New, diflucan has wokred in past, rx sent. Advised f/u with PCP or GYN for new or persistent symptoms     -     fluconazole (DIFLUCAN) 150 MG tablet; Take 1 tablet by mouth once for 1 dose    Time: EV1 - 5-10 minutes were spent on the digital evaluation and management of this patient.     GEOVANI Tinajero - CNP

## 2023-02-10 ENCOUNTER — APPOINTMENT (OUTPATIENT)
Dept: CT IMAGING | Age: 48
End: 2023-02-10
Payer: MEDICAID

## 2023-02-10 ENCOUNTER — APPOINTMENT (OUTPATIENT)
Dept: GENERAL RADIOLOGY | Age: 48
End: 2023-02-10
Payer: MEDICAID

## 2023-02-10 ENCOUNTER — HOSPITAL ENCOUNTER (EMERGENCY)
Age: 48
Discharge: HOME | End: 2023-02-11
Attending: EMERGENCY MEDICINE
Payer: MEDICAID

## 2023-02-10 VITALS
DIASTOLIC BLOOD PRESSURE: 99 MMHG | WEIGHT: 293 LBS | HEART RATE: 90 BPM | HEIGHT: 66 IN | BODY MASS INDEX: 47.09 KG/M2 | RESPIRATION RATE: 17 BRPM | SYSTOLIC BLOOD PRESSURE: 196 MMHG | TEMPERATURE: 97.4 F | OXYGEN SATURATION: 96 %

## 2023-02-10 DIAGNOSIS — R06.02 SHORTNESS OF BREATH: Primary | ICD-10-CM

## 2023-02-10 DIAGNOSIS — J40 BRONCHITIS: ICD-10-CM

## 2023-02-10 LAB
ALBUMIN SERPL-MCNC: 3.7 G/DL (ref 3.5–5.2)
ALP BLD-CCNC: 98 U/L (ref 35–104)
ALT SERPL-CCNC: 30 U/L (ref 0–32)
ANION GAP SERPL CALCULATED.3IONS-SCNC: 9 MMOL/L (ref 7–16)
AST SERPL-CCNC: 22 U/L (ref 0–31)
BASOPHILS ABSOLUTE: 0.04 E9/L (ref 0–0.2)
BASOPHILS RELATIVE PERCENT: 0.5 % (ref 0–2)
BILIRUB SERPL-MCNC: 0.6 MG/DL (ref 0–1.2)
BUN BLDV-MCNC: 7 MG/DL (ref 6–20)
CALCIUM SERPL-MCNC: 8.7 MG/DL (ref 8.6–10.2)
CHLORIDE BLD-SCNC: 101 MMOL/L (ref 98–107)
CO2: 30 MMOL/L (ref 22–29)
CREAT SERPL-MCNC: 0.8 MG/DL (ref 0.5–1)
D DIMER: 299 NG/ML DDU
EKG ATRIAL RATE: 81 BPM
EKG P AXIS: 56 DEGREES
EKG P-R INTERVAL: 168 MS
EKG Q-T INTERVAL: 446 MS
EKG QRS DURATION: 94 MS
EKG QTC CALCULATION (BAZETT): 518 MS
EKG R AXIS: -9 DEGREES
EKG T AXIS: 83 DEGREES
EKG VENTRICULAR RATE: 81 BPM
EOSINOPHILS ABSOLUTE: 0.39 E9/L (ref 0.05–0.5)
EOSINOPHILS RELATIVE PERCENT: 5 % (ref 0–6)
GFR SERPL CREATININE-BSD FRML MDRD: >60 ML/MIN/1.73
GLUCOSE BLD-MCNC: 137 MG/DL (ref 74–99)
HCT VFR BLD CALC: 41.1 % (ref 34–48)
HEMOGLOBIN: 14 G/DL (ref 11.5–15.5)
IMMATURE GRANULOCYTES #: 0.04 E9/L
IMMATURE GRANULOCYTES %: 0.5 % (ref 0–5)
INFLUENZA A BY PCR: NOT DETECTED
INFLUENZA B BY PCR: NOT DETECTED
LYMPHOCYTES ABSOLUTE: 2.26 E9/L (ref 1.5–4)
LYMPHOCYTES RELATIVE PERCENT: 29 % (ref 20–42)
MAGNESIUM: 1.7 MG/DL (ref 1.6–2.6)
MCH RBC QN AUTO: 29.8 PG (ref 26–35)
MCHC RBC AUTO-ENTMCNC: 34.1 % (ref 32–34.5)
MCV RBC AUTO: 87.4 FL (ref 80–99.9)
METER GLUCOSE: 105 MG/DL (ref 74–99)
MONOCYTES ABSOLUTE: 0.54 E9/L (ref 0.1–0.95)
MONOCYTES RELATIVE PERCENT: 6.9 % (ref 2–12)
NEUTROPHILS ABSOLUTE: 4.53 E9/L (ref 1.8–7.3)
NEUTROPHILS RELATIVE PERCENT: 58.1 % (ref 43–80)
PDW BLD-RTO: 12.8 FL (ref 11.5–15)
PLATELET # BLD: 185 E9/L (ref 130–450)
PMV BLD AUTO: 11.1 FL (ref 7–12)
POTASSIUM REFLEX MAGNESIUM: 3.3 MMOL/L (ref 3.5–5)
PRO-BNP: 30 PG/ML (ref 0–125)
RBC # BLD: 4.7 E12/L (ref 3.5–5.5)
SARS-COV-2, NAAT: NOT DETECTED
SODIUM BLD-SCNC: 140 MMOL/L (ref 132–146)
TOTAL PROTEIN: 6.4 G/DL (ref 6.4–8.3)
TROPONIN, HIGH SENSITIVITY: 14 NG/L (ref 0–9)
TROPONIN, HIGH SENSITIVITY: 15 NG/L (ref 0–9)
WBC # BLD: 7.8 E9/L (ref 4.5–11.5)

## 2023-02-10 PROCEDURE — 87502 INFLUENZA DNA AMP PROBE: CPT

## 2023-02-10 PROCEDURE — 80053 COMPREHEN METABOLIC PANEL: CPT

## 2023-02-10 PROCEDURE — 83735 ASSAY OF MAGNESIUM: CPT

## 2023-02-10 PROCEDURE — 93005 ELECTROCARDIOGRAM TRACING: CPT | Performed by: NURSE PRACTITIONER

## 2023-02-10 PROCEDURE — 99285 EMERGENCY DEPT VISIT HI MDM: CPT

## 2023-02-10 PROCEDURE — 6370000000 HC RX 637 (ALT 250 FOR IP): Performed by: STUDENT IN AN ORGANIZED HEALTH CARE EDUCATION/TRAINING PROGRAM

## 2023-02-10 PROCEDURE — 71046 X-RAY EXAM CHEST 2 VIEWS: CPT

## 2023-02-10 PROCEDURE — 84484 ASSAY OF TROPONIN QUANT: CPT

## 2023-02-10 PROCEDURE — 82962 GLUCOSE BLOOD TEST: CPT

## 2023-02-10 PROCEDURE — 87635 SARS-COV-2 COVID-19 AMP PRB: CPT

## 2023-02-10 PROCEDURE — 85025 COMPLETE CBC W/AUTO DIFF WBC: CPT

## 2023-02-10 PROCEDURE — 70450 CT HEAD/BRAIN W/O DYE: CPT

## 2023-02-10 PROCEDURE — 6360000004 HC RX CONTRAST MEDICATION: Performed by: RADIOLOGY

## 2023-02-10 PROCEDURE — 83880 ASSAY OF NATRIURETIC PEPTIDE: CPT

## 2023-02-10 PROCEDURE — 71275 CT ANGIOGRAPHY CHEST: CPT

## 2023-02-10 PROCEDURE — 85378 FIBRIN DEGRADE SEMIQUANT: CPT

## 2023-02-10 RX ORDER — POTASSIUM CHLORIDE 20 MEQ/1
40 TABLET, EXTENDED RELEASE ORAL ONCE
Status: COMPLETED | OUTPATIENT
Start: 2023-02-10 | End: 2023-02-10

## 2023-02-10 RX ORDER — IPRATROPIUM BROMIDE AND ALBUTEROL SULFATE 2.5; .5 MG/3ML; MG/3ML
1 SOLUTION RESPIRATORY (INHALATION)
Status: COMPLETED | OUTPATIENT
Start: 2023-02-11 | End: 2023-02-11

## 2023-02-10 RX ORDER — PREDNISONE 20 MG/1
60 TABLET ORAL ONCE
Status: COMPLETED | OUTPATIENT
Start: 2023-02-11 | End: 2023-02-11

## 2023-02-10 RX ADMIN — POTASSIUM CHLORIDE 40 MEQ: 20 TABLET, EXTENDED RELEASE ORAL at 19:54

## 2023-02-10 RX ADMIN — IOPAMIDOL 75 ML: 755 INJECTION, SOLUTION INTRAVENOUS at 22:30

## 2023-02-10 ASSESSMENT — PAIN DESCRIPTION - LOCATION: LOCATION: HEAD

## 2023-02-10 ASSESSMENT — PAIN DESCRIPTION - ORIENTATION: ORIENTATION: MID

## 2023-02-10 ASSESSMENT — PAIN DESCRIPTION - DESCRIPTORS: DESCRIPTORS: ACHING

## 2023-02-10 ASSESSMENT — LIFESTYLE VARIABLES
HOW OFTEN DO YOU HAVE A DRINK CONTAINING ALCOHOL: NEVER
HOW MANY STANDARD DRINKS CONTAINING ALCOHOL DO YOU HAVE ON A TYPICAL DAY: PATIENT DOES NOT DRINK

## 2023-02-10 ASSESSMENT — PAIN SCALES - GENERAL: PAINLEVEL_OUTOF10: 6

## 2023-02-10 ASSESSMENT — PAIN DESCRIPTION - PAIN TYPE: TYPE: ACUTE PAIN

## 2023-02-10 ASSESSMENT — PAIN - FUNCTIONAL ASSESSMENT: PAIN_FUNCTIONAL_ASSESSMENT: 0-10

## 2023-02-10 NOTE — ED NOTES
Department of Emergency Medicine  FIRST PROVIDER TRIAGE NOTE             Independent MLP           2/10/23  4:26 PM EST    Date of Encounter: 2/10/23   MRN: [id number]      HPI: Cherre Bosworth is a 53 y.o. female who presents to the ED for No chief complaint on file. Difficulty breathing. Has used albuterol x3 today without relief. Stated her glucose is high at 142. Stated this is high for her. SOB x1 day. Exposed to [employer] at home. Tested herself yesterday which was negative. ROS: Negative for cp, abd pain, back pain, vomiting, or diarrhea. PE: Gen Appearance/Constitutional: alert  HEENT: NC/NT. PERRLA,  Airway patent. Initial Plan of Care: All treatment areas with department are currently occupied. Plan to order/Initiate the following while awaiting opening in ED: labs, EKG, imaging studies, and COVID-19 testing.   Initiate Treatment-Testing, Proceed toTreatment Area When Bed Available for ED Attending/MLP to Continue Care    Electronically signed by GEOVANI Kasper CNP   DD: 2/10/23      GEOVANI Kasper CNP  02/10/23 [id number]

## 2023-02-11 PROCEDURE — 6370000000 HC RX 637 (ALT 250 FOR IP): Performed by: EMERGENCY MEDICINE

## 2023-02-11 PROCEDURE — 94640 AIRWAY INHALATION TREATMENT: CPT

## 2023-02-11 PROCEDURE — 94664 DEMO&/EVAL PT USE INHALER: CPT

## 2023-02-11 RX ORDER — PREDNISONE 20 MG/1
50 TABLET ORAL DAILY
Qty: 13 TABLET | Refills: 0 | Status: SHIPPED | OUTPATIENT
Start: 2023-02-11 | End: 2023-02-16

## 2023-02-11 RX ADMIN — IPRATROPIUM BROMIDE AND ALBUTEROL SULFATE 1 AMPULE: .5; 2.5 SOLUTION RESPIRATORY (INHALATION) at 00:01

## 2023-02-11 RX ADMIN — PREDNISONE 60 MG: 20 TABLET ORAL at 01:07

## 2023-02-11 RX ADMIN — IPRATROPIUM BROMIDE AND ALBUTEROL SULFATE 1 AMPULE: .5; 2.5 SOLUTION RESPIRATORY (INHALATION) at 00:02

## 2023-02-11 RX ADMIN — IPRATROPIUM BROMIDE AND ALBUTEROL SULFATE 1 AMPULE: .5; 2.5 SOLUTION RESPIRATORY (INHALATION) at 00:00

## 2023-02-11 NOTE — ED NOTES
Pt medicated as ordered. D-Dimer drawn and sent to lab. Denies current needs. Call light in reach.      Justine Oakley RN  02/10/23 1955

## 2023-02-11 NOTE — ED PROVIDER NOTES
[address]        Pt Name: Lynda Kocher  MRN: [id number]  [place] 1975  Date of evaluation: 2/10/2023  Provider: Sony Cobb DO  PCP: Margoth Partida MD  Note Started: 7:23 PM EST 2/10/23    CHIEF COMPLAINT       Chief Complaint   Patient presents with    Shortness of Breath     SOB for one day. Took albuterol inhaler x3 at home without success. Pt c/o congestion and cough. HISTORY OF PRESENT ILLNESS: 1 or more Elements   History From: Patient    Limitations to history : None    Eliana Godinez is a 53 y.o. female with past medical history of morbid obesity, ALO, diabetes, hypothyroidism, asthma, hypertension, PCOS, irritable bowel syndrome and hyperlipidemia who presents to the emergency department for evaluation of worsening shortness of breath. Patient states that her symptoms have been ongoing for 2 days. She is also endorsing viral symptoms including cough, congestion, sore throat and chills. Patient states that she has not been feeling well over these past 2 days. Shortness of breath is somewhat worse with exertion. She is also very concerned because of her blood sugar. She states that she generally does not run high at home. Blood sugar is 121 on her monitor currently. She is otherwise denying any other symptoms including nausea, vomiting, fever, headache, lightheadedness, dizziness, syncope, numbness, tingling, abdominal pain, urinary symptoms, constipation or diarrhea. Initial evaluation, patient is well-appearing in no acute distress. No signs of acute respiratory distress noted including increased work of breathing, nasal flaring, grunting or accessory muscle use. Patient appears to be breathing comfortably on room air with appropriate bedside SpO2. No recent sick contacts noted.     Nursing Notes were all reviewed and agreed with or any disagreements were addressed in the HPI.    ROS:   Pertinent positives and negatives are stated within HPI, all other systems reviewed and are negative.    --------------------------------------------- PAST HISTORY ---------------------------------------------  Past Medical History:  has a past medical history of Acute renal injury ([facility] [address] Analgesic overuse headache, Anxiety, Arthritis, Asthma, CAD (coronary artery disease), Depression, Diabetes mellitus ([facility] [address] Fracture of tooth, GERD (gastroesophageal reflux disease), Glaucoma, Hyperlipidemia, Hypertension, Hypertensive urgency, Hypothyroidism, Irritable bowel syndrome, Morbid obesity due to excess calories [facility] Obesity, Obstructive sleep apnea, Pneumonia, Polycystic ovarian syndrome, Postcholecystectomy syndrome, Spinal headache, and Suicidal ideation. Past Surgical History:  has a past surgical history that includes Tonsillectomy (); Cholecystectomy, laparoscopic ();  section (); Foot surgery (); Ovary removal (); Dental surgery (10/06/2011); Total abdominal hysterectomy w/ bilateral salpingoophorectomy (3/19/2013); hernia repair (9/3/13); Hysterectomy; Echo Complete (2014); Knee cartilage surgery; Endoscopy, colon, diagnostic; Colonoscopy; and Upper gastrointestinal endoscopy (N/A, 2022). Social History:  reports that she has never smoked. She has never used smokeless tobacco. She reports that she does not drink alcohol and does not use drugs. Family History: family history includes Asthma in her brother, mother, and sister; Cancer in her father and paternal grandfather; Depression in her father, maternal grandmother, and paternal grandmother; Diabetes in her brother and mother; Heart Disease in her father and maternal grandfather; High Blood Pressure in her father, maternal grandmother, mother, and paternal grandmother; Mental Illness in her maternal grandmother; Thyroid Disease in her maternal grandmother and sister.      The patients home medications have been reviewed. Allergies: Percocet [oxycodone-acetaminophen], Food, Metformin and related, and Tape [adhesive tape]    ---------------------------------------------------PHYSICAL EXAM--------------------------------------    Constitutional/General: Alert and oriented x3, well appearing, non toxic in NAD, obesity. Head: Normocephalic and atraumatic  Mouth: Oropharynx clear, handling secretions, no trismus  Neck: Supple, full ROM,  Pulmonary: Globally diminished breath sounds, likely worsened by body habitus and poor inspiratory effort. Lungs otherwise clear to auscultation bilaterally, no wheezes, rales, or rhonchi. Not in respiratory distress  Cardiovascular:  Regular rate. Regular rhythm. No murmurs  Chest: no chest wall tenderness  Abdomen: Soft. Mildly tense. Non tender. Non distended. No rebound, guarding, or rigidity. No pulsatile masses appreciated. Musculoskeletal: Moves all extremities x 4. Warm and well perfused, no clubbing, cyanosis, or edema. Capillary refill <3 seconds. Bilateral lower extremity swelling with trace to 1+ pitting edema. Chronic and probably worsened by body habitus. Skin: warm and dry. No rashes. Neurologic: GCS 15, no gross focal neurologic deficits  Psych: Normal Affect    -------------------------------------------------- RESULTS -------------------------------------------------  I have personally reviewed all laboratory and imaging results for this patient. Results are listed below.      LABS:  Results for orders placed or performed during the hospital encounter of 02/10/23   COVID-19, Rapid    Specimen: Nasopharyngeal Swab   Result Value Ref Range    SARS-CoV-2, NAAT Not Detected Not Detected   Rapid influenza A/B antigens    Specimen: Nasopharyngeal; Nose   Result Value Ref Range    Influenza A by PCR Not Detected Not Detected    Influenza B by PCR Not Detected Not Detected   Troponin   Result Value Ref Range    Troponin, High Sensitivity 14 (H) 0 - 9 ng/L   Brain Natriuretic Peptide   Result Value Ref Range    Pro-BNP 30 0 - 125 pg/mL   CBC with Auto Differential   Result Value Ref Range    WBC 7.8 4.5 - 11.5 E9/L    RBC 4.70 3.50 - 5.50 E12/L    Hemoglobin 14.0 11.5 - 15.5 g/dL    Hematocrit 41.1 34.0 - 48.0 %    MCV 87.4 80.0 - 99.9 fL    MCH 29.8 26.0 - 35.0 pg    MCHC 34.1 32.0 - 34.5 %    RDW 12.8 11.5 - 15.0 fL    Platelets 925 455 - 827 E9/L    MPV 11.1 7.0 - 12.0 fL    Neutrophils % 58.1 43.0 - 80.0 %    Immature Granulocytes % 0.5 0.0 - 5.0 %    Lymphocytes % 29.0 20.0 - 42.0 %    Monocytes % 6.9 2.0 - 12.0 %    Eosinophils % 5.0 0.0 - 6.0 %    Basophils % 0.5 0.0 - 2.0 %    Neutrophils Absolute 4.53 1.80 - 7.30 E9/L    Immature Granulocytes # 0.04 E9/L    Lymphocytes Absolute 2.26 1.50 - 4.00 E9/L    Monocytes Absolute 0.54 0.10 - 0.95 E9/L    Eosinophils Absolute 0.39 0.05 - 0.50 E9/L    Basophils Absolute 0.04 0.00 - 0.20 E9/L   Comprehensive Metabolic Panel w/ Reflex to MG   Result Value Ref Range    Sodium 140 132 - 146 mmol/L    Potassium reflex Magnesium 3.3 (L) 3.5 - 5.0 mmol/L    Chloride 101 98 - 107 mmol/L    CO2 30 (H) 22 - 29 mmol/L    Anion Gap 9 7 - 16 mmol/L    Glucose 137 (H) 74 - 99 mg/dL    BUN 7 6 - 20 mg/dL    Creatinine 0.8 0.5 - 1.0 mg/dL    Est, Glom Filt Rate >60 >=60 mL/min/1.73    Calcium 8.7 8.6 - 10.2 mg/dL    Total Protein 6.4 6.4 - 8.3 g/dL    Albumin 3.7 3.5 - 5.2 g/dL    Total Bilirubin 0.6 0.0 - 1.2 mg/dL    Alkaline Phosphatase 98 35 - 104 U/L    ALT 30 0 - 32 U/L    AST 22 0 - 31 U/L   Magnesium   Result Value Ref Range    Magnesium 1.7 1.6 - 2.6 mg/dL   Troponin   Result Value Ref Range    Troponin, High Sensitivity 15 (H) 0 - 9 ng/L   D-Dimer, Quantitative   Result Value Ref Range    D-Dimer, Quant 299 ng/mL DDU   POCT Glucose   Result Value Ref Range    Meter Glucose 105 (H) 74 - 99 mg/dL   EKG 12 Lead   Result Value Ref Range    Ventricular Rate 81 BPM    Atrial Rate 81 BPM    P-R Interval 168 ms    QRS Duration 94 ms    Q-T Interval 446 ms    QTc Calculation (Bazett) 518 ms    P Axis 56 degrees    R Axis -9 degrees    T Axis 83 degrees       RADIOLOGY:   Interpretation per the Radiologist below, if available at the time of this note:    CTA PULMONARY W CONTRAST   Final Result   1. No evidence of pulmonary embolism. No evidence of right heart strain. 2.  Faint peripheral nodular ground-glass opacities scattered throughout both   lungs. More focal peripheral wedge-shaped opacity in the medial right lower   lung and linear opacity in the lateral left lower lung. (These changes possibly reflect the presence of and underlying   infectious/inflammatory process in the right clinical setting.)      3. The lymphadenopathy in the mediastinum particularly in the subcarinal   region is slightly more prominent than previous exam.      4.  Left ventricle chamber is mildly enlarged. 5.  Remainder of the study is as above. RECOMMENDATIONS:   1. Suggest follow-up chest CT 3-6 months after resolution of patient's   symptoms to reassess lung parenchymal changes and the described   lymphadenopathy. 2.  Consider routine cardiology referral for consideration of screening   echocardiogram.         XR CHEST (2 VW)   Final Result   No acute process. CT HEAD WO CONTRAST   Final Result   No acute intracranial abnormality. Specifically, there is no acute   intracranial hemorrhage           XR CHEST (2 VW)    Result Date: 2/10/2023  EXAMINATION: TWO XRAY VIEWS OF THE CHEST 2/10/2023 4:11 pm COMPARISON: Chest CTA 15 April 2022 HISTORY: ORDERING SYSTEM PROVIDED HISTORY: cough, SOB TECHNOLOGIST PROVIDED HISTORY: Reason for exam:->cough, SOB What reading provider will be dictating this exam?->CRC FINDINGS: The lungs are without acute focal process. There is no effusion or pneumothorax. The cardiomediastinal silhouette is without acute process. The osseous structures are without acute process. No acute process. CT HEAD WO CONTRAST    Result Date: 2/10/2023  EXAMINATION: CT OF THE HEAD WITHOUT CONTRAST  2/10/2023 4:55 pm TECHNIQUE: CT of the head was performed without the administration of intravenous contrast. Automated exposure control, iterative reconstruction, and/or weight based adjustment of the mA/kV was utilized to reduce the radiation dose to as low as reasonably achievable. COMPARISON: None. HISTORY: ORDERING SYSTEM PROVIDED HISTORY: headache, hypertensive TECHNOLOGIST PROVIDED HISTORY: Reason for exam:->headache, hypertensive Has a \"code stroke\" or \"stroke alert\" been called? ->No Decision Support Exception - unselect if not a suspected or confirmed emergency medical condition->Emergency Medical Condition (MA) What reading provider will be dictating this exam?->CRC FINDINGS: BRAIN/VENTRICLES: There is no acute intracranial hemorrhage, mass effect or midline shift. No abnormal extra-axial fluid collection. The gray-white differentiation is maintained without evidence of an acute infarct. There is no evidence of hydrocephalus. ORBITS: The visualized portion of the orbits demonstrate no acute abnormality. SINUSES: The visualized paranasal sinuses and mastoid air cells demonstrate no acute abnormality. SOFT TISSUES/SKULL:  No acute abnormality of the visualized skull or soft tissues. No acute intracranial abnormality. Specifically, there is no acute intracranial hemorrhage       No results found. See preliminary interpretation by me below. EKG: This EKG is signed and interpreted by me. Normal sinus rhythm with ventricular of 81 bpm.  Left axis deviation. AR interval normal.  QTc mildly prolonged at 518 MS. Minimal voltage criteria for LVH. No evidence of acute ST elevation MI.   No significant changes compared to previous EKG on 10/7/2022.      ------------------------- NURSING NOTES AND VITALS REVIEWED ---------------------------   The nursing notes within the ED encounter and vital signs as below have been reviewed by myself. BP (!) 196/99   Pulse 90   Temp 97.4 °F (36.3 °C) (Temporal)   Resp 17   Ht 5' 6\" (1.676 m)   Wt (!) 347 lb (157.4 kg)   LMP 10/01/2012 (Approximate) Comment: 2013  SpO2 96%   BMI 56.01 kg/m²   Oxygen Saturation Interpretation: Normal    The patients available past medical records and past encounters were reviewed. ------------------------------ ED COURSE/MEDICAL DECISION MAKING----------------------  Medications   potassium chloride (KLOR-CON M) extended release tablet 40 mEq (40 mEq Oral Given 2/10/23 1954)   iopamidol (ISOVUE-370) 76 % injection 75 mL (75 mLs IntraVENous Given 2/10/23 2230)   ipratropium-albuterol (DUONEB) nebulizer solution 1 ampule (1 ampule Inhalation Given 2/11/23 0002)   predniSONE (DELTASONE) tablet 60 mg (60 mg Oral Given 2/11/23 0107)       Medical Decision Making/Differential Diagnosis:    CC/HPI Summary, Pertinent Physical Exam Findings, Social Determinants of health, Records Reviewed, DDx, testing done/not done, ED Course, Reassessment, disposition considerations/shared decision making with patient, consults, disposition:        Medical Decision Making:   I, Dr. Magdalena Stuart am the resident physician of record. History From: Patient    Limitations to history : None     Eliana Gutierrez is a 53 y.o. female who presents to the ED for shortness of breath and viral symptoms. Vital signs upon arrival BP (!) 196/99   Pulse 90   Temp 97.4 °F (36.3 °C) (Temporal)   Resp 17   Ht 5' 6\" (1.676 m)   Wt (!) 347 lb (157.4 kg)   LMP 10/01/2012 (Approximate) Comment: 2013  SpO2 96%   BMI 56.01 kg/m²     On initial evaluation, patient was nontoxic-appearing, afebrile, hemodynamically stable and in no acute distress. Initial vitals significant for hypertension with blood pressure 232/139 but remaining vitals within normal limits.  Differential diagnosis includes but is not limited to ACS, pulmonary embolism, acute viral syndrome, CKD, hypertensive emergency/ urgency, aortic valve disease, thyroid disease and ALO. Physical exam was essentially benign. Lungs globally diminished on auscultation, likely worsened by body habitus with no obvious wheezes, rales or rhonchi. Abdomen soft, nontender nondistended without rebound, guarding or rigidity. No significant pretibial edema noted. No other concerning physical exam findings. Work-up in the emergency department was generally unremarkable. Labs as interpreted by me include CBC with no leukocytosis, left shift or significant anemia. WBC 7.8 and hemoglobin stable at 14. CMP with stable renal function, creatinine 0.8/BUN 7. Patient mildly hypokalemic with potassium of 3.3. She was orally repleted with potassium chloride in the ED. No other major electrolyte abnormalities. Sodium normal at 140. LFTs within normal limits. Magnesium 1.7. Viral testing negative for COVID and influenza. Cardiac evaluation generally unremarkable. Patient initial troponin 14 with a repeat of 15, delta 1. EKG sinus nonspecific with no acute ischemic changes. Chest x-ray is clear. BNP 30.  Low clinical suspicion for pulmonary embolism however patient was complaining of shortness of breath and vague chest pain. D-dimer was obtained and 299. Follow-up CTA pulmonary obtained and unremarkable for acute PE or other concerning acute cardiopulmonary processes. Incidental findings of faint groundglass opacities and mediastinal lymphadenopathy noted. Patient was made aware of these incidental findings. CT head also obtained and negative for any acute intracranial process. Imaging studies as interpreted by me detailed below with official radiology read above. Overall, there is no signs of endorgan damage on lab studies. Blood pressure did improve to 196/99. On reevaluation, patient did develop wheezing on auscultation of the lungs.   She was given oral prednisone 60 mg and 1 DuoNeb treatment in the emergency department. Patient was reevaluated after treatment and noting significant improvement in her presenting symptoms. Work-up, results and plan were discussed with patient after shared decision-making, she is agreeable to discharge with close outpatient PCP follow-up as needed. Patient was given strict return precautions in case of new or worsening symptoms including but not limited to worsening shortness of breath, chest pain or syncope. Patient was discharged in stable condition. She will follow-up with PCP about incidental findings on imaging studies. Non-plain film images such as CT, Ultrasound and MRI are read by the radiologist. [employer] radiographic images are visualized and preliminarily interpreted by the ED Provider with the below findings:    CT head with no obvious intracranial hemorrhage, large masses, midline shift or herniation. Chest x-ray with no obvious focal consolidation suggestive of pneumonia, large pleural effusions or pneumothorax. Normal cardiac silhouette. CTA pulmonary with no acute PE, large pleural effusions or focal consolidations. Discussion with Other Profesionals : None    Social Determinants : None    Records Reviewed : Other myocardial SPECT rest exercise test from 10/28/2022 reviewed. Impression: Electrocardiographically normal infusion with clinically nonischemic response. Myocardial perfusion imaging was normal.  No evidence of ischemia or infarction. Overall left ventricular systolic function was normal without regional wall motion abnormalities. Low risk general pharmacologic stress test.    Chronic conditions: morbid obesity, ALO, diabetes, hypothyroidism, asthma, hypertension, PCOS, irritable bowel syndrome and hyperlipidemia    CONSULTS: None      Disposition:   Appropriate for outpatient management      Pt will be d/c with prescriptions for prednisone and will follow up with her PCP . She is educated on signs and symptoms that require emergent evaluation. Pt is advised to return to the ED if her symptoms change or worsen. If her pain persists, pt may need further evaluation. Pt is agreeable to plan and all questions have been answered at this time. 1. Shortness of breath    2. Bronchitis          Re-Evaluations/Consultations:             ED Course as of 02/11/23 0858   Sat Feb 11, 2023   0104 Patient reevaluated. She is feeling fine currently. Work-up and results were discussed with the patient at length and she is agreeable to discharge with outpatient PCP follow-up as needed. [PP]      ED Course User Index  [PP] Mo Daily DO         This patient's ED course included: History, physical examination, reevaluation prior to disposition    This patient has remained hemodynamically stable during their ED course. Counseling: The emergency provider has spoken with the patient and discussed todays results, in addition to providing specific details for the plan of care and counseling regarding the diagnosis and prognosis. Questions are answered at this time and they are agreeable with the plan.       --------------------------------- IMPRESSION AND DISPOSITION ---------------------------------    IMPRESSION  1. Shortness of breath    2. Bronchitis        DISPOSITION  Disposition: Discharge to home  Patient condition is stable        NOTE: This report was transcribed using voice recognition software.  Every effort was made to ensure accuracy; however, inadvertent computerized transcription errors may be present         Mo Daily DO  Resident  02/11/23 [id number]

## 2023-02-11 NOTE — ED NOTES
Communicated shift [facility] [place]  02/10/23 [id number]

## 2023-02-13 LAB
EKG ATRIAL RATE: 81 BPM
EKG P AXIS: 56 DEGREES
EKG P-R INTERVAL: 168 MS
EKG Q-T INTERVAL: 446 MS
EKG QRS DURATION: 94 MS
EKG QTC CALCULATION (BAZETT): 518 MS
EKG R AXIS: -9 DEGREES
EKG T AXIS: 83 DEGREES
EKG VENTRICULAR RATE: 81 BPM

## 2023-02-13 PROCEDURE — 93010 ELECTROCARDIOGRAM REPORT: CPT | Performed by: INTERNAL MEDICINE

## 2023-02-24 ENCOUNTER — OFFICE VISIT (OUTPATIENT)
Dept: BARIATRICS/WEIGHT MGMT | Age: 48
End: 2023-02-24
Payer: MEDICAID

## 2023-02-24 ENCOUNTER — TELEPHONE (OUTPATIENT)
Dept: BARIATRICS/WEIGHT MGMT | Age: 48
End: 2023-02-24

## 2023-02-24 DIAGNOSIS — F50.9 COMPULSIVE EATING PATTERNS: Primary | ICD-10-CM

## 2023-02-24 PROCEDURE — 90832 PSYTX W PT 30 MINUTES: CPT | Performed by: SOCIAL WORKER

## 2023-02-24 PROCEDURE — 1036F TOBACCO NON-USER: CPT | Performed by: SOCIAL WORKER

## 2023-02-24 NOTE — PATIENT INSTRUCTIONS
Assignments/Recommendations: Follow-up with SW as needed. Attend [facility] support group. Follow up with (referrals/present providers/all scheduled appointment at [facility]).

## 2023-02-24 NOTE — PROGRESS NOTES
INDIVIDUAL SESSION:  SUMMARY/PSYCH CLEARANCE     Amy Etheleen Alpers is a 53 y.o. ,   female, referred by Primary Care Provider  for evaluation and treatment. Patient identify verified by Name and . Those attending session : patient    DX:   Encounter Diagnosis   Name Primary? Compulsive eating patterns Yes       Chief Complaint   Patient presents with    Follow-up     3rd visit final clearance         Wt Readings from Last 3 Encounters:   02/10/23 (!) 347 lb (157.4 kg)   22 (!) 347 lb (157.4 kg)   22 (!) 351 lb (159.2 kg)            Narrative: Eliana stated that she did complete the homework without any problems. She was able to identify problems with emotional eating patterns including that she does have a hard time managing cravings when others are eating in front of her. She also stated that while on vacation with her son, who was insistent that she eat more, she had some trouble maintaining a healthy diet. She was also able to identify things that triggers cravings for food. She shared that smells, places, and the [place] and other celebrations are triggers for her. Eliana stated that she has made some progress with compulsive eating patterns and reported making the following changes: She continues to work toward her pre-surgery goal weight by limiting portions and using moderation with food. She also stated that she will continue to make changes in what and how much she is eating going forward. Eliana stated that she learning the difference between being really hungry and having cravings. She also stated that she has a good support system, her mother and her son are supporting her.        Mental Status Exam: appearance:  appropriately dressed and appropriately groomed, behavior:  normal, attitude:  cooperative, speech:  appropriate, mood:  euthymic, affect:  congruent with mood, thought content:  no evidence of psychosis, thought process:  logical and coherent, orientation: oriented in all spheres, memory:  recent:  good and remote:  good, insight:  fair , judgment:  fair , and cognitive:  intact and intelligent    RISK ASSESSMENT    Suicide screen: denies current suicidal ideation, plan and intent    Self Injurious Behavior: denies    Homicide screen: denies current homicidal ideation, plan and intent    TREATMENT PLAN:  Goal: Increase understanding of role of emotional factors contributing to issues with food and obesity and strategies to cope. Interventions in session:  Reviewed previous information provided to the patient and reinforced the need for continued engagement in support group and other resources of the [facility]. Provided Psychoeducational regarding physical, emotional, and behavioral changes that might occur for the patient post WLS. Assignments/Recommendations: Follow-up with SW as needed. Attend [facility] support group. Follow up with (referrals/present providers/all scheduled appointment at [facility]). Next steps: Follow up with SW post surgery as needed. Bariatric Surgery: Final visit:  The patient has completed a Biopsychosocial assessment and 2 educational sessions to evaluate her appropriateness for bariatric surgery. This patient has been compliant with all scheduled sessions and completed all assignments/recommendations including attendance at least one support group meeting. She does  present with mental health issues which appear to be stable and so would not interfere with her ability to adapt to the EBC changes that will be necessary for success. The patient appears motivated to make necessary changes and achieve weight loss goals but is struggling to reach her goal weight. Based on the information gathered during assessment and subsequent session it appears that she is a reasonable candidate for Bariatric surgery.      Patient and/or family/guardian verbalizes understanding of and agreement with treatment recommendations and plan: yes    Start time: 11:00 am          End time: 11:34 am    Visit Time:  111  [facility]

## 2023-02-24 NOTE — TELEPHONE ENCOUNTER
Eliana was here today to complete her clearance with Chanelle. After that appt she was weighed, per Monica's note, since she has been struggling getting to her goal weight of 330. She weighed 361 lbs today. She scheduled another appt with Juli Whitt and then returned a few minutes later to cancel it. She was very upset and proceeded to state she was not going to return to our office and felt she had wasted a 1 1/2 years of her life trying to meet unrealistic goals. I tried to talk with her and tell her those requirements were for her benefit in order to do well with the surgery and afterwards. We are trying to keep her safe and not proceed with a surgery if there were going to possibly be complications. She kept repeating that she had known other people who had the surgery and weighed more and I tried telling every individual is different and we treat each case as an individual, with the main goal being a safe and successful outcome. [place] did not have those stringent guidelines. I wished her luck with her decision.

## 2023-02-25 DIAGNOSIS — I10 ESSENTIAL HYPERTENSION: ICD-10-CM

## 2023-02-27 ENCOUNTER — HOSPITAL ENCOUNTER (INPATIENT)
Age: 48
LOS: 1 days | Discharge: LEFT BEFORE DISCHARGE | DRG: 141 | End: 2023-02-27
Attending: EMERGENCY MEDICINE | Admitting: FAMILY MEDICINE
Payer: MEDICAID

## 2023-02-27 ENCOUNTER — APPOINTMENT (OUTPATIENT)
Dept: GENERAL RADIOLOGY | Age: 48
DRG: 141 | End: 2023-02-27
Payer: MEDICAID

## 2023-02-27 ENCOUNTER — APPOINTMENT (OUTPATIENT)
Dept: CT IMAGING | Age: 48
DRG: 141 | End: 2023-02-27
Payer: MEDICAID

## 2023-02-27 VITALS
HEART RATE: 109 BPM | WEIGHT: 293 LBS | BODY MASS INDEX: 47.09 KG/M2 | DIASTOLIC BLOOD PRESSURE: 109 MMHG | RESPIRATION RATE: 18 BRPM | SYSTOLIC BLOOD PRESSURE: 209 MMHG | OXYGEN SATURATION: 98 % | HEIGHT: 66 IN | TEMPERATURE: 98 F

## 2023-02-27 DIAGNOSIS — E66.2 CLASS 3 OBESITY WITH ALVEOLAR HYPOVENTILATION, SERIOUS COMORBIDITY, AND BODY MASS INDEX (BMI) OF 50.0 TO 59.9 IN ADULT (HCC): ICD-10-CM

## 2023-02-27 DIAGNOSIS — J96.01 ACUTE RESPIRATORY FAILURE WITH HYPOXIA (HCC): Primary | ICD-10-CM

## 2023-02-27 DIAGNOSIS — R11.2 NAUSEA VOMITING AND DIARRHEA: ICD-10-CM

## 2023-02-27 DIAGNOSIS — R19.7 NAUSEA VOMITING AND DIARRHEA: ICD-10-CM

## 2023-02-27 PROBLEM — J45.901 ACUTE ASTHMA EXACERBATION: Status: ACTIVE | Noted: 2023-02-27

## 2023-02-27 LAB
ALBUMIN SERPL-MCNC: 3.5 G/DL (ref 3.5–5.2)
ALP BLD-CCNC: 87 U/L (ref 35–104)
ALT SERPL-CCNC: 20 U/L (ref 0–32)
ANION GAP SERPL CALCULATED.3IONS-SCNC: 9 MMOL/L (ref 7–16)
AST SERPL-CCNC: 20 U/L (ref 0–31)
BASOPHILS ABSOLUTE: 0.02 E9/L (ref 0–0.2)
BASOPHILS RELATIVE PERCENT: 0.2 % (ref 0–2)
BETA-HYDROXYBUTYRATE: 0.17 MMOL/L (ref 0.02–0.27)
BILIRUB SERPL-MCNC: 0.6 MG/DL (ref 0–1.2)
BILIRUBIN DIRECT: <0.2 MG/DL (ref 0–0.3)
BILIRUBIN, INDIRECT: NORMAL MG/DL (ref 0–1)
BUN BLDV-MCNC: 13 MG/DL (ref 6–20)
CALCIUM SERPL-MCNC: 8.3 MG/DL (ref 8.6–10.2)
CHLORIDE BLD-SCNC: 100 MMOL/L (ref 98–107)
CO2: 28 MMOL/L (ref 22–29)
CREAT SERPL-MCNC: 0.9 MG/DL (ref 0.5–1)
EKG ATRIAL RATE: 90 BPM
EKG P AXIS: 40 DEGREES
EKG P-R INTERVAL: 164 MS
EKG Q-T INTERVAL: 382 MS
EKG QRS DURATION: 86 MS
EKG QTC CALCULATION (BAZETT): 467 MS
EKG R AXIS: -19 DEGREES
EKG T AXIS: 90 DEGREES
EKG VENTRICULAR RATE: 90 BPM
EOSINOPHILS ABSOLUTE: 0.23 E9/L (ref 0.05–0.5)
EOSINOPHILS RELATIVE PERCENT: 2.6 % (ref 0–6)
GFR SERPL CREATININE-BSD FRML MDRD: >60 ML/MIN/1.73
GLUCOSE BLD-MCNC: 224 MG/DL (ref 74–99)
HCT VFR BLD CALC: 42.8 % (ref 34–48)
HEMOGLOBIN: 14.2 G/DL (ref 11.5–15.5)
IMMATURE GRANULOCYTES #: 0.02 E9/L
IMMATURE GRANULOCYTES %: 0.2 % (ref 0–5)
INFLUENZA A BY PCR: NOT DETECTED
INFLUENZA B BY PCR: NOT DETECTED
LIPASE: 20 U/L (ref 13–60)
LYMPHOCYTES ABSOLUTE: 0.55 E9/L (ref 1.5–4)
LYMPHOCYTES RELATIVE PERCENT: 6.3 % (ref 20–42)
MAGNESIUM: 1.6 MG/DL (ref 1.6–2.6)
MCH RBC QN AUTO: 30.2 PG (ref 26–35)
MCHC RBC AUTO-ENTMCNC: 33.2 % (ref 32–34.5)
MCV RBC AUTO: 91.1 FL (ref 80–99.9)
MONOCYTES ABSOLUTE: 0.44 E9/L (ref 0.1–0.95)
MONOCYTES RELATIVE PERCENT: 5 % (ref 2–12)
NEUTROPHILS ABSOLUTE: 7.48 E9/L (ref 1.8–7.3)
NEUTROPHILS RELATIVE PERCENT: 85.7 % (ref 43–80)
PDW BLD-RTO: 13 FL (ref 11.5–15)
PH VENOUS: 7.4 (ref 7.35–7.45)
PLATELET # BLD: 171 E9/L (ref 130–450)
PMV BLD AUTO: 10.8 FL (ref 7–12)
POLYCHROMASIA: ABNORMAL
POTASSIUM SERPL-SCNC: 4 MMOL/L (ref 3.5–5)
RBC # BLD: 4.7 E12/L (ref 3.5–5.5)
SARS-COV-2, NAAT: NOT DETECTED
SODIUM BLD-SCNC: 137 MMOL/L (ref 132–146)
TOTAL PROTEIN: 6.4 G/DL (ref 6.4–8.3)
TROPONIN, HIGH SENSITIVITY: 16 NG/L (ref 0–9)
TROPONIN, HIGH SENSITIVITY: 17 NG/L (ref 0–9)
WBC # BLD: 8.7 E9/L (ref 4.5–11.5)

## 2023-02-27 PROCEDURE — 99221 1ST HOSP IP/OBS SF/LOW 40: CPT | Performed by: FAMILY MEDICINE

## 2023-02-27 PROCEDURE — 6370000000 HC RX 637 (ALT 250 FOR IP): Performed by: EMERGENCY MEDICINE

## 2023-02-27 PROCEDURE — 6370000000 HC RX 637 (ALT 250 FOR IP): Performed by: FAMILY MEDICINE

## 2023-02-27 PROCEDURE — 82010 KETONE BODYS QUAN: CPT

## 2023-02-27 PROCEDURE — 2580000003 HC RX 258: Performed by: FAMILY MEDICINE

## 2023-02-27 PROCEDURE — 2500000003 HC RX 250 WO HCPCS: Performed by: FAMILY MEDICINE

## 2023-02-27 PROCEDURE — 84484 ASSAY OF TROPONIN QUANT: CPT

## 2023-02-27 PROCEDURE — 83690 ASSAY OF LIPASE: CPT

## 2023-02-27 PROCEDURE — 71045 X-RAY EXAM CHEST 1 VIEW: CPT

## 2023-02-27 PROCEDURE — 2060000000 HC ICU INTERMEDIATE R&B

## 2023-02-27 PROCEDURE — 6360000002 HC RX W HCPCS: Performed by: STUDENT IN AN ORGANIZED HEALTH CARE EDUCATION/TRAINING PROGRAM

## 2023-02-27 PROCEDURE — 6360000002 HC RX W HCPCS: Performed by: EMERGENCY MEDICINE

## 2023-02-27 PROCEDURE — 94640 AIRWAY INHALATION TREATMENT: CPT

## 2023-02-27 PROCEDURE — 99285 EMERGENCY DEPT VISIT HI MDM: CPT

## 2023-02-27 PROCEDURE — 2580000003 HC RX 258: Performed by: STUDENT IN AN ORGANIZED HEALTH CARE EDUCATION/TRAINING PROGRAM

## 2023-02-27 PROCEDURE — 2580000003 HC RX 258: Performed by: RADIOLOGY

## 2023-02-27 PROCEDURE — 80076 HEPATIC FUNCTION PANEL: CPT

## 2023-02-27 PROCEDURE — 96374 THER/PROPH/DIAG INJ IV PUSH: CPT

## 2023-02-27 PROCEDURE — 6360000002 HC RX W HCPCS: Performed by: FAMILY MEDICINE

## 2023-02-27 PROCEDURE — 83735 ASSAY OF MAGNESIUM: CPT

## 2023-02-27 PROCEDURE — 80048 BASIC METABOLIC PNL TOTAL CA: CPT

## 2023-02-27 PROCEDURE — 6360000004 HC RX CONTRAST MEDICATION: Performed by: RADIOLOGY

## 2023-02-27 PROCEDURE — 93005 ELECTROCARDIOGRAM TRACING: CPT | Performed by: STUDENT IN AN ORGANIZED HEALTH CARE EDUCATION/TRAINING PROGRAM

## 2023-02-27 PROCEDURE — 96375 TX/PRO/DX INJ NEW DRUG ADDON: CPT

## 2023-02-27 PROCEDURE — 87635 SARS-COV-2 COVID-19 AMP PRB: CPT

## 2023-02-27 PROCEDURE — 85025 COMPLETE CBC W/AUTO DIFF WBC: CPT

## 2023-02-27 PROCEDURE — 82800 BLOOD PH: CPT

## 2023-02-27 PROCEDURE — 71275 CT ANGIOGRAPHY CHEST: CPT

## 2023-02-27 PROCEDURE — 36415 COLL VENOUS BLD VENIPUNCTURE: CPT

## 2023-02-27 PROCEDURE — 93010 ELECTROCARDIOGRAM REPORT: CPT | Performed by: INTERNAL MEDICINE

## 2023-02-27 PROCEDURE — 74177 CT ABD & PELVIS W/CONTRAST: CPT

## 2023-02-27 PROCEDURE — 87502 INFLUENZA DNA AMP PROBE: CPT

## 2023-02-27 RX ORDER — SODIUM CHLORIDE 0.9 % (FLUSH) 0.9 %
10 SYRINGE (ML) INJECTION PRN
Status: COMPLETED | OUTPATIENT
Start: 2023-02-27 | End: 2023-02-27

## 2023-02-27 RX ORDER — IPRATROPIUM BROMIDE AND ALBUTEROL SULFATE 2.5; .5 MG/3ML; MG/3ML
1 SOLUTION RESPIRATORY (INHALATION)
Status: DISCONTINUED | OUTPATIENT
Start: 2023-02-27 | End: 2023-02-27 | Stop reason: HOSPADM

## 2023-02-27 RX ORDER — ENOXAPARIN SODIUM 100 MG/ML
40 INJECTION SUBCUTANEOUS 2 TIMES DAILY
Status: DISCONTINUED | OUTPATIENT
Start: 2023-02-27 | End: 2023-02-27 | Stop reason: HOSPADM

## 2023-02-27 RX ORDER — LISINOPRIL 40 MG/1
1 TABLET ORAL DAILY
Status: CANCELLED | OUTPATIENT
Start: 2023-02-27

## 2023-02-27 RX ORDER — DEXAMETHASONE SODIUM PHOSPHATE 10 MG/ML
10 INJECTION INTRAMUSCULAR; INTRAVENOUS ONCE
Status: COMPLETED | OUTPATIENT
Start: 2023-02-27 | End: 2023-02-27

## 2023-02-27 RX ORDER — DILTIAZEM HYDROCHLORIDE 180 MG/1
360 CAPSULE, COATED, EXTENDED RELEASE ORAL DAILY
Status: DISCONTINUED | OUTPATIENT
Start: 2023-02-27 | End: 2023-02-27 | Stop reason: HOSPADM

## 2023-02-27 RX ORDER — SODIUM CHLORIDE 0.9 % (FLUSH) 0.9 %
5-40 SYRINGE (ML) INJECTION EVERY 12 HOURS SCHEDULED
Status: DISCONTINUED | OUTPATIENT
Start: 2023-02-27 | End: 2023-02-27 | Stop reason: HOSPADM

## 2023-02-27 RX ORDER — HYDROCHLOROTHIAZIDE 12.5 MG/1
1 TABLET ORAL DAILY
Status: CANCELLED | OUTPATIENT
Start: 2023-02-27

## 2023-02-27 RX ORDER — PREDNISONE 20 MG/1
40 TABLET ORAL
Status: DISCONTINUED | OUTPATIENT
Start: 2023-02-28 | End: 2023-02-27 | Stop reason: HOSPADM

## 2023-02-27 RX ORDER — LISINOPRIL 20 MG/1
40 TABLET ORAL DAILY
Status: DISCONTINUED | OUTPATIENT
Start: 2023-02-27 | End: 2023-02-27 | Stop reason: HOSPADM

## 2023-02-27 RX ORDER — SODIUM CHLORIDE 0.9 % (FLUSH) 0.9 %
5-40 SYRINGE (ML) INJECTION PRN
Status: DISCONTINUED | OUTPATIENT
Start: 2023-02-27 | End: 2023-02-27 | Stop reason: HOSPADM

## 2023-02-27 RX ORDER — ATORVASTATIN CALCIUM 20 MG/1
20 TABLET, FILM COATED ORAL DAILY
Status: DISCONTINUED | OUTPATIENT
Start: 2023-02-27 | End: 2023-02-27 | Stop reason: HOSPADM

## 2023-02-27 RX ORDER — HYDRALAZINE HYDROCHLORIDE 50 MG/1
100 TABLET, FILM COATED ORAL EVERY 8 HOURS SCHEDULED
Status: CANCELLED | OUTPATIENT
Start: 2023-02-27

## 2023-02-27 RX ORDER — FENTANYL CITRATE 50 UG/ML
50 INJECTION, SOLUTION INTRAMUSCULAR; INTRAVENOUS ONCE
Status: COMPLETED | OUTPATIENT
Start: 2023-02-27 | End: 2023-02-27

## 2023-02-27 RX ORDER — ATORVASTATIN CALCIUM 20 MG/1
1 TABLET, FILM COATED ORAL DAILY
Status: CANCELLED | OUTPATIENT
Start: 2023-02-27

## 2023-02-27 RX ORDER — ONDANSETRON 2 MG/ML
4 INJECTION INTRAMUSCULAR; INTRAVENOUS ONCE
Status: COMPLETED | OUTPATIENT
Start: 2023-02-27 | End: 2023-02-27

## 2023-02-27 RX ORDER — PROCHLORPERAZINE EDISYLATE 5 MG/ML
10 INJECTION INTRAMUSCULAR; INTRAVENOUS EVERY 6 HOURS PRN
Status: DISCONTINUED | OUTPATIENT
Start: 2023-02-27 | End: 2023-02-27 | Stop reason: HOSPADM

## 2023-02-27 RX ORDER — ALBUTEROL SULFATE 2.5 MG/3ML
2.5 SOLUTION RESPIRATORY (INHALATION) EVERY 6 HOURS PRN
Status: CANCELLED | OUTPATIENT
Start: 2023-02-27

## 2023-02-27 RX ORDER — SODIUM CHLORIDE 9 MG/ML
INJECTION, SOLUTION INTRAVENOUS PRN
Status: DISCONTINUED | OUTPATIENT
Start: 2023-02-27 | End: 2023-02-27 | Stop reason: HOSPADM

## 2023-02-27 RX ORDER — DEXTROSE MONOHYDRATE 100 MG/ML
INJECTION, SOLUTION INTRAVENOUS CONTINUOUS PRN
Status: DISCONTINUED | OUTPATIENT
Start: 2023-02-27 | End: 2023-02-27 | Stop reason: HOSPADM

## 2023-02-27 RX ORDER — HYDROCHLOROTHIAZIDE 12.5 MG/1
12.5 TABLET ORAL DAILY
Status: DISCONTINUED | OUTPATIENT
Start: 2023-02-27 | End: 2023-02-27 | Stop reason: HOSPADM

## 2023-02-27 RX ORDER — 0.9 % SODIUM CHLORIDE 0.9 %
1000 INTRAVENOUS SOLUTION INTRAVENOUS ONCE
Status: COMPLETED | OUTPATIENT
Start: 2023-02-27 | End: 2023-02-27

## 2023-02-27 RX ORDER — HYDROCHLOROTHIAZIDE 12.5 MG/1
TABLET ORAL
Qty: 30 TABLET | Refills: 0 | OUTPATIENT
Start: 2023-02-27

## 2023-02-27 RX ORDER — ACETAMINOPHEN 325 MG/1
650 TABLET ORAL EVERY 6 HOURS PRN
Status: DISCONTINUED | OUTPATIENT
Start: 2023-02-27 | End: 2023-02-27 | Stop reason: HOSPADM

## 2023-02-27 RX ORDER — KETOROLAC TROMETHAMINE 30 MG/ML
15 INJECTION, SOLUTION INTRAMUSCULAR; INTRAVENOUS ONCE
Status: COMPLETED | OUTPATIENT
Start: 2023-02-27 | End: 2023-02-27

## 2023-02-27 RX ORDER — HYDRALAZINE HYDROCHLORIDE 50 MG/1
100 TABLET, FILM COATED ORAL EVERY 8 HOURS SCHEDULED
Status: DISCONTINUED | OUTPATIENT
Start: 2023-02-27 | End: 2023-02-27 | Stop reason: HOSPADM

## 2023-02-27 RX ORDER — IPRATROPIUM BROMIDE AND ALBUTEROL SULFATE 2.5; .5 MG/3ML; MG/3ML
1 SOLUTION RESPIRATORY (INHALATION)
Status: DISPENSED | OUTPATIENT
Start: 2023-02-27 | End: 2023-02-27

## 2023-02-27 RX ORDER — ASPIRIN 81 MG/1
81 TABLET ORAL DAILY
Status: DISCONTINUED | OUTPATIENT
Start: 2023-02-27 | End: 2023-02-27 | Stop reason: HOSPADM

## 2023-02-27 RX ORDER — ACETAMINOPHEN 650 MG/1
650 SUPPOSITORY RECTAL EVERY 6 HOURS PRN
Status: DISCONTINUED | OUTPATIENT
Start: 2023-02-27 | End: 2023-02-27 | Stop reason: HOSPADM

## 2023-02-27 RX ORDER — POLYETHYLENE GLYCOL 3350 17 G/17G
17 POWDER, FOR SOLUTION ORAL DAILY PRN
Status: DISCONTINUED | OUTPATIENT
Start: 2023-02-27 | End: 2023-02-27 | Stop reason: HOSPADM

## 2023-02-27 RX ADMIN — LISINOPRIL 40 MG: 20 TABLET ORAL at 11:57

## 2023-02-27 RX ADMIN — IPRATROPIUM BROMIDE AND ALBUTEROL SULFATE 1 AMPULE: .5; 2.5 SOLUTION RESPIRATORY (INHALATION) at 03:52

## 2023-02-27 RX ADMIN — SODIUM CHLORIDE 1000 ML: 9 INJECTION, SOLUTION INTRAVENOUS at 03:16

## 2023-02-27 RX ADMIN — PROCHLORPERAZINE EDISYLATE 10 MG: 5 INJECTION INTRAMUSCULAR; INTRAVENOUS at 12:12

## 2023-02-27 RX ADMIN — DILTIAZEM HYDROCHLORIDE 360 MG: 180 CAPSULE, COATED, EXTENDED RELEASE ORAL at 11:57

## 2023-02-27 RX ADMIN — ONDANSETRON 4 MG: 2 INJECTION INTRAMUSCULAR; INTRAVENOUS at 03:17

## 2023-02-27 RX ADMIN — IOPAMIDOL 75 ML: 755 INJECTION, SOLUTION INTRAVENOUS at 05:28

## 2023-02-27 RX ADMIN — DEXAMETHASONE SODIUM PHOSPHATE 10 MG: 10 INJECTION INTRAMUSCULAR; INTRAVENOUS at 03:51

## 2023-02-27 RX ADMIN — MICONAZOLE NITRATE: 20 POWDER TOPICAL at 11:58

## 2023-02-27 RX ADMIN — SODIUM CHLORIDE, PRESERVATIVE FREE 10 ML: 5 INJECTION INTRAVENOUS at 11:59

## 2023-02-27 RX ADMIN — ATORVASTATIN CALCIUM 20 MG: 20 TABLET, FILM COATED ORAL at 11:57

## 2023-02-27 RX ADMIN — IPRATROPIUM BROMIDE AND ALBUTEROL SULFATE 1 AMPULE: 2.5; .5 SOLUTION RESPIRATORY (INHALATION) at 07:46

## 2023-02-27 RX ADMIN — FENTANYL CITRATE 50 MCG: 50 INJECTION INTRAMUSCULAR; INTRAVENOUS at 07:57

## 2023-02-27 RX ADMIN — ASPIRIN 81 MG: 81 TABLET, COATED ORAL at 11:56

## 2023-02-27 RX ADMIN — SODIUM CHLORIDE, PRESERVATIVE FREE 10 ML: 5 INJECTION INTRAVENOUS at 05:25

## 2023-02-27 RX ADMIN — IPRATROPIUM BROMIDE AND ALBUTEROL SULFATE 1 AMPULE: 2.5; .5 SOLUTION RESPIRATORY (INHALATION) at 13:00

## 2023-02-27 RX ADMIN — KETOROLAC TROMETHAMINE 15 MG: 30 INJECTION, SOLUTION INTRAMUSCULAR; INTRAVENOUS at 03:17

## 2023-02-27 RX ADMIN — HYDROCHLOROTHIAZIDE 12.5 MG: 12.5 TABLET ORAL at 11:57

## 2023-02-27 RX ADMIN — HYDRALAZINE HYDROCHLORIDE 100 MG: 50 TABLET, FILM COATED ORAL at 11:57

## 2023-02-27 ASSESSMENT — ENCOUNTER SYMPTOMS
ABDOMINAL DISTENTION: 1
DIARRHEA: 0
DIARRHEA: 1
ABDOMINAL PAIN: 1
RHINORRHEA: 0
VOMITING: 1
RHINORRHEA: 1
NAUSEA: 0
CHEST TIGHTNESS: 0
SORE THROAT: 0
SHORTNESS OF BREATH: 0
EYE PAIN: 0
WHEEZING: 0
NAUSEA: 1
CONSTIPATION: 0
COUGH: 1

## 2023-02-27 ASSESSMENT — PAIN SCALES - GENERAL
PAINLEVEL_OUTOF10: 8
PAINLEVEL_OUTOF10: 1
PAINLEVEL_OUTOF10: 6
PAINLEVEL_OUTOF10: 7
PAINLEVEL_OUTOF10: 8

## 2023-02-27 ASSESSMENT — PAIN DESCRIPTION - LOCATION
LOCATION: ABDOMEN

## 2023-02-27 ASSESSMENT — PAIN DESCRIPTION - ORIENTATION: ORIENTATION: MID

## 2023-02-27 ASSESSMENT — PAIN - FUNCTIONAL ASSESSMENT
PAIN_FUNCTIONAL_ASSESSMENT: 0-10
PAIN_FUNCTIONAL_ASSESSMENT: 0-10

## 2023-02-27 ASSESSMENT — PAIN DESCRIPTION - DESCRIPTORS: DESCRIPTORS: ACHING

## 2023-02-27 NOTE — PROGRESS NOTES
[address]  Progress Note    Date:2/27/2023       Room:06/06  Patient Name:Eliana Romero     YOB: 1975     Age:48 y.o. Chief Complaint   Patient presents with    Nausea    Abdominal Cramping     X3 days     DATE OF SERVICE 2/27/2023    SUBJECTIVE     Patient seen today in the ER. She endorses diffuse abdominal pain. She mentions that her mother had similar episodes of emesis. Patient doing better after duoneb treatment. Denies chest pain, SOB, or chills. As per nurse, no overnight events     Past medical, surgical, family and social history were reviewed, non-contributory, and unchanged unless otherwise stated. Review of Systems   Review of Systems   Constitutional:  Negative for chills and fever. HENT:  Negative for congestion, rhinorrhea and sore throat. Eyes:  Negative for pain and visual disturbance. Respiratory:  Negative for shortness of breath and wheezing. Cardiovascular:  Negative for chest pain and palpitations. Gastrointestinal:  Positive for abdominal distention and abdominal pain. Negative for diarrhea and nausea. Genitourinary:  Negative for dysuria and hematuria. Musculoskeletal:  Negative for arthralgias and myalgias. Skin:  Negative for rash. Neurological:  Negative for dizziness and headaches.   as above, otherwise negative    Medications   Scheduled Meds:   Continuous Infusions:   PRN Meds: regadenoson    Past History    Past Medical History:   has a past medical history of Acute renal injury ([address] Analgesic overuse headache, Anxiety, Arthritis, Asthma, CAD (coronary artery disease), Depression, Diabetes mellitus ([address] Fracture of tooth, GERD (gastroesophageal reflux disease), Glaucoma, Hyperlipidemia, Hypertension, Hypertensive urgency, Hypothyroidism, Irritable bowel syndrome, Morbid obesity due to excess calories ([address] Obesity, Obstructive sleep apnea, Pneumonia, Polycystic ovarian syndrome, Postcholecystectomy syndrome, Spinal headache, and Suicidal ideation. Social History:   reports that she has never smoked. She has never used smokeless tobacco. She reports that she does not drink alcohol and does not use drugs. Family History:   Family History   Problem Relation Age of Onset    Asthma Mother     Diabetes Mother     High Blood Pressure Mother     High Blood Pressure Father     Heart Disease Father     Cancer Father     Depression Father     Diabetes Brother     Asthma Brother     Thyroid Disease Sister     Asthma Sister     Depression Maternal Grandmother     High Blood Pressure Maternal Grandmother     Mental Illness Maternal Grandmother     Thyroid Disease Maternal Grandmother     Heart Disease Maternal Grandfather     Depression Paternal Grandmother     High Blood Pressure Paternal Grandmother     Cancer Paternal Grandfather        OBJECTIVE     Vitals:  BP (!) 167/95   Pulse 85   Temp 98.8 °F (37.1 °C) (Oral)   Resp 20   Ht 5' 6\" (1.676 m)   Wt (!) 361 lb (163.7 kg)   LMP 10/01/2012 (Approximate) Comment: 2013  SpO2 98%   BMI 58.27 kg/m²   Temp (24hrs), Av.8 °F (37.1 °C), Min:98.8 °F (37.1 °C), Max:98.8 °F (37.1 °C)      I/O (24Hr): No intake or output data in the 24 hours ending 23 0618    Physical Examination        Physical Exam  Vitals and nursing note reviewed. Constitutional:       General: She is not in acute distress. Appearance: Normal appearance. She is ill-appearing. HENT:      Head: Normocephalic and atraumatic. Eyes:      General:         Right eye: No discharge. Left eye: No discharge. Cardiovascular:      Rate and Rhythm: Normal rate and regular rhythm. Heart sounds: No murmur heard. Pulmonary:      Effort: Pulmonary effort is normal.      Breath sounds: No wheezing. Abdominal:      General: Bowel sounds are normal.      Palpations: Abdomen is soft. Tenderness: There is abdominal tenderness (diffusely tender). Musculoskeletal:         General: No swelling. Normal range of motion. Right lower leg: No edema. Left lower leg: No edema. Skin:     General: Skin is warm and dry. Neurological:      General: No focal deficit present. Mental Status: She is oriented to person, place, and time. Labs/Imaging/Diagnostics   Labs:  CBC:  Recent Labs     02/27/23 0302   WBC 8.7   RBC 4.70   HGB 14.2   HCT 42.8   MCV 91.1   RDW 13.0        CHEMISTRIES:  Recent Labs     02/27/23 0302      K 4.0      CO2 28   BUN 13   CREATININE 0.9   GLUCOSE 224*   MG 1.6     PT/INR:No results for input(s): PROTIME, INR in the last 72 hours. APTT:No results for input(s): APTT in the last 72 hours. LIVER PROFILE:  Recent Labs     02/27/23 0302   AST 20   ALT 20   BILIDIR <0.2   BILITOT 0.6   ALKPHOS 87       Imaging Last 24 Hours:  XR CHEST PORTABLE    Result Date: 2/27/2023  EXAMINATION: ONE XRAY VIEW OF THE CHEST 2/27/2023 4:10 am COMPARISON: 02/10/2023 HISTORY: ORDERING SYSTEM PROVIDED HISTORY: hypoxia TECHNOLOGIST PROVIDED HISTORY: Reason for exam:->hypoxia FINDINGS: The lungs are without acute focal process. There is no effusion or pneumothorax. The cardiomediastinal silhouette is without acute process. The osseous structures are without acute process. No acute process. ASSESSMENT        Hospital Problems             Last Modified POA    * (Principal) Acute asthma exacerbation 2/27/2023 Yes       PLAN        Acute asthma exacerbation with hypoxia  In the ED, saturations in high 80's, improved with 4L O2. Received Decadron 10 mg, Duonebs x1, fluids, zofran 4 mg  - Continue duo nebs q4h prn  - Start prednisone 40 mg daily  - Supplemental O2; wean as tolerated  - RFA pending;   - CTA chest pending;negative     Abdominal pain with nausea and vomiting  2 day hx of crampy abd pain with N/V x30 episodes, viral gastroenteritis suspected. Sick contacts?  Mother similar presentation  - Compazine given elevated Qtc  - CT AP w contrast pending; negative    DM2  Last A1C 6.1 [8/5/22]. Had upcoming endocrine appointment. On an insulin pump.    - Cont home insulin pump  - POCT glucose checks  - Hypoglycemia protocol    HTN  - Home Diltiazem, Hydralazine, HCTZ, and Lisinopril    HLD  - Lipitopr 20 mg daily      DVT Prophylaxis: lovenox  GI Prophylaxis:  none  Diet: No diet orders on file   Code: full   Advance Directives       Power of  Living Will ACP-Advance Directive ACP-Power of     Not on File [place] on 02/14/14 Filed Not on File             Electronically signed by Snehal Lentz MD PGY-1 Family Medicine Resident on 02/27/23 at 6:18 AM

## 2023-02-27 NOTE — H&P
[place] [id number]  Resident History and Physical      CHIEF COMPLAINT:    Chief Complaint   Patient presents with    Nausea    Abdominal Cramping     X3 days        History of Present Illness:   Sarah Fernandez  is a 53 y.o. female patient of Randel Holter, MD  with a pertinent PMHx of T2DM, ALO untreated, asthma, and HTN who presented to the ER with chief complaint of nausea and vomiting. Patient says that starting Saturday she started to feel \"sick\" to her stomach. This continued into Sunday where Sunday afternoon she started to vomit continuously. She is unsure how many times she vomited. She was able to tolerate some food Sunday afternoon and is tolerating fluids now. In addition, she has had some issues with a dry cough. She has noticed some difficulty getting a deep breath but denies wheezing, or chest tightness. She was in the ER on 2/10 and diagnosed with bronchitis. She has not noticed any wheezing or increase in  her need for her albuterol inhaler. Ginger ale has improved her nausea and nothing has worsened her symptoms. She has had no known sick contacts. Denies fever, chills, congestion, chest pain, shortness of breath, constipation. Has had dry cough, runny nose, abdominal pain cramping, diarrhea, chronic swelling    In the ED, vitals were significant for hypertension 167/95 as well as hypoxia with an SPO2 of 88% improving to 98% on 4 L of oxygen. Laboratory work was relatively unremarkable except for troponins of 17 with a repeat of 16 as well as a blood glucose of 224. Otherwise venous pH, mag, lipase, beta hydroxybutyrate, BMP, CBC, hepatic function panels were all within normal limits. Patient was COVID and flu negative. Chest x-ray showed no acute process. CT abdomen pelvis with contrast and CTA chest were pending at the time of admission.  In the ED, patient received 1 L normal saline bolus, Decadron 10 mg, Toradol 15 mg, Zofran 4 mg, and 1 DuoNeb treatment. ROS:   Review of Systems   Constitutional:  Positive for chills. Negative for diaphoresis, fatigue and fever. HENT:  Positive for rhinorrhea. Negative for congestion and sore throat. Respiratory:  Positive for cough (dry cough). Negative for chest tightness, shortness of breath and wheezing. Cardiovascular:  Negative for chest pain and palpitations. Gastrointestinal:  Positive for abdominal pain (diffuse), diarrhea, nausea and vomiting. Negative for constipation. Genitourinary:  Negative for dysuria and frequency. Neurological:  Negative for dizziness and light-headedness. All other systems reviewed and are negative.     PMHx:  Past Medical History:   Diagnosis Date    Acute renal injury ([facility] 2013    Analgesic overuse headache 2018    Anxiety     Arthritis     Asthma     CAD (coronary artery disease)     Depression 2013    Diabetes mellitus ([facility]     A1C=6.7 on 14    Fracture of tooth 2018    GERD (gastroesophageal reflux disease)     Glaucoma     Hyperlipidemia 2021    Hypertension     Hypertensive urgency 2013    Hypothyroidism     Irritable bowel syndrome     Morbid obesity due to excess calories ([facility]     Obesity     Obstructive sleep apnea     Pneumonia     Polycystic ovarian syndrome     Postcholecystectomy syndrome 2018    Spinal headache     Suicidal ideation 2016       PSHx  Past Surgical History:   Procedure Laterality Date     SECTION      Dr. Blaise Rosario, [address] [facility], LAPAROSCOPIC      [address] [facility] SURGERY  10/06/2011    4 extractions    ECHO COMPLETE  2014         ENDOSCOPY, COLON, DIAGNOSTIC      FOOT SURGERY      RECONSTRUCTION LEFT FOOT,  [place], [address]  9/3/13    incisional hernia repair with mesh    HYSTERECTOMY (CERVIX STATUS UNKNOWN)      KNEE CARTILAGE SURGERY      OVARY REMOVAL      left due to polycystic ovary,  [place], [facility]    BRYNAT AND BSO (CERVIX REMOVED)  3/19/2013    Attempted Maria Victoria Watson [facility];RSO, Dr. Srinath Yan [facility] N/A 9/21/2022    EGD BIOPSY performed by Levar Jimenez MD at [place] [address]       Medications Prior to Admission:    Prior to Admission medications    Medication Sig Start Date End Date Taking? Authorizing Provider   ASPIRIN LOW DOSE 81 MG EC tablet TAKE 1 TABLET BY MOUTH DAILY 1/15/23   Melissa Torres MD   hydroCHLOROthiazide (HYDRODIURIL) 12.5 MG tablet TAKE ONE TABLET BY MOUTH EVERY DAY 1/15/23   Melissa Torres MD   Accu-Chek FastClix Lancets MISC USE TO CHECK BLOOD GLUCOSE 4X DAILY 1/9/23   GEOVANI Weems NP   atorvastatin (LIPITOR) 20 MG tablet TAKE 1 TABLET BY MOUTH DAILY 12/15/22   Melissa Torres MD   lisinopril (PRINIVIL;ZESTRIL) 40 MG tablet TAKE ONE TABLET BY MOUTH EVERY DAY 12/15/22   Melissa Torres MD   Dulaglutide (TRULICITY) 3 NH/3.8IH SOPN Inject 3 mg into the skin once a week 10/28/22   GEOVANI Weems NP   insulin lispro (HUMALOG) 100 UNIT/ML SOLN injection vial USE VIA INSULIN PUMP.  MAX 85 UNITS DAILY 10/7/22   Bethanie Magdaleno MD   Accu-Chek Softclix Lancets MISC Check blood sugar 4x daily 5/12/22   Bethanie Magdaleno MD   blood glucose test strips (ACCU-CHEK GUIDE) strip Use to check blood sugars 4x daily 5/12/22   Bethanie Magdaleno MD   dilTIAZem (CARDIZEM CD) 360 MG extended release capsule Take 1 capsule by mouth daily 5/3/22   Melissa Torres MD   hydrALAZINE (APRESOLINE) 100 MG tablet Take 1 tablet by mouth every 8 hours 5/3/22   Melissa Torres MD   Insulin Infusion Pump (MINIMED 770G INSULIN PUMP SYS) KIT To infuse insulin 4/20/22   GEOVANI Weems NP   Continuous Blood Gluc Sensor (GUARDIAN SENSOR 3) MISC To change every 7 days 4/20/22   Glenora Veronica Heutsche, APRN - NP   Continuous Blood Gluc Transmit (GUARDIAN LINK 3 TRANSMITTER) MISC To use with sensors 4/20/22   Mary Martin GEOVANI Rausch NP   hydrocortisone (ANUSOL-HC) 2.5 % CREA rectal cream Apply to affected area twice daily for rectal pain from external hemorrhoid. 4/16/22   Gaviota Hunt,    dicyclomine (BENTYL) 10 MG capsule Take 1 capsule by mouth 4 times daily 4/15/22   Jackie Arias MD   Insulin Pen Needle (BD PEN NEEDLE MICRO U/F) 32G X 6 MM MISC Uses with insulin 4 times a day 4/12/22   Kailyn Rosales MD   Insulin Pen Needle (BD PEN NEEDLE MICRO U/F) 32G X 6 MM MISC Uses with insulin 4 times a day 4/12/22   Kailyn Rosales MD   glucose monitoring (FREESTYLE FREEDOM) kit 1 kit by Does not apply route daily 4/12/22   Kailyn Rosales MD   mupirocin (BACTROBAN) 2 % ointment Apply topically 3 times daily as needed (apply to both hands and under breasts)    Historical Provider, MD   triamcinolone (ARISTOCORT) 0.5 % cream Apply topically 3 times daily as needed (apply to both hands and under breasts)    Historical Provider, MD   albuterol sulfate HFA (VENTOLIN HFA) 108 (90 Base) MCG/ACT inhaler Inhale 2 puffs into the lungs 4 times daily as needed for Wheezing 3/29/22   Savanna Nathan DO   pantoprazole (PROTONIX) 40 MG tablet Take 1 tablet by mouth daily for 10 days 7/10/21 7/10/21  Janina Cain DO   ondansetron (ZOFRAN-ODT) 4 MG disintegrating tablet Take 1 tablet by mouth 3 times daily as needed for Nausea or Vomiting 2/17/21   Maryclare Kehr, PA        Allergies:   Percocet [oxycodone-acetaminophen], Food, Metformin and related, and Tape [adhesive tape]    Social History:    reports that she has never smoked. She has never used smokeless tobacco. She reports that she does not drink alcohol and does not use drugs.     Family History:   family history includes Asthma in her brother, mother, and sister; Cancer in her father and paternal grandfather; Depression in her father, maternal grandmother, and paternal grandmother; Diabetes in her brother and mother; Heart Disease in her father and maternal grandfather; High Blood Pressure in her father, maternal grandmother, mother, and paternal grandmother; Mental Illness in her maternal grandmother; Thyroid Disease in her maternal grandmother and sister. PHYSICAL EXAM:    Vitals:  BP (!) 167/95   Pulse 85   Temp 98.8 °F (37.1 °C) (Oral)   Resp 20   Ht 5' 6\" (1.676 m)   Wt (!) 361 lb (163.7 kg)   LMP 10/01/2012 (Approximate) Comment: 2013  SpO2 98%   BMI 58.27 kg/m²     Physical Exam  Constitutional:       General: She is not in acute distress. Appearance: Normal appearance. She is obese. HENT:      Head: Normocephalic and atraumatic. Nose: No congestion or rhinorrhea. Mouth/Throat:      Mouth: Mucous membranes are moist.      Pharynx: Oropharynx is clear. Eyes:      Extraocular Movements: Extraocular movements intact. Conjunctiva/sclera: Conjunctivae normal.   Cardiovascular:      Rate and Rhythm: Normal rate and regular rhythm. Pulses: Normal pulses. Heart sounds: Normal heart sounds. No murmur heard. Pulmonary:      Effort: Pulmonary effort is normal. No respiratory distress. Breath sounds: Normal breath sounds. No wheezing or rales. Abdominal:      General: Bowel sounds are normal.      Tenderness: There is abdominal tenderness (generalized tenderness throughout). There is no guarding or rebound. Hernia: No hernia is present. Musculoskeletal:      Cervical back: Normal range of motion. Right lower leg: No edema. Left lower leg: No edema. Skin:     General: Skin is warm and dry. Neurological:      General: No focal deficit present. Mental Status: She is alert and oriented to person, place, and time. Cranial Nerves: No cranial nerve deficit. Sensory: No sensory deficit. Motor: No weakness.    Psychiatric:         Attention and Perception: Attention normal.         Mood and Affect: Mood normal.         Behavior: Behavior normal.       LABS:  Recent Results (from the past 24 hour(s))   CBC with Auto Differential    Collection Time: 02/27/23  3:02 AM   Result Value Ref Range    WBC 8.7 4.5 - 11.5 E9/L    RBC 4.70 3.50 - 5.50 E12/L    Hemoglobin 14.2 11.5 - 15.5 g/dL    Hematocrit 42.8 34.0 - 48.0 %    MCV 91.1 80.0 - 99.9 fL    MCH 30.2 26.0 - 35.0 pg    MCHC 33.2 32.0 - 34.5 %    RDW 13.0 11.5 - 15.0 fL    Platelets 186 153 - 409 E9/L    MPV 10.8 7.0 - 12.0 fL    Neutrophils % 85.7 (H) 43.0 - 80.0 %    Immature Granulocytes % 0.2 0.0 - 5.0 %    Lymphocytes % 6.3 (L) 20.0 - 42.0 %    Monocytes % 5.0 2.0 - 12.0 %    Eosinophils % 2.6 0.0 - 6.0 %    Basophils % 0.2 0.0 - 2.0 %    Neutrophils Absolute 7.48 (H) 1.80 - 7.30 E9/L    Immature Granulocytes # 0.02 E9/L    Lymphocytes Absolute 0.55 (L) 1.50 - 4.00 E9/L    Monocytes Absolute 0.44 0.10 - 0.95 E9/L    Eosinophils Absolute 0.23 0.05 - 0.50 E9/L    Basophils Absolute 0.02 0.00 - 0.20 E9/L    Polychromasia 1+    Basic Metabolic Panel    Collection Time: 02/27/23  3:02 AM   Result Value Ref Range    Sodium 137 132 - 146 mmol/L    Potassium 4.0 3.5 - 5.0 mmol/L    Chloride 100 98 - 107 mmol/L    CO2 28 22 - 29 mmol/L    Anion Gap 9 7 - 16 mmol/L    Glucose 224 (H) 74 - 99 mg/dL    BUN 13 6 - 20 mg/dL    Creatinine 0.9 0.5 - 1.0 mg/dL    Est, Glom Filt Rate >60 >=60 mL/min/1.73    Calcium 8.3 (L) 8.6 - 10.2 mg/dL   Hepatic Function Panel    Collection Time: 02/27/23  3:02 AM   Result Value Ref Range    Total Protein 6.4 6.4 - 8.3 g/dL    Albumin 3.5 3.5 - 5.2 g/dL    Alkaline Phosphatase 87 35 - 104 U/L    ALT 20 0 - 32 U/L    AST 20 0 - 31 U/L    Total Bilirubin 0.6 0.0 - 1.2 mg/dL    Bilirubin, Direct <0.2 0.0 - 0.3 mg/dL    Bilirubin, Indirect see below 0.0 - 1.0 mg/dL   Beta-Hydroxybutyrate    Collection Time: 02/27/23  3:02 AM   Result Value Ref Range    Beta-Hydroxybutyrate 0.17 0.02 - 0.27 mmol/L   PH, VENOUS    Collection Time: 02/27/23  3:02 AM   Result Value Ref Range    pH, Gen 7.40 7.35 - 7.45   Lipase    Collection Time: 02/27/23  3:02 AM   Result Value Ref Range    Lipase 20 13 - 60 U/L   COVID-19, Rapid    Collection Time: 02/27/23  3:02 AM    Specimen: Nasopharyngeal Swab   Result Value Ref Range    SARS-CoV-2, NAAT Not Detected Not Detected   RAPID INFLUENZA A/B ANTIGENS    Collection Time: 02/27/23  3:02 AM    Specimen: Nasopharyngeal   Result Value Ref Range    Influenza A by PCR Not Detected Not Detected    Influenza B by PCR Not Detected Not Detected   Troponin    Collection Time: 02/27/23  3:02 AM   Result Value Ref Range    Troponin, High Sensitivity 17 (H) 0 - 9 ng/L   Magnesium    Collection Time: 02/27/23  3:02 AM   Result Value Ref Range    Magnesium 1.6 1.6 - 2.6 mg/dL   Troponin    Collection Time: 02/27/23  3:44 AM   Result Value Ref Range    Troponin, High Sensitivity 16 (H) 0 - 9 ng/L       XR CHEST PORTABLE   Final Result   No acute process. CTA PULMONARY W CONTRAST    (Results Pending)   CT ABDOMEN PELVIS W IV CONTRAST Additional Contrast? None    (Results Pending)       ASSESSMENT/PLAN:      Active Hospital Problems    Diagnosis Date Noted    Acute asthma exacerbation [J45.901] 02/27/2023     Priority: Medium     Acute asthma exacerbation with hypoxia  Wheezing on ER physician's exam prior to duoneb. Hypoxia most likely due to asthma exacerbation in the setting of a viral illness. Desaturated to 88% in the ED and recovered to 98% on 4L  Received Decadron and DuoNebs in the ED  Continue duo nebs every 4 hours as needed  Start prednisone 40 mg daily  CTA Chest pending  RFA pending  Wean oxygen as tolerated    Abdominal pain/Nausea/Vomiting  2 day history of nausea vomiting and cramping abdominal pain. Suspect viral gastritis.   CT w contrast abdomen/pelvis pending  Laboratory workup unremarkable  Compazine for nausea/vomiting with previously elevated QTc    T2DM  Last A1c 6.1  Continue home insulin pump  Patient took Trulicity on 6/41  POCT glucose checks  Hypoglycemia orders placed    Hypertension  Continue home diltiazem, hydralazine, HCTZ, and lisinopril  Elevated in the ED, titrate medication as needed    Hyperlipidemia  Continue home     DVT ppx: Lovenox  Code Status: Full code    Case discussed with attending on call Dr. Bryce Vivar.     Andre Antoine DO  Family Medicine Resident PGY-3  2/27/2023

## 2023-02-27 NOTE — ED PROVIDER NOTES
[facility] ENCOUNTER        Pt Name: Nehemiah Dolan  MRN: [id number]  [place] 1975  Date of evaluation: 2023  Provider: Alix Christian MD  PCP: Pam Torres MD  Note Started: 2:47 AM EST 23    CHIEF COMPLAINT       Chief Complaint   Patient presents with    Nausea    Abdominal Cramping     X3 days       HISTORY OF PRESENT ILLNESS: 1 or more Elements        Limitations to history : None    Eliana Lauren Oscar is a 53 y.o. female who presents to the emergency department for nausea, vomiting, abdominal cramping, explosive diarrhea per the patient. Denies any sick contacts, denies any fevers or chills, denies any chest pain, chest tightness, shortness of breath. Denies any dysuria hematuria. Denies any cough or congestion. Nursing Notes were all reviewed and agreed with or any disagreements were addressed in the HPI. REVIEW OF EXTERNAL NOTE :       Office visit 2023, patient is currently following with bariatric surgery    REVIEW OF SYSTEMS :      Positives and Pertinent negatives as per HPI.      SURGICAL HISTORY     Past Surgical History:   Procedure Laterality Date     SECTION      Dr. Ayla Waggoner, [address], LAPAROSCOPIC      [facility]    COLONOSCOPY      DENTAL SURGERY  10/06/2011    4 extractions    ECHO COMPLETE  2014         ENDOSCOPY, COLON, DIAGNOSTIC      FOOT SURGERY      RECONSTRUCTION LEFT FOOT, Dr. Nisa Denis, [address]  9/3/13    incisional hernia repair with mesh    HYSTERECTOMY (CERVIX STATUS UNKNOWN)      KNEE CARTILAGE SURGERY      OVARY REMOVAL      left due to polycystic ovary, Dr. Joseluis Shelby, [address] [facility] (CERVIX REMOVED)  3/19/2013    Attempted Lap-Open Dr. Gary Meyer, [facility] N/A 2022    EGD BIOPSY performed by Virgil Gonzales MD at [place] [address] CURRENTMEDICATIONS       Previous Medications    ACCU-CHEK FASTCLIX LANCETS MISC    USE TO CHECK BLOOD GLUCOSE 4X DAILY    ACCU-CHEK SOFTCLIX LANCETS MISC    Check blood sugar 4x daily    ALBUTEROL SULFATE HFA (VENTOLIN HFA) 108 (90 BASE) MCG/ACT INHALER    Inhale 2 puffs into the lungs 4 times daily as needed for Wheezing    ASPIRIN LOW DOSE 81 MG EC TABLET    TAKE 1 TABLET BY MOUTH DAILY    ATORVASTATIN (LIPITOR) 20 MG TABLET    TAKE 1 TABLET BY MOUTH DAILY    BLOOD GLUCOSE TEST STRIPS (ACCU-CHEK GUIDE) STRIP    Use to check blood sugars 4x daily    CONTINUOUS BLOOD GLUC SENSOR (GUARDIAN SENSOR 3) MISC    To change every 7 days    CONTINUOUS BLOOD GLUC TRANSMIT ([employer] LINK 3 TRANSMITTER) MISC    To use with sensors    DICYCLOMINE (BENTYL) 10 MG CAPSULE    Take 1 capsule by mouth 4 times daily    DILTIAZEM (CARDIZEM CD) 360 MG EXTENDED RELEASE CAPSULE    Take 1 capsule by mouth daily    DULAGLUTIDE (TRULICITY) 3 LW/3.3AR SOPN    Inject 3 mg into the skin once a week    GLUCOSE MONITORING (FREESTYLE FREEDOM) KIT    1 kit by Does not apply route daily    HYDRALAZINE (APRESOLINE) 100 MG TABLET    Take 1 tablet by mouth every 8 hours    HYDROCHLOROTHIAZIDE (HYDRODIURIL) 12.5 MG TABLET    TAKE ONE TABLET BY MOUTH EVERY DAY    HYDROCORTISONE (ANUSOL-HC) 2.5 % CREA RECTAL CREAM    Apply to affected area twice daily for rectal pain from external hemorrhoid. INSULIN INFUSION PUMP (MINIMED 770G INSULIN PUMP SYS) KIT    To infuse insulin    INSULIN LISPRO (HUMALOG) 100 UNIT/ML SOLN INJECTION VIAL    USE VIA INSULIN PUMP.  MAX 85 UNITS DAILY    INSULIN PEN NEEDLE (BD PEN NEEDLE MICRO U/F) 32G X 6 MM MISC    Uses with insulin 4 times a day    INSULIN PEN NEEDLE (BD PEN NEEDLE MICRO U/F) 32G X 6 MM MISC    Uses with insulin 4 times a day    LISINOPRIL (PRINIVIL;ZESTRIL) 40 MG TABLET    TAKE ONE TABLET BY MOUTH EVERY DAY    MUPIROCIN (BACTROBAN) 2 % OINTMENT    Apply topically 3 times daily as needed (apply to both hands and under breasts)    ONDANSETRON (ZOFRAN-ODT) 4 MG DISINTEGRATING TABLET    Take 1 tablet by mouth 3 times daily as needed for Nausea or Vomiting    TRIAMCINOLONE (ARISTOCORT) 0.5 % CREAM    Apply topically 3 times daily as needed (apply to both hands and under breasts)       ALLERGIES     Percocet [oxycodone-acetaminophen], Food, Metformin and related, and Tape [adhesive tape]    FAMILYHISTORY       Family History   Problem Relation Age of Onset    Asthma Mother     Diabetes Mother     High Blood Pressure Mother     High Blood Pressure Father     Heart Disease Father     Cancer Father     Depression Father     Diabetes Brother     Asthma Brother     Thyroid Disease Sister     Asthma Sister     Depression Maternal Grandmother     High Blood Pressure Maternal Grandmother     Mental Illness Maternal Grandmother     Thyroid Disease Maternal Grandmother     Heart Disease Maternal Grandfather     Depression Paternal Grandmother     High Blood Pressure Paternal Grandmother     Cancer Paternal Grandfather         SOCIAL HISTORY       Social History     Tobacco Use    Smoking status: Never    Smokeless tobacco: Never   Vaping Use    Vaping Use: Never used   Substance Use Topics    Alcohol use: No     Comment: no alcohol since age 26;  drinks 2-3 glasses Coke/Pepsi daily & 2-3 glasses of iced tea daily     Drug use: Never       SCREENINGS        [place] Coma Scale  Eye Opening: Spontaneous  Best Verbal Response: Oriented  Best Motor Response: Obeys commands  Rita Coma Scale Score: 15                CIWA Assessment  BP: (!) 167/95  Heart Rate: 85           PHYSICAL EXAM  1 or more Elements     ED Triage Vitals   BP Temp Temp src Pulse Resp SpO2 Height Weight   -- -- -- -- -- -- -- --         Physical Exam  Vitals and nursing note reviewed. Constitutional:       Appearance: Normal appearance. She is not toxic-appearing. HENT:      Head: Normocephalic and atraumatic.       Right Ear: External ear normal.      Left Ear: External ear normal.      Nose: Nose normal.      Mouth/Throat:      Mouth: Mucous membranes are moist.   Eyes:      Extraocular Movements: Extraocular movements intact. Pupils: Pupils are equal, round, and reactive to light. Cardiovascular:      Rate and Rhythm: Normal rate and regular rhythm. Pulses: Normal pulses. Heart sounds: Normal heart sounds. Pulmonary:      Effort: Pulmonary effort is normal.      Breath sounds: Normal breath sounds. Abdominal:      General: Abdomen is flat. Bowel sounds are normal.      Palpations: Abdomen is soft. Tenderness: There is abdominal tenderness (Generalized tenderness throughout). Musculoskeletal:         General: Normal range of motion. Cervical back: Normal range of motion and neck supple. Skin:     General: Skin is warm and dry. Neurological:      Mental Status: She is alert.                 DIAGNOSTIC RESULTS   LABS:    Labs Reviewed   CBC WITH AUTO DIFFERENTIAL - Abnormal; Notable for the following components:       Result Value    Neutrophils % 85.7 (*)     Lymphocytes % 6.3 (*)     Neutrophils Absolute 7.48 (*)     Lymphocytes Absolute 0.55 (*)     All other components within normal limits   BASIC METABOLIC PANEL - Abnormal; Notable for the following components:    Glucose 224 (*)     Calcium 8.3 (*)     All other components within normal limits   TROPONIN - Abnormal; Notable for the following components:    Troponin, High Sensitivity 17 (*)     All other components within normal limits   TROPONIN - Abnormal; Notable for the following components:    Troponin, High Sensitivity 16 (*)     All other components within normal limits   COVID-19, RAPID   RAPID INFLUENZA A/B ANTIGENS   HEPATIC FUNCTION PANEL   BETA-HYDROXYBUTYRATE   PH, VENOUS   LIPASE   MAGNESIUM   URINALYSIS WITH MICROSCOPIC       As interpreted by me, the above displayed labs are abnormal. All other labs obtained during this visit were within normal range or not returned as of this dictation. RADIOLOGY:   Non-plain film images such as CT, Ultrasound and MRI are read by the radiologist. Plain radiographic images are visualized and preliminarily interpreted by the ED Provider with the findings documented in the ED Course. Interpretation per the Radiologist below, if available at the time of this note:    XR CHEST PORTABLE   Final Result   No acute process. CTA PULMONARY W CONTRAST    (Results Pending)   CT ABDOMEN PELVIS W IV CONTRAST Additional Contrast? None    (Results Pending)     No results found. No results found. PROCEDURES   Unless otherwise noted below, none       CRITICAL CARE TIME (.cct)   None    PAST MEDICAL HISTORY/Chronic Conditions Affecting Care      has a past medical history of Acute renal injury ([facility] [address] (09/04/2013), Analgesic overuse headache (12/19/2018), Anxiety, Arthritis, Asthma, CAD (coronary artery disease), Depression (03/19/2013), Diabetes mellitus ([facility] [address] Fracture of tooth (12/19/2018), GERD (gastroesophageal reflux disease), Glaucoma, Hyperlipidemia (12/08/2021), Hypertension, Hypertensive urgency (03/19/2013), Hypothyroidism, Irritable bowel syndrome, Morbid obesity due to excess calories ([facility] [address] Obesity, Obstructive sleep apnea, Pneumonia, Polycystic ovarian syndrome, Postcholecystectomy syndrome (12/19/2018), Spinal headache, and Suicidal ideation (03/18/2016).      EMERGENCY DEPARTMENT COURSE    Vitals:    Vitals:    02/27/23 0249 02/27/23 0315 02/27/23 0326 02/27/23 0354   BP: (!) 183/106   (!) 167/95   Pulse: 94 87  85   Resp: 20 20  20   Temp: 98.8 °F (37.1 °C)      TempSrc: Oral      SpO2:  (!) 88% 93% 98%   Weight: (!) 361 lb (163.7 kg)      Height: 5' 6\" (1.676 m)          Patient was given the following medications:  Medications   ipratropium-albuterol (DUONEB) nebulizer solution 1 ampule (1 ampule Inhalation Given 2/27/23 0352)   0.9 % sodium chloride bolus (0 mLs IntraVENous Stopped 2/27/23 0412)   ondansetron [facility]) injection 4 mg (4 mg IntraVENous Given 23)   ketorolac (TORADOL) injection 15 mg (15 mg IntraVENous Given 23)   dexamethasone (DECADRON) injection 10 mg (10 mg IntraVENous Given 23)   iopamidol (ISOVUE-370) 76 % injection 75 mL (75 mLs IntraVENous Given 23)   sodium chloride flush 0.9 % injection 10 mL (10 mLs IntraVENous Given 23)         Is this patient to be included in the SEP-1 Core Measure due to severe sepsis or septic shock? No Exclusion criteria - the patient is NOT to be included for SEP-1 Core Measure due to: Infection is not suspected          Medical Decision Making/Differential Diagnosis:    CC/HPI Summary, Social Determinants of health, Records Reviewed, DDx, testing done/not done, ED Course, Reassessment, disposition considerations/shared decision making with patient, consults, disposition:        ED Course as of 23 EC  ECG reviewed by myself  Normal sinus rhythm  Heart rate 90  Left ventricular hypertrophy, no acute ischemia, normal QTc, normal axis, normal NJ and QRS intervals possible old inferior infarct  Stable EKG [VIANEY]   6690 Reevaluated patient as she was notably hypoxic on her vitals. She had some wheezing in the posterior lung fields, will give breathing treatments, order CT scan and chest x-ray to evaluate for pneumonia. She has not typically required oxygen before. [JG]   [id number] Dr Justin Negrete here for family medicine admitting patient [VIANEY]   0772 My interpretation of patient's chest x-ray no visualized focal infiltrates or pneumothorax. [JG]   [age]-year-old female presenting to the emerged part for nausea vomiting and diarrhea that began the night prior. Was describing explosive nausea vomiting diarrhea that was nonbloody nonbilious. Denies any bad food she might of gotten into or sick contacts.   Upon arrival patient was afebrile, with generalized abdominal tenderness throughout, complaining of abdominal cramping. Considered possible intra-abdominal infection, no significant leukocytosis or fever to suggest this and no focal pain in the abdomen to suggest an underlying cholecystitis or pancreatitis. Considered electrolyte abnormality from patient's nausea vomiting diarrhea, electrolytes were normal on BMP. Considered possible DKA as patient is a diabetic patient, glucose 224, pH was 7.4 beta hydroxybutyrate 0.17. Later during her stay she became hypoxic, did have some wheezing present, she was given a DuoNeb and steroids. CTA pulmonary study CT abdomen pelvis with IV contrast was ordered. She was given Toradol and Zofran for her pain and nausea. She was admitted to hospital for further work-up management of her acute hypoxic respiratory failure secondary to suspected asthma exacerbation. COVID influenza testing were negative, admitted by family medicine. [JG]   [id number] On my interpretation of patient's CT pulmonary studies, no visualized central PE. [JG]   0557 On my interpretation of patient's CT abdomen pelvis no visualized free air. [JG]      ED Course User Index  [VIANEY] Vik Davis DO  [JG] Sebastian Park MD       Medical Decision Making  Problems Addressed:  Acute respiratory failure with hypoxia [facility]): acute illness or injury  Nausea vomiting and diarrhea: acute illness or injury    Amount and/or Complexity of Data Reviewed  Independent Historian: EMS  External Data Reviewed: notes. Labs: ordered. Decision-making details documented in ED Course. Radiology: ordered and independent interpretation performed. Decision-making details documented in ED Course. ECG/medicine tests: ordered and independent interpretation performed. Decision-making details documented in ED Course. Risk  Prescription drug management. Decision regarding hospitalization.               CONSULTS: (Who and What was discussed)  IP CONSULT TO FAMILY MEDICINE        I am the Primary Clinician of Record. FINAL IMPRESSION      1. Acute respiratory failure with hypoxia ([address]    2. Nausea vomiting and diarrhea    3. Class 3 obesity with alveolar hypoventilation, serious comorbidity, and body mass index (BMI) of 50.0 to 59.9 in adult [facility])          DISPOSITION/PLAN     DISPOSITION Admitted 02/27/2023 05:29:06 AM      PATIENT REFERRED TO:  No follow-up provider specified.     DISCHARGE MEDICATIONS:  New Prescriptions    No medications on file       DISCONTINUED MEDICATIONS:  Discontinued Medications    No medications on file              (Please note that portions of this note were completed with a voice recognition program.  Efforts were made to edit the dictations but occasionally words are mis-transcribed.)    Rupal Smith MD (electronically signed)           Rosana Santana MD  Resident  02/27/23 1986

## 2023-02-27 NOTE — PROGRESS NOTES
Attempted to get pt to stay and not leave AMA. She states that there is nothing else we can say to make her stay. Dr. Lindsay Kehr notified on above.

## 2023-02-27 NOTE — PROGRESS NOTES
Patient deciding to leave AMA. Patient states that her daughter-in-law is going into labor and that her  passed away here and she has too much anxiety to be at this hospital. No questions or concerns from patient.

## 2023-02-27 NOTE — PROGRESS NOTES
Database initiated. Patient is A&O independent from home with brother. States she uses no assistive devices and is RA at baseline. She has an insulin pump irma  CGM on her right upper arm. She was supposed to see Dr Matilda Davison today at 1pm because she is out of her diabetic supplies.

## 2023-02-27 NOTE — ED NOTES
Pt. Admission initiated. SBAR faxed to floor 4S. Floor called, spoke with RAMA Santos, fax verified. Pt transport called. Awaiting transport, will continue to monitor pt.        Ruddy Jansen RN  02/27/23 4209

## 2023-02-27 NOTE — DISCHARGE SUMMARY
Discharge Summary    Date:2/27/2023  Patient Name: Rosy Mandujano     YOB: 1975     Age: 53 y.o. MRN: [id number]    Admit Date:2/27/2023    Discharge Date: 02/27/23  Discharged Condition:fair    Discharge Diagnoses   Principal Problem:    Acute asthma exacerbation  Active Problems:    Acute respiratory failure with hypoxia ([address]    Hypothyroidism    Essential hypertension    Generalized abdominal pain    Hyperlipidemia  Resolved Problems:    * No resolved hospital problems. [facility] Stay   Narrative of Hospital Course: Refer to H&P for more detailed course. Rosy Mandujano with pertinent past medical history of DM 2, ALO, asthma, and HTN was admitted on 2/27/2023 with diffuse abdominal pain. Pain was progressively worsening the past couple of days, sick contacts include mother. Patient had multiple episodes of emesis. Denied any fever, chills, chest pain, or shortness of breath. In the ED patient was hypertensive 160s over 90s, as well as hypoxic in the high 80s improved to normal saturations with 4 L of oxygen. Troponins were reassuring. Glucose was 224. Beta hydroxybutyrate negative. Patient given fluids, steroids, analgesics, antiemetics and aerosols in the ED with improvement. Patient admitted for further management. Upon admission, patient received news that daughter-in-law was going into labor and that her  passed away here. This had caused her too much anxiety and had concerns with staying overnight in the hospital.  Nurses had attempted to explain her reason for admission and the rounding team was notified. Patient however declined and decided to leave AMA. Patient verbalized understanding, and signed out AMA. Consultants:   IP CONSULT TO FAMILY MEDICINE    Surgeries/Procedures Performed:   N.a.     Treatments:   IV hydration, analgesia: toradol, fentanyl, steroids:  decadron , and respiratory therapy: O2 and duonebs    Significant Diagnostic Studies:   Recent Labs:  CBC:   Lab Results   Component Value Date/Time    WBC 8.7 02/27/2023 03:02 AM    RBC 4.70 02/27/2023 03:02 AM    HGB 14.2 02/27/2023 03:02 AM    HCT 42.8 02/27/2023 03:02 AM    MCV 91.1 02/27/2023 03:02 AM    MCH 30.2 02/27/2023 03:02 AM    MCHC 33.2 02/27/2023 03:02 AM    RDW 13.0 02/27/2023 03:02 AM     02/27/2023 03:02 AM     CMP:    Lab Results   Component Value Date/Time    GLUCOSE 224 02/27/2023 03:02 AM     02/27/2023 03:02 AM    K 4.0 02/27/2023 03:02 AM    K 3.3 02/10/2023 04:41 PM     02/27/2023 03:02 AM    CO2 28 02/27/2023 03:02 AM    BUN 13 02/27/2023 03:02 AM    CREATININE 0.9 02/27/2023 03:02 AM    ANIONGAP 9 02/27/2023 03:02 AM    ALKPHOS 87 02/27/2023 03:02 AM    ALT 20 02/27/2023 03:02 AM    AST 20 02/27/2023 03:02 AM    BILITOT 0.6 02/27/2023 03:02 AM    LABALBU 3.5 02/27/2023 03:02 AM    LABGLOM >60 02/27/2023 03:02 AM    GFRAA >60 08/25/2022 03:37 PM    PROT 6.4 02/27/2023 03:02 AM    CALCIUM 8.3 02/27/2023 03:02 AM     Radiology Last 7 Days:  CT ABDOMEN PELVIS W IV CONTRAST Additional Contrast? None    Result Date: 2/27/2023  No acute abdominopelvic abnormality. XR CHEST PORTABLE    Result Date: 2/27/2023  No acute process. CTA PULMONARY W CONTRAST    Result Date: 2/27/2023  No evidence of pulmonary embolism or acute pulmonary abnormality. Discharge Physical Exam:   Physical Exam  Vitals and nursing note reviewed. Constitutional:       General: She is not in acute distress. Appearance: Normal appearance. She is ill-appearing. HENT:      Head: Normocephalic and atraumatic. Eyes:      General:         Right eye: No discharge. Left eye: No discharge. Cardiovascular:      Rate and Rhythm: Normal rate and regular rhythm. Heart sounds: No murmur heard. Pulmonary:      Effort: Pulmonary effort is normal.      Breath sounds: No wheezing. Abdominal:      General: Bowel sounds are normal.      Palpations: Abdomen is soft. Tenderness: There is abdominal tenderness (diffusely tender). Musculoskeletal:         General: No swelling. Normal range of motion. Right lower leg: No edema. Left lower leg: No edema. Skin:     General: Skin is warm and dry. Neurological:      General: No focal deficit present. Mental Status: She is oriented to person, place, and time. Discharge Plan   Disposition: To a non-[facility] facility    Provider Follow-Up:   No follow-up provider specified. Hospital/Incidental Findings Requiring Follow-Up:      Patient Instructions   Diet: diabetic diet    Activity: activity as tolerated    Other Instructions:       Discharge Medications         Medication List        CONTINUE taking these medications      * Accu-Chek Softclix Lancets Misc  Check blood sugar 4x daily     * Accu-Chek FastClix Lancets Misc  USE TO CHECK BLOOD GLUCOSE 4X DAILY     * BD Pen Needle Micro U/F 32G X 6 MM Misc  Generic drug: Insulin Pen Needle  Uses with insulin 4 times a day     * BD Pen Needle Micro U/F 32G X 6 MM Misc  Generic drug: Insulin Pen Needle  Uses with insulin 4 times a day     glucose monitoring kit  1 kit by Does not apply route daily     Guardian Link 3 Transmitter Misc  To use with sensors     Guardian Sensor 3 Misc  To change every 7 days     MiniMed 770G Insulin Pump Sys Kit  To infuse insulin           * This list has 4 medication(s) that are the same as other medications prescribed for you. Read the directions carefully, and ask your doctor or other care provider to review them with you.                 ASK your doctor about these medications      Accu-Chek Guide strip  Generic drug: blood glucose test strips  Use to check blood sugars 4x daily     albuterol sulfate  (90 Base) MCG/ACT inhaler  Commonly known as: Ventolin HFA  Inhale 2 puffs into the lungs 4 times daily as needed for Wheezing     Aspirin Low Dose 81 MG EC tablet  Generic drug: aspirin  TAKE 1 TABLET BY MOUTH DAILY atorvastatin 20 MG tablet  Commonly known as: LIPITOR  TAKE 1 TABLET BY MOUTH DAILY     dicyclomine 10 MG capsule  Commonly known as: BENTYL  Take 1 capsule by mouth 4 times daily     dilTIAZem 360 MG extended release capsule  Commonly known as: CARDIZEM CD  Take 1 capsule by mouth daily     hydrALAZINE 100 MG tablet  Commonly known as: APRESOLINE  Take 1 tablet by mouth every 8 hours     hydroCHLOROthiazide 12.5 MG tablet  Commonly known as: HYDRODIURIL  TAKE ONE TABLET BY MOUTH EVERY DAY     hydrocortisone 2.5 % Crea rectal cream  Commonly known as: Anusol-HC  Apply to affected area twice daily for rectal pain from external hemorrhoid. insulin lispro 100 UNIT/ML Soln injection vial  Commonly known as: HUMALOG  USE VIA INSULIN PUMP.  MAX 85 UNITS DAILY     lisinopril 40 MG tablet  Commonly known as: PRINIVIL;ZESTRIL  TAKE ONE TABLET BY MOUTH EVERY DAY     mupirocin 2 % ointment  Commonly known as: BACTROBAN     ondansetron 4 MG disintegrating tablet  Commonly known as: ZOFRAN-ODT  Take 1 tablet by mouth 3 times daily as needed for Nausea or Vomiting     pantoprazole 40 MG tablet  Commonly known as: Protonix  Take 1 tablet by mouth daily for 10 days     triamcinolone 0.5 % cream  Commonly known as: ARISTOCORT     Trulicity 3 TI/3.5QK Sopn  Generic drug: Dulaglutide  Inject 3 mg into the skin once a week              Electronically signed by Richard Beverly MD on 2/27/23 at 3:55 PM EST

## 2023-02-28 ENCOUNTER — APPOINTMENT (OUTPATIENT)
Dept: CT IMAGING | Age: 48
DRG: 249 | End: 2023-02-28
Payer: MEDICAID

## 2023-02-28 ENCOUNTER — HOSPITAL ENCOUNTER (INPATIENT)
Age: 48
LOS: 3 days | Discharge: HOME | DRG: 249 | End: 2023-03-05
Attending: EMERGENCY MEDICINE | Admitting: INTERNAL MEDICINE
Payer: MEDICAID

## 2023-02-28 DIAGNOSIS — R19.7 NAUSEA VOMITING AND DIARRHEA: Primary | ICD-10-CM

## 2023-02-28 DIAGNOSIS — R11.2 NAUSEA VOMITING AND DIARRHEA: Primary | ICD-10-CM

## 2023-02-28 PROBLEM — K52.9 GASTROENTERITIS: Status: ACTIVE | Noted: 2023-02-28

## 2023-02-28 LAB
ALBUMIN SERPL-MCNC: 4 G/DL (ref 3.5–5.2)
ALP BLD-CCNC: 89 U/L (ref 35–104)
ALT SERPL-CCNC: 25 U/L (ref 0–32)
ANION GAP SERPL CALCULATED.3IONS-SCNC: 9 MMOL/L (ref 7–16)
AST SERPL-CCNC: 24 U/L (ref 0–31)
BACTERIA: ABNORMAL /HPF
BASOPHILS ABSOLUTE: 0.03 E9/L (ref 0–0.2)
BASOPHILS RELATIVE PERCENT: 0.3 % (ref 0–2)
BILIRUB SERPL-MCNC: 0.6 MG/DL (ref 0–1.2)
BILIRUBIN URINE: NEGATIVE
BLOOD, URINE: NEGATIVE
BUN BLDV-MCNC: 22 MG/DL (ref 6–20)
CALCIUM SERPL-MCNC: 8.7 MG/DL (ref 8.6–10.2)
CHLORIDE BLD-SCNC: 102 MMOL/L (ref 98–107)
CLARITY: CLEAR
CO2: 27 MMOL/L (ref 22–29)
COLOR: ABNORMAL
CREAT SERPL-MCNC: 0.9 MG/DL (ref 0.5–1)
EOSINOPHILS ABSOLUTE: 0.01 E9/L (ref 0.05–0.5)
EOSINOPHILS RELATIVE PERCENT: 0.1 % (ref 0–6)
EPITHELIAL CELLS, UA: ABNORMAL /HPF
GFR SERPL CREATININE-BSD FRML MDRD: >60 ML/MIN/1.73
GLUCOSE BLD-MCNC: 152 MG/DL (ref 74–99)
GLUCOSE URINE: NEGATIVE MG/DL
HCG(URINE) PREGNANCY TEST: NEGATIVE
HCT VFR BLD CALC: 47.6 % (ref 34–48)
HEMOGLOBIN: 15.2 G/DL (ref 11.5–15.5)
IMMATURE GRANULOCYTES #: 0.08 E9/L
IMMATURE GRANULOCYTES %: 0.8 % (ref 0–5)
INR BLD: 1.1
KETONES, URINE: NEGATIVE MG/DL
LACTIC ACID: 1.5 MMOL/L (ref 0.5–2.2)
LEUKOCYTE ESTERASE, URINE: NEGATIVE
LIPASE: 11 U/L (ref 13–60)
LYMPHOCYTES ABSOLUTE: 1.56 E9/L (ref 1.5–4)
LYMPHOCYTES RELATIVE PERCENT: 15 % (ref 20–42)
MCH RBC QN AUTO: 29.6 PG (ref 26–35)
MCHC RBC AUTO-ENTMCNC: 31.9 % (ref 32–34.5)
MCV RBC AUTO: 92.6 FL (ref 80–99.9)
MONOCYTES ABSOLUTE: 0.9 E9/L (ref 0.1–0.95)
MONOCYTES RELATIVE PERCENT: 8.7 % (ref 2–12)
NEUTROPHILS ABSOLUTE: 7.79 E9/L (ref 1.8–7.3)
NEUTROPHILS RELATIVE PERCENT: 75.1 % (ref 43–80)
NITRITE, URINE: NEGATIVE
PDW BLD-RTO: 13.2 FL (ref 11.5–15)
PH UA: 5.5 (ref 5–9)
PLATELET # BLD: 251 E9/L (ref 130–450)
PMV BLD AUTO: 11.7 FL (ref 7–12)
POTASSIUM REFLEX MAGNESIUM: 3.8 MMOL/L (ref 3.5–5)
PROTEIN UA: NEGATIVE MG/DL
PROTHROMBIN TIME: 12.2 SEC (ref 9.3–12.4)
RBC # BLD: 5.14 E12/L (ref 3.5–5.5)
RBC UA: ABNORMAL /HPF (ref 0–2)
SODIUM BLD-SCNC: 138 MMOL/L (ref 132–146)
SPECIFIC GRAVITY UA: >=1.03 (ref 1–1.03)
TOTAL PROTEIN: 7 G/DL (ref 6.4–8.3)
UROBILINOGEN, URINE: 0.2 E.U./DL
WBC # BLD: 10.4 E9/L (ref 4.5–11.5)
WBC UA: ABNORMAL /HPF (ref 0–5)

## 2023-02-28 PROCEDURE — 93005 ELECTROCARDIOGRAM TRACING: CPT | Performed by: EMERGENCY MEDICINE

## 2023-02-28 PROCEDURE — 81001 URINALYSIS AUTO W/SCOPE: CPT

## 2023-02-28 PROCEDURE — 36415 COLL VENOUS BLD VENIPUNCTURE: CPT

## 2023-02-28 PROCEDURE — 83605 ASSAY OF LACTIC ACID: CPT

## 2023-02-28 PROCEDURE — 74177 CT ABD & PELVIS W/CONTRAST: CPT

## 2023-02-28 PROCEDURE — 6360000004 HC RX CONTRAST MEDICATION: Performed by: RADIOLOGY

## 2023-02-28 PROCEDURE — 85610 PROTHROMBIN TIME: CPT

## 2023-02-28 PROCEDURE — 99285 EMERGENCY DEPT VISIT HI MDM: CPT

## 2023-02-28 PROCEDURE — 96375 TX/PRO/DX INJ NEW DRUG ADDON: CPT

## 2023-02-28 PROCEDURE — 2580000003 HC RX 258: Performed by: EMERGENCY MEDICINE

## 2023-02-28 PROCEDURE — 99223 1ST HOSP IP/OBS HIGH 75: CPT | Performed by: INTERNAL MEDICINE

## 2023-02-28 PROCEDURE — 96361 HYDRATE IV INFUSION ADD-ON: CPT

## 2023-02-28 PROCEDURE — 83036 HEMOGLOBIN GLYCOSYLATED A1C: CPT

## 2023-02-28 PROCEDURE — 80053 COMPREHEN METABOLIC PANEL: CPT

## 2023-02-28 PROCEDURE — 85025 COMPLETE CBC W/AUTO DIFF WBC: CPT

## 2023-02-28 PROCEDURE — G0378 HOSPITAL OBSERVATION PER HR: HCPCS

## 2023-02-28 PROCEDURE — 83690 ASSAY OF LIPASE: CPT

## 2023-02-28 PROCEDURE — 96374 THER/PROPH/DIAG INJ IV PUSH: CPT

## 2023-02-28 PROCEDURE — 6360000002 HC RX W HCPCS: Performed by: EMERGENCY MEDICINE

## 2023-02-28 PROCEDURE — 81025 URINE PREGNANCY TEST: CPT

## 2023-02-28 RX ORDER — ASPIRIN 81 MG/1
81 TABLET ORAL DAILY
Status: DISCONTINUED | OUTPATIENT
Start: 2023-03-01 | End: 2023-03-05 | Stop reason: HOSPADM

## 2023-02-28 RX ORDER — DILTIAZEM HYDROCHLORIDE 120 MG/1
360 CAPSULE, COATED, EXTENDED RELEASE ORAL DAILY
Status: DISCONTINUED | OUTPATIENT
Start: 2023-03-01 | End: 2023-03-05 | Stop reason: HOSPADM

## 2023-02-28 RX ORDER — LISINOPRIL 20 MG/1
40 TABLET ORAL DAILY
Status: DISCONTINUED | OUTPATIENT
Start: 2023-03-01 | End: 2023-03-05 | Stop reason: HOSPADM

## 2023-02-28 RX ORDER — ONDANSETRON 2 MG/ML
4 INJECTION INTRAMUSCULAR; INTRAVENOUS EVERY 6 HOURS PRN
Status: DISCONTINUED | OUTPATIENT
Start: 2023-02-28 | End: 2023-03-05 | Stop reason: HOSPADM

## 2023-02-28 RX ORDER — HYDROCHLOROTHIAZIDE 12.5 MG/1
12.5 TABLET ORAL DAILY
Status: DISCONTINUED | OUTPATIENT
Start: 2023-03-01 | End: 2023-03-05 | Stop reason: HOSPADM

## 2023-02-28 RX ORDER — ALBUTEROL SULFATE 90 UG/1
2 AEROSOL, METERED RESPIRATORY (INHALATION) 4 TIMES DAILY PRN
Status: DISCONTINUED | OUTPATIENT
Start: 2023-02-28 | End: 2023-02-28 | Stop reason: CLARIF

## 2023-02-28 RX ORDER — CLONIDINE HYDROCHLORIDE 0.1 MG/1
0.1 TABLET ORAL EVERY 8 HOURS PRN
Status: DISCONTINUED | OUTPATIENT
Start: 2023-02-28 | End: 2023-03-05 | Stop reason: HOSPADM

## 2023-02-28 RX ORDER — ONDANSETRON 4 MG/1
4 TABLET, ORALLY DISINTEGRATING ORAL 3 TIMES DAILY PRN
Qty: 21 TABLET | Refills: 0 | Status: SHIPPED | OUTPATIENT
Start: 2023-02-28

## 2023-02-28 RX ORDER — SODIUM CHLORIDE 9 MG/ML
INJECTION, SOLUTION INTRAVENOUS PRN
Status: DISCONTINUED | OUTPATIENT
Start: 2023-02-28 | End: 2023-03-05 | Stop reason: HOSPADM

## 2023-02-28 RX ORDER — ONDANSETRON 2 MG/ML
4 INJECTION INTRAMUSCULAR; INTRAVENOUS ONCE
Status: COMPLETED | OUTPATIENT
Start: 2023-02-28 | End: 2023-02-28

## 2023-02-28 RX ORDER — FENTANYL CITRATE 0.05 MG/ML
25 INJECTION, SOLUTION INTRAMUSCULAR; INTRAVENOUS
Status: DISCONTINUED | OUTPATIENT
Start: 2023-02-28 | End: 2023-03-01

## 2023-02-28 RX ORDER — SODIUM CHLORIDE 9 MG/ML
INJECTION, SOLUTION INTRAVENOUS CONTINUOUS
Status: DISCONTINUED | OUTPATIENT
Start: 2023-02-28 | End: 2023-03-04

## 2023-02-28 RX ORDER — HYDRALAZINE HYDROCHLORIDE 50 MG/1
100 TABLET, FILM COATED ORAL EVERY 8 HOURS SCHEDULED
Status: DISCONTINUED | OUTPATIENT
Start: 2023-03-01 | End: 2023-03-05 | Stop reason: HOSPADM

## 2023-02-28 RX ORDER — DEXTROSE MONOHYDRATE 100 MG/ML
INJECTION, SOLUTION INTRAVENOUS CONTINUOUS PRN
Status: DISCONTINUED | OUTPATIENT
Start: 2023-02-28 | End: 2023-03-05 | Stop reason: HOSPADM

## 2023-02-28 RX ORDER — INSULIN LISPRO 100 [IU]/ML
0-4 INJECTION, SOLUTION INTRAVENOUS; SUBCUTANEOUS NIGHTLY
Status: DISCONTINUED | OUTPATIENT
Start: 2023-03-01 | End: 2023-03-01

## 2023-02-28 RX ORDER — ENOXAPARIN SODIUM 100 MG/ML
40 INJECTION SUBCUTANEOUS DAILY
Status: DISCONTINUED | OUTPATIENT
Start: 2023-03-01 | End: 2023-03-03

## 2023-02-28 RX ORDER — ATORVASTATIN CALCIUM 20 MG/1
20 TABLET, FILM COATED ORAL DAILY
Status: DISCONTINUED | OUTPATIENT
Start: 2023-03-01 | End: 2023-03-05 | Stop reason: HOSPADM

## 2023-02-28 RX ORDER — ONDANSETRON 4 MG/1
4 TABLET, ORALLY DISINTEGRATING ORAL EVERY 8 HOURS PRN
Status: DISCONTINUED | OUTPATIENT
Start: 2023-02-28 | End: 2023-03-05 | Stop reason: HOSPADM

## 2023-02-28 RX ORDER — 0.9 % SODIUM CHLORIDE 0.9 %
1000 INTRAVENOUS SOLUTION INTRAVENOUS ONCE
Status: COMPLETED | OUTPATIENT
Start: 2023-02-28 | End: 2023-02-28

## 2023-02-28 RX ORDER — SODIUM CHLORIDE 0.9 % (FLUSH) 0.9 %
5-40 SYRINGE (ML) INJECTION EVERY 12 HOURS SCHEDULED
Status: DISCONTINUED | OUTPATIENT
Start: 2023-02-28 | End: 2023-03-05 | Stop reason: HOSPADM

## 2023-02-28 RX ORDER — SODIUM CHLORIDE 0.9 % (FLUSH) 0.9 %
5-40 SYRINGE (ML) INJECTION PRN
Status: DISCONTINUED | OUTPATIENT
Start: 2023-02-28 | End: 2023-03-05 | Stop reason: HOSPADM

## 2023-02-28 RX ORDER — INSULIN LISPRO 100 [IU]/ML
0-4 INJECTION, SOLUTION INTRAVENOUS; SUBCUTANEOUS
Status: DISCONTINUED | OUTPATIENT
Start: 2023-03-01 | End: 2023-03-01

## 2023-02-28 RX ORDER — FENTANYL CITRATE 0.05 MG/ML
50 INJECTION, SOLUTION INTRAMUSCULAR; INTRAVENOUS
Status: DISCONTINUED | OUTPATIENT
Start: 2023-02-28 | End: 2023-03-01

## 2023-02-28 RX ORDER — DICYCLOMINE HYDROCHLORIDE 10 MG/1
10 CAPSULE ORAL 4 TIMES DAILY
Status: DISCONTINUED | OUTPATIENT
Start: 2023-03-01 | End: 2023-03-02

## 2023-02-28 RX ORDER — ALBUTEROL SULFATE 2.5 MG/3ML
2.5 SOLUTION RESPIRATORY (INHALATION) EVERY 4 HOURS PRN
Status: DISCONTINUED | OUTPATIENT
Start: 2023-02-28 | End: 2023-03-05 | Stop reason: HOSPADM

## 2023-02-28 RX ADMIN — ONDANSETRON 4 MG: 2 INJECTION INTRAMUSCULAR; INTRAVENOUS at 17:55

## 2023-02-28 RX ADMIN — SODIUM CHLORIDE: 9 INJECTION, SOLUTION INTRAVENOUS at 23:02

## 2023-02-28 RX ADMIN — FENTANYL CITRATE 25 MCG: 0.05 INJECTION, SOLUTION INTRAMUSCULAR; INTRAVENOUS at 17:52

## 2023-02-28 RX ADMIN — IOPAMIDOL 75 ML: 755 INJECTION, SOLUTION INTRAVENOUS at 19:52

## 2023-02-28 RX ADMIN — SODIUM CHLORIDE 1000 ML: 9 INJECTION, SOLUTION INTRAVENOUS at 17:50

## 2023-02-28 ASSESSMENT — PAIN SCALES - GENERAL
PAINLEVEL_OUTOF10: 8
PAINLEVEL_OUTOF10: 8

## 2023-02-28 NOTE — ED NOTES
Pt stated she had flu and covid swab yesterday at [address] [facility]. LIP notified and stated she did not have to be swabbed again.      Jacob Hernandez RN  02/28/23 5866

## 2023-03-01 PROBLEM — K56.7 ILEUS (HCC): Status: ACTIVE | Noted: 2023-03-01

## 2023-03-01 LAB
EKG ATRIAL RATE: 81 BPM
EKG P AXIS: 52 DEGREES
EKG P-R INTERVAL: 176 MS
EKG Q-T INTERVAL: 400 MS
EKG QRS DURATION: 74 MS
EKG QTC CALCULATION (BAZETT): 464 MS
EKG R AXIS: 4 DEGREES
EKG T AXIS: 112 DEGREES
EKG VENTRICULAR RATE: 81 BPM
HBA1C MFR BLD: 6.2 % (ref 4–5.6)
METER GLUCOSE: 121 MG/DL (ref 74–99)

## 2023-03-01 PROCEDURE — 6360000002 HC RX W HCPCS: Performed by: INTERNAL MEDICINE

## 2023-03-01 PROCEDURE — APPSS30 APP SPLIT SHARED TIME 16-30 MINUTES: Performed by: NURSE PRACTITIONER

## 2023-03-01 PROCEDURE — 82962 GLUCOSE BLOOD TEST: CPT

## 2023-03-01 PROCEDURE — 93010 ELECTROCARDIOGRAM REPORT: CPT | Performed by: INTERNAL MEDICINE

## 2023-03-01 PROCEDURE — 2580000003 HC RX 258: Performed by: INTERNAL MEDICINE

## 2023-03-01 PROCEDURE — 6370000000 HC RX 637 (ALT 250 FOR IP): Performed by: NURSE PRACTITIONER

## 2023-03-01 PROCEDURE — 96376 TX/PRO/DX INJ SAME DRUG ADON: CPT

## 2023-03-01 PROCEDURE — 96361 HYDRATE IV INFUSION ADD-ON: CPT

## 2023-03-01 PROCEDURE — 6370000000 HC RX 637 (ALT 250 FOR IP): Performed by: INTERNAL MEDICINE

## 2023-03-01 PROCEDURE — 96372 THER/PROPH/DIAG INJ SC/IM: CPT

## 2023-03-01 PROCEDURE — G0378 HOSPITAL OBSERVATION PER HR: HCPCS

## 2023-03-01 PROCEDURE — 2580000003 HC RX 258: Performed by: EMERGENCY MEDICINE

## 2023-03-01 RX ORDER — ACETAMINOPHEN 325 MG/1
650 TABLET ORAL EVERY 4 HOURS PRN
Status: DISCONTINUED | OUTPATIENT
Start: 2023-03-01 | End: 2023-03-05 | Stop reason: HOSPADM

## 2023-03-01 RX ADMIN — DICYCLOMINE HYDROCHLORIDE 10 MG: 10 CAPSULE ORAL at 18:06

## 2023-03-01 RX ADMIN — DICYCLOMINE HYDROCHLORIDE 10 MG: 10 CAPSULE ORAL at 08:10

## 2023-03-01 RX ADMIN — ACETAMINOPHEN 650 MG: 325 TABLET ORAL at 18:21

## 2023-03-01 RX ADMIN — DILTIAZEM HYDROCHLORIDE 360 MG: 120 CAPSULE, COATED, EXTENDED RELEASE ORAL at 08:06

## 2023-03-01 RX ADMIN — SODIUM CHLORIDE, PRESERVATIVE FREE 10 ML: 5 INJECTION INTRAVENOUS at 11:12

## 2023-03-01 RX ADMIN — ASPIRIN 81 MG: 81 TABLET, COATED ORAL at 08:15

## 2023-03-01 RX ADMIN — ATORVASTATIN CALCIUM 20 MG: 20 TABLET, FILM COATED ORAL at 08:06

## 2023-03-01 RX ADMIN — DICYCLOMINE HYDROCHLORIDE 10 MG: 10 CAPSULE ORAL at 13:34

## 2023-03-01 RX ADMIN — ONDANSETRON 4 MG: 2 INJECTION INTRAMUSCULAR; INTRAVENOUS at 18:15

## 2023-03-01 RX ADMIN — FENTANYL CITRATE 50 MCG: 0.05 INJECTION, SOLUTION INTRAMUSCULAR; INTRAVENOUS at 07:54

## 2023-03-01 RX ADMIN — SODIUM CHLORIDE, PRESERVATIVE FREE 10 ML: 5 INJECTION INTRAVENOUS at 18:16

## 2023-03-01 RX ADMIN — FENTANYL CITRATE 50 MCG: 0.05 INJECTION, SOLUTION INTRAMUSCULAR; INTRAVENOUS at 06:04

## 2023-03-01 RX ADMIN — ENOXAPARIN SODIUM 40 MG: 100 INJECTION SUBCUTANEOUS at 08:28

## 2023-03-01 RX ADMIN — HYDRALAZINE HYDROCHLORIDE 100 MG: 50 TABLET, FILM COATED ORAL at 05:42

## 2023-03-01 RX ADMIN — HYDRALAZINE HYDROCHLORIDE 100 MG: 50 TABLET, FILM COATED ORAL at 00:07

## 2023-03-01 RX ADMIN — HYDRALAZINE HYDROCHLORIDE 100 MG: 50 TABLET, FILM COATED ORAL at 21:29

## 2023-03-01 RX ADMIN — DICYCLOMINE HYDROCHLORIDE 10 MG: 10 CAPSULE ORAL at 21:29

## 2023-03-01 RX ADMIN — HYDRALAZINE HYDROCHLORIDE 100 MG: 50 TABLET, FILM COATED ORAL at 14:55

## 2023-03-01 RX ADMIN — SODIUM CHLORIDE, PRESERVATIVE FREE 10 ML: 5 INJECTION INTRAVENOUS at 08:03

## 2023-03-01 RX ADMIN — FENTANYL CITRATE 50 MCG: 0.05 INJECTION, SOLUTION INTRAMUSCULAR; INTRAVENOUS at 00:08

## 2023-03-01 RX ADMIN — SODIUM CHLORIDE: 9 INJECTION, SOLUTION INTRAVENOUS at 21:34

## 2023-03-01 RX ADMIN — ONDANSETRON 4 MG: 4 TABLET, ORALLY DISINTEGRATING ORAL at 06:04

## 2023-03-01 RX ADMIN — SODIUM CHLORIDE: 9 INJECTION, SOLUTION INTRAVENOUS at 11:10

## 2023-03-01 RX ADMIN — SODIUM CHLORIDE, PRESERVATIVE FREE 10 ML: 5 INJECTION INTRAVENOUS at 00:10

## 2023-03-01 RX ADMIN — DICYCLOMINE HYDROCHLORIDE 10 MG: 10 CAPSULE ORAL at 00:06

## 2023-03-01 RX ADMIN — FENTANYL CITRATE 50 MCG: 0.05 INJECTION, SOLUTION INTRAMUSCULAR; INTRAVENOUS at 11:10

## 2023-03-01 ASSESSMENT — PAIN DESCRIPTION - ORIENTATION
ORIENTATION: RIGHT;LEFT;MID
ORIENTATION: RIGHT;LEFT;MID
ORIENTATION: OTHER (COMMENT)
ORIENTATION: OTHER (COMMENT)
ORIENTATION: RIGHT;LEFT;MID
ORIENTATION: RIGHT;LEFT;MID

## 2023-03-01 ASSESSMENT — PAIN - FUNCTIONAL ASSESSMENT
PAIN_FUNCTIONAL_ASSESSMENT: PREVENTS OR INTERFERES SOME ACTIVE ACTIVITIES AND ADLS
PAIN_FUNCTIONAL_ASSESSMENT: ACTIVITIES ARE NOT PREVENTED

## 2023-03-01 ASSESSMENT — PAIN DESCRIPTION - PAIN TYPE: TYPE: ACUTE PAIN

## 2023-03-01 ASSESSMENT — PAIN DESCRIPTION - DESCRIPTORS
DESCRIPTORS: ACHING
DESCRIPTORS: SPASM
DESCRIPTORS: ACHING;CRAMPING;TENDER
DESCRIPTORS: DISCOMFORT;SHARP;ACHING
DESCRIPTORS: ACHING;DISCOMFORT;SHARP
DESCRIPTORS: ACHING;CRAMPING;DISCOMFORT
DESCRIPTORS: SPASM
DESCRIPTORS: ACHING;BURNING;DISCOMFORT;SORE;SHARP

## 2023-03-01 ASSESSMENT — PAIN DESCRIPTION - LOCATION
LOCATION: ABDOMEN
LOCATION: ABDOMEN;GENERALIZED
LOCATION: ABDOMEN

## 2023-03-01 ASSESSMENT — PAIN DESCRIPTION - FREQUENCY: FREQUENCY: CONTINUOUS

## 2023-03-01 ASSESSMENT — PAIN SCALES - GENERAL
PAINLEVEL_OUTOF10: 9
PAINLEVEL_OUTOF10: 8
PAINLEVEL_OUTOF10: 8
PAINLEVEL_OUTOF10: 5
PAINLEVEL_OUTOF10: 6
PAINLEVEL_OUTOF10: 7
PAINLEVEL_OUTOF10: 8
PAINLEVEL_OUTOF10: 9

## 2023-03-01 ASSESSMENT — PAIN DESCRIPTION - ONSET: ONSET: ON-GOING

## 2023-03-01 NOTE — PROGRESS NOTES
Patient's bedside nurse stated that the patient wanted more pain medication or she would be signing out [place]. Patient's bedside nurse re-educated the patient on why attending physician and nurse practioner decided to de-escalate pain medication. Patient refused to take ordered tylenol. Nurse practioner was called and updated on the situation. Patient stated she still wants to sign out AMA.

## 2023-03-01 NOTE — DISCHARGE INSTRUCTIONS
Your information:  Name: Yamilet Wen  : 1975    Your instructions:    Discharge home. Follow up with primary care provider and specialists as directed. Go to our outpatient pharmacy or a pharmacy of your choice to receive your Covid booster. Signs and symptoms to look out for:   Call 911 anytime you think you may need emergency care. For example, call if:    You passed out (lost consciousness). Call your doctor now or seek immediate medical care if:    You have symptoms of dehydration, such as:  Dry eyes and a dry mouth. Passing only a little urine. Feeling thirstier than usual.     You have new or worsening belly pain. You have a new or higher fever. You vomit blood or what looks like coffee grounds. Watch closely for changes in your health, and be sure to contact your doctor if:    You have ongoing nausea and vomiting. Your vomiting is getting worse. Your vomiting lasts longer than 2 days. You are not getting better as expected. What to do after you leave the hospital:    Recommended diet: diabetic diet    Recommended activity: activity as tolerated    The following personal items were collected during your admission and were returned to you:    Belongings  Dental Appliances: None  Vision - Corrective Lenses: Eyeglasses  Hearing Aid: None  Clothing: Pants, Jacket/Coat, Footwear, Socks, Shirt  Jewelry: Necklace  Body Piercings Removed: No  Electronic Devices: Cell Phone,   Weapons (Notify Protective Services/Security): None  Other Valuables: At home  Home Medications: None  Valuables Given To: Patient  Provide Name(s) of Who Valuable(s) Were Given To: NA  Responsible person(s) in the waiting room: no    Information obtained by:  By signing below, I understand that if any problems occur once I leave the hospital I am to contact Dr. Toribio Monahan. I understand and acknowledge receipt of the instructions indicated above.

## 2023-03-01 NOTE — PROGRESS NOTES
[facility] Hospitalist Progress Note    Admitting Date and Time: 2/28/2023  4:51 PM  Admit Dx: Gastroenteritis [K52.9]  Nausea vomiting and diarrhea [R11.2, R19.7]    Subjective:    3/1: Pt eating jello upon arrival. States she has not had any diarrhea since last night in ER. States abd pain is 10/10, unable to describe, unable to pinpoint. Lives with her brother, states that he informed her that he woke up this am with GI upset. Denies CP, SOB. PMH:  has a past medical history of Acute renal injury ([address] Analgesic overuse headache, Anxiety, Arthritis, Asthma, CAD (coronary artery disease), Depression, Diabetes mellitus ([address] Fracture of tooth, GERD (gastroesophageal reflux disease), Glaucoma, Hyperlipidemia, Hypertension, Hypertensive urgency, Hypothyroidism, Irritable bowel syndrome, Morbid obesity due to excess calories ([address] Obesity, Obstructive sleep apnea, Pneumonia, Polycystic ovarian syndrome, Postcholecystectomy syndrome, Spinal headache, and Suicidal ideation. REVIEW OF SYSTEMS:  no fevers, chills, cp, sob, ha, vision/hearing changes, wt changes, hot/cold flashes, other open skin lesions, dysuria/hematuria unless noted in HPI. Complete ROS performed with the patient and is otherwise negative.     Objective:    BP (!) 150/90   Pulse 67   Temp 97.7 °F (36.5 °C) (Oral)   Resp 18   Ht 5' 6\" (1.676 m)   Wt (!) 361 lb (163.7 kg)   LMP 10/01/2012 (Approximate) Comment: 2013  SpO2 94%   BMI 58.27 kg/m²     PHYSICAL EXAM  General Appearance: alert and oriented to person, place and time and in no acute distress  Skin: warm and dry  Head: normocephalic and atraumatic  Eyes: pupils equal, round, and reactive to light, extraocular eye movements intact, conjunctivae normal  Neck: neck supple and non tender without mass   Pulmonary/Chest: clear to auscultation bilaterally- no wheezes, rales or rhonchi, normal air movement, no respiratory distress  Cardiovascular: normal rate, normal S1 and S2 and no carotid bruits  Abdomen: soft, tender in all quadrants without guarding. No rebound tenderness. non-distended, decreased bowel sounds, no masses or organomegaly  Extremities: no cyanosis, no clubbing and no edema  Neurologic: no cranial nerve deficit and speech normal    Recent Labs     02/27/23  0302 02/28/23  1732    138   K 4.0 3.8    102   CO2 28 27   BUN 13 22*   CREATININE 0.9 0.9   GLUCOSE 224* 152*   CALCIUM 8.3* 8.7     Recent Labs     02/27/23  0302 02/28/23  1732   WBC 8.7 10.4   RBC 4.70 5.14   HGB 14.2 15.2   HCT 42.8 47.6   MCV 91.1 92.6   MCH 30.2 29.6   MCHC 33.2 31.9*   RDW 13.0 13.2    251   MPV 10.8 11.7       Admission HPI: Presented on 2/28 with Intractable abdominal pain, nausea, vomiting x2-3 days. She was recently seen at ED but signed out AMS and now coming back for worsening abdominal pain. Assessment & Plan:    Intractable diarrhea & vomiting  Ileus vs gastroenteritis vs gastroparesis  -CT abd notes development of intestinal prominence with air-fluid levels including small bowel dilation  -CT findings of possible ileus inconsistent with pt report, and chief complaint, of copious amounts of diarrhea  -will hold off on gen surg consult for now and monitor closely  -clear liquid diet  -Leopoldo@[employer]  -afebrile, no elevated white count  -lipase 11  -pt denies diarrhea for >12h  -continue bentyl QID  -fentanyl stopped  -acetaminophen prn     DMII current A1c 6.2  -pt has insulin pump: coverage insulin managed by pt, basal insulin managed by endocrinologist. Pt states she cannot alter basal dosage.  -hold home trulicity  -hypoglycemia protocol    HTN  -continue home lisinopril & hydralazine  -hold home hctz  -clonidine prn  -monitor BP    CAD  HLD 4/2022 lipid panel WNL  -continue home statin  -lipid panel in am      Code Status: Full Code  DVT prophylaxis: Lovenox    Disposition: From home. Anticipate discharge to home when medically stable.      30 minutes or more spent reviewing patient chart, assessing patient, discussing plan of care with patient and family, discussing plan of care with collaborating physician, and documentation. NOTE: This report was transcribed using voice recognition software. Every effort was made to ensure accuracy; however, inadvertent computerized transcription errors may be present.   Electronically signed by GEOVANI Lloyd NP on 3/1/2023 at 7:33 AM

## 2023-03-01 NOTE — H&P
[address] [place], [place] [address]ist Group   HISTORY AND PHYSICAL EXAM      AUTHOR: Amairani Whitt MD PATIENT NAME: Clemente Agustin   PCP: Augusto Alvarado MD  MRN: [id number], : 1975       CHIEF COMPLAINT / REASON FOR ADMISSION: Intractable abdominal pain, nausea, vomiting  HPI:   This is a 53 y.o. female  has a past medical history of Acute renal injury ([facility] [address] Analgesic overuse headache, Anxiety, Arthritis, Asthma, CAD (coronary artery disease), Depression, Diabetes mellitus ([address] Fracture of tooth, GERD (gastroesophageal reflux disease), Glaucoma, Hyperlipidemia, Hypertension, Hypertensive urgency, Hypothyroidism, Irritable bowel syndrome, Morbid obesity due to excess calories ([address] Obesity, Obstructive sleep apnea, Pneumonia, Polycystic ovarian syndrome, Postcholecystectomy syndrome, Spinal headache, and Suicidal ideation. presented with Intractable abdominal pain, nausea, vomiting  for last few days prior to arrival to the hospital.  She was recently seen at ED but signed out AMS and now coming back for worsening abdominal pain. The patient was seen and examined at bedside, appears alert and awake with no acute distress and is able to answer simple  questions. On direct questioning, patient denied any  resting ongoing chest pain, resting SOB, hemoptysis, productive cough, fever, ongoing palpitation, hematemesis, rectal bleeding, phil, hematuria, any other  and GI complaints, and any new focal neuro deficits.     ROS:  Pertinent positives and negatives are noted in the HPI, all other systems are reviewed and negative    PMH:  Past Medical History:   Diagnosis Date    Acute renal injury ([address] 2013    Analgesic overuse headache 2018    Anxiety     Arthritis     Asthma     CAD (coronary artery disease)     Depression 2013    Diabetes mellitus ([facility] [facility]     A1C=6.7 on 14    Fracture of tooth 2018    GERD (gastroesophageal reflux disease)     Glaucoma     Hyperlipidemia 2021 Hypertension     Hypertensive urgency 2013    Hypothyroidism     Irritable bowel syndrome     Morbid obesity due to excess calories ([address]     Obesity     Obstructive sleep apnea     Pneumonia     Polycystic ovarian syndrome     Postcholecystectomy syndrome 2018    Spinal headache     Suicidal ideation 2016       Surgical History:  Past Surgical History:   Procedure Laterality Date     SECTION      Dr. Easton Larkin, [address], LAPAROSCOPIC      [facility]    COLONOSCOPY      DENTAL SURGERY  10/06/2011    4 extractions    ECHO COMPLETE  2014         ENDOSCOPY, COLON, DIAGNOSTIC      FOOT SURGERY      RECONSTRUCTION LEFT FOOT, Dr. Glenis Barraza, [address]  9/3/13    incisional hernia repair with mesh    HYSTERECTOMY (CERVIX STATUS UNKNOWN)      KNEE CARTILAGE SURGERY      OVARY REMOVAL      left due to polycystic ovary, Dr. Franklin Lovelace, [address] [facility] BSO (CERVIX REMOVED)  3/19/2013    Attempted Lap-Open Liz Roots, Dr. Gunner Martinez, [facility] N/A 2022    EGD BIOPSY performed by Sandra Beckman MD at [place] [address]       Medications Prior to Admission:    Prior to Admission medications    Medication Sig Start Date End Date Taking?  Authorizing Provider   ondansetron (ZOFRAN-ODT) 4 MG disintegrating tablet Take 1 tablet by mouth 3 times daily as needed for Nausea or Vomiting 23  Yes Lisette Canas MD   ondansetron (ZOFRAN-ODT) 4 MG disintegrating tablet Take 1 tablet by mouth 3 times daily as needed for Nausea or Vomiting 23  Yes Lisette Canas MD   ASPIRIN LOW DOSE 81 MG EC tablet TAKE 1 TABLET BY MOUTH DAILY 1/15/23   Rik Fish MD   hydroCHLOROthiazide (HYDRODIURIL) 12.5 MG tablet TAKE ONE TABLET BY MOUTH EVERY DAY 1/15/23   Rik Fish MD   Accu-Chek FastClix Lancets MISC USE TO CHECK BLOOD GLUCOSE 4X DAILY  Patient not taking: Reported on 2023 GEOVANI Weems NP   atorvastatin (LIPITOR) 20 MG tablet TAKE 1 TABLET BY MOUTH DAILY 12/15/22   Christina Bellamy MD   lisinopril (PRINIVIL;ZESTRIL) 40 MG tablet TAKE ONE TABLET BY MOUTH EVERY DAY 12/15/22   Christina Bellamy MD   Dulaglutide (TRULICITY) 3 WQ/2.9IY SOPN Inject 3 mg into the skin once a week 10/28/22   GEOVANI Weems NP   insulin lispro (HUMALOG) 100 UNIT/ML SOLN injection vial USE VIA INSULIN PUMP. MAX 85 UNITS DAILY 10/7/22   Yvette Jacobson MD   Accu-Chek Softclix Lancets MISC Check blood sugar 4x daily  Patient not taking: Reported on 2/28/2023 5/12/22   Yvette Jacobson MD   blood glucose test strips (ACCU-CHEK GUIDE) strip Use to check blood sugars 4x daily 5/12/22   Yvette Jacobson MD   dilTIAZem (CARDIZEM CD) 360 MG extended release capsule Take 1 capsule by mouth daily 5/3/22   Christina Bellamy MD   hydrALAZINE (APRESOLINE) 100 MG tablet Take 1 tablet by mouth every 8 hours 5/3/22   Christina Bellamy MD   Insulin Infusion Pump (MINIMED 770G INSULIN PUMP SYS) KIT To infuse insulin 4/20/22   GEOVANI Weems NP   Continuous Blood Gluc Sensor (GUARDIAN SENSOR 3) MISC To change every 7 days 4/20/22   GEOVANI Weems NP   Continuous Blood Gluc Transmit (GUARDIAN LINK 3 TRANSMITTER) MISC To use with sensors 4/20/22   GEOVANI Weems NP   hydrocortisone (ANUSOL-HC) 2.5 % CREA rectal cream Apply to affected area twice daily for rectal pain from external hemorrhoid.  4/16/22   Gaviota Hunt DO   dicyclomine (BENTYL) 10 MG capsule Take 1 capsule by mouth 4 times daily  Patient not taking: Reported on 2/28/2023 4/15/22   Jackie Arias MD   Insulin Pen Needle (BD PEN NEEDLE MICRO U/F) 32G X 6 MM MISC Uses with insulin 4 times a day 4/12/22   Kailyn Rosales MD   Insulin Pen Needle (BD PEN NEEDLE MICRO U/F) 32G X 6 MM MISC Uses with insulin 4 times a day  Patient not taking: Reported on 2/28/2023 4/12/22   Kailyn Rosales MD   glucose monitoring (FREESTYLE FREEDOM) kit 1 kit by Does not apply route daily 4/12/22   Brett Carson MD   mupirocin (BACTROBAN) 2 % ointment Apply topically 3 times daily as needed (apply to both hands and under breasts)  Patient not taking: Reported on 2/28/2023    Historical Provider, MD   triamcinolone (ARISTOCORT) 0.5 % cream Apply topically 3 times daily as needed (apply to both hands and under breasts)  Patient not taking: Reported on 2/28/2023    Historical Provider, MD   albuterol sulfate HFA (VENTOLIN HFA) 108 (90 Base) MCG/ACT inhaler Inhale 2 puffs into the lungs 4 times daily as needed for Wheezing 3/29/22   Jose Nathan DO   pantoprazole (PROTONIX) 40 MG tablet Take 1 tablet by mouth daily for 10 days 7/10/21 7/10/21  Lisha Durand DO       Allergies:    Percocet [oxycodone-acetaminophen], Food, Metformin and related, and Tape [adhesive tape]    Social History:    reports that she has never smoked. She has never used smokeless tobacco. She reports that she does not drink alcohol and does not use drugs. Family History:   family history includes Asthma in her brother, mother, and sister; Cancer in her father and paternal grandfather; Depression in her father, maternal grandmother, and paternal grandmother; Diabetes in her brother and mother; Heart Disease in her father and maternal grandfather; High Blood Pressure in her father, maternal grandmother, mother, and paternal grandmother; Mental Illness in her maternal grandmother; Thyroid Disease in her maternal grandmother and sister. PHYSICAL EXAM:  Vitals:  BP (!) 179/89   Pulse 73   Temp 98 °F (36.7 °C) (Oral)   Resp 18   Ht 5' 6\" (1.676 m)   Wt (!) 361 lb (163.7 kg)   LMP 10/01/2012 (Approximate) Comment: 2013  SpO2 94%   BMI 58.27 kg/m²   GENERAL: No acute distress, Alert and awake, Afebrile, Appears tired and weak otherwise hemodynamically stable at present. HEENT: PERRLA, no icterus. OP clear and no exudates.   NECK: Supple  no carotid/ophthalmic bruits, JVD None. RESPIRATORY:  Bilateral equal vesicular breath sound with no wheezing. Lung bases are clear. HEART: No tachycardia at bedside and regular rhythm. Normal S1 and S2, No S3 or S4 is audible. No pulsation, thrills, murmur or friction rubs. ABDOMEN: Soft, nondistended, nontender. No hepatomegaly or splenomegaly. No CVA tenderness on the both sides. Bowel sound is present. EXTREMITIES: All peripheral pulses are present. No calf tenderness or swelling. No pedal edema is present. [facility] NEUROLOGY: Alert and awake. No new focal neuro deficit. Bilateral Pupil is equal and reactive to light. CN-ii-xii otherwise grossly intact. Motor and Sensory: Grossly Intact bilaterally with no new focal signs     LABS:  Recent Labs     02/27/23  0302 02/28/23  1732   WBC 8.7 10.4   RBC 4.70 5.14   HGB 14.2 15.2   HCT 42.8 47.6   MCV 91.1 92.6   MCH 30.2 29.6   MCHC 33.2 31.9*   RDW 13.0 13.2    251   MPV 10.8 11.7     Recent Labs     02/27/23  0302 02/28/23  1732    138   K 4.0 3.8    102   CO2 28 27   BUN 13 22*   CREATININE 0.9 0.9   GLUCOSE 224* 152*   CALCIUM 8.3* 8.7     No results for input(s): POCGLU in the last 72 hours.   Results for orders placed or performed during the hospital encounter of 02/28/23   CBC with Auto Differential   Result Value Ref Range    WBC 10.4 4.5 - 11.5 E9/L    RBC 5.14 3.50 - 5.50 E12/L    Hemoglobin 15.2 11.5 - 15.5 g/dL    Hematocrit 47.6 34.0 - 48.0 %    MCV 92.6 80.0 - 99.9 fL    MCH 29.6 26.0 - 35.0 pg    MCHC 31.9 (L) 32.0 - 34.5 %    RDW 13.2 11.5 - 15.0 fL    Platelets 531 179 - 294 E9/L    MPV 11.7 7.0 - 12.0 fL    Neutrophils % 75.1 43.0 - 80.0 %    Immature Granulocytes % 0.8 0.0 - 5.0 %    Lymphocytes % 15.0 (L) 20.0 - 42.0 %    Monocytes % 8.7 2.0 - 12.0 %    Eosinophils % 0.1 0.0 - 6.0 %    Basophils % 0.3 0.0 - 2.0 %    Neutrophils Absolute 7.79 (H) 1.80 - 7.30 E9/L    Immature Granulocytes # 0.08 E9/L    Lymphocytes Absolute 1.56 1.50 - 4.00 E9/L    Monocytes Absolute 0.90 0.10 - 0.95 E9/L    Eosinophils Absolute 0.01 (L) 0.05 - 0.50 E9/L    Basophils Absolute 0.03 0.00 - 0.20 E9/L   Comprehensive Metabolic Panel w/ Reflex to MG   Result Value Ref Range    Sodium 138 132 - 146 mmol/L    Potassium reflex Magnesium 3.8 3.5 - 5.0 mmol/L    Chloride 102 98 - 107 mmol/L    CO2 27 22 - 29 mmol/L    Anion Gap 9 7 - 16 mmol/L    Glucose 152 (H) 74 - 99 mg/dL    BUN 22 (H) 6 - 20 mg/dL    Creatinine 0.9 0.5 - 1.0 mg/dL    Est, Glom Filt Rate >60 >=60 mL/min/1.73    Calcium 8.7 8.6 - 10.2 mg/dL    Total Protein 7.0 6.4 - 8.3 g/dL    Albumin 4.0 3.5 - 5.2 g/dL    Total Bilirubin 0.6 0.0 - 1.2 mg/dL    Alkaline Phosphatase 89 35 - 104 U/L    ALT 25 0 - 32 U/L    AST 24 0 - 31 U/L   Lipase   Result Value Ref Range    Lipase 11 (L) 13 - 60 U/L   Protime-INR   Result Value Ref Range    Protime 12.2 9.3 - 12.4 sec    INR 1.1    Urinalysis with Microscopic   Result Value Ref Range    Color, UA DKYELLOW Straw/Yellow    Clarity, UA Clear Clear    Glucose, Ur Negative Negative mg/dL    Bilirubin Urine Negative Negative    Ketones, Urine Negative Negative mg/dL    Specific Gravity, UA >=1.030 1.005 - 1.030    Blood, Urine Negative Negative    pH, UA 5.5 5.0 - 9.0    Protein, UA Negative Negative mg/dL    Urobilinogen, Urine 0.2 <2.0 E.U./dL    Nitrite, Urine Negative Negative    Leukocyte Esterase, Urine Negative Negative    WBC, UA 2-5 0 - 5 /HPF    RBC, UA NONE 0 - 2 /HPF    Epithelial Cells, UA RARE /HPF    Bacteria, UA FEW (A) None Seen /HPF   Pregnancy, Urine   Result Value Ref Range    HCG(Urine) Pregnancy Test NEGATIVE NEGATIVE   Lactic Acid   Result Value Ref Range    Lactic Acid 1.5 0.5 - 2.2 mmol/L   EKG 12 Lead   Result Value Ref Range    Ventricular Rate 81 BPM    Atrial Rate 81 BPM    P-R Interval 176 ms    QRS Duration 74 ms    Q-T Interval 400 ms    QTc Calculation (Bazett) 464 ms    P Axis 52 degrees    R Axis 4 degrees    T Axis 112 degrees Radiology: CT ABDOMEN PELVIS W IV CONTRAST Additional Contrast? None    Result Date: 2/28/2023  EXAMINATION: CT OF THE ABDOMEN AND PELVIS WITH CONTRAST 2/28/2023 7:53 pm TECHNIQUE: CT of the abdomen and pelvis was performed with the administration of intravenous contrast. Multiplanar reformatted images are provided for review. Automated exposure control, iterative reconstruction, and/or weight based adjustment of the mA/kV was utilized to reduce the radiation dose to as low as reasonably achievable. COMPARISON: Yesterday HISTORY: ORDERING SYSTEM PROVIDED HISTORY: pain vomiting and diarrhea TECHNOLOGIST PROVIDED HISTORY: Additional Contrast?->None Reason for exam:->pain vomiting and diarrhea Decision Support Exception - unselect if not a suspected or confirmed emergency medical condition->Emergency Medical Condition (MA) FINDINGS: Lower Chest: No infiltrate or effusion in the visible lower chest. Organs: Fatty liver suggested. Question hepatic cirrhosis. Cholecystectomy. Mild splenomegaly. GI/Bowel: There is diffuse intestinal prominence with air-fluid levels. There is note of small bowel dilation most evident midportion relatively collapsed distally and proximally. Distal transition appearing to be around the lower abdomen central right-side/upper pelvis Pelvis:   Small round cystic area is stable in the right pelvis on image 355 of uncertain etiology and significance. Post hysterectomy Peritoneum/Retroperitoneum: Minimal ascites Bones/Soft Tissues: Ventral postsurgical changes with apparent scarring at the subcutaneous fat and abdominal wall mostly infraumbilical.  There are lumbosacral degenerative changes mainly     Development of intestinal prominence with air-fluid levels including small bowel dilation with transitions suspicious for obstruction, small amount of ascites also, with differential considerations of ileus and gastroenteritis.      ASSESSMENT:    Present on Admission:   Nausea vomiting and diarrhea   Gastroenteritis    PLAN:  # Intractable abdominal pain, nausea, vomiting - gastroenteritic vs ileus vs gastroparesis   CT showed intestinal prominence with air-fluid levels including small bowel dilation   No leucocytosis, no fever   NPO, IV fluid, PRN pain meds, antiemetics   Surgery consult  # Hypertension   Currently better controlled  Will resume home medications as needed. Monitor vitals and adjust BP meds as needed  # DM moderately controlled  On Low carb diet  Nutritional and dietary counseling   Placed on sliding scale inulin   Bedside glucose before meals and bedtime and adjust insulin accordingly  # Anxiety with depression   Currently stable with no acute changes   Will resume/adjust medications as on chart  # H/O CAD and prior IHD   Continue home meds  Antipletelets + Statin on board  # Rest of the chronic medical problems are stable and will be managed with appropriately with home medications, placed nursing communication order to verify home medications before giving them to the patient. # Fall Precaution: Yes  # Disposition: Home/primary residence   # Code Status: Full code  # DVT Prophylaxis : Lovenox SC  The patient at bedside was counseled about clinical status, laboratory/imaging results, diagnoses, medication side effects, risk, and treatment plan, all questions were answered to patient's satisfaction and verbalized understanding      SIGNATURE: Jaelyn Orellana MD PATIENT NAME: [address] [place] [address] #: Hospitalist on call MRN: [id number]     Disclaimer: Portions of this note may have been generated using Dragon voice recognition software. Reasonable efforts were made to correct any dictation errors that resulted due to the programming of this software but some may still be present.

## 2023-03-01 NOTE — CARE COORDINATION
Case Management Assessment  Initial Evaluation    Date/Time of Evaluation: 3/1/2023 2:24 PM  Assessment Completed by: Chaparrita Carey RN    If patient is discharged prior to next notation, then this note serves as note for discharge by case management. Patient Name: Claudia Luo                   YOB: 1975  Diagnosis: Gastroenteritis [K52.9]  Nausea vomiting and diarrhea [R11.2, R19.7]                   Date / Time: 2/28/2023  4:51 PM    Patient Admission Status: Observation   Readmission Risk (Low < 19, Mod (19-27), High > 27): Readmission Risk Score: 10.6    Current PCP: Diannah Kanner, MD  PCP verified by CM? No    Chart Reviewed: Yes      History Provided by: Patient, Medical Record  Patient Orientation: Alert and Oriented    Patient Cognition: Alert    Hospitalization in the last 30 days (Readmission):  No    If yes, Readmission Assessment in  Navigator will be completed. Advance Directives:      Code Status: Full Code         Discharge Planning:    Patient lives with: Family Members Type of Home: House  Primary Care Giver: Self  Patient Support Systems include: Family Members     Current services prior to admission: None            Type of Home Care services:  None    ADLS  Prior functional level: Independent in ADLs/IADLs  Current functional level: Independent in ADLs/IADLs      Family can provide assistance at DC: Yes  Would you like Case Management to discuss the discharge plan with any other family members/significant others, and if so, who?  No  Plans to Return to Present Housing: Yes    Potential Assistance needed at discharge: N/A       Patient expects to discharge to: [employer]      Financial    Payor: Jackie Boateng / Plan: Jackie Boateng / Product Type: *No Product type* /         Meds-to-Beds request: Yes      [address] [employer] [facility] 39 [address] Sheri Ferguson  Phone: [phone] Fax: [phone]      Notes: Chart reviewed as patient is low risk for discharge planning intervention. Anticipate that patient will be discharge home tomorrow if medically stable. Pt came for diarrhea but has not had any for >12 hours.       The Plan for Transition of Care is related to the following treatment goals of Gastroenteritis [K52.9]  Nausea vomiting and diarrhea [R11.2, R19.7]        Bud Arana RN  Case Management Department  Ph: [phone]

## 2023-03-01 NOTE — PROGRESS NOTES
Reason for consult: Insulin pump patient    A1C:   6.2   [] Not available                     Patient states the following concerns/barriers to diabetes self-management:     [x] None       [] Medication cost   [] Food cost/availability        [] Reading  [] Hearing   [] Vision                [] Work    [] Transportation  [] No insurance  [] Physical limitations    [] Other:        Patient states the following about their diabetes/health:   [x]  Insulin pump patient-- using her own pump           COMMENTS:  Insulin pump agreement form provided to nurse for patient signature. Recommendations:   [x] Carbohydrate-controlled diet    [] Script for glucometer and supplies (per preference of patient's insurance)  [] Script for insulin pens and pen needles (if insulin is ordered at discharge for home use)   [] Consult to social work: patient has no insurance or has financial hardship  [] Inpatient consult to endocrinologist   [x] Follow up with endocrinologist as an outpatient   [] Home healthcare nursing to increase compliance to treatment plan   [] Script for outpatient diabetes education classes (from doctor)        Thank you for this consult.

## 2023-03-01 NOTE — ED NOTES
Patient reported that she thought that she was going home. She took her IV. IV was replaced.      Cinda Valdivia RN  02/28/23 [id number]

## 2023-03-01 NOTE — ED PROVIDER NOTES
[address] [facility]  eMERGENCY dEPARTMENT eNCOUnter      Pt Name: Pao Correia  MRN: [id number]  [place] 1975  Date of evaluation: 2/28/2023  Provider: Aiden Kruse MD     CHIEF COMPLAINT       Chief Complaint   Patient presents with    Abdominal Pain    Emesis    Diarrhea         HISTORY OF PRESENT ILLNESS   (Location/Symptom, Timing/Onset,Context/Setting, Quality, Duration, Modifying Factors, Severity) Note limiting factors. HPI    Pao Correia is a 53 y.o. female who presents to the emergency department. Patient is a 2-day history of nausea vomiting and diarrhea. She denies hematemesis or melena. She denies any known food poisoning. She has had no known COVID or influenza exposure. She describes diffuse crampy abdominal pain. She states she has gone diarrhea 100 times since this morning. She reports she has vomited at least 15 times. She denies previous such episodes. Other members of her household have not been ill. She denies recent abdominal surgery. She has a history of irritable bowel syndrome. Nursing Notes were reviewed. REVIEW OF SYSTEMS  (2+ for level 4; 10+ for level 5)     . REVIEW OF SYSTEMS:   CONSTITUTIONAL: Denies: fever, chills, diaphoresis, weakness  ALLERGIES: Denies: urticaria, angioedema  EYES: Denies: blurry vision, decreased vision, loss of vision  ENT: Deniess  ore throat, nasal congestion, epistaxis, hearing loss  RESPIRATORY: Denies: cough, hemoptysis, shortness of breath  ENDOCRINE:Denies polydipsia/polyuria, palpitations  HEME-LYMPH: Denies: swollen lymph nodes, bleeding  GI: see hpi  : Denies: dysuria, hematuria, pelvic pain  MUSCULOSKELETAL: Denies: back pain, joint stiffness, muscle pain  SKIN: Denies: rash, itching, hives    PAST MEDICAL HISTORY     Past Medical History:   Diagnosis Date    Acute renal injury ([facility] [address] 09/04/2013    Analgesic overuse headache 12/19/2018    Anxiety     Arthritis     Asthma CAD (coronary artery disease)     Depression 2013    Diabetes mellitus ([facility] [address]     A1C=6.7 on 14    Fracture of tooth 2018    GERD (gastroesophageal reflux disease)     Glaucoma     Hyperlipidemia 2021    Hypertension     Hypertensive urgency 2013    Hypothyroidism     Irritable bowel syndrome     Morbid obesity due to excess calories ([facility] [address]     Obesity     Obstructive sleep apnea     Pneumonia     Polycystic ovarian syndrome     Postcholecystectomy syndrome 2018    Spinal headache     Suicidal ideation 2016       SURGICAL HISTORY       Past Surgical History:   Procedure Laterality Date     SECTION      Dr. Benita Shahid, [address], LAPAROSCOPIC      [address]  10/06/2011    4 extractions    ECHO COMPLETE  2014         ENDOSCOPY, COLON, DIAGNOSTIC      FOOT SURGERY      RECONSTRUCTION LEFT FOOT, Dr. Aiden Sherwood, [address]  9/3/13    incisional hernia repair with mesh    HYSTERECTOMY (CERVIX STATUS UNKNOWN)      KNEE CARTILAGE SURGERY      OVARY REMOVAL      left due to polycystic ovary, Dr. Faiza Palmer, [facility]    BRYANT AND BSO (CERVIX REMOVED)  3/19/2013    Attempted Lap-Open BRYANT;RSO, Dr. Luis Moraes, [facility] N/A 2022    EGD BIOPSY performed by Mark Price MD at [facility]       Previous Medications    ACCU-CHEK FASTCLIX LANCETS MISC    USE TO CHECK BLOOD GLUCOSE 4X DAILY    ACCU-CHEK SOFTCLIX LANCETS MISC    Check blood sugar 4x daily    ALBUTEROL SULFATE HFA (VENTOLIN HFA) 108 (90 BASE) MCG/ACT INHALER    Inhale 2 puffs into the lungs 4 times daily as needed for Wheezing    ASPIRIN LOW DOSE 81 MG EC TABLET    TAKE 1 TABLET BY MOUTH DAILY    ATORVASTATIN (LIPITOR) 20 MG TABLET    TAKE 1 TABLET BY MOUTH DAILY    BLOOD GLUCOSE TEST STRIPS (ACCU-CHEK GUIDE) STRIP    Use to check blood sugars 4x daily CONTINUOUS BLOOD GLUC SENSOR (GUARDIAN SENSOR 3) MISC    To change every 7 days    CONTINUOUS BLOOD GLUC TRANSMIT (GUARDIAN LINK 3 TRANSMITTER) MISC    To use with sensors    DICYCLOMINE (BENTYL) 10 MG CAPSULE    Take 1 capsule by mouth 4 times daily    DILTIAZEM (CARDIZEM CD) 360 MG EXTENDED RELEASE CAPSULE    Take 1 capsule by mouth daily    DULAGLUTIDE (TRULICITY) 3 MA/7.5KH SOPN    Inject 3 mg into the skin once a week    GLUCOSE MONITORING (FREESTYLE FREEDOM) KIT    1 kit by Does not apply route daily    HYDRALAZINE (APRESOLINE) 100 MG TABLET    Take 1 tablet by mouth every 8 hours    HYDROCHLOROTHIAZIDE (HYDRODIURIL) 12.5 MG TABLET    TAKE ONE TABLET BY MOUTH EVERY DAY    HYDROCORTISONE (ANUSOL-HC) 2.5 % CREA RECTAL CREAM    Apply to affected area twice daily for rectal pain from external hemorrhoid. INSULIN INFUSION PUMP (MINIMED 770G INSULIN PUMP SYS) KIT    To infuse insulin    INSULIN LISPRO (HUMALOG) 100 UNIT/ML SOLN INJECTION VIAL    USE VIA INSULIN PUMP.  MAX 85 UNITS DAILY    INSULIN PEN NEEDLE (BD PEN NEEDLE MICRO U/F) 32G X 6 MM MISC    Uses with insulin 4 times a day    INSULIN PEN NEEDLE (BD PEN NEEDLE MICRO U/F) 32G X 6 MM MISC    Uses with insulin 4 times a day    LISINOPRIL (PRINIVIL;ZESTRIL) 40 MG TABLET    TAKE ONE TABLET BY MOUTH EVERY DAY    MUPIROCIN (BACTROBAN) 2 % OINTMENT    Apply topically 3 times daily as needed (apply to both hands and under breasts)    ONDANSETRON (ZOFRAN-ODT) 4 MG DISINTEGRATING TABLET    Take 1 tablet by mouth 3 times daily as needed for Nausea or Vomiting    TRIAMCINOLONE (ARISTOCORT) 0.5 % CREAM    Apply topically 3 times daily as needed (apply to both hands and under breasts)       ALLERGIES     Percocet [oxycodone-acetaminophen], Food, Metformin and related, and Tape [adhesive tape]    FAMILY HISTORY       Family History   Problem Relation Age of Onset    Asthma Mother     Diabetes Mother     High Blood Pressure Mother     High Blood Pressure Father Heart Disease Father     Cancer Father     Depression Father     Diabetes Brother     Asthma Brother     Thyroid Disease Sister     Asthma Sister     Depression Maternal Grandmother     High Blood Pressure Maternal Grandmother     Mental Illness Maternal Grandmother     Thyroid Disease Maternal Grandmother     Heart Disease Maternal Grandfather     Depression Paternal Grandmother     High Blood Pressure Paternal Grandmother     Cancer Paternal Grandfather           SOCIAL HISTORY       Social History     Socioeconomic History    Marital status:       Spouse name: None    Number of children: 1    Years of education: 16    Highest education level: None   Tobacco Use    Smoking status: Never    Smokeless tobacco: Never   Vaping Use    Vaping Use: Never used   Substance and Sexual Activity    Alcohol use: No     Comment: no alcohol since age 26;  drinks 2-3 glasses Coke/Pepsi daily & 2-3 glasses of iced tea daily     Drug use: Never    Sexual activity: Not Currently     Partners: Male       SCREENINGS    [place] Coma Scale  Eye Opening: Spontaneous  Best Verbal Response: Oriented  Best Motor Response: Obeys commands  [place] Coma Scale Score: 15      PHYSICAL EXAM    (5+ for level 4, 8+ for level 5)     ED Triage Vitals [02/28/23 1650]   BP Temp Temp Source Heart Rate Resp SpO2 Height Weight   (!) 171/81 98.3 °F (36.8 °C) Oral 83 20 94 % -- --       Physical Exam  General Appearance:   Alert, cooperative, no distress, appears stated age, super morbid obesity  Head:   Normocephalic, without obvious abnormality, atraumatic  Eyes:   PERRL, conjunctiva/corneas clear,   EOM's intact, fundi    benign, both eyesEars:   Normal TM's and external ear canals, both earsNose:    Nares normal, septum midline, mucosa normal, no drainage    or sinus tenderness  Throat:  Lips, mucosa, and tongue normal; teeth and gums normal  Neck:  Supple, symmetrical, trachea midline, no adenopathy;    thyroid:  no enlargement/tenderness/nodules; no carotid   bruit or JVD  Back:    Symmetric, no curvature, ROM normal, no CVA tendernessLungs:    Clear to auscultation bilaterally, respirations unlabored  Chest Wall:   No tenderness or deformity Heart:   Regular rate and rhythm, S1 and S2 normal, no murmur, rub   or gallop  Abdomen:    Soft, diffuse abdominal tenderness with percussion tenderness. , bowel sounds active all four quadrants, no masses, no organomegaly  Extremities:  Extremities normal, atraumatic, no cyanosis or edema Pulses:  2+ and symmetric all extremities  Skin:  Skin color, texture, turgor normal, no rashes or lesionsLymph nodes:  Cervical, supraclavicular, and axillary nodes normal  Neurologic:  CNII-XII intact, normal strength, sensation and reflexes throughout    DIAGNOSTIC RESULTS       Interpretation per the Radiologist below, if available at the time of this note:  No results found. LABS:  Labs Reviewed   CBC WITH AUTO DIFFERENTIAL - Abnormal; Notable for the following components:       Result Value    MCHC 31.9 (*)     Lymphocytes % 15.0 (*)     Neutrophils Absolute 7.79 (*)     Eosinophils Absolute 0.01 (*)     All other components within normal limits   COMPREHENSIVE METABOLIC PANEL W/ REFLEX TO MG FOR LOW K - Abnormal; Notable for the following components:    Glucose 152 (*)     BUN 22 (*)     All other components within normal limits   LIPASE - Abnormal; Notable for the following components:    Lipase 11 (*)     All other components within normal limits   URINALYSIS WITH MICROSCOPIC - Abnormal; Notable for the following components:    Bacteria, UA FEW (*)     All other components within normal limits   RAPID INFLUENZA A/B ANTIGENS   PROTIME-INR   PREGNANCY, URINE   LACTIC ACID   COVID-19   COVID-19   COVID-19       All other labs werewithin normal range or not returned as of this dictation.     EMERGENCY DEPARTMENT COURSE and DIFFERENTIAL DIAGNOSIS/MDM:   Vitals:    Vitals:    02/28/23 1650   BP: (!) 171/81   Pulse: 83   Resp: 20   Temp: 98.3 °F (36.8 °C)   TempSrc: Oral   SpO2: 94%       Medications   0.9 % sodium chloride bolus (1,000 mLs IntraVENous New Bag 23 1750)   0.9 % sodium chloride infusion (has no administration in time range)   fentaNYL (SUBLIMAZE) injection 25 mcg (25 mcg IntraVENous Given 23 1752)   ondansetron (ZOFRAN) injection 4 mg (4 mg IntraVENous Given 23 1755)       MDM  . Patient has significant abdominal pain. Her white count is normal.  There is no evidence of a urinary tract infection. CT scan is pending at this time. She had negative COVID and flu testing done yesterday at [facility].  She signed out  [address] due to anxiety from being placed in a room where her   earlier. Her white blood cell count is normal.  Urinalysis shows increased specific gravity but no evidence of acute infection. The glucose is mildly evaded at 152 with a BUN of 22. She has received IV fluids and is feeling better. CONSULTS:  None    PROCEDURES:  Unless otherwise noted below, none     Procedures    FINAL IMPRESSION      1. Nausea vomiting and diarrhea          DISPOSITION/PLAN   DISPOSITION        PATIENT REFERRED TO:  PCP    DISCHARGE MEDICATIONS:  New Prescriptions    ONDANSETRON (ZOFRAN-ODT) 4 MG DISINTEGRATING TABLET    Take 1 tablet by mouth 3 times daily as needed for Nausea or Vomiting    ONDANSETRON (ZOFRAN-ODT) 4 MG DISINTEGRATING TABLET    Take 1 tablet by mouth 3 times daily as needed for Nausea or Vomiting          (Please note:  Portions of this note were completed with a voice recognition program.  Efforts were made to edit the dictations but occasionally words and phrases are mis-transcribed.)    Form v2016. J.5-cn    Ana Luisa Fine MD (electronically signed)  Emergency Medicine Provider                 Ana Luisa Fine MD  [phone]

## 2023-03-02 ENCOUNTER — APPOINTMENT (OUTPATIENT)
Dept: GENERAL RADIOLOGY | Age: 48
DRG: 249 | End: 2023-03-02
Payer: MEDICAID

## 2023-03-02 LAB
ALBUMIN SERPL-MCNC: 3.1 G/DL (ref 3.5–5.2)
ALP BLD-CCNC: 71 U/L (ref 35–104)
ALT SERPL-CCNC: 26 U/L (ref 0–32)
ANION GAP SERPL CALCULATED.3IONS-SCNC: 9 MMOL/L (ref 7–16)
AST SERPL-CCNC: 19 U/L (ref 0–31)
BASOPHILS ABSOLUTE: 0.03 E9/L (ref 0–0.2)
BASOPHILS RELATIVE PERCENT: 0.5 % (ref 0–2)
BILIRUB SERPL-MCNC: 0.4 MG/DL (ref 0–1.2)
BUN BLDV-MCNC: 18 MG/DL (ref 6–20)
CALCIUM SERPL-MCNC: 8.3 MG/DL (ref 8.6–10.2)
CHLORIDE BLD-SCNC: 106 MMOL/L (ref 98–107)
CHOLESTEROL, TOTAL: 114 MG/DL (ref 0–199)
CO2: 25 MMOL/L (ref 22–29)
CREAT SERPL-MCNC: 0.8 MG/DL (ref 0.5–1)
EOSINOPHILS ABSOLUTE: 0.12 E9/L (ref 0.05–0.5)
EOSINOPHILS RELATIVE PERCENT: 1.9 % (ref 0–6)
GFR SERPL CREATININE-BSD FRML MDRD: >60 ML/MIN/1.73
GLUCOSE BLD-MCNC: 129 MG/DL (ref 74–99)
HCT VFR BLD CALC: 40.1 % (ref 34–48)
HDLC SERPL-MCNC: 39 MG/DL
HEMOGLOBIN: 12.6 G/DL (ref 11.5–15.5)
IMMATURE GRANULOCYTES #: 0.02 E9/L
IMMATURE GRANULOCYTES %: 0.3 % (ref 0–5)
LDL CHOLESTEROL CALCULATED: 56 MG/DL (ref 0–99)
LIPASE: 16 U/L (ref 13–60)
LYMPHOCYTES ABSOLUTE: 2.74 E9/L (ref 1.5–4)
LYMPHOCYTES RELATIVE PERCENT: 42.5 % (ref 20–42)
MAGNESIUM: 1.8 MG/DL (ref 1.6–2.6)
MCH RBC QN AUTO: 29.5 PG (ref 26–35)
MCHC RBC AUTO-ENTMCNC: 31.4 % (ref 32–34.5)
MCV RBC AUTO: 93.9 FL (ref 80–99.9)
METER GLUCOSE: 111 MG/DL (ref 74–99)
METER GLUCOSE: 135 MG/DL (ref 74–99)
METER GLUCOSE: 141 MG/DL (ref 74–99)
METER GLUCOSE: 146 MG/DL (ref 74–99)
MONOCYTES ABSOLUTE: 0.58 E9/L (ref 0.1–0.95)
MONOCYTES RELATIVE PERCENT: 9 % (ref 2–12)
NEUTROPHILS ABSOLUTE: 2.96 E9/L (ref 1.8–7.3)
NEUTROPHILS RELATIVE PERCENT: 45.8 % (ref 43–80)
PDW BLD-RTO: 13.7 FL (ref 11.5–15)
PLATELET # BLD: 195 E9/L (ref 130–450)
PMV BLD AUTO: 12 FL (ref 7–12)
POTASSIUM REFLEX MAGNESIUM: 3.4 MMOL/L (ref 3.5–5)
RBC # BLD: 4.27 E12/L (ref 3.5–5.5)
SODIUM BLD-SCNC: 140 MMOL/L (ref 132–146)
TOTAL PROTEIN: 5.3 G/DL (ref 6.4–8.3)
TRIGL SERPL-MCNC: 97 MG/DL (ref 0–149)
VLDLC SERPL CALC-MCNC: 19 MG/DL
WBC # BLD: 6.5 E9/L (ref 4.5–11.5)

## 2023-03-02 PROCEDURE — 6370000000 HC RX 637 (ALT 250 FOR IP): Performed by: NURSE PRACTITIONER

## 2023-03-02 PROCEDURE — APPSS30 APP SPLIT SHARED TIME 16-30 MINUTES: Performed by: NURSE PRACTITIONER

## 2023-03-02 PROCEDURE — 96361 HYDRATE IV INFUSION ADD-ON: CPT

## 2023-03-02 PROCEDURE — 96375 TX/PRO/DX INJ NEW DRUG ADDON: CPT

## 2023-03-02 PROCEDURE — 6370000000 HC RX 637 (ALT 250 FOR IP): Performed by: INTERNAL MEDICINE

## 2023-03-02 PROCEDURE — 83690 ASSAY OF LIPASE: CPT

## 2023-03-02 PROCEDURE — 2580000003 HC RX 258: Performed by: EMERGENCY MEDICINE

## 2023-03-02 PROCEDURE — 1200000000 HC SEMI PRIVATE

## 2023-03-02 PROCEDURE — 96376 TX/PRO/DX INJ SAME DRUG ADON: CPT

## 2023-03-02 PROCEDURE — 80053 COMPREHEN METABOLIC PANEL: CPT

## 2023-03-02 PROCEDURE — 85025 COMPLETE CBC W/AUTO DIFF WBC: CPT

## 2023-03-02 PROCEDURE — 74018 RADEX ABDOMEN 1 VIEW: CPT

## 2023-03-02 PROCEDURE — 82962 GLUCOSE BLOOD TEST: CPT

## 2023-03-02 PROCEDURE — 80061 LIPID PANEL: CPT

## 2023-03-02 PROCEDURE — 6360000002 HC RX W HCPCS: Performed by: NURSE PRACTITIONER

## 2023-03-02 PROCEDURE — 83735 ASSAY OF MAGNESIUM: CPT

## 2023-03-02 PROCEDURE — 96372 THER/PROPH/DIAG INJ SC/IM: CPT

## 2023-03-02 PROCEDURE — 6360000002 HC RX W HCPCS: Performed by: INTERNAL MEDICINE

## 2023-03-02 PROCEDURE — 99232 SBSQ HOSP IP/OBS MODERATE 35: CPT | Performed by: INTERNAL MEDICINE

## 2023-03-02 PROCEDURE — 36415 COLL VENOUS BLD VENIPUNCTURE: CPT

## 2023-03-02 RX ORDER — POTASSIUM CHLORIDE 750 MG/1
40 TABLET, EXTENDED RELEASE ORAL ONCE
Status: COMPLETED | OUTPATIENT
Start: 2023-03-02 | End: 2023-03-03

## 2023-03-02 RX ORDER — METOCLOPRAMIDE HYDROCHLORIDE 5 MG/ML
10 INJECTION INTRAMUSCULAR; INTRAVENOUS ONCE
Status: COMPLETED | OUTPATIENT
Start: 2023-03-02 | End: 2023-03-02

## 2023-03-02 RX ADMIN — LISINOPRIL 40 MG: 20 TABLET ORAL at 10:09

## 2023-03-02 RX ADMIN — ONDANSETRON 4 MG: 2 INJECTION INTRAMUSCULAR; INTRAVENOUS at 03:39

## 2023-03-02 RX ADMIN — ASPIRIN 81 MG: 81 TABLET, COATED ORAL at 10:09

## 2023-03-02 RX ADMIN — SODIUM CHLORIDE: 9 INJECTION, SOLUTION INTRAVENOUS at 07:09

## 2023-03-02 RX ADMIN — ONDANSETRON 4 MG: 2 INJECTION INTRAMUSCULAR; INTRAVENOUS at 10:07

## 2023-03-02 RX ADMIN — SODIUM CHLORIDE: 9 INJECTION, SOLUTION INTRAVENOUS at 17:36

## 2023-03-02 RX ADMIN — HYDRALAZINE HYDROCHLORIDE 100 MG: 50 TABLET, FILM COATED ORAL at 20:47

## 2023-03-02 RX ADMIN — HYDRALAZINE HYDROCHLORIDE 100 MG: 50 TABLET, FILM COATED ORAL at 05:36

## 2023-03-02 RX ADMIN — ATORVASTATIN CALCIUM 20 MG: 20 TABLET, FILM COATED ORAL at 10:10

## 2023-03-02 RX ADMIN — DILTIAZEM HYDROCHLORIDE 360 MG: 120 CAPSULE, COATED, EXTENDED RELEASE ORAL at 10:08

## 2023-03-02 RX ADMIN — ONDANSETRON 4 MG: 2 INJECTION INTRAMUSCULAR; INTRAVENOUS at 20:47

## 2023-03-02 RX ADMIN — ACETAMINOPHEN 650 MG: 325 TABLET ORAL at 03:59

## 2023-03-02 RX ADMIN — METOCLOPRAMIDE 10 MG: 5 INJECTION, SOLUTION INTRAMUSCULAR; INTRAVENOUS at 13:59

## 2023-03-02 RX ADMIN — ENOXAPARIN SODIUM 40 MG: 100 INJECTION SUBCUTANEOUS at 10:10

## 2023-03-02 RX ADMIN — HYDRALAZINE HYDROCHLORIDE 100 MG: 50 TABLET, FILM COATED ORAL at 14:00

## 2023-03-02 ASSESSMENT — PAIN DESCRIPTION - LOCATION: LOCATION: ABDOMEN

## 2023-03-02 ASSESSMENT — PAIN SCALES - GENERAL: PAINLEVEL_OUTOF10: 10

## 2023-03-02 ASSESSMENT — PAIN DESCRIPTION - ORIENTATION: ORIENTATION: MID

## 2023-03-02 ASSESSMENT — PAIN DESCRIPTION - DESCRIPTORS: DESCRIPTORS: SHARP

## 2023-03-02 NOTE — PROGRESS NOTES
[address] [place] [address] Hospitalist Progress Note    Admitting Date and Time: 2/28/2023  4:51 PM  Admit Dx: Gastroenteritis [K52.9]  Nausea vomiting and diarrhea [R11.2, R19.7]    Subjective:    3/1: Pt eating jello upon arrival. States she has not had any diarrhea since last night in ER. States abd pain is 10/10, unable to describe, unable to pinpoint. Lives with her brother, states that he informed her that he woke up this am with GI upset. Denies CP, SOB.    3/2: Received a call early this am that pt was going to leave AMA this because she is not getting enough food. Pt had also states she was leaving last evening if she did not receive more fentanyl. Went to see pt shortly after phone call, pt initially very upset & yelling about the portion size of her breakfast. Explained to pt that slow reintroduction of food is appropriate given her chief complaint of abd pain & diarrhea. Also explained to pt why POCT BS checks are ordered, especially given pt's current dietary restrictions and insulin pump. Pt initially irritated, but did ultimately verbalize understanding. Pt did calm down & apologized for her behavior. Pt continues to complain of abd pain, does appear distended & firm this am. States she had 3 episodes of diarrhea yesterday & 1 episode this am. States she is tolerating food, but is not eating enough to have more diarrhea. PMH:  has a past medical history of Acute renal injury ([address] Analgesic overuse headache, Anxiety, Arthritis, Asthma, CAD (coronary artery disease), Depression, Diabetes mellitus ([address] Fracture of tooth, GERD (gastroesophageal reflux disease), Glaucoma, Hyperlipidemia, Hypertension, Hypertensive urgency, Hypothyroidism, Irritable bowel syndrome, Morbid obesity due to excess calories ([address] Obesity, Obstructive sleep apnea, Pneumonia, Polycystic ovarian syndrome, Postcholecystectomy syndrome, Spinal headache, and Suicidal ideation.     REVIEW OF SYSTEMS:  no fevers, chills, cp, sob, ha, vision/hearing changes, wt changes, hot/cold flashes, other open skin lesions, dysuria/hematuria unless noted in HPI. Complete ROS performed with the patient and is otherwise negative. Objective:    BP (!) 151/80   Pulse 72   Temp 98.2 °F (36.8 °C) (Oral)   Resp 18   Ht 5' 6\" (1.676 m)   Wt (!) 361 lb (163.7 kg)   LMP 10/01/2012 (Approximate) Comment: 2013  SpO2 92%   BMI 58.27 kg/m²     PHYSICAL EXAM  General Appearance: alert and oriented to person, place and time and in no acute distress  Skin: warm and dry  Head: normocephalic and atraumatic  Eyes: pupils equal, round, and reactive to light, extraocular eye movements intact, conjunctivae normal  Neck: neck supple and non tender without mass   Pulmonary/Chest: clear to auscultation bilaterally- no wheezes, rales or rhonchi, normal air movement, no respiratory distress  Cardiovascular: normal rate, normal S1 and S2 and no carotid bruits  Abdomen: firm, distended, tender in all quadrants without guarding. No rebound tenderness. Active, distant bowel sounds, no masses or organomegaly  Extremities: no cyanosis, no clubbing and no edema  Neurologic: no cranial nerve deficit and speech normal    Recent Labs     02/28/23  1732      K 3.8      CO2 27   BUN 22*   CREATININE 0.9   GLUCOSE 152*   CALCIUM 8.7       Recent Labs     02/28/23  1732   WBC 10.4   RBC 5.14   HGB 15.2   HCT 47.6   MCV 92.6   MCH 29.6   MCHC 31.9*   RDW 13.2      MPV 11.7       Admission HPI: Presented on 2/28 with Intractable abdominal pain, nausea, vomiting x2-3 days. She was recently seen at ED but signed out AMS and now coming back for worsening abdominal pain.       Assessment & Plan:    Intractable diarrhea & vomiting  Ileus vs gastroenteritis vs gastroparesis  -gen surg consulted, appreciate input  -CT abd notes development of intestinal prominence with air-fluid levels including small bowel dilation  -CT findings of possible ileus inconsistent with pt report, and chief complaint, of copious amounts of diarrhea  -pt reports 3 episodes of diarrhea 3/1 and 1 episode this am  -Diet advanced to solids 3/1, pt states she is tolerating well and wants dietary restrictions removed  -Abd firm & distended   -diet increased to reg, 3 carb diet 3/1. Placed on NPO this am pending input from gen surg  -bentyl stopped  -[email or web address]  -afebrile, no elevated white count  -lipase 11  -fentanyl stopped 3/1  -acetaminophen prn     DMII current A1c 6.2  -pt has insulin pump: coverage insulin managed by pt, basal insulin managed by endocrinologist. Pt states she cannot alter basal dosage. -POCT glucose is not being documented. 3/1 Nsg communication placed & spoke with charge nurse regarding necessity for obtaining & documenting POCT glucose. 1 meter glucose obtained yesterday afternoon. Repeat nsg communication & call to charge nurse placed today. Per nurse, pt had been refusing  - pt now agreeable to POCT glucose  -hold home trulicity  -hypoglycemia protocol    HTN  -continue home lisinopril & hydralazine  -hold home hctz  -clonidine prn  -monitor BP    CAD  HLD current lipid panel WNL  -continue home statin      Code Status: Full Code  DVT prophylaxis: Lovenox    Disposition: From home. Anticipate discharge to home when medically stable. 30 minutes or more spent reviewing patient chart, assessing patient, discussing plan of care with patient and family, discussing plan of care with collaborating physician, and documentation. NOTE: This report was transcribed using voice recognition software. Every effort was made to ensure accuracy; however, inadvertent computerized transcription errors may be present.   Electronically signed by GEOVANI Blackburn NP on 3/2/2023 at 7:07 AM

## 2023-03-02 NOTE — PLAN OF CARE
Problem: Discharge Planning  Goal: Discharge to home or other facility with appropriate resources  Outcome: Progressing     Problem: Safety - Adult  Goal: Free from fall injury  3/2/2023 1105 by Gretta Sauceda RN  Outcome: Progressing     Problem: Pain  Goal: Verbalizes/displays adequate comfort level or baseline comfort level  3/2/2023 1105 by Gretta Sauceda RN  Outcome: Progressing     Problem: Chronic Conditions and Co-morbidities  Goal: Patient's chronic conditions and co-morbidity symptoms are monitored and maintained or improved  3/2/2023 1105 by Gretta Sauceda RN  Outcome: Progressing

## 2023-03-02 NOTE — PLAN OF CARE
Problem: Safety - Adult  Goal: Free from fall injury  Outcome: Progressing     Problem: Pain  Goal: Verbalizes/displays adequate comfort level or baseline comfort level  Outcome: Progressing     Problem: Chronic Conditions and Co-morbidities  Goal: Patient's chronic conditions and co-morbidity symptoms are monitored and maintained or improved  Outcome: Progressing

## 2023-03-02 NOTE — PROGRESS NOTES
Patient lenard yelling and cursing about her lack of food and wanting to leave AMA. Changed her mind, wants to see the doctor right now.  Charge nurse notified about patient request.

## 2023-03-03 ENCOUNTER — APPOINTMENT (OUTPATIENT)
Dept: CT IMAGING | Age: 48
DRG: 249 | End: 2023-03-03
Payer: MEDICAID

## 2023-03-03 PROBLEM — R19.7 DIARRHEA: Status: ACTIVE | Noted: 2023-03-03

## 2023-03-03 LAB
ANION GAP SERPL CALCULATED.3IONS-SCNC: 8 MMOL/L (ref 7–16)
BASOPHILS ABSOLUTE: 0.02 E9/L (ref 0–0.2)
BASOPHILS RELATIVE PERCENT: 0.3 % (ref 0–2)
BUN BLDV-MCNC: 12 MG/DL (ref 6–20)
CALCIUM SERPL-MCNC: 7.9 MG/DL (ref 8.6–10.2)
CHLORIDE BLD-SCNC: 106 MMOL/L (ref 98–107)
CO2: 25 MMOL/L (ref 22–29)
CREAT SERPL-MCNC: 0.8 MG/DL (ref 0.5–1)
EOSINOPHILS ABSOLUTE: 0.15 E9/L (ref 0.05–0.5)
EOSINOPHILS RELATIVE PERCENT: 2.2 % (ref 0–6)
GFR SERPL CREATININE-BSD FRML MDRD: >60 ML/MIN/1.73
GLUCOSE BLD-MCNC: 124 MG/DL (ref 74–99)
HCT VFR BLD CALC: 39.6 % (ref 34–48)
HEMOGLOBIN: 12.5 G/DL (ref 11.5–15.5)
IMMATURE GRANULOCYTES #: 0.02 E9/L
IMMATURE GRANULOCYTES %: 0.3 % (ref 0–5)
LYMPHOCYTES ABSOLUTE: 2.91 E9/L (ref 1.5–4)
LYMPHOCYTES RELATIVE PERCENT: 41.9 % (ref 20–42)
MCH RBC QN AUTO: 29.4 PG (ref 26–35)
MCHC RBC AUTO-ENTMCNC: 31.6 % (ref 32–34.5)
MCV RBC AUTO: 93.2 FL (ref 80–99.9)
METER GLUCOSE: 118 MG/DL (ref 74–99)
METER GLUCOSE: 124 MG/DL (ref 74–99)
METER GLUCOSE: 132 MG/DL (ref 74–99)
METER GLUCOSE: 164 MG/DL (ref 74–99)
MONOCYTES ABSOLUTE: 0.62 E9/L (ref 0.1–0.95)
MONOCYTES RELATIVE PERCENT: 8.9 % (ref 2–12)
NEUTROPHILS ABSOLUTE: 3.23 E9/L (ref 1.8–7.3)
NEUTROPHILS RELATIVE PERCENT: 46.4 % (ref 43–80)
PDW BLD-RTO: 13.5 FL (ref 11.5–15)
PLATELET # BLD: 189 E9/L (ref 130–450)
PMV BLD AUTO: 11.6 FL (ref 7–12)
POTASSIUM SERPL-SCNC: 3.4 MMOL/L (ref 3.5–5)
RBC # BLD: 4.25 E12/L (ref 3.5–5.5)
SODIUM BLD-SCNC: 139 MMOL/L (ref 132–146)
WBC # BLD: 7 E9/L (ref 4.5–11.5)

## 2023-03-03 PROCEDURE — 36415 COLL VENOUS BLD VENIPUNCTURE: CPT

## 2023-03-03 PROCEDURE — APPSS30 APP SPLIT SHARED TIME 16-30 MINUTES: Performed by: NURSE PRACTITIONER

## 2023-03-03 PROCEDURE — 87045 FECES CULTURE AEROBIC BACT: CPT

## 2023-03-03 PROCEDURE — 80048 BASIC METABOLIC PNL TOTAL CA: CPT

## 2023-03-03 PROCEDURE — 1200000000 HC SEMI PRIVATE

## 2023-03-03 PROCEDURE — 82962 GLUCOSE BLOOD TEST: CPT

## 2023-03-03 PROCEDURE — 85025 COMPLETE CBC W/AUTO DIFF WBC: CPT

## 2023-03-03 PROCEDURE — 99232 SBSQ HOSP IP/OBS MODERATE 35: CPT | Performed by: INTERNAL MEDICINE

## 2023-03-03 PROCEDURE — 6360000002 HC RX W HCPCS: Performed by: INTERNAL MEDICINE

## 2023-03-03 PROCEDURE — 99254 IP/OBS CNSLTJ NEW/EST MOD 60: CPT | Performed by: SURGERY

## 2023-03-03 PROCEDURE — 2580000003 HC RX 258: Performed by: EMERGENCY MEDICINE

## 2023-03-03 PROCEDURE — 87077 CULTURE AEROBIC IDENTIFY: CPT

## 2023-03-03 PROCEDURE — 6360000004 HC RX CONTRAST MEDICATION: Performed by: RADIOLOGY

## 2023-03-03 PROCEDURE — 89055 LEUKOCYTE ASSESSMENT FECAL: CPT

## 2023-03-03 PROCEDURE — 6370000000 HC RX 637 (ALT 250 FOR IP): Performed by: FAMILY MEDICINE

## 2023-03-03 PROCEDURE — 87328 CRYPTOSPORIDIUM AG IA: CPT

## 2023-03-03 PROCEDURE — 6370000000 HC RX 637 (ALT 250 FOR IP): Performed by: INTERNAL MEDICINE

## 2023-03-03 PROCEDURE — 6370000000 HC RX 637 (ALT 250 FOR IP): Performed by: NURSE PRACTITIONER

## 2023-03-03 PROCEDURE — 87329 GIARDIA AG IA: CPT

## 2023-03-03 PROCEDURE — 74177 CT ABD & PELVIS W/CONTRAST: CPT

## 2023-03-03 RX ORDER — MECOBALAMIN 5000 MCG
10 TABLET,DISINTEGRATING ORAL NIGHTLY
Status: DISCONTINUED | OUTPATIENT
Start: 2023-03-03 | End: 2023-03-05 | Stop reason: HOSPADM

## 2023-03-03 RX ORDER — ENOXAPARIN SODIUM 100 MG/ML
40 INJECTION SUBCUTANEOUS 2 TIMES DAILY
Status: DISCONTINUED | OUTPATIENT
Start: 2023-03-03 | End: 2023-03-05 | Stop reason: HOSPADM

## 2023-03-03 RX ADMIN — Medication 10 MG: at 22:07

## 2023-03-03 RX ADMIN — ONDANSETRON 4 MG: 2 INJECTION INTRAMUSCULAR; INTRAVENOUS at 06:38

## 2023-03-03 RX ADMIN — HYDRALAZINE HYDROCHLORIDE 100 MG: 50 TABLET, FILM COATED ORAL at 06:30

## 2023-03-03 RX ADMIN — HYDRALAZINE HYDROCHLORIDE 100 MG: 50 TABLET, FILM COATED ORAL at 22:07

## 2023-03-03 RX ADMIN — ACETAMINOPHEN 650 MG: 325 TABLET ORAL at 15:07

## 2023-03-03 RX ADMIN — DILTIAZEM HYDROCHLORIDE 360 MG: 120 CAPSULE, COATED, EXTENDED RELEASE ORAL at 11:05

## 2023-03-03 RX ADMIN — LISINOPRIL 40 MG: 20 TABLET ORAL at 11:06

## 2023-03-03 RX ADMIN — ONDANSETRON 4 MG: 2 INJECTION INTRAMUSCULAR; INTRAVENOUS at 19:47

## 2023-03-03 RX ADMIN — POTASSIUM CHLORIDE 40 MEQ: 750 TABLET, EXTENDED RELEASE ORAL at 11:05

## 2023-03-03 RX ADMIN — SODIUM CHLORIDE: 9 INJECTION, SOLUTION INTRAVENOUS at 22:11

## 2023-03-03 RX ADMIN — ATORVASTATIN CALCIUM 20 MG: 20 TABLET, FILM COATED ORAL at 11:05

## 2023-03-03 RX ADMIN — IOPAMIDOL 75 ML: 755 INJECTION, SOLUTION INTRAVENOUS at 10:33

## 2023-03-03 RX ADMIN — ASPIRIN 81 MG: 81 TABLET, COATED ORAL at 11:05

## 2023-03-03 RX ADMIN — IOPAMIDOL 18 ML: 755 INJECTION, SOLUTION INTRAVENOUS at 10:33

## 2023-03-03 ASSESSMENT — PAIN DESCRIPTION - ONSET: ONSET: GRADUAL

## 2023-03-03 ASSESSMENT — PAIN DESCRIPTION - LOCATION: LOCATION: HEAD

## 2023-03-03 ASSESSMENT — PAIN SCALES - GENERAL
PAINLEVEL_OUTOF10: 4
PAINLEVEL_OUTOF10: 0

## 2023-03-03 ASSESSMENT — PAIN DESCRIPTION - DESCRIPTORS: DESCRIPTORS: ACHING;DISCOMFORT

## 2023-03-03 ASSESSMENT — PAIN - FUNCTIONAL ASSESSMENT: PAIN_FUNCTIONAL_ASSESSMENT: ACTIVITIES ARE NOT PREVENTED

## 2023-03-03 ASSESSMENT — PAIN DESCRIPTION - PAIN TYPE: TYPE: ACUTE PAIN

## 2023-03-03 ASSESSMENT — PAIN DESCRIPTION - FREQUENCY: FREQUENCY: INTERMITTENT

## 2023-03-03 NOTE — PLAN OF CARE
Problem: Discharge Planning  Goal: Discharge to home or other facility with appropriate resources  3/3/2023 1033 by Lauren Campbell RN  Outcome: Progressing  3/2/2023 2235 by Annmarie Nguyen RN  Outcome: Progressing     Problem: Safety - Adult  Goal: Free from fall injury  3/3/2023 1033 by Lauren Campbell RN  Outcome: Progressing  3/2/2023 2235 by Annmarie Nguyen RN  Outcome: Progressing     Problem: Pain  Goal: Verbalizes/displays adequate comfort level or baseline comfort level  3/3/2023 1033 by Lauren Campbell RN  Outcome: Progressing  3/2/2023 2235 by Annmarie Nguyen RN  Outcome: Progressing     Problem: Chronic Conditions and Co-morbidities  Goal: Patient's chronic conditions and co-morbidity symptoms are monitored and maintained or improved  3/3/2023 1033 by Lauren Campbell RN  Outcome: Progressing  3/2/2023 2235 by Annmarie Nguyen RN  Outcome: Progressing

## 2023-03-03 NOTE — PROGRESS NOTES
[address] [place] [address] Hospitalist Progress Note    Admitting Date and Time: 2/28/2023  4:51 PM  Admit Dx: Gastroenteritis [K52.9]  Nausea vomiting and diarrhea [R11.2, R19.7]    Subjective:    3/1: Pt eating jello upon arrival. States she has not had any diarrhea since last night in ER. States abd pain is 10/10, unable to describe, unable to pinpoint. Lives with her brother, states that he informed her that he woke up this am with GI upset. Denies CP, SOB.    3/2: Received a call early this am that pt was going to leave AMA this because she is not getting enough food. Pt had also states she was leaving last evening if she did not receive more fentanyl. Went to see pt shortly after phone call, pt initially very upset & yelling about the portion size of her breakfast. Explained to pt that slow reintroduction of food is appropriate given her chief complaint of abd pain & diarrhea. Also explained to pt why POCT BS checks are ordered, especially given pt's current dietary restrictions and insulin pump. Pt initially irritated, but did ultimately verbalize understanding. Pt did calm down & apologized for her behavior. Pt continues to complain of abd pain, does appear distended & firm this am. States she had 3 episodes of diarrhea yesterday & 1 episode this am. States she is tolerating food, but is not eating enough to have more diarrhea. 3/3: Pt seen and examined with Attending. Pt seen by gen surg this am, just finished oral contrast for CT abd. Abd pain unchanged, no diarrhea overnight. Denies emesis but does complain of nausea, which she attributes to the oral contrast. Denies CP, SOB. States brother/roommate told her he had a stomach bug after she left for hospital, but his symptoms have since resolved. No other contacts have had similar symptoms, to her knowledge.       PMH:  has a past medical history of Acute renal injury ([address] Analgesic overuse headache, Anxiety, Arthritis, Asthma, CAD (coronary artery disease), Depression, Diabetes mellitus ([address] Fracture of tooth, GERD (gastroesophageal reflux disease), Glaucoma, Hyperlipidemia, Hypertension, Hypertensive urgency, Hypothyroidism, Irritable bowel syndrome, Morbid obesity due to excess calories ([address] Obesity, Obstructive sleep apnea, Pneumonia, Polycystic ovarian syndrome, Postcholecystectomy syndrome, Spinal headache, and Suicidal ideation. REVIEW OF SYSTEMS:  no fevers, chills, cp, sob, ha, vision/hearing changes, wt changes, hot/cold flashes, other open skin lesions, dysuria/hematuria unless noted in HPI. Complete ROS performed with the patient and is otherwise negative. Objective:    /80   Pulse 85   Temp 98.7 °F (37.1 °C) (Oral)   Resp 16   Ht 5' 6\" (1.676 m)   Wt (!) 361 lb (163.7 kg)   LMP 10/01/2012 (Approximate) Comment: 2013  SpO2 100%   BMI 58.27 kg/m²     PHYSICAL EXAM  General Appearance: alert and oriented to person, place and time and in no acute distress  Skin: warm and dry  Head: normocephalic and atraumatic  Eyes: pupils equal, round, and reactive to light, extraocular eye movements intact, conjunctivae normal  Neck: neck supple and non tender without mass   Pulmonary/Chest: clear to auscultation bilaterally- no wheezes, rales or rhonchi, normal air movement, no respiratory distress  Cardiovascular: normal rate, normal S1 and S2 and no carotid bruits  Abdomen: firm, distended, tender in all quadrants without guarding. No rebound tenderness.   Active, distant bowel sounds, no masses or organomegaly  Extremities: no cyanosis, no clubbing and no edema  Neurologic: no cranial nerve deficit and speech normal    Recent Labs     02/28/23 1732 03/02/23  0504 03/03/23  0540    140 139   K 3.8 3.4* 3.4*    106 106   CO2 27 25 25   BUN 22* 18 12   CREATININE 0.9 0.8 0.8   GLUCOSE 152* 129* 124*   CALCIUM 8.7 8.3* 7.9*       Recent Labs     02/28/23 1732 03/02/23  0504 03/03/23  0540   WBC 10.4 6.5 7.0   RBC 5.14 4.27 4.25 HGB 15.2 12.6 12.5   HCT 47.6 40.1 39.6   MCV 92.6 93.9 93.2   MCH 29.6 29.5 29.4   MCHC 31.9* 31.4* 31.6*   RDW 13.2 13.7 13.5    195 189   MPV 11.7 12.0 11.6       Admission HPI: Presented on 2/28 with Intractable abdominal pain, nausea, vomiting x2-3 days. She was recently seen at ED but signed out AMS and now coming back for worsening abdominal pain. Assessment & Plan:    Intractable diarrhea & vomiting  Ileus vs gastroenteritis vs gastroparesis  -gen surg following  -NPO  -CT abd w oral contrast ordered  [email or web address]  -afebrile, no elevated white count  -lipase 11  -fentanyl stopped 3/1  -acetaminophen prn     DMII current A1c 6.2  -pt has insulin pump: coverage insulin managed by pt, basal insulin managed by endocrinologist. Pt states she cannot alter basal dosage. -AC, HS BS checks, SSI coverage managed by pt  -POCT BS has been running low-mid 736'P  -hold home trulicity  -hypoglycemia protocol    Hypokalemia  -serum K 3.4, unchanged from yesterday  -pt did not receive KCL 40meq as ordered yesterday  -spoke with charge, will advised RN to give previously ordered KCL once pt able to take po. If kept NPO, will order IV potassium    Hypocalcemia  7.9, corrected for albumin 8.6    HTN  -continue home lisinopril & hydralazine  -hold home hctz  -clonidine prn  -monitor BP    CAD  HLD current lipid panel WNL  -continue home statin      Code Status: Full Code  DVT prophylaxis: Lovenox    Disposition: From home. Anticipate discharge to home when medically stable. 30 minutes or more spent reviewing patient chart, assessing patient, discussing plan of care with patient and family, discussing plan of care with collaborating physician, and documentation. NOTE: This report was transcribed using voice recognition software. Every effort was made to ensure accuracy; however, inadvertent computerized transcription errors may be present.   Electronically signed by GEOVANI Kaye NP on 3/3/2023 at 7:14 AM

## 2023-03-03 NOTE — PROGRESS NOTES
Pharmacist Review and Automatic Dose Adjustment of Prophylactic Enoxaparin         The reviewing pharmacist has made an adjustment to the ordered enoxaparin dose or converted to UFH per the approved [address] [place] [address]  protocol and table as identified below. Katelynn Tran is a 53 y.o. female. Recent Labs     02/28/23  1732 03/02/23  0504 03/03/23  0540   CREATININE 0.9 0.8 0.8       Estimated Creatinine Clearance: 139 mL/min (based on SCr of 0.8 mg/dL).     Recent Labs     03/02/23  0504 03/03/23  0540   HGB 12.6 12.5   HCT 40.1 39.6    189     Recent Labs     02/28/23  1742   INR 1.1       Height:   Ht Readings from Last 1 Encounters:   02/28/23 5' 6\" (1.676 m)     Weight:  Wt Readings from Last 1 Encounters:   02/28/23 (!) 361 lb (163.7 kg)               Plan: Based upon the patient's weight and renal function    Ordered: Enoxaparin 40mg SUBQ Daily    Changed/converted to    New Order: Enoxaparin 40mg SUBQ BID      Thank you,  Lynn Jasso, [facility]  3/3/2023, 5:07 PM

## 2023-03-03 NOTE — CONSULTS
GENERAL SURGERY  CONSULT NOTE  3/3/2023    Physician Consulted: Dr. Rajni Ahmadi  Reason for Consult: ileus, abdominal pain  Referring Physician: Dr. Salomón Cornejo    Chief Complaint   Patient presents with    Abdominal Pain    Emesis    Diarrhea         HPI  Efra Mota is a 53 y.o. female who presents for evaluation of abdominal pain, diarrhea and nausea/vomiting x approx 5 days. She states she continues to have nausesa and diarrhea. Denies melena or hematochezia. Denies hematemesis. Tolerating full liquid diet without emesis. Denies recent sick contacts.     PMH irritable bowel syndrome, HTN, morbid obesity, diabetes, GERD, ALO, PCOS  PSxH: prior ventral hernia repair, hysterectomy, cholecystectomy    EGD with biopsy 2022 showed mild esophagitis, 2 cm hiatal hernia, there was no gastritis      Past Medical History:   Diagnosis Date    Acute renal injury ([facility] 2013    Analgesic overuse headache 2018    Anxiety     Arthritis     Asthma     CAD (coronary artery disease)     Depression 2013    Diabetes mellitus ([address]     A1C=6.7 on 15    Fracture of tooth 2018    GERD (gastroesophageal reflux disease)     Glaucoma     Hyperlipidemia 2021    Hypertension     Hypertensive urgency 2013    Hypothyroidism     Irritable bowel syndrome     Morbid obesity due to excess calories ([address]     Obesity     Obstructive sleep apnea     Pneumonia     Polycystic ovarian syndrome     Postcholecystectomy syndrome 2018    Spinal headache     Suicidal ideation 2016       Past Surgical History:   Procedure Laterality Date     SECTION      Dr. Jessica Fletcher, [address], LAPAROSCOPIC      [address] [facility]  10/06/2011    4 extractions    ECHO COMPLETE  2014         ENDOSCOPY, COLON, DIAGNOSTIC      FOOT SURGERY      RECONSTRUCTION LEFT FOOT, Dr. Marybeth Do, [address]  9/3/13    incisional hernia repair with mesh    HYSTERECTOMY (CERVIX STATUS UNKNOWN)      KNEE CARTILAGE SURGERY      OVARY REMOVAL  2006    left due to polycystic ovary, Dr. Brittany Romo, [address] [facility] BSO (CERVIX REMOVED)  3/19/2013    Attempted Cammei Velasquez [facility];RSO, Dr. Nuno Bateman [facility] N/A 9/21/2022    EGD BIOPSY performed by Iris Selby MD at [place] [address]       Medications Prior to Admission    Prior to Admission medications    Medication Sig Start Date End Date Taking? Authorizing Provider   ondansetron (ZOFRAN-ODT) 4 MG disintegrating tablet Take 1 tablet by mouth 3 times daily as needed for Nausea or Vomiting 2/28/23  Yes Telly Burleson MD   ondansetron (ZOFRAN-ODT) 4 MG disintegrating tablet Take 1 tablet by mouth 3 times daily as needed for Nausea or Vomiting 2/28/23  Yes Telly Burleson MD   ASPIRIN LOW DOSE 81 MG EC tablet TAKE 1 TABLET BY MOUTH DAILY 1/15/23   Margoth Partida MD   hydroCHLOROthiazide (HYDRODIURIL) 12.5 MG tablet TAKE ONE TABLET BY MOUTH EVERY DAY 1/15/23   Margoth Partida MD   Accu-Chek FastClix Lancets MISC USE TO CHECK BLOOD GLUCOSE 4X DAILY  Patient not taking: Reported on 2/28/2023 1/9/23   GEOVANI Waddell NP   atorvastatin (LIPITOR) 20 MG tablet TAKE 1 TABLET BY MOUTH DAILY 12/15/22   Margoth Partida MD   lisinopril (PRINIVIL;ZESTRIL) 40 MG tablet TAKE ONE TABLET BY MOUTH EVERY DAY 12/15/22   Margoth Partida MD   Dulaglutide (TRULICITY) 3 CJ/1.3RN SOPN Inject 3 mg into the skin once a week 10/28/22   GEOVANI Weems NP   insulin lispro (HUMALOG) 100 UNIT/ML SOLN injection vial USE VIA INSULIN PUMP.  MAX 85 UNITS DAILY 10/7/22   Alejandro Estes MD   Accu-Chek Softclix Lancets MISC Check blood sugar 4x daily  Patient not taking: Reported on 2/28/2023 5/12/22   Alejandro Estes MD   blood glucose test strips (ACCU-CHEK GUIDE) strip Use to check blood sugars 4x daily 5/12/22   Alejandro Estes MD   dilTIAZem (CARDIZEM CD) 360 MG extended release capsule Take 1 capsule by mouth daily 5/3/22   Erum Adrian MD   hydrALAZINE (APRESOLINE) 100 MG tablet Take 1 tablet by mouth every 8 hours 5/3/22   Erum Adrian MD   Insulin Infusion Pump (MINIMED 770G INSULIN PUMP SYS) KIT To infuse insulin 4/20/22   GEOVANI Weems NP   Continuous Blood Gluc Sensor (GUARDIAN SENSOR 3) MISC To change every 7 days 4/20/22   GEOVANI Weems NP   Continuous Blood Gluc Transmit (GUARDIAN LINK 3 TRANSMITTER) MISC To use with sensors 4/20/22   GEOVANI Weems NP   hydrocortisone (ANUSOL-HC) 2.5 % CREA rectal cream Apply to affected area twice daily for rectal pain from external hemorrhoid.  4/16/22   Yoko Roblero DO   dicyclomine (BENTYL) 10 MG capsule Take 1 capsule by mouth 4 times daily  Patient not taking: Reported on 2/28/2023 4/15/22   Chris Caballero MD   Insulin Pen Needle (BD PEN NEEDLE MICRO U/F) 32G X 6 MM MISC Uses with insulin 4 times a day 4/12/22   Lew Grace MD   Insulin Pen Needle (BD PEN NEEDLE MICRO U/F) 32G X 6 MM MISC Uses with insulin 4 times a day  Patient not taking: Reported on 2/28/2023 4/12/22   Lew Grace MD   glucose monitoring (FREESTYLE FREEDOM) kit 1 kit by Does not apply route daily 4/12/22   Lew Grace MD   mupirocin (BACTROBAN) 2 % ointment Apply topically 3 times daily as needed (apply to both hands and under breasts)  Patient not taking: Reported on 2/28/2023    Historical Provider, MD   triamcinolone (ARISTOCORT) 0.5 % cream Apply topically 3 times daily as needed (apply to both hands and under breasts)  Patient not taking: Reported on 2/28/2023    Historical Provider, MD   albuterol sulfate HFA (VENTOLIN HFA) 108 (90 Base) MCG/ACT inhaler Inhale 2 puffs into the lungs 4 times daily as needed for Wheezing 3/29/22   Wanda Nathan DO   pantoprazole (PROTONIX) 40 MG tablet Take 1 tablet by mouth daily for 10 days 7/10/21 7/10/21  Juanita Risk, DO       Allergies   Allergen Reactions    Percocet [Oxycodone-Acetaminophen] Itching    Food Diarrhea     Food Intolerance - Dairy - Diarrhea    Metformin And Related Other (See Comments)     GI side effects     Tape [Adhesive Tape] Itching       Family History   Problem Relation Age of Onset    Asthma Mother     Diabetes Mother     High Blood Pressure Mother     High Blood Pressure Father     Heart Disease Father     Cancer Father     Depression Father     Diabetes Brother     Asthma Brother     Thyroid Disease Sister     Asthma Sister     Depression Maternal Grandmother     High Blood Pressure Maternal Grandmother     Mental Illness Maternal Grandmother     Thyroid Disease Maternal Grandmother     Heart Disease Maternal Grandfather     Depression Paternal Grandmother     High Blood Pressure Paternal Grandmother     Cancer Paternal Grandfather        Social History     Tobacco Use    Smoking status: Never    Smokeless tobacco: Never   Vaping Use    Vaping Use: Never used   Substance Use Topics    Alcohol use: No     Comment: no alcohol since age 26;  drinks 2-3 glasses Coke/Pepsi daily & 2-3 glasses of iced tea daily     Drug use: Never         Review of Systems:   General ROS: negative  Hematological and Lymphatic ROS: negative  Respiratory ROS: no cough, shortness of breath, or wheezing  Cardiovascular ROS: no chest pain or dyspnea on exertion  Gastrointestinal ROS: see HPI  Genito-Urinary ROS: no dysuria, trouble voiding, or hematuria  Musculoskeletal ROS: negative      PHYSICAL EXAM:    Vitals:    03/03/23 0515   BP: 127/80   Pulse: 85   Resp: 16   Temp: 98.7 °F (37.1 °C)   SpO2: 100%       GENERAL:  NAD. A&Ox3. HEAD:  Normocephalic. Atraumatic. EYES:   No scleral icterus. PERRL. LUNGS:  No increased work of breathing. CARDIOVASCULAR: Regular rate  ABDOMEN:  Soft, non-distended, non-tender. No guarding, rigidity, rebound. obese  EXTREMITIES:   MAEx4. Atraumatic. No LE edema.   SKIN:  Warm and dry, no rashes or lesions  NEUROLOGIC:  no focal deficits      LABS:    CBC  Recent Labs     03/03/23  0540   WBC 7.0   HGB 12.5   HCT 39.6        BMP  Recent Labs     03/02/23  0504      K 3.4*      CO2 25   BUN 18   CREATININE 0.8   CALCIUM 8.3*     Liver Function  Recent Labs     03/02/23  0504   LIPASE 16   BILITOT 0.4   AST 19   ALT 26   ALKPHOS 71   PROT 5.3*   LABALBU 3.1*     No results for input(s): LACTATE in the last 72 hours. Recent Labs     02/28/23  1742   INR 1.1       RADIOLOGY    CT ABDOMEN PELVIS W IV CONTRAST Additional Contrast? None    Result Date: 2/28/2023  EXAMINATION: CT OF THE ABDOMEN AND PELVIS WITH CONTRAST 2/28/2023 7:53 pm TECHNIQUE: CT of the abdomen and pelvis was performed with the administration of intravenous contrast. Multiplanar reformatted images are provided for review. Automated exposure control, iterative reconstruction, and/or weight based adjustment of the mA/kV was utilized to reduce the radiation dose to as low as reasonably achievable. COMPARISON: Yesterday HISTORY: ORDERING SYSTEM PROVIDED HISTORY: pain vomiting and diarrhea TECHNOLOGIST PROVIDED HISTORY: Additional Contrast?->None Reason for exam:->pain vomiting and diarrhea Decision Support Exception - unselect if not a suspected or confirmed emergency medical condition->Emergency Medical Condition (MA) FINDINGS: Lower Chest: No infiltrate or effusion in the visible lower chest. Organs: Fatty liver suggested. Question hepatic cirrhosis. Cholecystectomy. Mild splenomegaly. GI/Bowel: There is diffuse intestinal prominence with air-fluid levels. There is note of small bowel dilation most evident midportion relatively collapsed distally and proximally. Distal transition appearing to be around the lower abdomen central right-side/upper pelvis Pelvis:   Small round cystic area is stable in the right pelvis on image 551 of uncertain etiology and significance.   Post hysterectomy Peritoneum/Retroperitoneum: Minimal ascites Bones/Soft Tissues: Ventral postsurgical changes with apparent scarring at the subcutaneous fat and abdominal wall mostly infraumbilical.  There are lumbosacral degenerative changes mainly     Development of intestinal prominence with air-fluid levels including small bowel dilation with transitions suspicious for obstruction, small amount of ascites also, with differential considerations of ileus and gastroenteritis. ASSESSMENT/PLAN:  53 y.o. female with abdominal pain, nausea, diarrhea    Tolerating fulls  CT a/p with po contrast - doubt there is an obstructive process given she is tolerating liquid diet and having diarrhea  Prn antiemetics    Plan will be discussed with Dr. Jefe Orantes.     Kassidy Taylor DO  Resident, PGY-4  3/3/2023  6:31 AM    Surgery Progress Note            Chief complaint:   Chief Complaint   Patient presents with    Abdominal Pain    Emesis    Diarrhea      Patient Active Problem List   Diagnosis    Morbid obesity ([address]    Hypothyroidism    Depression with anxiety    Obstructive sleep apnea    Diabetes mellitus ([address]    Asthma    Arteriosclerosis of coronary artery    Hypertension    Generalized abdominal pain    PCOS (polycystic ovarian syndrome)    Lymphedema of both lower extremities    Irritable bowel syndrome with diarrhea    Hyperlipidemia    Gastroesophageal reflux disease with esophagitis    Acute asthma exacerbation    Acute respiratory failure with hypoxia (HCC)    Nausea vomiting and diarrhea    Gastroenteritis    Ileus ([address]       S: as above    O:   Vitals:    03/03/23 0515   BP: 127/80   Pulse: 85   Resp: 16   Temp: 98.7 °F (37.1 °C)   SpO2: 100%       Intake/Output Summary (Last 24 hours) at 3/3/2023 0817  Last data filed at 3/3/2023 9149  Gross per 24 hour   Intake 870 ml   Output 700 ml   Net 170 ml         Review of Systems  Review of Systems -  General ROS: negative for - chills, fatigue or malaise  ENT ROS: negative for - hearing change, nasal congestion or nasal discharge  Allergy and Immunology ROS: negative for - hives, itchy/watery eyes or nasal congestion  Hematological and Lymphatic ROS: negative for - blood clots, blood transfusions, bruising or fatigue  Endocrine ROS: negative for - malaise/lethargy, mood swings, palpitations or polydipsia/polyuria  Breast ROS: negative for - new or changing breast lumps or nipple changes  Respiratory ROS: negative for - sputum changes, stridor, tachypnea or wheezing  Cardiovascular ROS: negative for - irregular heartbeat, loss of consciousness, murmur or orthopnea  Gastrointestinal ROS: negative for - constipation, diarrhea, gas/bloating, heartburn or hematemesis  Genito-Urinary ROS: negative for -  genital discharge, genital ulcers or hematuria  Musculoskeletal ROS: negative for - gait disturbance, muscle pain or muscular weakness    Labs:  Lab Results   Component Value Date/Time    WBC 7.0 03/03/2023 05:40 AM    WBC 6.5 03/02/2023 05:04 AM    WBC 10.4 02/28/2023 05:32 PM    HGB 12.5 03/03/2023 05:40 AM    HGB 12.6 03/02/2023 05:04 AM    HGB 15.2 02/28/2023 05:32 PM    HCT 39.6 03/03/2023 05:40 AM    HCT 40.1 03/02/2023 05:04 AM    HCT 47.6 02/28/2023 05:32 PM     Lab Results   Component Value Date    CREATININE 0.8 03/03/2023    BUN 12 03/03/2023     03/03/2023    K 3.4 (L) 03/03/2023     03/03/2023    CO2 25 03/03/2023     Lab Results   Component Value Date/Time    LIPASE 16 03/02/2023 05:04 AM    LIPASE 11 02/28/2023 05:32 PM    LIPASE 20 02/27/2023 03:02 AM    AMYLASE 15 07/03/2014 11:35 AM    AMYLASE 18 05/19/2014 12:40 PM    AMYLASE 21 06/11/2013 09:15 PM         Physical exam:   /80   Pulse 85   Temp 98.7 °F (37.1 °C) (Oral)   Resp 16   Ht 5' 6\" (1.676 m)   Wt (!) 361 lb (163.7 kg)   LMP 10/01/2012 (Approximate) Comment: 2013  SpO2 100%   BMI 58.27 kg/m²   General appearance: NAD  Head: NCAT  Neck: supple, no masses  Lungs: equal chest rise bilateral  Heart: S1S2 present  Abdomen: soft, nontender, nondistended  Skin; no lesions  Gu: no cva tenderness  Extremities: extremities normal, atraumatic, no cyanosis or edema    A:  sbo vs ileus     P: monitor exam and bowel function, will get oral contrast CT today    Silvio Kothari MD, MD  3/3/2023

## 2023-03-03 NOTE — PLAN OF CARE
Problem: Discharge Planning  Goal: Discharge to home or other facility with appropriate resources  3/2/2023 2235 by Nain Jimenez RN  Outcome: Progressing     Problem: Safety - Adult  Goal: Free from fall injury  3/2/2023 2235 by Nain Jimenez RN  Outcome: Progressing     Problem: Pain  Goal: Verbalizes/displays adequate comfort level or baseline comfort level  3/2/2023 2235 by Nain Jimenez RN  Outcome: Progressing     Problem: Chronic Conditions and Co-morbidities  Goal: Patient's chronic conditions and co-morbidity symptoms are monitored and maintained or improved  3/2/2023 2235 by Nain Jimenez RN  Outcome: Progressing

## 2023-03-04 LAB
ANION GAP SERPL CALCULATED.3IONS-SCNC: 6 MMOL/L (ref 7–16)
BASOPHILS ABSOLUTE: 0.03 E9/L (ref 0–0.2)
BASOPHILS RELATIVE PERCENT: 0.5 % (ref 0–2)
BUN BLDV-MCNC: 7 MG/DL (ref 6–20)
CALCIUM SERPL-MCNC: 8.2 MG/DL (ref 8.6–10.2)
CHLORIDE BLD-SCNC: 106 MMOL/L (ref 98–107)
CO2: 27 MMOL/L (ref 22–29)
CREAT SERPL-MCNC: 0.8 MG/DL (ref 0.5–1)
EOSINOPHILS ABSOLUTE: 0.15 E9/L (ref 0.05–0.5)
EOSINOPHILS RELATIVE PERCENT: 2.3 % (ref 0–6)
GFR SERPL CREATININE-BSD FRML MDRD: >60 ML/MIN/1.73
GLUCOSE BLD-MCNC: 131 MG/DL (ref 74–99)
HCT VFR BLD CALC: 38.6 % (ref 34–48)
HEMOGLOBIN: 12.3 G/DL (ref 11.5–15.5)
IMMATURE GRANULOCYTES #: 0.03 E9/L
IMMATURE GRANULOCYTES %: 0.5 % (ref 0–5)
LYMPHOCYTES ABSOLUTE: 2.46 E9/L (ref 1.5–4)
LYMPHOCYTES RELATIVE PERCENT: 37.4 % (ref 20–42)
MCH RBC QN AUTO: 30 PG (ref 26–35)
MCHC RBC AUTO-ENTMCNC: 31.9 % (ref 32–34.5)
MCV RBC AUTO: 94.1 FL (ref 80–99.9)
METER GLUCOSE: 123 MG/DL (ref 74–99)
METER GLUCOSE: 129 MG/DL (ref 74–99)
METER GLUCOSE: 149 MG/DL (ref 74–99)
METER GLUCOSE: 158 MG/DL (ref 74–99)
MONOCYTES ABSOLUTE: 0.5 E9/L (ref 0.1–0.95)
MONOCYTES RELATIVE PERCENT: 7.6 % (ref 2–12)
NEUTROPHILS ABSOLUTE: 3.41 E9/L (ref 1.8–7.3)
NEUTROPHILS RELATIVE PERCENT: 51.7 % (ref 43–80)
PDW BLD-RTO: 13.8 FL (ref 11.5–15)
PLATELET # BLD: 187 E9/L (ref 130–450)
PMV BLD AUTO: 10.9 FL (ref 7–12)
POTASSIUM SERPL-SCNC: 3.6 MMOL/L (ref 3.5–5)
RBC # BLD: 4.1 E12/L (ref 3.5–5.5)
SODIUM BLD-SCNC: 139 MMOL/L (ref 132–146)
WBC # BLD: 6.6 E9/L (ref 4.5–11.5)

## 2023-03-04 PROCEDURE — 99233 SBSQ HOSP IP/OBS HIGH 50: CPT | Performed by: SURGERY

## 2023-03-04 PROCEDURE — 36415 COLL VENOUS BLD VENIPUNCTURE: CPT

## 2023-03-04 PROCEDURE — 85025 COMPLETE CBC W/AUTO DIFF WBC: CPT

## 2023-03-04 PROCEDURE — 2580000003 HC RX 258: Performed by: INTERNAL MEDICINE

## 2023-03-04 PROCEDURE — 6370000000 HC RX 637 (ALT 250 FOR IP): Performed by: SURGERY

## 2023-03-04 PROCEDURE — 80048 BASIC METABOLIC PNL TOTAL CA: CPT

## 2023-03-04 PROCEDURE — 99232 SBSQ HOSP IP/OBS MODERATE 35: CPT | Performed by: INTERNAL MEDICINE

## 2023-03-04 PROCEDURE — APPSS30 APP SPLIT SHARED TIME 16-30 MINUTES: Performed by: NURSE PRACTITIONER

## 2023-03-04 PROCEDURE — 82962 GLUCOSE BLOOD TEST: CPT

## 2023-03-04 PROCEDURE — 1200000000 HC SEMI PRIVATE

## 2023-03-04 PROCEDURE — 2580000003 HC RX 258: Performed by: EMERGENCY MEDICINE

## 2023-03-04 PROCEDURE — 6370000000 HC RX 637 (ALT 250 FOR IP): Performed by: INTERNAL MEDICINE

## 2023-03-04 RX ORDER — METRONIDAZOLE 500 MG/1
500 TABLET ORAL EVERY 8 HOURS SCHEDULED
Status: DISCONTINUED | OUTPATIENT
Start: 2023-03-04 | End: 2023-03-05 | Stop reason: HOSPADM

## 2023-03-04 RX ADMIN — SODIUM CHLORIDE: 9 INJECTION, SOLUTION INTRAVENOUS at 07:06

## 2023-03-04 RX ADMIN — HYDRALAZINE HYDROCHLORIDE 100 MG: 50 TABLET, FILM COATED ORAL at 22:06

## 2023-03-04 RX ADMIN — LISINOPRIL 40 MG: 20 TABLET ORAL at 10:00

## 2023-03-04 RX ADMIN — ATORVASTATIN CALCIUM 20 MG: 20 TABLET, FILM COATED ORAL at 10:00

## 2023-03-04 RX ADMIN — ASPIRIN 81 MG: 81 TABLET, COATED ORAL at 10:00

## 2023-03-04 RX ADMIN — METRONIDAZOLE 500 MG: 500 TABLET ORAL at 14:44

## 2023-03-04 RX ADMIN — SODIUM CHLORIDE, PRESERVATIVE FREE 10 ML: 5 INJECTION INTRAVENOUS at 22:10

## 2023-03-04 RX ADMIN — HYDRALAZINE HYDROCHLORIDE 100 MG: 50 TABLET, FILM COATED ORAL at 05:12

## 2023-03-04 RX ADMIN — HYDRALAZINE HYDROCHLORIDE 100 MG: 50 TABLET, FILM COATED ORAL at 14:42

## 2023-03-04 RX ADMIN — DILTIAZEM HYDROCHLORIDE 360 MG: 120 CAPSULE, COATED, EXTENDED RELEASE ORAL at 10:00

## 2023-03-04 RX ADMIN — METRONIDAZOLE 500 MG: 500 TABLET ORAL at 22:06

## 2023-03-04 NOTE — PROGRESS NOTES
[address] [place] [address] Hospitalist Progress Note    Admitting Date and Time: 2/28/2023  4:51 PM  Admit Dx: Gastroenteritis [K52.9]  Nausea vomiting and diarrhea [R11.2, R19.7]    Subjective:    3/1: Pt eating jello upon arrival. States she has not had any diarrhea since last night in ER. States abd pain is 10/10, unable to describe, unable to pinpoint. Lives with her brother, states that he informed her that he woke up this am with GI upset. Denies CP, SOB.    3/2: Received a call early this am that pt was going to leave AMA this because she is not getting enough food. Pt had also states she was leaving last evening if she did not receive more fentanyl. Went to see pt shortly after phone call, pt initially very upset & yelling about the portion size of her breakfast. Explained to pt that slow reintroduction of food is appropriate given her chief complaint of abd pain & diarrhea. Also explained to pt why POCT BS checks are ordered, especially given pt's current dietary restrictions and insulin pump. Pt initially irritated, but did ultimately verbalize understanding. Pt did calm down & apologized for her behavior. Pt continues to complain of abd pain, does appear distended & firm this am. States she had 3 episodes of diarrhea yesterday & 1 episode this am. States she is tolerating food, but is not eating enough to have more diarrhea. 3/3: Pt seen and examined with Attending. Pt seen by gen surg this am, just finished oral contrast for CT abd. Abd pain unchanged, no diarrhea overnight. Denies emesis but does complain of nausea, which she attributes to the oral contrast. Denies CP, SOB. States brother/roommate told her he had a stomach bug after she left for hospital, but his symptoms have since resolved. No other contacts have had similar symptoms, to her knowledge. 3/4: Pt just returned from taking a shower. Started breakfast, 1st meal with solid foods.  States she did have diarrhea soon after, but did not have the accompanying abd pain this am like she has had recently. Going to continue to eat more breakfast. States abd pain is much better. She says she is ambulating, and sitting up in chair frequently. Denies CP, SOB. No needs at this time. PMH:  has a past medical history of Acute renal injury ([address] Analgesic overuse headache, Anxiety, Arthritis, Asthma, CAD (coronary artery disease), Depression, Diabetes mellitus ([address] Fracture of tooth, GERD (gastroesophageal reflux disease), Glaucoma, Hyperlipidemia, Hypertension, Hypertensive urgency, Hypothyroidism, Irritable bowel syndrome, Morbid obesity due to excess calories ([address] Obesity, Obstructive sleep apnea, Pneumonia, Polycystic ovarian syndrome, Postcholecystectomy syndrome, Spinal headache, and Suicidal ideation. REVIEW OF SYSTEMS:  no fevers, chills, cp, sob, ha, vision/hearing changes, wt changes, hot/cold flashes, other open skin lesions, dysuria/hematuria unless noted in HPI. Complete ROS performed with the patient and is otherwise negative. Objective:    BP (!) 122/53   Pulse 74   Temp 97.7 °F (36.5 °C) (Oral)   Resp 18   Ht 5' 6\" (1.676 m)   Wt (!) 361 lb (163.7 kg)   LMP 10/01/2012 (Approximate) Comment: 2013  SpO2 94%   BMI 58.27 kg/m²     PHYSICAL EXAM  General Appearance: alert and oriented to person, place and time and in no acute distress  Skin: warm and dry  Head: normocephalic and atraumatic  Eyes: pupils equal, round, and reactive to light, extraocular eye movements intact, conjunctivae normal  Neck: neck supple and non tender without mass   Pulmonary/Chest: clear to auscultation bilaterally- no wheezes, rales or rhonchi, normal air movement, no respiratory distress  Cardiovascular: normal rate, normal S1 and S2 and no carotid bruits  Abdomen: firm, nondistended, no tenderness. No rebound tenderness.   Active, distant bowel sounds, no masses or organomegaly  Extremities: no cyanosis, no clubbing and no edema  Neurologic: no cranial nerve deficit and speech normal    Recent Labs     03/02/23  0504 03/03/23  0540 03/04/23  0502    139 139   K 3.4* 3.4* 3.6    106 106   CO2 25 25 27   BUN 18 12 7   CREATININE 0.8 0.8 0.8   GLUCOSE 129* 124* 131*   CALCIUM 8.3* 7.9* 8.2*       Recent Labs     03/02/23  0504 03/03/23  0540 03/04/23  0502   WBC 6.5 7.0 6.6   RBC 4.27 4.25 4.10   HGB 12.6 12.5 12.3   HCT 40.1 39.6 38.6   MCV 93.9 93.2 94.1   MCH 29.5 29.4 30.0   MCHC 31.4* 31.6* 31.9*   RDW 13.7 13.5 13.8    189 187   MPV 12.0 11.6 10.9       Admission HPI: Presented on 2/28 with Intractable abdominal pain, nausea, vomiting x2-3 days. She was recently seen at ED but signed out AMS and now coming back for worsening abdominal pain. Assessment & Plan:    Intractable diarrhea & vomiting  Ileus vs gastroenteritis vs gastroparesis  -gen surg following  -CT abd w oral contrast notes likely mild enteritis without signs of ileus or obstruction. -full liquid diet advanced to bland diet this am, pt reported diarrhea after  -started on flagyl po TID by gen surg  -IVF stopped  -afebrile, no elevated white count  -stool studies for infectious etiology remain pending  -lipase 11  -fentanyl stopped 3/1  -acetaminophen prn     DMII current A1c 6.2  -pt has insulin pump: coverage insulin managed by pt, basal insulin managed by endocrinologist. Pt states she cannot alter basal dosage. -AC, HS BS checks, SSI coverage managed by pt  -POCT BS has been running low-mid 582'F  -hold home trulicity  -hypoglycemia protocol    Hypokalemia  -trend & replete prn    HTN  -continue home lisinopril & hydralazine  -hold home hctz  -clonidine prn  -monitor BP    CAD  HLD current lipid panel WNL  -continue home statin      Code Status: Full Code  DVT prophylaxis: Lovenox    Disposition: From home. Anticipate discharge to home tomorrow.      30 minutes or more spent reviewing patient chart, assessing patient, discussing plan of care with patient and family, discussing plan of care with collaborating physician, and documentation. NOTE: This report was transcribed using voice recognition software. Every effort was made to ensure accuracy; however, inadvertent computerized transcription errors may be present.   Electronically signed by GEOVANI Lilly NP on 3/4/2023 at 10:47 AM

## 2023-03-04 NOTE — PLAN OF CARE
Problem: Discharge Planning  Goal: Discharge to home or other facility with appropriate resources  3/3/2023 2335 by Lorin Mendez RN  Outcome: Progressing  3/3/2023 1033 by Cristal Ovalle RN  Outcome: Progressing     Problem: Safety - Adult  Goal: Free from fall injury  3/3/2023 2335 by Lorin Mendez RN  Outcome: Progressing  3/3/2023 1033 by Cristal Ovalle RN  Outcome: Progressing     Problem: Pain  Goal: Verbalizes/displays adequate comfort level or baseline comfort level  3/3/2023 2335 by Lorin Mendez RN  Outcome: Progressing  3/3/2023 1033 by Cristal Ovalle RN  Outcome: Progressing     Problem: Chronic Conditions and Co-morbidities  Goal: Patient's chronic conditions and co-morbidity symptoms are monitored and maintained or improved  3/3/2023 2335 by Lorin Mendez RN  Outcome: Progressing  3/3/2023 1033 by Cristal Ovalle RN  Outcome: Progressing

## 2023-03-04 NOTE — DISCHARGE SUMMARY
[address] [place] [address] Hospitalist Group   Discharge Summary      MD Sheri Perezchante [phone]  [phone]    Schedule an appointment as soon as possible for a visit in 1 week(s)        Activity level: As tolerated    Diet: ADULT DIET; Regular; GI Harlan (GERD/Peptic Ulcer)    Labs:Per PCP    Condition at discharge: Stable    Dispo:Discharge home      Patient ID:  Mercedes Ayala  [id number]  53 y.o.  1975      Discharge date and time:  3/4/2023  6:58 AM    Admission Diagnoses: Principal Problem:    Gastroenteritis  Active Problems:    Diabetes mellitus ([address]    Nausea vomiting and diarrhea    Ileus ([address]    Diarrhea    Hypertension  Resolved Problems:    * No resolved hospital problems. *      Discharge Diagnoses: Principal Problem:    Gastroenteritis  Active Problems:    Diabetes mellitus (HCC)    Nausea vomiting and diarrhea    Ileus (HCC)    Diarrhea    Hypertension  Resolved Problems:    * No resolved hospital problems. *      Past Medical History:  has a past medical history of Acute renal injury ([address] Analgesic overuse headache, Anxiety, Arthritis, Asthma, CAD (coronary artery disease), Depression, Diabetes mellitus ([address] Fracture of tooth, GERD (gastroesophageal reflux disease), Glaucoma, Hyperlipidemia, Hypertension, Hypertensive urgency, Hypothyroidism, Irritable bowel syndrome, Morbid obesity due to excess calories ([address] Obesity, Obstructive sleep apnea, Pneumonia, Polycystic ovarian syndrome, Postcholecystectomy syndrome, Spinal headache, and Suicidal ideation. .       Consults:  IP CONSULT TO DIABETES EDUCATOR  IP CONSULT TO DIABETES EDUCATOR  IP CONSULT TO GENERAL SURGERY    Procedures: N/A    Hospital Course: Admitted 2/28 for gastroenteritis. Pt admitted 2/27 for abd pain, N&V but signed out AMA due to anxiety. Presented here on 2/28 and admitted for gastroenteritis.  Pt reported intractable diarrhea prior to arrival.  Pt made NPO, started on IVF and gen surg was consulted. Stool studies sent for infectious work-up. CT showed intestinal prominence with air-fluid levels including small bowel dilation. KUB showed no evidence of bowel obstruction, colonic fecal retention. Repeat CT abd with oral contrast showed no signs of ileus or obstruction. Several fluid-filled loops of small bowel with no surrounding distention or wall thickening. Mild enteritis cannot be excluded. Gen sug placed pt on full liquid diet, which was advanced as tolerated. Pt has reported <4 episodes diarrhea/day during admission. She reports tolerating solid foods without difficulty. Abd pain has resolved. Pt has remained hemodynamically stable. At this time, pt is medically stable and ready for discharge. She is to follow-up with PCP upon discharge. Pt requested a covid booster injection prior to discharge. However, vaccine not available at this time. Pt was instructed to obtain booster as outpatient. Discharge Exam:  Vitals:    03/03/23 0515 03/03/23 1700 03/03/23 2207 03/04/23 0512   BP: 127/80 (!) 164/80 (!) 151/69 131/68   Pulse: 85 81 72 74   Resp: 16 18  18   Temp: 98.7 °F (37.1 °C) 98 °F (36.7 °C)  97.7 °F (36.5 °C)   TempSrc: Oral   Oral   SpO2: 100% 99%  94%   Weight:       Height:           PHYSICAL EXAM  General Appearance: alert and oriented to person, place and time and in no acute distress  Skin: warm and dry  Head: normocephalic and atraumatic  Eyes: pupils equal, round, and reactive to light, extraocular eye movements intact, conjunctivae normal  Neck: neck supple and non tender without mass   Pulmonary/Chest: clear to auscultation bilaterally- no wheezes, rales or rhonchi, normal air movement, no respiratory distress  Cardiovascular: normal rate, normal S1 and S2 and no carotid bruits  Abdomen: soft, nondistended, no tenderness. No rebound tenderness.   Active, distant bowel sounds, no masses or organomegaly  Extremities: no cyanosis, no clubbing and no edema  Neurologic: no cranial nerve deficit and speech normal    Imaging:  CT ABDOMEN PELVIS W IV CONTRAST Additional Contrast? None    Result Date: 2/28/2023  EXAMINATION: CT OF THE ABDOMEN AND PELVIS WITH CONTRAST 2/28/2023 7:53 pm TECHNIQUE: CT of the abdomen and pelvis was performed with the administration of intravenous contrast. Multiplanar reformatted images are provided for review. Automated exposure control, iterative reconstruction, and/or weight based adjustment of the mA/kV was utilized to reduce the radiation dose to as low as reasonably achievable. COMPARISON: Yesterday HISTORY: ORDERING SYSTEM PROVIDED HISTORY: pain vomiting and diarrhea TECHNOLOGIST PROVIDED HISTORY: Additional Contrast?->None Reason for exam:->pain vomiting and diarrhea Decision Support Exception - unselect if not a suspected or confirmed emergency medical condition->Emergency Medical Condition (MA) FINDINGS: Lower Chest: No infiltrate or effusion in the visible lower chest. Organs: Fatty liver suggested. Question hepatic cirrhosis. Cholecystectomy. Mild splenomegaly. GI/Bowel: There is diffuse intestinal prominence with air-fluid levels. There is note of small bowel dilation most evident midportion relatively collapsed distally and proximally. Distal transition appearing to be around the lower abdomen central right-side/upper pelvis Pelvis:   Small round cystic area is stable in the right pelvis on image 200 of uncertain etiology and significance. Post hysterectomy Peritoneum/Retroperitoneum: Minimal ascites Bones/Soft Tissues: Ventral postsurgical changes with apparent scarring at the subcutaneous fat and abdominal wall mostly infraumbilical.  There are lumbosacral degenerative changes mainly     Development of intestinal prominence with air-fluid levels including small bowel dilation with transitions suspicious for obstruction, small amount of ascites also, with differential considerations of ileus and gastroenteritis.          Patient Instructions:      Medication List        START taking these medications      * ondansetron 4 MG disintegrating tablet  Commonly known as: ZOFRAN-ODT  Take 1 tablet by mouth 3 times daily as needed for Nausea or Vomiting     * ondansetron 4 MG disintegrating tablet  Commonly known as: ZOFRAN-ODT  Take 1 tablet by mouth 3 times daily as needed for Nausea or Vomiting           * This list has 2 medication(s) that are the same as other medications prescribed for you. Read the directions carefully, and ask your doctor or other care provider to review them with you. CONTINUE taking these medications      * Accu-Chek Softclix Lancets Misc  Check blood sugar 4x daily     * Accu-Chek FastClix Lancets Misc  USE TO CHECK BLOOD GLUCOSE 4X DAILY     albuterol sulfate  (90 Base) MCG/ACT inhaler  Commonly known as: Ventolin HFA  Inhale 2 puffs into the lungs 4 times daily as needed for Wheezing     Aspirin Low Dose 81 MG EC tablet  Generic drug: aspirin  TAKE 1 TABLET BY MOUTH DAILY     atorvastatin 20 MG tablet  Commonly known as: LIPITOR  TAKE 1 TABLET BY MOUTH DAILY     * BD Pen Needle Micro U/F 32G X 6 MM Misc  Generic drug: Insulin Pen Needle  Uses with insulin 4 times a day     * BD Pen Needle Micro U/F 32G X 6 MM Misc  Generic drug: Insulin Pen Needle  Uses with insulin 4 times a day     dilTIAZem 360 MG extended release capsule  Commonly known as: CARDIZEM CD  Take 1 capsule by mouth daily     glucose monitoring kit  1 kit by Does not apply route daily     Guardian Link 3 Transmitter Misc  To use with sensors     Guardian Sensor 3 Misc  To change every 7 days     hydrALAZINE 100 MG tablet  Commonly known as: APRESOLINE  Take 1 tablet by mouth every 8 hours     hydroCHLOROthiazide 12.5 MG tablet  Commonly known as: HYDRODIURIL  TAKE ONE TABLET BY MOUTH EVERY DAY     hydrocortisone 2.5 % Crea rectal cream  Commonly known as:  Anusol-HC  Apply to affected area twice daily for rectal pain from external hemorrhoid. insulin lispro 100 UNIT/ML Soln injection vial  Commonly known as: HUMALOG  USE VIA INSULIN PUMP. MAX 85 UNITS DAILY     lisinopril 40 MG tablet  Commonly known as: PRINIVIL;ZESTRIL  TAKE ONE TABLET BY MOUTH EVERY DAY     MiniMed 770G Insulin Pump Sys Kit  To infuse insulin     Trulicity 3 SH/3.1HK Sopn  Generic drug: Dulaglutide  Inject 3 mg into the skin once a week           * This list has 4 medication(s) that are the same as other medications prescribed for you. Read the directions carefully, and ask your doctor or other care provider to review them with you. STOP taking these medications      Accu-Chek Guide strip  Generic drug: blood glucose test strips     dicyclomine 10 MG capsule  Commonly known as: BENTYL     mupirocin 2 % ointment  Commonly known as: BACTROBAN     triamcinolone 0.5 % cream  Commonly known as: ARISTOCORT            ASK your doctor about these medications      pantoprazole 40 MG tablet  Commonly known as: Protonix  Take 1 tablet by mouth daily for 10 days               Where to Get Your Medications        These medications were sent to [facility] [address] [place] [address]      Phone: [phone]   ondansetron 4 MG disintegrating tablet  ondansetron 4 MG disintegrating tablet          45 minutes or more spent reviewing patient chart, assessing patient, discussing plan of care with patient and family, discussing plan of care with collaborating physician, and documentation. NOTE: This report was transcribed using voice recognition software. Every effort was made to ensure accuracy; however, inadvertent computerized transcription errors may be present.      Signed:  Electronically signed by GEOVANI Tavarez NP on 3/4/2023 at 6:58 AM

## 2023-03-04 NOTE — PROGRESS NOTES
[address] [facility],[address]       Surgery Progress Note     Subjective:  vomiting and diarrhea    History: 52 yo female with 7 days of nausea, vomiting and diarrhea. CT scan first read as possible SBO but repeat images were normal other than bowel swelling consistent with gastroenteritis. Labs:   Recent Labs     03/02/23  0504 03/03/23  0540 03/04/23  0502   WBC 6.5 7.0 6.6   HGB 12.6 12.5 12.3    189 187    139 139    106 106   K 3.4* 3.4* 3.6   BUN 18 12 7   CREATININE 0.8 0.8 0.8   GLUCOSE 129* 124* 131*   LABALBU 3.1*  --   --    PROT 5.3*  --   --    CALCIUM 8.3* 7.9* 8.2*   MG 1.8  --   --    BILITOT 0.4  --   --    ALKPHOS 71  --   --    AST 19  --   --    ALT 26  --   --      Physical Exam  VITALS:  Blood pressure 131/68, pulse 74, temperature 97.7 °F (36.5 °C), temperature source Oral, resp. rate 18, height 5' 6\" (1.676 m), weight (!) 361 lb (163.7 kg), last menstrual period 10/01/2012, SpO2 94 %, not currently breastfeeding. General appearance: alert, appears stated age and cooperative, does ambulate easily  Head: Normocephalic, without obvious abnormality, atraumatic  Eyes: PERRL  Ears/mouth/throat:  Ears clear, mouth normal, throat no redness  Neck: no adenopathy, no JVD, supple, symmetrical, trachea midline and thyroid not enlarged  Lungs: clear to auscultation bilaterally  Heart: regular rate and rhythm  Abdomen: soft, non tender, no distension  Extremities: extremities normal, atraumatic, no cyanosis or edema  Skin: no open wounds    ASSESSMENT:   Nausea, vomiting and diarrhea with no improvement, no bowel obstruction seen on CT scan. PLAN:    Oral hydration. Nausea control  Oral Flagyl may help control the diarrhea. Slowly advance diet. Home once she can tolerate oral fluids.     Physician Signature: Electronically signed by Dr. Augustine Harrington M.D.   3/4/2023

## 2023-03-05 VITALS
SYSTOLIC BLOOD PRESSURE: 145 MMHG | BODY MASS INDEX: 47.09 KG/M2 | TEMPERATURE: 98.1 F | RESPIRATION RATE: 18 BRPM | WEIGHT: 293 LBS | HEART RATE: 72 BPM | OXYGEN SATURATION: 92 % | DIASTOLIC BLOOD PRESSURE: 69 MMHG | HEIGHT: 66 IN

## 2023-03-05 LAB
ANION GAP SERPL CALCULATED.3IONS-SCNC: 6 MMOL/L (ref 7–16)
BASOPHILS ABSOLUTE: 0.02 E9/L (ref 0–0.2)
BASOPHILS RELATIVE PERCENT: 0.3 % (ref 0–2)
BUN BLDV-MCNC: 9 MG/DL (ref 6–20)
CALCIUM SERPL-MCNC: 8.5 MG/DL (ref 8.6–10.2)
CHLORIDE BLD-SCNC: 102 MMOL/L (ref 98–107)
CO2: 28 MMOL/L (ref 22–29)
CREAT SERPL-MCNC: 0.8 MG/DL (ref 0.5–1)
CULTURE, STOOL: NORMAL
EOSINOPHILS ABSOLUTE: 0.17 E9/L (ref 0.05–0.5)
EOSINOPHILS RELATIVE PERCENT: 2.3 % (ref 0–6)
GFR SERPL CREATININE-BSD FRML MDRD: >60 ML/MIN/1.73
GLUCOSE BLD-MCNC: 122 MG/DL (ref 74–99)
HCT VFR BLD CALC: 38.8 % (ref 34–48)
HEMOGLOBIN: 12.6 G/DL (ref 11.5–15.5)
IMMATURE GRANULOCYTES #: 0.05 E9/L
IMMATURE GRANULOCYTES %: 0.7 % (ref 0–5)
LYMPHOCYTES ABSOLUTE: 2.82 E9/L (ref 1.5–4)
LYMPHOCYTES RELATIVE PERCENT: 38.3 % (ref 20–42)
MCH RBC QN AUTO: 30.6 PG (ref 26–35)
MCHC RBC AUTO-ENTMCNC: 32.5 % (ref 32–34.5)
MCV RBC AUTO: 94.2 FL (ref 80–99.9)
METER GLUCOSE: 116 MG/DL (ref 74–99)
METER GLUCOSE: 198 MG/DL (ref 74–99)
MONOCYTES ABSOLUTE: 0.51 E9/L (ref 0.1–0.95)
MONOCYTES RELATIVE PERCENT: 6.9 % (ref 2–12)
NEUTROPHILS ABSOLUTE: 3.79 E9/L (ref 1.8–7.3)
NEUTROPHILS RELATIVE PERCENT: 51.5 % (ref 43–80)
PDW BLD-RTO: 13.6 FL (ref 11.5–15)
PLATELET # BLD: 184 E9/L (ref 130–450)
PMV BLD AUTO: 11.1 FL (ref 7–12)
POTASSIUM SERPL-SCNC: 3.5 MMOL/L (ref 3.5–5)
RBC # BLD: 4.12 E12/L (ref 3.5–5.5)
SODIUM BLD-SCNC: 136 MMOL/L (ref 132–146)
WBC # BLD: 7.4 E9/L (ref 4.5–11.5)
WHITE BLOOD CELLS (WBC), STOOL: NORMAL

## 2023-03-05 PROCEDURE — 6370000000 HC RX 637 (ALT 250 FOR IP): Performed by: INTERNAL MEDICINE

## 2023-03-05 PROCEDURE — 82962 GLUCOSE BLOOD TEST: CPT

## 2023-03-05 PROCEDURE — 80048 BASIC METABOLIC PNL TOTAL CA: CPT

## 2023-03-05 PROCEDURE — 6370000000 HC RX 637 (ALT 250 FOR IP): Performed by: SURGERY

## 2023-03-05 PROCEDURE — 99233 SBSQ HOSP IP/OBS HIGH 50: CPT | Performed by: SURGERY

## 2023-03-05 PROCEDURE — 85025 COMPLETE CBC W/AUTO DIFF WBC: CPT

## 2023-03-05 PROCEDURE — APPSS45 APP SPLIT SHARED TIME 31-45 MINUTES: Performed by: NURSE PRACTITIONER

## 2023-03-05 PROCEDURE — 99232 SBSQ HOSP IP/OBS MODERATE 35: CPT | Performed by: INTERNAL MEDICINE

## 2023-03-05 PROCEDURE — 6370000000 HC RX 637 (ALT 250 FOR IP): Performed by: FAMILY MEDICINE

## 2023-03-05 PROCEDURE — 2580000003 HC RX 258: Performed by: INTERNAL MEDICINE

## 2023-03-05 PROCEDURE — 36415 COLL VENOUS BLD VENIPUNCTURE: CPT

## 2023-03-05 RX ORDER — METRONIDAZOLE 500 MG/1
500 TABLET ORAL EVERY 8 HOURS SCHEDULED
Qty: 30 TABLET | Refills: 0 | Status: SHIPPED | OUTPATIENT
Start: 2023-03-05 | End: 2023-03-15

## 2023-03-05 RX ADMIN — ATORVASTATIN CALCIUM 20 MG: 20 TABLET, FILM COATED ORAL at 08:52

## 2023-03-05 RX ADMIN — LISINOPRIL 40 MG: 20 TABLET ORAL at 08:51

## 2023-03-05 RX ADMIN — Medication 10 MG: at 00:11

## 2023-03-05 RX ADMIN — ASPIRIN 81 MG: 81 TABLET, COATED ORAL at 08:51

## 2023-03-05 RX ADMIN — DILTIAZEM HYDROCHLORIDE 360 MG: 120 CAPSULE, COATED, EXTENDED RELEASE ORAL at 08:52

## 2023-03-05 RX ADMIN — SODIUM CHLORIDE, PRESERVATIVE FREE 10 ML: 5 INJECTION INTRAVENOUS at 08:54

## 2023-03-05 RX ADMIN — METRONIDAZOLE 500 MG: 500 TABLET ORAL at 05:48

## 2023-03-05 RX ADMIN — HYDRALAZINE HYDROCHLORIDE 100 MG: 50 TABLET, FILM COATED ORAL at 05:49

## 2023-03-05 NOTE — PROGRESS NOTES
Covid booster ordered prior to discharge. Per pharmacy, we do not have covid boosters available and the patient will have to follow up at our outpatient pharmacy/an outpatient pharmacy of her choice. Information added to discharge instructions. Attending nurse practitioner notified.

## 2023-03-05 NOTE — PLAN OF CARE
Problem: Discharge Planning  Goal: Discharge to home or other facility with appropriate resources  3/5/2023 1225 by Juliann Johns RN  Outcome: Completed     Problem: Safety - Adult  Goal: Free from fall injury  3/5/2023 1225 by Juliann Johns RN  Outcome: Completed     Problem: Pain  Goal: Verbalizes/displays adequate comfort level or baseline comfort level  3/5/2023 1225 by Juliann Johns RN  Outcome: Completed     Problem: Chronic Conditions and Co-morbidities  Goal: Patient's chronic conditions and co-morbidity symptoms are monitored and maintained or improved  3/5/2023 1225 by Juliann Johns RN  Outcome: Completed

## 2023-03-05 NOTE — PROGRESS NOTES
[address]       Surgery Progress Note     Subjective:  vomiting and diarrhea    History: 54 yo female with 7 days of nausea, vomiting and diarrhea. CT scan first read as possible SBO but repeat images were normal other than bowel swelling consistent with gastroenteritis. Labs:   Recent Labs     03/04/23  0502 03/05/23  0425   WBC 6.6 7.4   HGB 12.3 12.6    184    136    102   K 3.6 3.5   BUN 7 9   CREATININE 0.8 0.8   GLUCOSE 131* 122*   CALCIUM 8.2* 8.5*     Physical Exam  VITALS:  Blood pressure (!) 145/69, pulse 72, temperature 98.1 °F (36.7 °C), resp. rate 18, height 5' 6\" (1.676 m), weight (!) 361 lb (163.7 kg), last menstrual period 10/01/2012, SpO2 92 %, not currently breastfeeding. General appearance: alert, appears stated age and cooperative, does ambulate easily  Head: Normocephalic, without obvious abnormality, atraumatic  Eyes: PERRL  Ears/mouth/throat:  Ears clear, mouth normal, throat no redness  Neck: no adenopathy, no JVD, supple, symmetrical, trachea midline and thyroid not enlarged  Lungs: clear to auscultation bilaterally  Heart: regular rate and rhythm  Abdomen: soft, non tender, no distension  Extremities: extremities normal, atraumatic, no cyanosis or edema  Skin: no open wounds    ASSESSMENT:   Nausea, vomiting and diarrhea, getting better,   no bowel obstruction seen on CT scan. PLAN:    Oral hydration. Oral Flagyl.   Ok to discharge home    Physician Signature: Electronically signed by Dr. Callie Johnson M.D.   3/5/2023

## 2023-03-06 ENCOUNTER — OFFICE VISIT (OUTPATIENT)
Dept: FAMILY MEDICINE CLINIC | Age: 48
End: 2023-03-06
Payer: MEDICAID

## 2023-03-06 ENCOUNTER — TELEPHONE (OUTPATIENT)
Dept: BARIATRICS/WEIGHT MGMT | Age: 48
End: 2023-03-06

## 2023-03-06 ENCOUNTER — OFFICE VISIT (OUTPATIENT)
Dept: ENDOCRINOLOGY | Age: 48
End: 2023-03-06
Payer: MEDICAID

## 2023-03-06 VITALS
TEMPERATURE: 97.5 F | RESPIRATION RATE: 18 BRPM | SYSTOLIC BLOOD PRESSURE: 132 MMHG | OXYGEN SATURATION: 98 % | DIASTOLIC BLOOD PRESSURE: 73 MMHG | HEART RATE: 77 BPM | BODY MASS INDEX: 47.09 KG/M2 | WEIGHT: 293 LBS | HEIGHT: 66 IN

## 2023-03-06 VITALS
OXYGEN SATURATION: 96 % | RESPIRATION RATE: 18 BRPM | HEART RATE: 90 BPM | SYSTOLIC BLOOD PRESSURE: 179 MMHG | HEIGHT: 66 IN | WEIGHT: 293 LBS | BODY MASS INDEX: 47.09 KG/M2 | DIASTOLIC BLOOD PRESSURE: 82 MMHG

## 2023-03-06 DIAGNOSIS — E11.9 CONTROLLED TYPE 2 DIABETES MELLITUS WITHOUT COMPLICATION, WITH LONG-TERM CURRENT USE OF INSULIN (HCC): Primary | ICD-10-CM

## 2023-03-06 DIAGNOSIS — K52.9 ACUTE GASTROENTERITIS: ICD-10-CM

## 2023-03-06 DIAGNOSIS — Z79.4 CONTROLLED TYPE 2 DIABETES MELLITUS WITHOUT COMPLICATION, WITH LONG-TERM CURRENT USE OF INSULIN (HCC): Primary | ICD-10-CM

## 2023-03-06 DIAGNOSIS — Z96.41 INSULIN PUMP IN PLACE: ICD-10-CM

## 2023-03-06 DIAGNOSIS — I10 ESSENTIAL HYPERTENSION: ICD-10-CM

## 2023-03-06 DIAGNOSIS — E55.9 VITAMIN D DEFICIENCY: ICD-10-CM

## 2023-03-06 DIAGNOSIS — E66.01 MORBID OBESITY WITH BMI OF 50.0-59.9, ADULT (HCC): ICD-10-CM

## 2023-03-06 DIAGNOSIS — Z09 HOSPITAL DISCHARGE FOLLOW-UP: Primary | ICD-10-CM

## 2023-03-06 LAB
CRYPTOSPORIDIUM ANTIGEN STOOL: NORMAL
GIARDIA ANTIGEN STOOL: NORMAL

## 2023-03-06 PROCEDURE — G8417 CALC BMI ABV UP PARAM F/U: HCPCS | Performed by: NURSE PRACTITIONER

## 2023-03-06 PROCEDURE — 1036F TOBACCO NON-USER: CPT | Performed by: STUDENT IN AN ORGANIZED HEALTH CARE EDUCATION/TRAINING PROGRAM

## 2023-03-06 PROCEDURE — 99213 OFFICE O/P EST LOW 20 MIN: CPT | Performed by: STUDENT IN AN ORGANIZED HEALTH CARE EDUCATION/TRAINING PROGRAM

## 2023-03-06 PROCEDURE — 95251 CONT GLUC MNTR ANALYSIS I&R: CPT | Performed by: NURSE PRACTITIONER

## 2023-03-06 PROCEDURE — 1111F DSCHRG MED/CURRENT MED MERGE: CPT | Performed by: STUDENT IN AN ORGANIZED HEALTH CARE EDUCATION/TRAINING PROGRAM

## 2023-03-06 PROCEDURE — 3078F DIAST BP <80 MM HG: CPT | Performed by: STUDENT IN AN ORGANIZED HEALTH CARE EDUCATION/TRAINING PROGRAM

## 2023-03-06 PROCEDURE — G8427 DOCREV CUR MEDS BY ELIG CLIN: HCPCS | Performed by: NURSE PRACTITIONER

## 2023-03-06 PROCEDURE — 2022F DILAT RTA XM EVC RTNOPTHY: CPT | Performed by: NURSE PRACTITIONER

## 2023-03-06 PROCEDURE — G8427 DOCREV CUR MEDS BY ELIG CLIN: HCPCS | Performed by: STUDENT IN AN ORGANIZED HEALTH CARE EDUCATION/TRAINING PROGRAM

## 2023-03-06 PROCEDURE — 3044F HG A1C LEVEL LT 7.0%: CPT | Performed by: NURSE PRACTITIONER

## 2023-03-06 PROCEDURE — 3075F SYST BP GE 130 - 139MM HG: CPT | Performed by: NURSE PRACTITIONER

## 2023-03-06 PROCEDURE — G8417 CALC BMI ABV UP PARAM F/U: HCPCS | Performed by: STUDENT IN AN ORGANIZED HEALTH CARE EDUCATION/TRAINING PROGRAM

## 2023-03-06 PROCEDURE — 1111F DSCHRG MED/CURRENT MED MERGE: CPT | Performed by: NURSE PRACTITIONER

## 2023-03-06 PROCEDURE — G8484 FLU IMMUNIZE NO ADMIN: HCPCS | Performed by: STUDENT IN AN ORGANIZED HEALTH CARE EDUCATION/TRAINING PROGRAM

## 2023-03-06 PROCEDURE — 3078F DIAST BP <80 MM HG: CPT | Performed by: NURSE PRACTITIONER

## 2023-03-06 PROCEDURE — 3074F SYST BP LT 130 MM HG: CPT | Performed by: STUDENT IN AN ORGANIZED HEALTH CARE EDUCATION/TRAINING PROGRAM

## 2023-03-06 PROCEDURE — 99214 OFFICE O/P EST MOD 30 MIN: CPT | Performed by: NURSE PRACTITIONER

## 2023-03-06 PROCEDURE — G8484 FLU IMMUNIZE NO ADMIN: HCPCS | Performed by: NURSE PRACTITIONER

## 2023-03-06 PROCEDURE — 1036F TOBACCO NON-USER: CPT | Performed by: NURSE PRACTITIONER

## 2023-03-06 RX ORDER — SOFT LENS RINSE,STORE SOLUTION
1 SOLUTION, NON-ORAL MISCELLANEOUS DAILY
Qty: 30 EACH | Refills: 11 | Status: CANCELLED | OUTPATIENT
Start: 2023-03-06

## 2023-03-06 RX ORDER — DULAGLUTIDE 4.5 MG/.5ML
INJECTION, SOLUTION SUBCUTANEOUS
Qty: 4 ADJUSTABLE DOSE PRE-FILLED PEN SYRINGE | Refills: 5 | Status: SHIPPED | OUTPATIENT
Start: 2023-03-06

## 2023-03-06 RX ORDER — GEL DRESSING
1 GEL (GRAM) TOPICAL DAILY
Qty: 30 EACH | Refills: 11 | Status: SHIPPED | OUTPATIENT
Start: 2023-03-06

## 2023-03-06 SDOH — ECONOMIC STABILITY: INCOME INSECURITY: HOW HARD IS IT FOR YOU TO PAY FOR THE VERY BASICS LIKE FOOD, HOUSING, MEDICAL CARE, AND HEATING?: HARD

## 2023-03-06 SDOH — ECONOMIC STABILITY: HOUSING INSECURITY
IN THE LAST 12 MONTHS, WAS THERE A TIME WHEN YOU DID NOT HAVE A STEADY PLACE TO SLEEP OR SLEPT IN A SHELTER (INCLUDING NOW)?: NO

## 2023-03-06 SDOH — ECONOMIC STABILITY: FOOD INSECURITY: WITHIN THE PAST 12 MONTHS, YOU WORRIED THAT YOUR FOOD WOULD RUN OUT BEFORE YOU GOT MONEY TO BUY MORE.: OFTEN TRUE

## 2023-03-06 SDOH — ECONOMIC STABILITY: FOOD INSECURITY: WITHIN THE PAST 12 MONTHS, THE FOOD YOU BOUGHT JUST DIDN'T LAST AND YOU DIDN'T HAVE MONEY TO GET MORE.: OFTEN TRUE

## 2023-03-06 ASSESSMENT — PATIENT HEALTH QUESTIONNAIRE - PHQ9
8. MOVING OR SPEAKING SO SLOWLY THAT OTHER PEOPLE COULD HAVE NOTICED. OR THE OPPOSITE, BEING SO FIGETY OR RESTLESS THAT YOU HAVE BEEN MOVING AROUND A LOT MORE THAN USUAL: 0
6. FEELING BAD ABOUT YOURSELF - OR THAT YOU ARE A FAILURE OR HAVE LET YOURSELF OR YOUR FAMILY DOWN: 0
9. THOUGHTS THAT YOU WOULD BE BETTER OFF DEAD, OR OF HURTING YOURSELF: 0
SUM OF ALL RESPONSES TO PHQ QUESTIONS 1-9: 3
SUM OF ALL RESPONSES TO PHQ9 QUESTIONS 1 & 2: 0
3. TROUBLE FALLING OR STAYING ASLEEP: 3
7. TROUBLE CONCENTRATING ON THINGS, SUCH AS READING THE NEWSPAPER OR WATCHING TELEVISION: 0
4. FEELING TIRED OR HAVING LITTLE ENERGY: 0
2. FEELING DOWN, DEPRESSED OR HOPELESS: 0
SUM OF ALL RESPONSES TO PHQ QUESTIONS 1-9: 3
1. LITTLE INTEREST OR PLEASURE IN DOING THINGS: 0
5. POOR APPETITE OR OVEREATING: 0
SUM OF ALL RESPONSES TO PHQ QUESTIONS 1-9: 3
10. IF YOU CHECKED OFF ANY PROBLEMS, HOW DIFFICULT HAVE THESE PROBLEMS MADE IT FOR YOU TO DO YOUR WORK, TAKE CARE OF THINGS AT HOME, OR GET ALONG WITH OTHER PEOPLE: 0
SUM OF ALL RESPONSES TO PHQ QUESTIONS 1-9: 3

## 2023-03-06 NOTE — TELEPHONE ENCOUNTER
Pt called into the office to schedule a wt check. She spoke with  and he told her to just continue the program and not to give up. She does need to reach her goal wt. Pt is scheduled for 3/24/2023 with Nick Davila

## 2023-03-06 NOTE — PROGRESS NOTES
[facility]  Precepting Note    Subjective:  Hospital follow up  Symptoms improved    ROS otherwise negative     Past medical, surgical, family and social history were reviewed, non-contributory, and unchanged unless otherwise stated. Objective:    /73   Pulse 77   Temp 97.5 °F (36.4 °C) (Temporal)   Resp 18   Ht 5' 6\" (1.676 m)   Wt (!) 367 lb (166.5 kg)   LMP 10/01/2012 (Approximate) Comment: 2013  SpO2 98%   BMI 59.24 kg/m²     Exam is as noted by resident with the following changes, additions or corrections:    General:  NAD; alert & oriented x 3   Heart:  RRR, no murmurs, gallops, or rubs. Lungs:  CTA bilaterally, no wheeze, rales or rhonchi  Abd: bowel sounds present, nontender, nondistended, no masses  Extrem:  No clubbing, cyanosis, or edema    Assessment/Plan:  Gastroenteritis: resolved  Nausea and vomiting were also thought to be secondary to GLP1 agonist initiation. Trulicity discontinued. F/u with surgery for bariatrics       Attending Physician Statement  I have reviewed the chart, including any radiology or labs. I have discussed the case, including pertinent history and exam findings with the resident. I agree with the assessment, plan and orders as documented by the resident. Please refer to the resident note for additional information.       Electronically signed by Percy Zaman MD on 3/6/2023 at 1:20 PM

## 2023-03-06 NOTE — PROGRESS NOTES
[facility] Department of Endocrinology Diabetes and Metabolism   [address] [facility] [address]   Phone: [phone]  Fax: [phone]    Date of Service: 3/6/2023    Primary Care Physician: Chaparrita Farr MD  Referring physician: No ref. provider found  Provider: GEOVANI rKaus NP     Reason for the visit:    Type 2 DM    History of Present Illness: The history is provided by the patient. No  was used. Accuracy of the patient data is excellent. Anyi Herrera is a very pleasant 53 y.o. female seen today for diabetes management.      Her last OV was 8/2022    Anyi Herrera was diagnosed with diabetes at age 26 and currently on Medtronic 5g , which she started 8/2022    Pump Settings: Basal 1.20, CR 8, IS 32, AIT  3, BG goal 100-150  The patient has been checking blood sugar per CGM  Continues on Trulicity 3 mg weekly    Download:  TIR 96%  LOWS 0%  Hyperglycemia 4%  AVG   Auto Mode 94%    TDD 36.3 units     Most recent A1c results summarized below  Lab Results   Component Value Date/Time    LABA1C 6.2 02/28/2023 05:04 AM    LABA1C 6.1 08/25/2022 03:37 PM    LABA1C 5.9 08/25/2022 03:03 PM       Patient has had no hypoglycemic episodes   The patient has been mindful of what has been eating and is following a diabetic diet    I reviewed current medications and the patient has no issues with diabetes medications  Eliana Avila has an eye appt scheduled 3/9/23 and denied any history of diabetic retinopathy   The patient  performs her own feet care  Microvascular complications:  No Retinopathy, Nephropathy or Neuropathy   Macrovascular complications: no CAD, PVD, or Stroke    PAST MEDICAL HISTORY   Past Medical History:   Diagnosis Date    Acute renal injury ([address] 09/04/2013    Analgesic overuse headache 12/19/2018    Anxiety     Arthritis     Asthma     CAD (coronary artery disease)     Depression 03/19/2013    Diabetes mellitus ([address] A1C=6.7 on 14    Fracture of tooth 2018    GERD (gastroesophageal reflux disease)     Glaucoma     Hyperlipidemia 2021    Hypertension     Hypertensive urgency 2013    Hypothyroidism     Irritable bowel syndrome     Morbid obesity due to excess calories ([facility] [address]     Obesity     Obstructive sleep apnea     Pneumonia     Polycystic ovarian syndrome     Postcholecystectomy syndrome 2018    Spinal headache     Suicidal ideation 2016       PAST SURGICAL HISTORY   Past Surgical History:   Procedure Laterality Date     SECTION      Dr. Sreekanth Alegria, [address], LAPAROSCOPIC      [facility]    COLONOSCOPY      DENTAL SURGERY  10/06/2011    4 extractions    ECHO COMPLETE  2014         ENDOSCOPY, COLON, DIAGNOSTIC      FOOT SURGERY      RECONSTRUCTION LEFT FOOT, Dr. Carlton Hobbs, [address]  9/3/13    incisional hernia repair with mesh    HYSTERECTOMY (CERVIX STATUS UNKNOWN)      KNEE CARTILAGE SURGERY      OVARY REMOVAL      left due to polycystic ovary, Dr. Bethanie Pacheco, [address] [facility] [place], operated by [facility] (CERVIX REMOVED)  3/19/2013    [facility]ed [facility] Silver Nails[facility]    EGD BIOPSY performed by Kristian Cain MD at [facility]:   reports that she has never smoked. She has never used smokeless tobacco.  Alcohol:   reports no history of alcohol use. Drugs:   reports no history of drug use.     FAMILY HISTORY   Family History   Problem Relation Age of Onset    Asthma Mother     Diabetes Mother     High Blood Pressure Mother     High Blood Pressure Father     Heart Disease Father     Cancer Father     Depression Father     Diabetes Brother     Asthma Brother     Thyroid Disease Sister     Asthma Sister     Depression Maternal Grandmother     High Blood Pressure Maternal Grandmother     Mental Illness Maternal Grandmother     Thyroid Disease Maternal Grandmother     Heart Disease Maternal Grandfather     Depression Paternal Grandmother     High Blood Pressure Paternal Grandmother     Cancer Paternal Grandfather        ALLERGIES AND DRUG REACTIONS   Allergies   Allergen Reactions    Percocet [Oxycodone-Acetaminophen] Itching    Food Diarrhea     Food Intolerance - Dairy - Diarrhea    Metformin And Related Other (See Comments)     GI side effects     Tape [Adhesive Tape] Itching       CURRENT MEDICATIONS   Current Outpatient Medications   Medication Sig Dispense Refill    Dulaglutide (TRULICITY) 4.5 BP/8.2BS SOPN Inject 4.5 mg weekly 4 Adjustable Dose Pre-filled Pen Syringe 5    metroNIDAZOLE (FLAGYL) 500 MG tablet Take 1 tablet by mouth every 8 hours for 10 days 30 tablet 0    ondansetron (ZOFRAN-ODT) 4 MG disintegrating tablet Take 1 tablet by mouth 3 times daily as needed for Nausea or Vomiting 21 tablet 0    ondansetron (ZOFRAN-ODT) 4 MG disintegrating tablet Take 1 tablet by mouth 3 times daily as needed for Nausea or Vomiting 21 tablet 0    ASPIRIN LOW DOSE 81 MG EC tablet TAKE 1 TABLET BY MOUTH DAILY 30 tablet 5    hydroCHLOROthiazide (HYDRODIURIL) 12.5 MG tablet TAKE ONE TABLET BY MOUTH EVERY DAY 30 tablet 0    Accu-Chek FastClix Lancets MISC USE TO CHECK BLOOD GLUCOSE 4X DAILY 204 each 5    atorvastatin (LIPITOR) 20 MG tablet TAKE 1 TABLET BY MOUTH DAILY 30 tablet 5    lisinopril (PRINIVIL;ZESTRIL) 40 MG tablet TAKE ONE TABLET BY MOUTH EVERY DAY 30 tablet 2    Dulaglutide (TRULICITY) 3 FE/7.8VE SOPN Inject 3 mg into the skin once a week 2 mL 5    insulin lispro (HUMALOG) 100 UNIT/ML SOLN injection vial USE VIA INSULIN PUMP.  MAX 85 UNITS DAILY 30 mL 5    Accu-Chek Softclix Lancets MISC Check blood sugar 4x daily 200 each 6    dilTIAZem (CARDIZEM CD) 360 MG extended release capsule Take 1 capsule by mouth daily 30 capsule 0    hydrALAZINE (APRESOLINE) 100 MG tablet Take 1 tablet by mouth every 8 hours 90 tablet 0    Insulin Infusion Pump (MINIMED 770G INSULIN PUMP SYS) KIT To infuse insulin 1 kit 0    Continuous Blood Gluc Sensor (GUARDIAN SENSOR 3) MISC To change every 7 days 12 each 3    Continuous Blood Gluc Transmit (GUARDIAN LINK 3 TRANSMITTER) MISC To use with sensors 1 each 0    hydrocortisone (ANUSOL-HC) 2.5 % CREA rectal cream Apply to affected area twice daily for rectal pain from external hemorrhoid. 28 g 0    Insulin Pen Needle (BD PEN NEEDLE MICRO U/F) 32G X 6 MM MISC Uses with insulin 4 times a day 250 each 5    Insulin Pen Needle (BD PEN NEEDLE MICRO U/F) 32G X 6 MM MISC Uses with insulin 4 times a day 250 each 5    glucose monitoring (FREESTYLE FREEDOM) kit 1 kit by Does not apply route daily 1 kit 0    albuterol sulfate HFA (VENTOLIN HFA) 108 (90 Base) MCG/ACT inhaler Inhale 2 puffs into the lungs 4 times daily as needed for Wheezing 18 g 0     No current facility-administered medications for this visit. Review of Systems  Constitutional: No fever, no chills, no diaphoresis, no generalized weakness. HEENT: No blurred vision, No sore throat, no ear pain, no hair loss  Neck: denied any neck swelling, difficulty swallowing,   Cardio-pulmonary: No CP, SOB or palpitation, No orthopnea or PND. No cough or wheezing. GI: No N/V/D, no constipation, No abdominal pain, no melena or hematochezia   : Denied any dysuria, hematuria, flank pain, discharge, or incontinence. Skin: denied any rash, ulcer, Hirsute, or hyperpigmentation. MSK: denied any joint deformity, joint pain/swelling, muscle pain, or back pain.   Neuro: no numbness, no tingling, no weakness    OBJECTIVE    BP (!) 179/82   Pulse 90   Resp 18   Ht 5' 6\" (1.676 m)   Wt (!) 367 lb (166.5 kg)   LMP 10/01/2012 (Approximate) Comment: 2013  SpO2 96%   BMI 59.24 kg/m²   BP Readings from Last 4 Encounters:   03/06/23 (!) 179/82   03/06/23 132/73   03/05/23 (!) 145/69   02/27/23 (!) 209/109     Wt Readings from Last 6 Encounters:   03/06/23 (!) 367 lb (166.5 kg) 03/06/23 (!) 367 lb (166.5 kg)   02/28/23 (!) 361 lb (163.7 kg)   02/27/23 (!) 361 lb (163.7 kg)   02/10/23 (!) 347 lb (157.4 kg)   12/19/22 (!) 347 lb (157.4 kg)       Physical examination:  General: awake alert, oriented x3, no abnormal position or movements. HEENT: normocephalic non-traumatic, no exophthalmos   Neck: supple, no LN enlargement, no thyromegaly, no thyroid tenderness, no JVD. Pulm: Clear equal air entry no added sounds, no wheezing or rhonchi    CVS: S1 + S2, no murmur, no heave. Dorsalis pedis pulse palpable   Abd: soft lax, no tenderness, no organomegaly, audible bowel sounds. Skin: warm, no lesions, no rash.  No callus, no Ulcers, No acanthosis nigricans  Musculoskeletal: No back tenderness, no kyphosis/scoliosis    Neuro: CN intact,  sensation decreased bilateral , muscle power normal  Psych: normal mood, and affect      Review of Laboratory Data:  I personally reviewed the following lab:  Lab Results   Component Value Date/Time    WBC 7.4 03/05/2023 04:25 AM    RBC 4.12 03/05/2023 04:25 AM    HGB 12.6 03/05/2023 04:25 AM    HCT 38.8 03/05/2023 04:25 AM    MCV 94.2 03/05/2023 04:25 AM    MCH 30.6 03/05/2023 04:25 AM    MCHC 32.5 03/05/2023 04:25 AM    RDW 13.6 03/05/2023 04:25 AM     03/05/2023 04:25 AM    MPV 11.1 03/05/2023 04:25 AM      Lab Results   Component Value Date/Time     03/05/2023 04:25 AM    K 3.5 03/05/2023 04:25 AM    K 3.4 (L) 03/02/2023 05:04 AM    CO2 28 03/05/2023 04:25 AM    BUN 9 03/05/2023 04:25 AM    CREATININE 0.8 03/05/2023 04:25 AM    CALCIUM 8.5 (L) 03/05/2023 04:25 AM    LABGLOM >60 03/05/2023 04:25 AM    GFRAA >60 08/25/2022 03:37 PM      Lab Results   Component Value Date/Time    TSH 2.160 04/10/2022 05:03 AM    T4FREE 1.30 04/10/2022 05:03 AM    A7IHXIM 7.5 04/13/2016 04:30 PM     Lab Results   Component Value Date/Time    LABA1C 6.2 02/28/2023 05:04 AM    GLUCOSE 122 03/05/2023 04:25 AM    MALBCR - 05/18/2021 08:08 AM    LABMICR <12.0 05/18/2021 08:08 AM    LABCREA 106 05/18/2021 08:08 AM     Lab Results   Component Value Date/Time    LABA1C 6.2 02/28/2023 05:04 AM    LABA1C 6.1 08/25/2022 03:37 PM    LABA1C 5.9 08/25/2022 03:03 PM     Lab Results   Component Value Date/Time    TRIG 97 03/02/2023 05:04 AM    HDL 39 03/02/2023 05:04 AM    LDLCALC 56 03/02/2023 05:04 AM    CHOL 114 03/02/2023 05:04 AM     Lab Results   Component Value Date/Time    VITD25 8 04/01/2014 01:10 PM       ASSESSMENT & RECOMMENDATIONS   Eliana Romero, a 53 y.o.-old female seen in for the following issues       Assessment:      Diagnosis Orders   1. Controlled type 2 diabetes mellitus without complication, with long-term current use of insulin (HCC)  [place] CONTINUOUS GLUCOSE MONITORING ANALYSIS I&R    Dulaglutide (TRULICITY) 4.5 GB/8.9YW SOPN    Vitamin D 25 Hydroxy    MICROALBUMIN / CREATININE URINE RATIO      2. Morbid obesity with BMI of 50.0-59.9, adult ([facility]        3. Insulin pump in place        4. Vitamin D deficiency              Plan:     1. Controlled type 2 diabetes mellitus (HCC)   Glycemic control greatly improved since starting pump therapy 6.2%  No changes to current pump therapy: Pump Settings: Basal 1.20, CR 8, IS 32, AIT  3, BG goal [phone]  Increase trulicity 4.5 mg weekly  Continue CGM monitoring  Discussed with patient A1c and blood sugar goals   Optimal blood sugars: 100-140 pre-prandial, < 180 peak post-prandial  The patient counseled about the complications of uncontrolled diabetes   Patient was counselled about the importance of self-blood glucose monitoring and eating consistent carb diet to avoid blood sugar fluctuations   Patient will need routine diabetes maintenance and prevention  Discussed lifestyle changes including diet and exercise with patient  Diabetes labs before next visit     Continuous Glucose Monitoring (CGM) download and interpretation   I personally reviewed and interpreted continuous glucose monitor (CGM) download.  CGM report was discussed with patient including blood glucose patterns, percentages of blood glucose at goal, above goal and below goal. Insulin dosages/antidiabetic regimen was adjusted according to CGM download. Full CGM was scanned under media. 2. Morbid obesity with BMI of 50.0-59.9, adult ([address]   Discussed lifestyle changes including diet and exercise with patient in depth. Also discussed with patient cardiovascular risk associated with obesity  Following for [facility], planning bariatric surgery     3. Vitamin D Deficiency  Will check surveillance level as pt is at risk     I personally reviewed external notes from PCP and other patient's care team providers, and personally interpreted labs associated with the above diagnosis. I also ordered labs to further assess and manage the above addressed medical conditions. Return in about 3 months (around 6/6/2023) for Type 2 DM. The above issues were reviewed with the patient who understood and agreed with the plan. Thank you for allowing us to participate in the care of this patient. Please do not hesitate to contact us with any additional questions. GEOVANI Glez NP    [facility] Diabetes Care and Endocrinology   [address] [place] [address]   Phone: [phone]  Fax: [phone]  --------------------------------------------  An electronic signature was used to authenticate this note.  GEOVANI Glez NP on 3/6/2023 at 1:48 PM

## 2023-03-06 NOTE — PROGRESS NOTES
Post-Discharge Transitional Care  Follow Up      Eliana Romero   YOB: 1975    Date of Office Visit:  3/6/2023  Date of Hospital Admission: 2/28/23  Date of Hospital Discharge: 3/5/23  Risk of hospital readmission (high >=14%. Medium >=10%) :Readmission Risk Score: 17.2      Care management risk score Rising risk (score 2-5) and Complex Care (Scores >=6): No Risk Score On File     Non face to face  following discharge, date last encounter closed (first attempt may have been earlier): *No documented post hospital discharge outreach found in the last 14 days    Call initiated 2 business days of discharge: *No response recorded in the last 14 days    ASSESSMENT/PLAN:   Hospital discharge follow-up  -SD DISCHARGE MEDS RECONCILED W/ CURRENT OUTPATIENT MED LIST    Acute gastroenteritis  -Doing well post discharge, symptoms have resolved. Essential hypertension  -BP within goal.  -hydroCHLOROthiazide (HYDRODIURIL) 12.5 MG tablet; TAKE ONE TABLET BY MOUTH EVERY DAY, Disp-30 tablet, R-0Normal  -lisinopril (PRINIVIL;ZESTRIL) 40 MG tablet; TAKE ONE TABLET BY MOUTH EVERY DAY, Disp-30 tablet, R-0Patient will need refill for next fill pleaseNormal      Medical Decision Making: straightforward  No follow-ups on file. Subjective:   HPI:  Follow up of Hospital problems/diagnosis(es):     Inpatient course: Discharge summary reviewed- see chart.    -Patient was admitted for gastroenteritis.  -Discharged yesterday from [facility]  -She denies any current nausea, vomiting or diarrhea  -She states that she is doing well post discharge. -General surgery was consulted and she was transitioned to a liquid diet. She states that she has been back to her regular diet. -KUB showed no evidence of bowel obstruction, colonic fecal retention. Repeat CT abd with oral contrast showed no signs of ileus or obstruction.   -She states that she will be following up with surgery soon.       Patient Active Problem List Diagnosis    Morbid obesity ([facility] [address] [address]    Hypothyroidism    Depression with anxiety    Obstructive sleep apnea    Diabetes mellitus ([facility] [address]    Asthma    Arteriosclerosis of coronary artery    Hypertension    Generalized abdominal pain    PCOS (polycystic ovarian syndrome)    Lymphedema of both lower extremities    Irritable bowel syndrome with diarrhea    Hyperlipidemia    Gastroesophageal reflux disease with esophagitis    Acute asthma exacerbation    Acute respiratory failure with hypoxia ([facility])    Nausea vomiting and diarrhea    Gastroenteritis    Ileus ([facility] [address]    Diarrhea     Medications listed as ordered at the time of discharge from hospital     Medication List            Accurate as of March 6, 2023 11:59 PM. If you have any questions, ask your nurse or doctor. START taking these medications      Skin Tac Adhesive Barrier Wipe Misc  1 each by Does not apply route daily  Started by: Sharyle Primrose, APRN - NP     Uni-Solve Pads  Apply 1 each topically daily  Started by: Sharyle Primrose, APRN - NP            CHANGE how you take these medications      * Trulicity 3 XG/9.4OC Sopn  Generic drug: Dulaglutide  Inject 3 mg into the skin once a week  What changed: Another medication with the same name was added. Make sure you understand how and when to take each. Changed by: GEOVANI Murdock NP     * Trulicity 4.5 EP/0.5YP Sopn  Generic drug: Dulaglutide  Inject 4.5 mg weekly  What changed: You were already taking a medication with the same name, and this prescription was added. Make sure you understand how and when to take each. Changed by: Sharyle Primrose, APRN - NP           * This list has 2 medication(s) that are the same as other medications prescribed for you. Read the directions carefully, and ask your doctor or other care provider to review them with you.                 CONTINUE taking these medications      * Accu-Chek Softclix Lancets Misc  Check blood sugar 4x daily     * Accu-Chek FastClix Lancets Misc  USE TO CHECK BLOOD GLUCOSE 4X DAILY     albuterol sulfate  (90 Base) MCG/ACT inhaler  Commonly known as: Ventolin HFA  Inhale 2 puffs into the lungs 4 times daily as needed for Wheezing     Aspirin Low Dose 81 MG EC tablet  Generic drug: aspirin  TAKE 1 TABLET BY MOUTH DAILY     atorvastatin 20 MG tablet  Commonly known as: LIPITOR  TAKE 1 TABLET BY MOUTH DAILY     * BD Pen Needle Micro U/F 32G X 6 MM Misc  Generic drug: Insulin Pen Needle  Uses with insulin 4 times a day     * BD Pen Needle Micro U/F 32G X 6 MM Misc  Generic drug: Insulin Pen Needle  Uses with insulin 4 times a day     dilTIAZem 360 MG extended release capsule  Commonly known as: CARDIZEM CD  Take 1 capsule by mouth daily     glucose monitoring kit  1 kit by Does not apply route daily     Guardian Link 3 Transmitter Misc  To use with sensors     Guardian Sensor 3 Misc  To change every 7 days     hydrALAZINE 100 MG tablet  Commonly known as: APRESOLINE  Take 1 tablet by mouth every 8 hours     hydroCHLOROthiazide 12.5 MG tablet  Commonly known as: HYDRODIURIL  TAKE ONE TABLET BY MOUTH EVERY DAY     hydrocortisone 2.5 % Crea rectal cream  Commonly known as: Anusol-HC  Apply to affected area twice daily for rectal pain from external hemorrhoid. insulin lispro 100 UNIT/ML Soln injection vial  Commonly known as: HUMALOG  USE VIA INSULIN PUMP.  MAX 85 UNITS DAILY     lisinopril 40 MG tablet  Commonly known as: PRINIVIL;ZESTRIL  TAKE ONE TABLET BY MOUTH EVERY DAY     metroNIDAZOLE 500 MG tablet  Commonly known as: FLAGYL  Take 1 tablet by mouth every 8 hours for 10 days     MiniMed 770G Insulin Pump Sys Kit  To infuse insulin     * ondansetron 4 MG disintegrating tablet  Commonly known as: ZOFRAN-ODT  Take 1 tablet by mouth 3 times daily as needed for Nausea or Vomiting     * ondansetron 4 MG disintegrating tablet  Commonly known as: ZOFRAN-ODT  Take 1 tablet by mouth 3 times daily as needed for Nausea or Vomiting           * This list has 6 medication(s) that are the same as other medications prescribed for you. Read the directions carefully, and ask your doctor or other care provider to review them with you. Where to Get Your Medications        These medications were sent to [address] [facility], [facility]  [address], [address] [facility] [address]      Phone: [phone]   hydroCHLOROthiazide 12.5 MG tablet  lisinopril 40 MG tablet  Skin Tac Adhesive Barrier Wipe Misc  Trulicity 4.5 UQ/2.9UT Sopn  Uni-Solve Pads           Medications marked \"taking\" at this time  Outpatient Medications Marked as Taking for the 3/6/23 encounter (Office Visit) with Anne Jenkins MD   Medication Sig Dispense Refill    hydroCHLOROthiazide (HYDRODIURIL) 12.5 MG tablet TAKE ONE TABLET BY MOUTH EVERY DAY 30 tablet 0    lisinopril (PRINIVIL;ZESTRIL) 40 MG tablet TAKE ONE TABLET BY MOUTH EVERY DAY 30 tablet 0    metroNIDAZOLE (FLAGYL) 500 MG tablet Take 1 tablet by mouth every 8 hours for 10 days 30 tablet 0    ondansetron (ZOFRAN-ODT) 4 MG disintegrating tablet Take 1 tablet by mouth 3 times daily as needed for Nausea or Vomiting 21 tablet 0    ondansetron (ZOFRAN-ODT) 4 MG disintegrating tablet Take 1 tablet by mouth 3 times daily as needed for Nausea or Vomiting 21 tablet 0    ASPIRIN LOW DOSE 81 MG EC tablet TAKE 1 TABLET BY MOUTH DAILY 30 tablet 5    Accu-Chek FastClix Lancets MISC USE TO CHECK BLOOD GLUCOSE 4X DAILY 204 each 5    atorvastatin (LIPITOR) 20 MG tablet TAKE 1 TABLET BY MOUTH DAILY 30 tablet 5    Dulaglutide (TRULICITY) 3 ZZ/3.9MZ SOPN Inject 3 mg into the skin once a week 2 mL 5    insulin lispro (HUMALOG) 100 UNIT/ML SOLN injection vial USE VIA INSULIN PUMP.  MAX 85 UNITS DAILY 30 mL 5    Accu-Chek Softclix Lancets MISC Check blood sugar 4x daily 200 each 6    dilTIAZem (CARDIZEM CD) 360 MG extended release capsule Take 1 capsule by mouth daily 30 capsule 0    hydrALAZINE (APRESOLINE) 100 MG tablet Take 1 tablet by mouth every 8 hours 90 tablet 0    Insulin Infusion Pump (MINIMED 770G INSULIN PUMP SYS) KIT To infuse insulin 1 kit 0    Continuous Blood Gluc Sensor (GUARDIAN SENSOR 3) MISC To change every 7 days 12 each 3    Continuous Blood Gluc Transmit (GUARDIAN LINK 3 TRANSMITTER) MISC To use with sensors 1 each 0    hydrocortisone (ANUSOL-HC) 2.5 % CREA rectal cream Apply to affected area twice daily for rectal pain from external hemorrhoid. 28 g 0    Insulin Pen Needle (BD PEN NEEDLE MICRO U/F) 32G X 6 MM MISC Uses with insulin 4 times a day 250 each 5    Insulin Pen Needle (BD PEN NEEDLE MICRO U/F) 32G X 6 MM MISC Uses with insulin 4 times a day 250 each 5    glucose monitoring (FREESTYLE FREEDOM) kit 1 kit by Does not apply route daily 1 kit 0    albuterol sulfate HFA (VENTOLIN HFA) 108 (90 Base) MCG/ACT inhaler Inhale 2 puffs into the lungs 4 times daily as needed for Wheezing 18 g 0        Medications patient taking as of now reconciled against medications ordered at time of hospital discharge: Yes    A comprehensive review of systems was negative except for what was noted in the HPI.     Objective:    /73   Pulse 77   Temp 97.5 °F (36.4 °C) (Temporal)   Resp 18   Ht 5' 6\" (1.676 m)   Wt (!) 367 lb (166.5 kg)   LMP 10/01/2012 (Approximate) Comment: 2013  SpO2 98%   BMI 59.24 kg/m²   General Appearance: alert and oriented to person, place and time, well developed and well- nourished, in no acute distress  Skin: warm and dry, no rash or erythema  Neck: supple and non-tender without mass, no thyromegaly or thyroid nodules, no cervical lymphadenopathy  Pulmonary/Chest: clear to auscultation bilaterally- no wheezes, rales or rhonchi, normal air movement, no respiratory distress  Cardiovascular: normal rate, regular rhythm, normal S1 and S2, no murmurs, rubs, clicks, or gallops, distal pulses intact, no carotid bruits  Abdomen: soft, non-tender, non-distended, normal bowel sounds, no masses or organomegaly      An electronic signature was used to authenticate this note.   --Anne Jenkins MD

## 2023-03-07 LAB — CULTURE, STOOL: NORMAL

## 2023-03-08 RX ORDER — LISINOPRIL 40 MG/1
TABLET ORAL
Qty: 30 TABLET | Refills: 0 | Status: SHIPPED | OUTPATIENT
Start: 2023-03-08

## 2023-03-08 RX ORDER — HYDROCHLOROTHIAZIDE 12.5 MG/1
TABLET ORAL
Qty: 30 TABLET | Refills: 0 | Status: SHIPPED | OUTPATIENT
Start: 2023-03-08

## 2023-03-13 DIAGNOSIS — I10 ESSENTIAL HYPERTENSION: ICD-10-CM

## 2023-03-14 RX ORDER — HYDROCHLOROTHIAZIDE 12.5 MG/1
TABLET ORAL
Qty: 30 TABLET | Refills: 0 | OUTPATIENT
Start: 2023-03-14

## 2023-03-14 RX ORDER — LISINOPRIL 40 MG/1
TABLET ORAL
Qty: 30 TABLET | Refills: 2 | OUTPATIENT
Start: 2023-03-14

## 2023-03-14 RX ORDER — PEN NEEDLE, DIABETIC 32GX 5/32"
NEEDLE, DISPOSABLE MISCELLANEOUS
Qty: 100 EACH | Refills: 13 | OUTPATIENT
Start: 2023-03-14

## 2023-04-11 DIAGNOSIS — I10 ESSENTIAL HYPERTENSION: ICD-10-CM

## 2023-04-12 RX ORDER — HYDROCHLOROTHIAZIDE 12.5 MG/1
TABLET ORAL
Qty: 30 TABLET | Refills: 3 | Status: SHIPPED | OUTPATIENT
Start: 2023-04-12

## 2023-04-12 RX ORDER — LISINOPRIL 40 MG/1
TABLET ORAL
Qty: 30 TABLET | Refills: 0 | Status: SHIPPED
Start: 2023-04-12 | End: 2023-05-16

## 2023-04-13 NOTE — PATIENT INSTRUCTIONS
What is the next step to proceed with weight loss surgery? Please be aware that any co-pays or deductibles may be requested prior to testing and / or procedures. You will need to schedule a psychological evaluation for weight loss surgery. Patients will be required to complete all psychological testing as required by the mental health provider. Patients must also follow all of the provider's recommendations before weight loss surgery can be scheduled. The evaluation must be done a standard way for weight loss surgery. We strongly recommend that you contact one of our preferred providers listed below to arrange this:      Jordin Diaz, [facility]  [address] [facility], [address] [place] [address] Drive   [phone]    [facility] and [address] [facility] [address] [facility], [address] [place] [address]   [phone]    Dr. Kiya Hanks, PhD    Chip Lanza. [address]    [phone]      You will also need to plan on attending a 2 hour nutrition class at the [facility] prior to your surgery. We will schedule this for you when we schedule your surgery. Please remember to have your labs drawn 10 days prior to your first scheduled dietary appointment. Please remember, that while we will submit your case to insurance for surgery authorization, it is your responsibility to know if your plan covers weight loss surgery and keep up-to-date with changes to your insurance coverage. We will do everything possible to help you get approved for weight loss surgery, but cannot guarantee an approval.     Please note that you will not be submitted to your insurance company until all pre-operative testing requirements are met. Bacterial Etiology

## 2023-05-12 DIAGNOSIS — I10 ESSENTIAL HYPERTENSION: ICD-10-CM

## 2023-05-16 RX ORDER — LISINOPRIL 40 MG/1
TABLET ORAL
Qty: 30 TABLET | Refills: 4 | Status: SHIPPED | OUTPATIENT
Start: 2023-05-16

## 2023-07-19 DIAGNOSIS — E11.9 CONTROLLED TYPE 2 DIABETES MELLITUS WITHOUT COMPLICATION, WITH LONG-TERM CURRENT USE OF INSULIN (HCC): ICD-10-CM

## 2023-07-19 DIAGNOSIS — Z79.4 CONTROLLED TYPE 2 DIABETES MELLITUS WITHOUT COMPLICATION, WITH LONG-TERM CURRENT USE OF INSULIN (HCC): ICD-10-CM

## 2023-07-19 RX ORDER — DULAGLUTIDE 4.5 MG/.5ML
INJECTION, SOLUTION SUBCUTANEOUS
Qty: 2 ML | Refills: 5 | Status: SHIPPED | OUTPATIENT
Start: 2023-07-19

## 2023-08-07 ENCOUNTER — TELEPHONE (OUTPATIENT)
Dept: BARIATRICS/WEIGHT MGMT | Age: 48
End: 2023-08-07

## 2023-08-07 NOTE — TELEPHONE ENCOUNTER
I called patient to see if she was going to go forward with weight loss surgery. She wants to but has to meet her goal weight first.  She will come in on 8/30 to check her weight.

## 2023-09-06 DIAGNOSIS — E11.65 UNCONTROLLED TYPE 2 DIABETES MELLITUS WITH HYPERGLYCEMIA (HCC): ICD-10-CM

## 2023-09-07 RX ORDER — INSULIN LISPRO 100 [IU]/ML
INJECTION, SOLUTION INTRAVENOUS; SUBCUTANEOUS
Qty: 30 ML | Refills: 5 | Status: SHIPPED | OUTPATIENT
Start: 2023-09-07

## 2023-09-19 ENCOUNTER — HOSPITAL ENCOUNTER (EMERGENCY)
Age: 48
Discharge: LEFT BEFORE DISCHARGE | End: 2023-09-19
Attending: EMERGENCY MEDICINE
Payer: MEDICAID

## 2023-09-19 DIAGNOSIS — F32.A DEPRESSION, UNSPECIFIED DEPRESSION TYPE: Primary | ICD-10-CM

## 2023-09-19 PROCEDURE — 99283 EMERGENCY DEPT VISIT LOW MDM: CPT

## 2023-09-19 NOTE — ED PROVIDER NOTES
Shared ROSMERY-ED Attending Visit. CC: No     [facility]  Department of Emergency Medicine   ED  Encounter Note  Admit Date/RoomTime: 2023  2:43 PM  ED Room: MONALISA/MONALISA    NAME: Julia Lopez  : 1975  MRN: [id number]     Chief Complaint:  No chief complaint on file. History of Present Illness       Eliana Olea is a 53 y.o. old female who presents to the emergency department by ambulance, for '' having a bad day. \"  Patient states that she unaffected and her water got shut off and her boyfriend woke up with her. Patient was very upset and her mother then called the police because she was concerned for her safety because she stated that she \"did not want to be here any longer because she was sad. \"  Patient states she has no thoughts of harming herself or others. Patient states she has no suicidal homicidal plan. Patient states the last time she ever attempted anything was in . Patient is of sound mind stating that she has a safe place in her home. Patient states she does state that \"out of anger because she was crying and upset and she did not mean that. \"  Patient states \"I have no active plan nor do I ever want to take my life I would describe because I was upset. \"  Patient does currently have a counselor and called her on the way in the ambulance does have an appointment scheduled. Patient states has been in counseling for some time. Patient states taking all of her medications accordingly. Patient denies any drug or alcohol abuse. \ There has been no history of recent trauma . Quality:      Hopelessness:   No.     Terror:   No.     Confusion:   No.     Hallucinations:   None. Able to care for self: Yes. Able to control self: Yes. ROS   Pertinent positives and negatives are stated within HPI, all other systems reviewed and are negative.     Past Medical History:  has a past medical history of Acute renal injury ([address]), Analgesic overuse headache, Anxiety,

## 2023-10-28 ENCOUNTER — APPOINTMENT (OUTPATIENT)
Dept: GENERAL RADIOLOGY | Age: 48
DRG: 817 | End: 2023-10-28
Payer: MEDICAID

## 2023-10-28 ENCOUNTER — HOSPITAL ENCOUNTER (INPATIENT)
Age: 48
LOS: 1 days | Discharge: HOME | DRG: 817 | End: 2023-10-30
Attending: EMERGENCY MEDICINE | Admitting: PSYCHIATRY & NEUROLOGY
Payer: MEDICAID

## 2023-10-28 DIAGNOSIS — R94.31 PROLONGED Q-T INTERVAL ON ECG: ICD-10-CM

## 2023-10-28 DIAGNOSIS — T50.902A OVERDOSE OF MEDICATION, INTENTIONAL SELF-HARM, INITIAL ENCOUNTER (HCC): Primary | ICD-10-CM

## 2023-10-28 DIAGNOSIS — E87.6 HYPOKALEMIA: ICD-10-CM

## 2023-10-28 LAB
ALBUMIN SERPL-MCNC: 3.6 G/DL (ref 3.5–5.2)
ALP SERPL-CCNC: 83 U/L (ref 35–104)
ALT SERPL-CCNC: 24 U/L (ref 0–32)
AMPHET UR QL SCN: POSITIVE
ANION GAP SERPL CALCULATED.3IONS-SCNC: 13 MMOL/L (ref 7–16)
APAP SERPL-MCNC: <5 UG/ML (ref 10–30)
AST SERPL-CCNC: 18 U/L (ref 0–31)
B-HCG SERPL EIA 3RD IS-ACNC: 0.1 MIU/ML (ref 0–7)
BARBITURATES UR QL SCN: NEGATIVE
BASOPHILS # BLD: 0.03 K/UL (ref 0–0.2)
BASOPHILS NFR BLD: 0 % (ref 0–2)
BENZODIAZ UR QL: NEGATIVE
BILIRUB SERPL-MCNC: 0.7 MG/DL (ref 0–1.2)
BILIRUB UR QL STRIP: NEGATIVE
BUN SERPL-MCNC: 9 MG/DL (ref 6–20)
BUPRENORPHINE UR QL: NEGATIVE
CALCIUM SERPL-MCNC: 9.1 MG/DL (ref 8.6–10.2)
CANNABINOIDS UR QL SCN: NEGATIVE
CHLORIDE SERPL-SCNC: 103 MMOL/L (ref 98–107)
CLARITY UR: ABNORMAL
CO2 SERPL-SCNC: 23 MMOL/L (ref 22–29)
COCAINE UR QL SCN: NEGATIVE
COLOR UR: YELLOW
CREAT SERPL-MCNC: 0.9 MG/DL (ref 0.5–1)
EOSINOPHIL # BLD: 0.15 K/UL (ref 0.05–0.5)
EOSINOPHILS RELATIVE PERCENT: 2 % (ref 0–6)
ERYTHROCYTE [DISTWIDTH] IN BLOOD BY AUTOMATED COUNT: 12.9 % (ref 11.5–15)
ETHANOLAMINE SERPL-MCNC: <10 MG/DL
FENTANYL UR QL: NEGATIVE
GFR SERPL CREATININE-BSD FRML MDRD: >60 ML/MIN/1.73M2
GLUCOSE SERPL-MCNC: 126 MG/DL (ref 74–99)
GLUCOSE UR STRIP-MCNC: NEGATIVE MG/DL
HCT VFR BLD AUTO: 40.5 % (ref 34–48)
HGB BLD-MCNC: 13.6 G/DL (ref 11.5–15.5)
HGB UR QL STRIP.AUTO: NEGATIVE
IMM GRANULOCYTES # BLD AUTO: 0.03 K/UL (ref 0–0.58)
IMM GRANULOCYTES NFR BLD: 0 % (ref 0–5)
KETONES UR STRIP-MCNC: NEGATIVE MG/DL
LEUKOCYTE ESTERASE UR QL STRIP: ABNORMAL
LYMPHOCYTES NFR BLD: 2.18 K/UL (ref 1.5–4)
LYMPHOCYTES RELATIVE PERCENT: 32 % (ref 20–42)
MCH RBC QN AUTO: 30.2 PG (ref 26–35)
MCHC RBC AUTO-ENTMCNC: 33.6 G/DL (ref 32–34.5)
MCV RBC AUTO: 90 FL (ref 80–99.9)
METHADONE UR QL: NEGATIVE
MONOCYTES NFR BLD: 0.44 K/UL (ref 0.1–0.95)
MONOCYTES NFR BLD: 6 % (ref 2–12)
NEUTROPHILS NFR BLD: 59 % (ref 43–80)
NEUTS SEG NFR BLD: 4.09 K/UL (ref 1.8–7.3)
NITRITE UR QL STRIP: POSITIVE
OPIATES UR QL SCN: NEGATIVE
OXYCODONE UR QL SCN: NEGATIVE
PCP UR QL SCN: NEGATIVE
PH UR STRIP: 6 [PH] (ref 5–9)
PLATELET # BLD AUTO: 175 K/UL (ref 130–450)
PMV BLD AUTO: 11.2 FL (ref 7–12)
POTASSIUM SERPL-SCNC: 2.8 MMOL/L (ref 3.5–5)
PROT SERPL-MCNC: 6.7 G/DL (ref 6.4–8.3)
PROT UR STRIP-MCNC: NEGATIVE MG/DL
RBC # BLD AUTO: 4.5 M/UL (ref 3.5–5.5)
SALICYLATES SERPL-MCNC: <0.3 MG/DL (ref 0–30)
SODIUM SERPL-SCNC: 139 MMOL/L (ref 132–146)
SP GR UR STRIP: <1.005 (ref 1–1.03)
TEST INFORMATION: ABNORMAL
TOXIC TRICYCLIC SC,BLOOD: NEGATIVE
TROPONIN I SERPL HS-MCNC: 14 NG/L (ref 0–9)
UROBILINOGEN UR STRIP-ACNC: 0.2 EU/DL (ref 0–1)
WBC OTHER # BLD: 6.9 K/UL (ref 4.5–11.5)

## 2023-10-28 PROCEDURE — 81001 URINALYSIS AUTO W/SCOPE: CPT

## 2023-10-28 PROCEDURE — G0480 DRUG TEST DEF 1-7 CLASSES: HCPCS

## 2023-10-28 PROCEDURE — 82550 ASSAY OF CK (CPK): CPT

## 2023-10-28 PROCEDURE — 80053 COMPREHEN METABOLIC PANEL: CPT

## 2023-10-28 PROCEDURE — 80307 DRUG TEST PRSMV CHEM ANLYZR: CPT

## 2023-10-28 PROCEDURE — 84702 CHORIONIC GONADOTROPIN TEST: CPT

## 2023-10-28 PROCEDURE — 85025 COMPLETE CBC W/AUTO DIFF WBC: CPT

## 2023-10-28 PROCEDURE — 84484 ASSAY OF TROPONIN QUANT: CPT

## 2023-10-28 PROCEDURE — 83735 ASSAY OF MAGNESIUM: CPT

## 2023-10-28 PROCEDURE — 96375 TX/PRO/DX INJ NEW DRUG ADDON: CPT

## 2023-10-28 PROCEDURE — 80143 DRUG ASSAY ACETAMINOPHEN: CPT

## 2023-10-28 PROCEDURE — 99285 EMERGENCY DEPT VISIT HI MDM: CPT

## 2023-10-28 PROCEDURE — 80179 DRUG ASSAY SALICYLATE: CPT

## 2023-10-28 PROCEDURE — 96374 THER/PROPH/DIAG INJ IV PUSH: CPT

## 2023-10-28 RX ORDER — 0.9 % SODIUM CHLORIDE 0.9 %
1000 INTRAVENOUS SOLUTION INTRAVENOUS ONCE
Status: COMPLETED | OUTPATIENT
Start: 2023-10-28 | End: 2023-10-29

## 2023-10-28 ASSESSMENT — PAIN - FUNCTIONAL ASSESSMENT: PAIN_FUNCTIONAL_ASSESSMENT: NONE - DENIES PAIN

## 2023-10-29 ENCOUNTER — APPOINTMENT (OUTPATIENT)
Dept: GENERAL RADIOLOGY | Age: 48
DRG: 817 | End: 2023-10-29
Payer: MEDICAID

## 2023-10-29 PROBLEM — T50.902A INTENTIONAL DRUG OVERDOSE, INITIAL ENCOUNTER (HCC): Status: ACTIVE | Noted: 2023-10-29

## 2023-10-29 LAB
BACTERIA URNS QL MICRO: ABNORMAL
CHP ED QC CHECK: NORMAL
CK SERPL-CCNC: 47 U/L (ref 20–180)
GLUCOSE BLD-MCNC: 136 MG/DL
GLUCOSE BLD-MCNC: 136 MG/DL (ref 74–99)
MAGNESIUM SERPL-MCNC: 1.7 MG/DL (ref 1.6–2.6)
RBC #/AREA URNS HPF: ABNORMAL /HPF
WBC #/AREA URNS HPF: ABNORMAL /HPF

## 2023-10-29 PROCEDURE — 93005 ELECTROCARDIOGRAM TRACING: CPT | Performed by: EMERGENCY MEDICINE

## 2023-10-29 PROCEDURE — 6370000000 HC RX 637 (ALT 250 FOR IP): Performed by: INTERNAL MEDICINE

## 2023-10-29 PROCEDURE — 1200000000 HC SEMI PRIVATE

## 2023-10-29 PROCEDURE — 6370000000 HC RX 637 (ALT 250 FOR IP): Performed by: FAMILY MEDICINE

## 2023-10-29 PROCEDURE — 82962 GLUCOSE BLOOD TEST: CPT

## 2023-10-29 PROCEDURE — 6360000002 HC RX W HCPCS: Performed by: EMERGENCY MEDICINE

## 2023-10-29 PROCEDURE — 6360000002 HC RX W HCPCS: Performed by: INTERNAL MEDICINE

## 2023-10-29 PROCEDURE — 2580000003 HC RX 258: Performed by: EMERGENCY MEDICINE

## 2023-10-29 PROCEDURE — 1240000000 HC EMOTIONAL WELLNESS R&B

## 2023-10-29 PROCEDURE — 71045 X-RAY EXAM CHEST 1 VIEW: CPT

## 2023-10-29 RX ORDER — ENOXAPARIN SODIUM 100 MG/ML
40 INJECTION SUBCUTANEOUS 2 TIMES DAILY
Status: DISCONTINUED | OUTPATIENT
Start: 2023-10-29 | End: 2023-11-01 | Stop reason: HOSPADM

## 2023-10-29 RX ORDER — ASPIRIN 81 MG/1
81 TABLET ORAL DAILY
COMMUNITY

## 2023-10-29 RX ORDER — HYDRALAZINE HYDROCHLORIDE 25 MG/1
100 TABLET, FILM COATED ORAL EVERY 8 HOURS SCHEDULED
Status: DISCONTINUED | OUTPATIENT
Start: 2023-10-29 | End: 2023-10-29

## 2023-10-29 RX ORDER — POLYETHYLENE GLYCOL 3350 17 G/17G
17 POWDER, FOR SOLUTION ORAL DAILY PRN
Status: DISCONTINUED | OUTPATIENT
Start: 2023-10-29 | End: 2023-11-01 | Stop reason: HOSPADM

## 2023-10-29 RX ORDER — SODIUM CHLORIDE 0.9 % (FLUSH) 0.9 %
5-40 SYRINGE (ML) INJECTION PRN
Status: DISCONTINUED | OUTPATIENT
Start: 2023-10-29 | End: 2023-11-01 | Stop reason: HOSPADM

## 2023-10-29 RX ORDER — MAGNESIUM SULFATE IN WATER 40 MG/ML
2000 INJECTION, SOLUTION INTRAVENOUS ONCE
Status: COMPLETED | OUTPATIENT
Start: 2023-10-29 | End: 2023-10-29

## 2023-10-29 RX ORDER — ASPIRIN 81 MG/1
81 TABLET ORAL DAILY
Status: DISCONTINUED | OUTPATIENT
Start: 2023-10-29 | End: 2023-11-01 | Stop reason: HOSPADM

## 2023-10-29 RX ORDER — LORATADINE 10 MG/1
10 TABLET ORAL DAILY
COMMUNITY

## 2023-10-29 RX ORDER — ACETAMINOPHEN 325 MG/1
650 TABLET ORAL EVERY 4 HOURS PRN
Status: DISCONTINUED | OUTPATIENT
Start: 2023-10-29 | End: 2023-11-01 | Stop reason: HOSPADM

## 2023-10-29 RX ORDER — DEXTROSE MONOHYDRATE 100 MG/ML
INJECTION, SOLUTION INTRAVENOUS CONTINUOUS PRN
Status: DISCONTINUED | OUTPATIENT
Start: 2023-10-29 | End: 2023-11-01 | Stop reason: HOSPADM

## 2023-10-29 RX ORDER — ATORVASTATIN CALCIUM 40 MG/1
40 TABLET, FILM COATED ORAL NIGHTLY
COMMUNITY

## 2023-10-29 RX ORDER — FLUOXETINE HYDROCHLORIDE 20 MG/1
20 CAPSULE ORAL DAILY
COMMUNITY

## 2023-10-29 RX ORDER — DULAGLUTIDE 4.5 MG/.5ML
4.5 INJECTION, SOLUTION SUBCUTANEOUS WEEKLY
COMMUNITY
End: 2023-10-30 | Stop reason: HOSPADM

## 2023-10-29 RX ORDER — ALBUTEROL SULFATE 2.5 MG/3ML
2.5 SOLUTION RESPIRATORY (INHALATION) 4 TIMES DAILY PRN
Status: DISCONTINUED | OUTPATIENT
Start: 2023-10-29 | End: 2023-11-01 | Stop reason: HOSPADM

## 2023-10-29 RX ORDER — HYDRALAZINE HYDROCHLORIDE 20 MG/ML
10 INJECTION INTRAMUSCULAR; INTRAVENOUS ONCE
Status: COMPLETED | OUTPATIENT
Start: 2023-10-29 | End: 2023-10-29

## 2023-10-29 RX ORDER — POTASSIUM CHLORIDE 7.45 MG/ML
10 INJECTION INTRAVENOUS
Status: COMPLETED | OUTPATIENT
Start: 2023-10-29 | End: 2023-10-29

## 2023-10-29 RX ORDER — LISINOPRIL 10 MG/1
40 TABLET ORAL DAILY
Status: DISCONTINUED | OUTPATIENT
Start: 2023-10-29 | End: 2023-11-01 | Stop reason: HOSPADM

## 2023-10-29 RX ORDER — SODIUM CHLORIDE 0.9 % (FLUSH) 0.9 %
5-40 SYRINGE (ML) INJECTION EVERY 12 HOURS SCHEDULED
Status: DISCONTINUED | OUTPATIENT
Start: 2023-10-29 | End: 2023-11-01 | Stop reason: HOSPADM

## 2023-10-29 RX ORDER — DILTIAZEM HYDROCHLORIDE 180 MG/1
360 CAPSULE, COATED, EXTENDED RELEASE ORAL DAILY
Status: DISCONTINUED | OUTPATIENT
Start: 2023-10-29 | End: 2023-10-29

## 2023-10-29 RX ORDER — HYDROCHLOROTHIAZIDE 12.5 MG/1
12.5 TABLET ORAL DAILY
Status: DISCONTINUED | OUTPATIENT
Start: 2023-10-29 | End: 2023-10-29

## 2023-10-29 RX ORDER — ATORVASTATIN CALCIUM 20 MG/1
20 TABLET, FILM COATED ORAL DAILY
Status: DISCONTINUED | OUTPATIENT
Start: 2023-10-29 | End: 2023-10-29

## 2023-10-29 RX ORDER — LISINOPRIL 40 MG/1
40 TABLET ORAL DAILY
COMMUNITY

## 2023-10-29 RX ORDER — SODIUM CHLORIDE 9 MG/ML
INJECTION, SOLUTION INTRAVENOUS PRN
Status: DISCONTINUED | OUTPATIENT
Start: 2023-10-29 | End: 2023-11-01 | Stop reason: HOSPADM

## 2023-10-29 RX ADMIN — SODIUM CHLORIDE 1000 ML: 9 INJECTION, SOLUTION INTRAVENOUS at 03:52

## 2023-10-29 RX ADMIN — POTASSIUM CHLORIDE 10 MEQ: 10 INJECTION, SOLUTION INTRAVENOUS at 06:53

## 2023-10-29 RX ADMIN — ASPIRIN 81 MG: 81 TABLET, COATED ORAL at 15:18

## 2023-10-29 RX ADMIN — MAGNESIUM SULFATE HEPTAHYDRATE 2000 MG: 40 INJECTION, SOLUTION INTRAVENOUS at 03:43

## 2023-10-29 RX ADMIN — ENOXAPARIN SODIUM 40 MG: 100 INJECTION SUBCUTANEOUS at 15:18

## 2023-10-29 RX ADMIN — ACETAMINOPHEN 650 MG: 325 TABLET ORAL at 23:11

## 2023-10-29 RX ADMIN — POTASSIUM CHLORIDE 10 MEQ: 10 INJECTION, SOLUTION INTRAVENOUS at 03:51

## 2023-10-29 RX ADMIN — POTASSIUM CHLORIDE 10 MEQ: 10 INJECTION, SOLUTION INTRAVENOUS at 12:41

## 2023-10-29 RX ADMIN — LISINOPRIL 40 MG: 10 TABLET ORAL at 15:18

## 2023-10-29 RX ADMIN — HYDRALAZINE HYDROCHLORIDE 10 MG: 20 INJECTION, SOLUTION INTRAMUSCULAR; INTRAVENOUS at 08:28

## 2023-10-29 ASSESSMENT — PAIN SCALES - GENERAL
PAINLEVEL_OUTOF10: 8
PAINLEVEL_OUTOF10: 7

## 2023-10-29 ASSESSMENT — PAIN DESCRIPTION - LOCATION
LOCATION: BACK;ARM
LOCATION: BACK;ARM

## 2023-10-29 ASSESSMENT — PAIN DESCRIPTION - ORIENTATION: ORIENTATION: RIGHT

## 2023-10-29 ASSESSMENT — PAIN DESCRIPTION - DESCRIPTORS: DESCRIPTORS: ACHING

## 2023-10-29 NOTE — ED PROVIDER NOTES
HPI:  10/28/23, Time: 9:58 PM EDT         Julia Lopez is a 53 y.o. female renal injury history of anxiety arthritis asthma CAD diabetes depression hyperlipidemia hypertension struct of sleep apnea polycystic ovarian disease history of suicidal ideation presenting to the ED for overdose, beginning hours ago. The complaint has been persistent, moderate in severity, and worsened by nothing. Reporting feeling depressed and suicidal for the last several years. Patient reports she overdosed on her medication today. He did take Vistaril hydroxyzine over 70 tablets. Patient reporting no vomiting she reports no chest pain or difficulty breathing she reports no fall or injury she reports no syncopal event. Patient reporting no weakness or dizziness. Patient reporting no hallucinations or homicidal ideation. ROS:   Pertinent positives and negatives are stated within HPI, all other systems reviewed and are negative.  --------------------------------------------- PAST HISTORY ---------------------------------------------  Past Medical History:  has a past medical history of Acute renal injury ([address]), Analgesic overuse headache, Anxiety, Arthritis, Asthma, CAD (coronary artery disease), Depression, Diabetes mellitus ([address]), Fracture of tooth, GERD (gastroesophageal reflux disease), Glaucoma, Hyperlipidemia, Hypertension, Hypertensive urgency, Hypothyroidism, Irritable bowel syndrome, Morbid obesity due to excess calories ([address]), Obesity, Obstructive sleep apnea, Pneumonia, Polycystic ovarian syndrome, Postcholecystectomy syndrome, Spinal headache, and Suicidal ideation. Past Surgical History:  has a past surgical history that includes Tonsillectomy (); Cholecystectomy, laparoscopic ();  section (); Foot surgery (); Ovary removal (); Dental surgery (10/06/2011); Total abdominal hysterectomy w/ bilateral salpingoophorectomy (3/19/2013); hernia repair (9/3/13);  Hysterectomy; Echo Complete

## 2023-10-29 NOTE — ED NOTES
ERICKA spoke with patient son, Danae Wharton ([phone]). Per Maximo Story, patient has been dealing with addiction for the past 1.5 months. Patient has been using cocaine. Per son, patient has no prior hx of substance use but that she was dating an ex-boyfriend who was battling addiction. Patient reportedly has been talking to a Sponsor, but has not been willing to engage in treatment for her substance use. Per son, patient does have a mental health hx in 2016/2017, after his father called emergency services and had the patient pink slipped for inpatient psych treatment. Son reports that he would prefer his mother be considered for Dual Dx inpatient treatment. He would like to be involved in the treatment team/discharge planning process. Son reports that his Alma Hedger ([phone]), can provide additional collateral information.     Electronically signed by MOSHE Sierra, ROBINW on 10/29/2023 at 12:14 PM

## 2023-10-29 NOTE — ED NOTES
Poison control called upon patient arrival. Patient reportedly took 70 of 25mg Hydroxyzine tablets prior to arrival.     Per poison control we are to monitor patient for anticholinergic symptoms and somnolence, tachycardia and mild HTN. The drug will peak 2 hours after the time of ingestion. Instructions are to monitor patient for 6-8 hours and provide supportive care. Patient is on monitor and has a CO at bedside.       Cassius Moise RN  10/28/23 2742

## 2023-10-29 NOTE — PROGRESS NOTES
DVT Prophylaxis Adjustment Policy (DVT Prophylaxis)     This patient is on DVT Prophylaxis medication that requires a dose adjustment      Date Body Weight IBW  Adjusted BW SCr  CrCl Dialysis status   10/29/2023   Patient weight not recorded Creatinine clearance cannot be calculated (Unknown ideal weight.) N/a       Pharmacy has dose-adjusted the DVT Prophylaxis regimen to match   the recommendations from the following table        Ordered Medication:Lovenox 40mg daily    Order Changed/converted to: Lovenox 40mg BID      These changes were made per protocol according to the [facility] Pharmacist   Review for Appropriate Use and Automatic Dose Adjustments of   Subcutaneous Anticoagulants Policy     *Please note this dose may need readjusted if patient's condition changes. Please contact pharmacy with any questions regarding these changes.     EDVIN [facility]  10/29/2023  11:15 AM

## 2023-10-29 NOTE — H&P
Hospital Medicine History & Physical      PCP: Anastasia Brock MD    Date of Admission: 10/28/2023    Date of Service: Pt seen/examined on 10/29/23 and Admitted to Inpatient with expected LOS greater than two midnights due to medical therapy. Chief Complaint:  intentional overdose       History Of Present Illness:  53 y.o. female with a hx of CAD, DM2, HLD, HTN, hypothyroidism, ALO, obesity,  who presented to [facility] following an intentional overdose of vistaril in an attempt to kill herself. Pt wanted to be with her  . Patient apparently took over 70 tablets of vistaril. In the ED workup was significant for UDS positive for amphetamines. Labs significant for hypokalemia. EKG did show QTc prolongation, but repeat showed improvement in the ED. She was admitted for further evaluation and treatment.      Past Medical History:          Diagnosis Date    Acute renal injury ([address]) 2013    Analgesic overuse headache 2018    Anxiety     Arthritis     Asthma     CAD (coronary artery disease)     Depression 2013    Diabetes mellitus ([address])     A1C=6.7 on 14    Fracture of tooth 2018    GERD (gastroesophageal reflux disease)     Glaucoma     Hyperlipidemia 2021    Hypertension     Hypertensive urgency 2013    Hypothyroidism     Irritable bowel syndrome     Morbid obesity due to excess calories ([address])     Obesity     Obstructive sleep apnea     Pneumonia     Polycystic ovarian syndrome     Postcholecystectomy syndrome 2018    Spinal headache     Suicidal ideation 2016       Past Surgical History:          Procedure Laterality Date     SECTION      Dr. Stephany Conn, [address] [place] [address], LAPAROSCOPIC  2001    [address] [place] [address]  10/06/2011    4 extractions    ECHO COMPLETE  2014         ENDOSCOPY, COLON, DIAGNOSTIC      FOOT SURGERY      RECONSTRUCTION LEFT FOOT, Dr. Edwige Angel,

## 2023-10-29 NOTE — ED NOTES
Patient changed into gown and clothes placed in belongings bag and locked up in locker [address] [place], [place]  10/28/23 [address] [place], [place]  10/28/23 [address] [place]

## 2023-10-29 NOTE — ED NOTES
Behavioral Health Crisis Assessment      Chief Complaint: \"My roommate was trying to get me out of the house. \"    Mental Status Exam: Patient was semi-alert (appeared sedated), oriented X3. Patient with fleeting eye contact. Patient giving limited responses, unreliable  (denied having SI in last month, denied she overdosed). Patient denied having hallucinations. Patient denied HI. Legal Status:  [] Voluntary:  [x] Involuntary, Issued by: Police    Gender:  [] Male [x] Female [] Transgender  [] Other    Sexual Orientation:  [x] Heterosexual [] Homosexual [] Bisexual [] Other    Brief Clinical Summary:    Patient is a 53year old female who was brought to the ED via ambulance. Patient is on a pink slip: Nick took numerous hydroxyzine pills in attempt to harm herself. Hernesto Rivero made mention of wanting to be with her  . Per ED MD note,  patient reported being depressed and suicidal for the last several years. ED MD note stated patient admitted to taking an overdose, note continued to report patient took Vistaril hydroxyzine over 70 tablets. SW met with patient for assessment. Patient did not identify any stressors. Patient giving unreliable responses. Patient noted she lives alone but when denying having SI/overdosing, stated her \"roommate\" was \"trying to get me out of the house. \" Patient went on to deny having SI in the last month. Patient admitted to a suicide attempt in 2016. Patient was admitted to [facility] on 3/18/16, diagnosis per chart noted to be Major Depressive disorder and anxiety disorder. Patient did admit that she has not been on any psychiatric medications for several years. SW spoke to ED MD who does not believe patient is being truthful with her report regarding SI/overdose. Patient denied drinking for the last year and a half. Patient denied having any drug use history or AOD treatment history. Patient denied having a legal guardian or POA.     Collateral Information:

## 2023-10-29 NOTE — ED NOTES
Patient's son here to visit and talk to social work for update regarding mother's condition      Nuno Martinez RN  10/29/23 5104

## 2023-10-30 ENCOUNTER — HOSPITAL ENCOUNTER (INPATIENT)
Age: 48
LOS: 2 days | Discharge: HOME | DRG: 817 | End: 2023-11-01
Attending: PSYCHIATRY & NEUROLOGY | Admitting: PSYCHIATRY & NEUROLOGY
Payer: MEDICAID

## 2023-10-30 PROBLEM — F39 MOOD DISORDER (HCC): Status: ACTIVE | Noted: 2023-10-30

## 2023-10-30 LAB
ANION GAP SERPL CALCULATED.3IONS-SCNC: 8 MMOL/L (ref 7–16)
BASOPHILS # BLD: 0.04 K/UL (ref 0–0.2)
BASOPHILS NFR BLD: 1 % (ref 0–2)
BUN SERPL-MCNC: 13 MG/DL (ref 6–20)
CALCIUM SERPL-MCNC: 8.8 MG/DL (ref 8.6–10.2)
CHLORIDE SERPL-SCNC: 106 MMOL/L (ref 98–107)
CHP ED QC CHECK: NORMAL
CO2 SERPL-SCNC: 30 MMOL/L (ref 22–29)
CREAT SERPL-MCNC: 1.1 MG/DL (ref 0.5–1)
EKG ATRIAL RATE: 62 BPM
EKG ATRIAL RATE: 67 BPM
EKG P AXIS: 127 DEGREES
EKG P AXIS: 55 DEGREES
EKG P-R INTERVAL: 176 MS
EKG P-R INTERVAL: 180 MS
EKG Q-T INTERVAL: 488 MS
EKG Q-T INTERVAL: 488 MS
EKG QRS DURATION: 108 MS
EKG QRS DURATION: 98 MS
EKG QTC CALCULATION (BAZETT): 495 MS
EKG QTC CALCULATION (BAZETT): 515 MS
EKG R AXIS: -12 DEGREES
EKG R AXIS: -171 DEGREES
EKG T AXIS: 128 DEGREES
EKG T AXIS: 41 DEGREES
EKG VENTRICULAR RATE: 62 BPM
EKG VENTRICULAR RATE: 67 BPM
EOSINOPHIL # BLD: 0.26 K/UL (ref 0.05–0.5)
EOSINOPHILS RELATIVE PERCENT: 4 % (ref 0–6)
ERYTHROCYTE [DISTWIDTH] IN BLOOD BY AUTOMATED COUNT: 13.3 % (ref 11.5–15)
GFR SERPL CREATININE-BSD FRML MDRD: >60 ML/MIN/1.73M2
GLUCOSE BLD-MCNC: 106 MG/DL
GLUCOSE BLD-MCNC: 106 MG/DL (ref 74–99)
GLUCOSE BLD-MCNC: 123 MG/DL (ref 74–99)
GLUCOSE BLD-MCNC: 97 MG/DL (ref 74–99)
GLUCOSE SERPL-MCNC: 103 MG/DL (ref 74–99)
HCT VFR BLD AUTO: 38.8 % (ref 34–48)
HGB BLD-MCNC: 12.7 G/DL (ref 11.5–15.5)
IMM GRANULOCYTES # BLD AUTO: <0.03 K/UL (ref 0–0.58)
IMM GRANULOCYTES NFR BLD: 0 % (ref 0–5)
LYMPHOCYTES NFR BLD: 2.35 K/UL (ref 1.5–4)
LYMPHOCYTES RELATIVE PERCENT: 35 % (ref 20–42)
MAGNESIUM SERPL-MCNC: 2.1 MG/DL (ref 1.6–2.6)
MCH RBC QN AUTO: 30.5 PG (ref 26–35)
MCHC RBC AUTO-ENTMCNC: 32.7 G/DL (ref 32–34.5)
MCV RBC AUTO: 93.3 FL (ref 80–99.9)
MONOCYTES NFR BLD: 0.52 K/UL (ref 0.1–0.95)
MONOCYTES NFR BLD: 8 % (ref 2–12)
NEUTROPHILS NFR BLD: 52 % (ref 43–80)
NEUTS SEG NFR BLD: 3.45 K/UL (ref 1.8–7.3)
PLATELET # BLD AUTO: 186 K/UL (ref 130–450)
PMV BLD AUTO: 11.5 FL (ref 7–12)
POTASSIUM SERPL-SCNC: 3.5 MMOL/L (ref 3.5–5)
RBC # BLD AUTO: 4.16 M/UL (ref 3.5–5.5)
SODIUM SERPL-SCNC: 144 MMOL/L (ref 132–146)
WBC OTHER # BLD: 6.6 K/UL (ref 4.5–11.5)

## 2023-10-30 PROCEDURE — 6370000000 HC RX 637 (ALT 250 FOR IP): Performed by: FAMILY MEDICINE

## 2023-10-30 PROCEDURE — 6370000000 HC RX 637 (ALT 250 FOR IP): Performed by: INTERNAL MEDICINE

## 2023-10-30 PROCEDURE — 85025 COMPLETE CBC W/AUTO DIFF WBC: CPT

## 2023-10-30 PROCEDURE — 2580000003 HC RX 258: Performed by: INTERNAL MEDICINE

## 2023-10-30 PROCEDURE — 6370000000 HC RX 637 (ALT 250 FOR IP): Performed by: PSYCHIATRY & NEUROLOGY

## 2023-10-30 PROCEDURE — 82962 GLUCOSE BLOOD TEST: CPT

## 2023-10-30 PROCEDURE — 1240000000 HC EMOTIONAL WELLNESS R&B

## 2023-10-30 PROCEDURE — 93010 ELECTROCARDIOGRAM REPORT: CPT | Performed by: INTERNAL MEDICINE

## 2023-10-30 PROCEDURE — 80048 BASIC METABOLIC PNL TOTAL CA: CPT

## 2023-10-30 PROCEDURE — 83735 ASSAY OF MAGNESIUM: CPT

## 2023-10-30 RX ORDER — NICOTINE 21 MG/24HR
1 PATCH, TRANSDERMAL 24 HOURS TRANSDERMAL DAILY
Status: DISCONTINUED | OUTPATIENT
Start: 2023-10-30 | End: 2023-11-01 | Stop reason: HOSPADM

## 2023-10-30 RX ORDER — LABETALOL HYDROCHLORIDE 5 MG/ML
10 INJECTION, SOLUTION INTRAVENOUS EVERY 4 HOURS PRN
Status: DISCONTINUED | OUTPATIENT
Start: 2023-10-30 | End: 2023-11-01

## 2023-10-30 RX ORDER — IBUPROFEN 400 MG/1
400 TABLET ORAL EVERY 6 HOURS PRN
Status: DISCONTINUED | OUTPATIENT
Start: 2023-10-30 | End: 2023-11-01 | Stop reason: HOSPADM

## 2023-10-30 RX ORDER — AMLODIPINE BESYLATE 10 MG/1
10 TABLET ORAL NIGHTLY
Status: DISCONTINUED | OUTPATIENT
Start: 2023-10-30 | End: 2023-10-31

## 2023-10-30 RX ORDER — 0.9 % SODIUM CHLORIDE 0.9 %
1000 INTRAVENOUS SOLUTION INTRAVENOUS ONCE
Status: COMPLETED | OUTPATIENT
Start: 2023-10-30 | End: 2023-10-30

## 2023-10-30 RX ORDER — MAGNESIUM HYDROXIDE/ALUMINUM HYDROXICE/SIMETHICONE 120; 1200; 1200 MG/30ML; MG/30ML; MG/30ML
30 SUSPENSION ORAL PRN
Status: DISCONTINUED | OUTPATIENT
Start: 2023-10-30 | End: 2023-11-01 | Stop reason: HOSPADM

## 2023-10-30 RX ORDER — METOPROLOL SUCCINATE 25 MG/1
50 TABLET, EXTENDED RELEASE ORAL DAILY
Status: DISCONTINUED | OUTPATIENT
Start: 2023-10-31 | End: 2023-11-01 | Stop reason: HOSPADM

## 2023-10-30 RX ORDER — METOPROLOL SUCCINATE 25 MG/1
25 TABLET, EXTENDED RELEASE ORAL DAILY
Status: DISCONTINUED | OUTPATIENT
Start: 2023-10-30 | End: 2023-10-30

## 2023-10-30 RX ORDER — DULAGLUTIDE 4.5 MG/.5ML
4.5 INJECTION, SOLUTION SUBCUTANEOUS WEEKLY
COMMUNITY

## 2023-10-30 RX ORDER — LANOLIN ALCOHOL/MO/W.PET/CERES
3 CREAM (GRAM) TOPICAL NIGHTLY PRN
Status: DISCONTINUED | OUTPATIENT
Start: 2023-10-30 | End: 2023-11-01 | Stop reason: HOSPADM

## 2023-10-30 RX ADMIN — MELATONIN 3 MG ORAL TABLET 3 MG: 3 TABLET ORAL at 21:18

## 2023-10-30 RX ADMIN — AMLODIPINE BESYLATE 10 MG: 10 TABLET ORAL at 21:19

## 2023-10-30 RX ADMIN — ASPIRIN 81 MG: 81 TABLET, COATED ORAL at 08:41

## 2023-10-30 RX ADMIN — ACETAMINOPHEN 650 MG: 325 TABLET ORAL at 21:18

## 2023-10-30 RX ADMIN — SODIUM CHLORIDE 1000 ML: 9 INJECTION, SOLUTION INTRAVENOUS at 12:47

## 2023-10-30 RX ADMIN — METOPROLOL SUCCINATE 25 MG: 25 TABLET, EXTENDED RELEASE ORAL at 18:35

## 2023-10-30 RX ADMIN — SODIUM CHLORIDE, PRESERVATIVE FREE 10 ML: 5 INJECTION INTRAVENOUS at 21:47

## 2023-10-30 RX ADMIN — LISINOPRIL 40 MG: 10 TABLET ORAL at 08:41

## 2023-10-30 RX ADMIN — IBUPROFEN 400 MG: 400 TABLET, FILM COATED ORAL at 23:14

## 2023-10-30 ASSESSMENT — PATIENT HEALTH QUESTIONNAIRE - PHQ9
SUM OF ALL RESPONSES TO PHQ QUESTIONS 1-9: 0
SUM OF ALL RESPONSES TO PHQ9 QUESTIONS 1 & 2: 0
1. LITTLE INTEREST OR PLEASURE IN DOING THINGS: 0
SUM OF ALL RESPONSES TO PHQ QUESTIONS 1-9: 0
SUM OF ALL RESPONSES TO PHQ QUESTIONS 1-9: 0
2. FEELING DOWN, DEPRESSED OR HOPELESS: 0
SUM OF ALL RESPONSES TO PHQ QUESTIONS 1-9: 0

## 2023-10-30 ASSESSMENT — PAIN DESCRIPTION - DESCRIPTORS: DESCRIPTORS: SORE;HEAVINESS

## 2023-10-30 ASSESSMENT — PAIN DESCRIPTION - LOCATION
LOCATION: ARM
LOCATION: ARM

## 2023-10-30 ASSESSMENT — PAIN DESCRIPTION - ORIENTATION
ORIENTATION: RIGHT
ORIENTATION: RIGHT

## 2023-10-30 ASSESSMENT — PAIN SCALES - GENERAL
PAINLEVEL_OUTOF10: 10
PAINLEVEL_OUTOF10: 9
PAINLEVEL_OUTOF10: 10

## 2023-10-30 ASSESSMENT — SLEEP AND FATIGUE QUESTIONNAIRES
DO YOU USE A SLEEP AID: NO
AVERAGE NUMBER OF SLEEP HOURS: 8
DO YOU HAVE DIFFICULTY SLEEPING: NO

## 2023-10-30 ASSESSMENT — PAIN DESCRIPTION - PAIN TYPE: TYPE: ACUTE PAIN

## 2023-10-30 ASSESSMENT — PAIN DESCRIPTION - ONSET: ONSET: ON-GOING

## 2023-10-30 ASSESSMENT — PAIN - FUNCTIONAL ASSESSMENT: PAIN_FUNCTIONAL_ASSESSMENT: ACTIVITIES ARE NOT PREVENTED

## 2023-10-30 ASSESSMENT — PAIN DESCRIPTION - FREQUENCY: FREQUENCY: INTERMITTENT

## 2023-10-30 NOTE — ED NOTES
Assumed care of patient. Patient asleep in bed. Easily aroused. No concerns or complaints at this time. CO at bedside.       Elpidio Macario RN  10/30/23 1366

## 2023-10-30 NOTE — PROGRESS NOTES
4 Eyes Skin Assessment     NAME:  Lorri Tucker  YOB: 1975  MEDICAL RECORD NUMBER:  [id number]    The patient is being assessed for  Admission    I agree that at least one RN has performed a thorough Head to Toe Skin Assessment on the patient. ALL assessment sites listed below have been assessed. Areas assessed by both nurses:    Head, Face, Ears, Shoulders, Back, Chest, Arms, Elbows, Hands, Sacrum. Buttock, Coccyx, Ischium, and Legs. Feet and Heels        Does the Patient have a Wound?  No noted wound(s)       Eric Prevention initiated by RN: No  Wound Care Orders initiated by RN: No    Pressure Injury (Stage 3,4, Unstageable, DTI, NWPT, and Complex wounds) if present, place Wound referral order by RN under : No    New Ostomies, if present place, Ostomy referral order under : No     Nurse 1 eSignature: Electronically signed by Elizabeth Medrano RN on 10/30/23 at 7:48 PM EDT    **SHARE this note so that the co-signing nurse can place an eSignature**    Nurse 2 eSignature: Electronically signed by Juan Ramon Ferguson RN on 10/30/23 at 7:48 PM EDT

## 2023-10-30 NOTE — PROGRESS NOTES
[facility]  Admission Note     Admission Type:   Admission Type: Involuntary    Reason for admission:  Reason for Admission: \"I was trying to get rid of my roommates\"      Addictive Behavior:   Addictive Behavior  In the Past 3 Months, Have You Felt or Has Someone Told You That You Have a Problem With  : None    Medical Problems:   Past Medical History:   Diagnosis Date    Acute renal injury ([facility]) 09/04/2013    Analgesic overuse headache 12/19/2018    Anxiety     Arthritis     Asthma     CAD (coronary artery disease)     Depression 03/19/2013    Diabetes mellitus ([facility])     A1C=6.7 on 1/14/14    Fracture of tooth 12/19/2018    GERD (gastroesophageal reflux disease)     Glaucoma     Hyperlipidemia 12/08/2021    Hypertension     Hypertensive urgency 03/19/2013    Hypothyroidism     Irritable bowel syndrome     Morbid obesity due to excess calories (HCC)     Obesity     Obstructive sleep apnea     Pneumonia     Polycystic ovarian syndrome     Postcholecystectomy syndrome 12/19/2018    Spinal headache     Suicidal ideation 03/18/2016       Status EXAM:  Mental Status and Behavioral Exam  Normal: No  Level of Assistance: Independent/Self  Facial Expression: Flat, Brightened (brightens with conversation)  Affect: Incongruent  Level of Consciousness: Alert  Frequency of Checks: 4 times per hour, close  Mood:Normal: No  Mood: Depressed, Anxious, Sad  Motor Activity:Normal: Yes  Eye Contact: Fair  Observed Behavior: Cooperative  Sexual Misconduct History: Current - no  Preception: Wise to person, Wise to time, Wise to place, Wise to situation  Attention:Normal: No  Attention: Distractible  Thought Processes: Perseveration  Thought Content:Normal: No  Thought Content: Preoccupations  Depression Symptoms: Impaired concentration  Anxiety Symptoms: Generalized  Zenia Symptoms: No problems reported or observed. Hallucinations: None  Delusions: No  Delusions:  Other (comment)  Memory:Normal: No  Memory: Poor recent, Poor remote  Insight and Judgment: No  Insight and Judgment: Poor judgment, Poor insight, Unrealistic    Tobacco Screening:  Practical Counseling, on admission, lilibeth X, if applicable and completed (first 3 are required if patient doesn't refuse):            ( ) Recognizing danger situations (included triggers and roadblocks)                    ( ) Coping skills (new ways to manage stress,relaxation techniques, changing routine, distraction)                                                           ( ) Basic information about quitting (benefits of quitting, techniques in how to quit, available resources  ( ) Referral for counseling faxed to [address] [place] [address]                                                                                                                   ( ) Patient refused counseling  ( X ) Patient has not smoked in the last 30 days    Metabolic Screening:    Lab Results   Component Value Date    LABA1C 6.2 (H) 02/28/2023       Lab Results   Component Value Date    CHOL 114 03/02/2023    CHOL 168 08/25/2022    CHOL 195 04/09/2022    CHOL 213 (H) 04/01/2022    CHOL 204 (H) 07/15/2021    CHOL 203 (H) 05/18/2021    CHOL 224 (H) 01/11/2019    CHOL 218 (H) 07/22/2016    CHOL 190 06/07/2015    CHOL 177 04/01/2014    CHOL 172 11/18/2013    CHOL 197 10/18/2012     Lab Results   Component Value Date    TRIG 97 03/02/2023    TRIG 117 08/25/2022    TRIG 221 (H) 04/09/2022    TRIG 101 04/01/2022    TRIG 96 07/15/2021    TRIG 104 05/18/2021    TRIG 121 01/11/2019    TRIG 89 07/22/2016    TRIG 118 06/07/2015    TRIG 92 04/01/2014    TRIG 133 11/18/2013    TRIG 111 10/18/2012     Lab Results   Component Value Date    HDL 39 03/02/2023    HDL 57 04/09/2022    HDL 72 04/01/2022    HDL 51 07/15/2021    HDL 56 05/18/2021    HDL 65 01/11/2019    HDL 49 07/22/2016    HDL 53 06/07/2015    HDL 53 04/01/2014    HDL 50.0 11/18/2013    HDL 48.0 10/18/2012     No components found for: \"LDLCAL\"  Lab Results

## 2023-10-30 NOTE — PROGRESS NOTES
Consulted for medical management for HtN and DM   Reviewed med list vitals and labs  Cont Lisinopril 20 mg  Added Norvasc and metoprolol  Blood glucose 101  Not on insulin pump any more  Will cont to follow pt   I didn't personally examined the pt

## 2023-10-30 NOTE — CONSULTS
Chart reviewed patient is status post intentional overdose. Patient has been medically cleared and can be presented for admission to either 7 S. or [address]. for further psychiatric assessment stabilization and treatment.   I discussed this with the [facility]

## 2023-10-30 NOTE — ED NOTES
[id number] ASSIGNED TO [facility] IN ADMITTING    [id number]     Lily Clark, [facility]  10/30/23 2 [facility], [address], [facility]  10/30/23 1452

## 2023-10-30 NOTE — ED NOTES
Pt admitted to [facility]     Malia Carmona, [address] [place] [address]  10/30/23 5994

## 2023-10-30 NOTE — PLAN OF CARE
Problem: Anxiety  Goal: Will report anxiety at manageable levels  Description: INTERVENTIONS:  1. Administer medication as ordered  2. Teach and rehearse alternative coping skills  3. Provide emotional support with 1:1 interaction with staff  Outcome: Progressing     Problem: Coping  Goal: Pt/Family able to verbalize concerns and demonstrate effective coping strategies  Description: INTERVENTIONS:  1. Assist patient/family to identify coping skills, available support systems and cultural and spiritual values  2. Provide emotional support, including active listening and acknowledgement of concerns of patient and caregivers  3. Reduce environmental stimuli, as able  4. Instruct patient/family in relaxation techniques, as appropriate  5. Assess for spiritual pain/suffering and initiate Spiritual Care, Psychosocial Clinical Specialist consults as needed  Outcome: Progressing     Problem: Behavior  Goal: Pt/Family maintain appropriate behavior and adhere to behavioral management agreement, if implemented  Description: INTERVENTIONS:  1. Assess patient/family's coping skills and  non-compliant behavior (including use of illegal substances)  2. Notify security of behavior or suspected illegal substances which indicate the need for search of the family and/or belongings  3. Encourage verbalization of thoughts and concerns in a socially appropriate manner  4. Utilize positive, consistent limit setting strategies supporting safety of patient, staff and others  5. Encourage participation in the decision making process about the behavioral management agreement  6. If a visitor's behavior poses a threat to safety call refer to organization policy. 7. Initiate consult with , Psychosocial CNS, Spiritual Care as appropriate  Outcome: Progressing     Problem: Depression/Self Harm  Goal: Effect of psychiatric condition will be minimized and patient will be protected from self harm  Description: INTERVENTIONS:  1.

## 2023-10-30 NOTE — ED NOTES
Pt resting in bed, calm and cooperative at this time. CO at bedside.      Belem Bonilla RN  10/30/23 1954

## 2023-10-30 NOTE — BH NOTE
Perfect serve message was sent again to Dr. Kenna Canales stating the current blood pressure and informing them the patient no longer has an insulin pump.

## 2023-10-30 NOTE — ED NOTES
Pt has been accepted to 7W and will be assigned a bed     [facility] nurse is aware to obtain admission orders     Sita Boogie, [facility]  10/30/23 44 [facility], [address], [facility]  10/30/23 4791

## 2023-10-30 NOTE — ED NOTES
Notified Dr of need for admission to [facility] [address] answering machine received, message left will await return call      Lucille Vargas  10/30/23 9276

## 2023-10-31 ENCOUNTER — APPOINTMENT (OUTPATIENT)
Dept: GENERAL RADIOLOGY | Age: 48
DRG: 817 | End: 2023-10-31
Payer: MEDICAID

## 2023-10-31 PROBLEM — F23 ACUTE PSYCHOSIS (HCC): Status: ACTIVE | Noted: 2023-10-31

## 2023-10-31 PROBLEM — F60.3 BORDERLINE PERSONALITY DISORDER (HCC): Status: ACTIVE | Noted: 2023-10-31

## 2023-10-31 PROBLEM — F19.10 DRUG ABUSE (HCC): Status: ACTIVE | Noted: 2023-10-31

## 2023-10-31 LAB
EKG ATRIAL RATE: 59 BPM
EKG P AXIS: 54 DEGREES
EKG P-R INTERVAL: 174 MS
EKG Q-T INTERVAL: 490 MS
EKG QRS DURATION: 96 MS
EKG QTC CALCULATION (BAZETT): 485 MS
EKG R AXIS: -9 DEGREES
EKG T AXIS: 40 DEGREES
EKG VENTRICULAR RATE: 59 BPM
GLUCOSE BLD-MCNC: 101 MG/DL (ref 74–99)
GLUCOSE BLD-MCNC: 116 MG/DL (ref 74–99)
GLUCOSE BLD-MCNC: 94 MG/DL (ref 74–99)
GLUCOSE BLD-MCNC: 97 MG/DL (ref 74–99)

## 2023-10-31 PROCEDURE — 6370000000 HC RX 637 (ALT 250 FOR IP): Performed by: INTERNAL MEDICINE

## 2023-10-31 PROCEDURE — 1240000000 HC EMOTIONAL WELLNESS R&B

## 2023-10-31 PROCEDURE — 73030 X-RAY EXAM OF SHOULDER: CPT

## 2023-10-31 PROCEDURE — 82962 GLUCOSE BLOOD TEST: CPT

## 2023-10-31 PROCEDURE — 6370000000 HC RX 637 (ALT 250 FOR IP): Performed by: PSYCHIATRY & NEUROLOGY

## 2023-10-31 PROCEDURE — 6370000000 HC RX 637 (ALT 250 FOR IP): Performed by: NURSE PRACTITIONER

## 2023-10-31 PROCEDURE — 97161 PT EVAL LOW COMPLEX 20 MIN: CPT

## 2023-10-31 PROCEDURE — 6370000000 HC RX 637 (ALT 250 FOR IP): Performed by: FAMILY MEDICINE

## 2023-10-31 PROCEDURE — 6360000002 HC RX W HCPCS: Performed by: PSYCHIATRY & NEUROLOGY

## 2023-10-31 PROCEDURE — G0008 ADMIN INFLUENZA VIRUS VAC: HCPCS | Performed by: PSYCHIATRY & NEUROLOGY

## 2023-10-31 PROCEDURE — 93010 ELECTROCARDIOGRAM REPORT: CPT | Performed by: INTERNAL MEDICINE

## 2023-10-31 PROCEDURE — 93005 ELECTROCARDIOGRAM TRACING: CPT | Performed by: NURSE PRACTITIONER

## 2023-10-31 PROCEDURE — 90792 PSYCH DIAG EVAL W/MED SRVCS: CPT | Performed by: NURSE PRACTITIONER

## 2023-10-31 PROCEDURE — 90686 IIV4 VACC NO PRSV 0.5 ML IM: CPT | Performed by: PSYCHIATRY & NEUROLOGY

## 2023-10-31 RX ORDER — AMLODIPINE BESYLATE 5 MG/1
5 TABLET ORAL NIGHTLY
Status: DISCONTINUED | OUTPATIENT
Start: 2023-10-31 | End: 2023-11-01 | Stop reason: HOSPADM

## 2023-10-31 RX ORDER — ARIPIPRAZOLE 10 MG/1
10 TABLET ORAL DAILY
Status: DISCONTINUED | OUTPATIENT
Start: 2023-10-31 | End: 2023-11-01 | Stop reason: HOSPADM

## 2023-10-31 RX ORDER — OXCARBAZEPINE 150 MG/1
150 TABLET, FILM COATED ORAL 2 TIMES DAILY
Status: DISCONTINUED | OUTPATIENT
Start: 2023-10-31 | End: 2023-11-01 | Stop reason: HOSPADM

## 2023-10-31 RX ADMIN — IBUPROFEN 400 MG: 400 TABLET, FILM COATED ORAL at 21:21

## 2023-10-31 RX ADMIN — OXCARBAZEPINE 150 MG: 150 TABLET, FILM COATED ORAL at 21:21

## 2023-10-31 RX ADMIN — OXCARBAZEPINE 150 MG: 150 TABLET, FILM COATED ORAL at 14:03

## 2023-10-31 RX ADMIN — ASPIRIN 81 MG: 81 TABLET, COATED ORAL at 09:27

## 2023-10-31 RX ADMIN — LISINOPRIL 40 MG: 10 TABLET ORAL at 09:27

## 2023-10-31 RX ADMIN — AMLODIPINE BESYLATE 5 MG: 5 TABLET ORAL at 21:21

## 2023-10-31 RX ADMIN — METOPROLOL SUCCINATE 50 MG: 25 TABLET, EXTENDED RELEASE ORAL at 09:27

## 2023-10-31 RX ADMIN — INFLUENZA VIRUS VACCINE 0.5 ML: 15; 15; 15; 15 SUSPENSION INTRAMUSCULAR at 19:26

## 2023-10-31 RX ADMIN — ARIPIPRAZOLE 10 MG: 10 TABLET ORAL at 14:03

## 2023-10-31 ASSESSMENT — PATIENT HEALTH QUESTIONNAIRE - PHQ9
SUM OF ALL RESPONSES TO PHQ9 QUESTIONS 1 & 2: 0
1. LITTLE INTEREST OR PLEASURE IN DOING THINGS: 0
2. FEELING DOWN, DEPRESSED OR HOPELESS: 0
SUM OF ALL RESPONSES TO PHQ QUESTIONS 1-9: 0

## 2023-10-31 ASSESSMENT — LIFESTYLE VARIABLES
HOW MANY STANDARD DRINKS CONTAINING ALCOHOL DO YOU HAVE ON A TYPICAL DAY: PATIENT DOES NOT DRINK
HOW OFTEN DO YOU HAVE A DRINK CONTAINING ALCOHOL: NEVER

## 2023-10-31 ASSESSMENT — PAIN DESCRIPTION - ORIENTATION: ORIENTATION: RIGHT

## 2023-10-31 ASSESSMENT — PAIN SCALES - GENERAL
PAINLEVEL_OUTOF10: 0
PAINLEVEL_OUTOF10: 9

## 2023-10-31 ASSESSMENT — SLEEP AND FATIGUE QUESTIONNAIRES
DO YOU HAVE DIFFICULTY SLEEPING: NO
DO YOU USE A SLEEP AID: NO
AVERAGE NUMBER OF SLEEP HOURS: 8

## 2023-10-31 ASSESSMENT — PAIN DESCRIPTION - LOCATION: LOCATION: SHOULDER

## 2023-10-31 ASSESSMENT — PAIN - FUNCTIONAL ASSESSMENT: PAIN_FUNCTIONAL_ASSESSMENT: PREVENTS OR INTERFERES SOME ACTIVE ACTIVITIES AND ADLS

## 2023-10-31 ASSESSMENT — PAIN DESCRIPTION - PAIN TYPE: TYPE: ACUTE PAIN

## 2023-10-31 ASSESSMENT — PAIN DESCRIPTION - DESCRIPTORS: DESCRIPTORS: ACHING

## 2023-10-31 ASSESSMENT — PAIN DESCRIPTION - FREQUENCY: FREQUENCY: CONTINUOUS

## 2023-10-31 ASSESSMENT — PAIN DESCRIPTION - ONSET: ONSET: GRADUAL

## 2023-10-31 NOTE — BH NOTE
Patient presents calm and cooperative during assessment. Patient denies SI/HI/AVH. Patient denies anxiety and depression. No paranoia or delusions reported or observed. Patient appears flat  but brightens on approach. Appears intrusive in conversations with peers and staff and is easily irritable. Patient is visible on the unit and social with peers. Medications taken without issues. No PRN's given. C/O throbbing R arm pain, PRN Tylenol administered per patient request. No unit problems reported. Q 15 minute rounds maintained.

## 2023-10-31 NOTE — PLAN OF CARE
Problem: Chronic Conditions and Co-morbidities  Goal: Patient's chronic conditions and co-morbidity symptoms are monitored and maintained or improved  Outcome: Progressing     Problem: Risk for Elopement  Goal: Patient will not exit the unit/facility without proper excort  Outcome: Progressing     Problem: Anxiety  Goal: Will report anxiety at manageable levels  Description: INTERVENTIONS:  1. Administer medication as ordered  2. Teach and rehearse alternative coping skills  3. Provide emotional support with 1:1 interaction with staff  10/31/2023 0111 by Madi Pickens RN  Outcome: Progressing  10/30/2023 1939 by Renetta Dong RN  Outcome: Progressing     Problem: Coping  Goal: Pt/Family able to verbalize concerns and demonstrate effective coping strategies  Description: INTERVENTIONS:  1. Assist patient/family to identify coping skills, available support systems and cultural and spiritual values  2. Provide emotional support, including active listening and acknowledgement of concerns of patient and caregivers  3. Reduce environmental stimuli, as able  4. Instruct patient/family in relaxation techniques, as appropriate  5. Assess for spiritual pain/suffering and initiate Spiritual Care, Psychosocial Clinical Specialist consults as needed  10/31/2023 0111 by Madi Pickens RN  Outcome: Progressing  10/30/2023 1939 by Renetta Dong RN  Outcome: Progressing     Problem: Behavior  Goal: Pt/Family maintain appropriate behavior and adhere to behavioral management agreement, if implemented  Description: INTERVENTIONS:  1. Assess patient/family's coping skills and  non-compliant behavior (including use of illegal substances)  2. Notify security of behavior or suspected illegal substances which indicate the need for search of the family and/or belongings  3. Encourage verbalization of thoughts and concerns in a socially appropriate manner  4.  Utilize positive, consistent limit setting strategies supporting safety of
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
## 2023-10-31 NOTE — GROUP NOTE
Group Therapy Note    Date: 10/31/2023    Group Start Time: 8309  Group End Time: 1500  Group Topic: Cognitive Skills    [facility] 7[facility] ACUTE [facility] [place], MSW, [facility]        Group Therapy Note    Attendees: 5       Patient's Goal:  pt will be able to identify their values and their importance. Notes:  pt participated in group and made connections. Status After Intervention:  Improved    Participation Level:  Active Listener and Interactive    Participation Quality: Appropriate, Attentive, Sharing, and Supportive      Speech:  normal      Thought Process/Content: Logical  Linear      Affective Functioning: Congruent      Mood: anxious      Level of consciousness:  Alert, Oriented x4, and Attentive      Response to Learning: Able to verbalize current knowledge/experience, Able to verbalize/acknowledge new learning, Able to retain information, and Capable of insight      Endings: None Reported    Modes of Intervention: Education, Support, Socialization, Exploration, Clarifying, and Problem-solving      Discipline Responsible: /Counselor      Signature:  MOSHE Godwin, [place]

## 2023-10-31 NOTE — PLAN OF CARE
Pt denies suicidal ideation, homicidal ideation, and AV hallucinations. Pt is calm, cooperative, and friendly at this time. She was agitated and anxious this morning but has calmed down throughout the day. She did shower this morning. She is compliant with medications and is attending group. Problem: Chronic Conditions and Co-morbidities  Goal: Patient's chronic conditions and co-morbidity symptoms are monitored and maintained or improved  Outcome: Progressing     Problem: Risk for Elopement  Goal: Patient will not exit the unit/facility without proper excort  Outcome: Progressing     Problem: Anxiety  Goal: Will report anxiety at manageable levels  Description: INTERVENTIONS:  1. Administer medication as ordered  2. Teach and rehearse alternative coping skills  3. Provide emotional support with 1:1 interaction with staff  Outcome: Progressing     Problem: Coping  Goal: Pt/Family able to verbalize concerns and demonstrate effective coping strategies  Description: INTERVENTIONS:  1. Assist patient/family to identify coping skills, available support systems and cultural and spiritual values  2. Provide emotional support, including active listening and acknowledgement of concerns of patient and caregivers  3. Reduce environmental stimuli, as able  4. Instruct patient/family in relaxation techniques, as appropriate  5. Assess for spiritual pain/suffering and initiate Spiritual Care, Psychosocial Clinical Specialist consults as needed  Outcome: Progressing     Problem: Behavior  Goal: Pt/Family maintain appropriate behavior and adhere to behavioral management agreement, if implemented  Description: INTERVENTIONS:  1. Assess patient/family's coping skills and  non-compliant behavior (including use of illegal substances)  2. Notify security of behavior or suspected illegal substances which indicate the need for search of the family and/or belongings  3.  Encourage verbalization of thoughts and concerns in a socially

## 2023-10-31 NOTE — PROGRESS NOTES
Patient signed an JONAS for [employer] (brother), Tami Montez and Keely from [employer], aMtty (niece), and Olinda Gongora (son).

## 2023-10-31 NOTE — PROGRESS NOTES
Physical Therapy    Initial Assessment     Name: Ria Patricio  : 1975  MRN: [id number]      Date of Service: 10/31/2023    Evaluating PT: Michael Tipton, PT, DPT [id number]      Room #:  3919/9709-Z  Diagnosis:  Hypokalemia [E87.6]  Mood disorder ([address]) [F39]  Prolonged Q-T interval on ECG [R94.31]  Intentional drug overdose, initial encounter ([address]) [T50.902A]  Overdose of medication, intentional self-harm, initial encounter ([address]) Tomas Dominguez  PMHx/PSHx:   has a past medical history of Acute renal injury ([address]), Analgesic overuse headache, Anxiety, Arthritis, Asthma, CAD (coronary artery disease), Depression, Diabetes mellitus ([address]), Fracture of tooth, GERD (gastroesophageal reflux disease), Glaucoma, Hyperlipidemia, Hypertension, Hypertensive urgency, Hypothyroidism, Irritable bowel syndrome, Morbid obesity due to excess calories ([address]), Obesity, Obstructive sleep apnea, Pneumonia, Polycystic ovarian syndrome, Postcholecystectomy syndrome, Spinal headache, and Suicidal ideation. Procedure/Surgery:  NA  Precautions:  None  Equipment Needs:  TBD    SUBJECTIVE:    Pt lives alone in a 1 story house with 2 stair(s) and 2 rail(s) to enter. Pt ambulated with no AD prior to admission. OBJECTIVE:   Initial Evaluation  Date:    AM-PAC 6 Clicks 40/56   Was pt agreeable to Eval/treatment? Yes   Does pt have pain? 10/10 pain in R arm   Bed Mobility  NT- pt in chair   Transfers Sit to stand: Independent   Stand to sit: Independent   Stand pivot: Independent   Ambulation   100, 100 feet with no AD with Independent   Stair negotiation: ascended and descended NT    ROM BUE: Refer to OT note  BLE: WFL   Strength BUE: Refer to OT note  BLE: WFL   Balance Sitting EOB: Independent   Dynamic Standing: Independent      Pt is A & O x: 4 to person, place, month/year, and situation. Sensation: Pt denies numbness and tingling of extremities.   Edema: Unremarkable    Patient education  Pt educated on PT role in acute care setting. Patient response to education:   Pt verbalized understanding Pt demonstrated skill Pt requires further education in this area   Yes NA No     ASSESSMENT:    Comments:    Pt was in chair in common room upon room entry, agreeable to PT evaluation. Pt completed all mobility noted above. Pt stood from chair. Pt ambulated down hallway to room. Pt was steady with no LOB. Pt took a rest break on bed. O2 sat was 96% on RA and HR was 60 bpm. Pt ambulated back to common room. Pt reports they are at functional baseline and that there are no skilled PT needs at this time. Pt was left in chair with all needs met at conclusion of session. PHYSICAL THERAPY PLAN OF CARE:    PT POC is established based on physician order and patient diagnosis     Based on pt's current level of independence for all functional mobility, this pt is not a candidate for continued skilled PT services. Pt is agreeable that there are no skilled PT needs at this time. Will complete order and remove pt from PT caseload. Please re-consult if pt experiences functional decline. Thank you.     Referring provider/PT Order:    Start   Ordering Provider    10/29/23 1100  PT eval and treat  Start:  10/29/23 1100,   End:  10/29/23 1100,   ONE TIME,   Standing Count:  1 Occurrences,   R        Order went unreviewed at transfer on Mon Oct 30, 2023  3:43 PM    Ellie Vital MD      Diagnosis:  Hypokalemia [E87.6]  Mood disorder ([address]) [F39]  Prolonged Q-T interval on ECG [R94.31]  Intentional drug overdose, initial encounter ([address]) [T50.902A]  Overdose of medication, intentional self-harm, initial encounter ([address]) Savannah Councilman    Time in  1143  Time out  1153    Total Treatment Time  0 minutes     Evaluation Time includes thorough review of current medical information, gathering information on past medical history/social history and prior level of function, completion of standardized testing/informal observation of tasks, assessment of data and education on

## 2023-10-31 NOTE — DISCHARGE INSTRUCTIONS
Attending Provider: Kaitlin Gruber MD.     If you have any questions and need to contact this individual please call the unit at [phone]. [facility] Provider will be available on call 24/7 and during holidays.         Reason for Admission: suicide attempt by overdose of Vistaril

## 2023-10-31 NOTE — PROGRESS NOTES
Chart reviewed. Talked with RN - states Patient is currently not on her pump due to improvement in blood sugars at home. BG accord at this time are WNL. We will review chart in AM and see patient tomorrow if she is receptive to education.  Electronically signed by Damian Michael RD, LD on 10/31/2023 at 3:08 PM
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
## 2023-10-31 NOTE — GROUP NOTE
Group Therapy Note    Date: 10/31/2023    Group Start Time: 5611  Group End Time: 2650  Group Topic: Psychoeducation    [facility] 7[facility] ACUTE BH 1    Raj Villanueva                                                                        Group Therapy Note    Date: 10/31/2023  Module Name:  id of stressors and physical effects     Patient's Goal:  Patient will be able to id daily hassles and life events. Patient will be able to  Id positive ways to manage daily stressors in the future. Notes:  Pleasant and sharing, will to participate when prompted. Status After Intervention:  Improved    Participation Level:  Active Listener and Interactive    Participation Quality: Appropriate, Attentive, Sharing, and Supportive      Speech:  normal      Thought Process/Content: Logical      Affective Functioning: Congruent      Mood: euthymic      Level of consciousness:  Alert, Oriented x4, and Attentive      Response to Learning: Able to verbalize/acknowledge new learning, Able to retain information, and Progressing to goal      Endings: None Reported    Modes of Intervention: Education, Support, Socialization, and Problem-solving      Discipline Responsible: Psychoeducational Specialist      Signature:  Raj Villanueva

## 2023-11-01 VITALS
SYSTOLIC BLOOD PRESSURE: 199 MMHG | OXYGEN SATURATION: 97 % | BODY MASS INDEX: 59.24 KG/M2 | DIASTOLIC BLOOD PRESSURE: 114 MMHG | TEMPERATURE: 97.8 F | RESPIRATION RATE: 18 BRPM | HEART RATE: 59 BPM | HEIGHT: 66 IN

## 2023-11-01 LAB — GLUCOSE BLD-MCNC: 92 MG/DL (ref 74–99)

## 2023-11-01 PROCEDURE — 82962 GLUCOSE BLOOD TEST: CPT

## 2023-11-01 PROCEDURE — 6370000000 HC RX 637 (ALT 250 FOR IP): Performed by: NURSE PRACTITIONER

## 2023-11-01 PROCEDURE — 6370000000 HC RX 637 (ALT 250 FOR IP): Performed by: FAMILY MEDICINE

## 2023-11-01 PROCEDURE — 97165 OT EVAL LOW COMPLEX 30 MIN: CPT

## 2023-11-01 PROCEDURE — 6370000000 HC RX 637 (ALT 250 FOR IP): Performed by: INTERNAL MEDICINE

## 2023-11-01 PROCEDURE — 99239 HOSP IP/OBS DSCHRG MGMT >30: CPT | Performed by: NURSE PRACTITIONER

## 2023-11-01 RX ORDER — OXCARBAZEPINE 150 MG/1
150 TABLET, FILM COATED ORAL 2 TIMES DAILY
Qty: 60 TABLET | Refills: 0 | Status: SHIPPED | OUTPATIENT
Start: 2023-11-01 | End: 2023-12-01

## 2023-11-01 RX ORDER — ARIPIPRAZOLE 10 MG/1
10 TABLET ORAL DAILY
Qty: 30 TABLET | Refills: 0 | Status: SHIPPED | OUTPATIENT
Start: 2023-11-02 | End: 2023-12-02

## 2023-11-01 RX ORDER — CLONIDINE HYDROCHLORIDE 0.1 MG/1
0.1 TABLET ORAL EVERY 6 HOURS PRN
Status: DISCONTINUED | OUTPATIENT
Start: 2023-11-01 | End: 2023-11-01 | Stop reason: HOSPADM

## 2023-11-01 RX ORDER — HYDROCHLOROTHIAZIDE 12.5 MG/1
12.5 TABLET ORAL DAILY
Qty: 30 TABLET | Refills: 3 | Status: SHIPPED | OUTPATIENT
Start: 2023-11-01

## 2023-11-01 RX ORDER — HYDROCHLOROTHIAZIDE 25 MG/1
12.5 TABLET ORAL DAILY
Status: DISCONTINUED | OUTPATIENT
Start: 2023-11-01 | End: 2023-11-01 | Stop reason: HOSPADM

## 2023-11-01 RX ORDER — METOPROLOL SUCCINATE 50 MG/1
50 TABLET, EXTENDED RELEASE ORAL DAILY
Qty: 30 TABLET | Refills: 0 | Status: SHIPPED | OUTPATIENT
Start: 2023-11-02 | End: 2023-12-02

## 2023-11-01 RX ORDER — AMLODIPINE BESYLATE 5 MG/1
5 TABLET ORAL NIGHTLY
Qty: 30 TABLET | Refills: 0 | Status: SHIPPED | OUTPATIENT
Start: 2023-11-01 | End: 2023-12-01

## 2023-11-01 RX ADMIN — METOPROLOL SUCCINATE 50 MG: 25 TABLET, EXTENDED RELEASE ORAL at 09:18

## 2023-11-01 RX ADMIN — HYDROCHLOROTHIAZIDE 12.5 MG: 25 TABLET ORAL at 11:02

## 2023-11-01 RX ADMIN — OXCARBAZEPINE 150 MG: 150 TABLET, FILM COATED ORAL at 09:18

## 2023-11-01 RX ADMIN — LISINOPRIL 40 MG: 10 TABLET ORAL at 09:18

## 2023-11-01 RX ADMIN — ARIPIPRAZOLE 10 MG: 10 TABLET ORAL at 09:18

## 2023-11-01 RX ADMIN — ASPIRIN 81 MG: 81 TABLET, COATED ORAL at 09:18

## 2023-11-01 ASSESSMENT — PAIN DESCRIPTION - ORIENTATION: ORIENTATION: RIGHT

## 2023-11-01 ASSESSMENT — PAIN DESCRIPTION - LOCATION: LOCATION: SHOULDER

## 2023-11-01 ASSESSMENT — PAIN SCALES - GENERAL: PAINLEVEL_OUTOF10: 7

## 2023-11-01 NOTE — PLAN OF CARE
Problem: Chronic Conditions and Co-morbidities  Goal: Patient's chronic conditions and co-morbidity symptoms are monitored and maintained or improved  10/31/2023 2336 by Eula Arnold RN  Outcome: Progressing  10/31/2023 1835 by Yash Asif RN  Outcome: Progressing     Problem: Risk for Elopement  Goal: Patient will not exit the unit/facility without proper excort  10/31/2023 2336 by Eula Arnold RN  Outcome: Progressing  10/31/2023 [address] by Yash Asif RN  Outcome: Progressing     Problem: Anxiety  Goal: Will report anxiety at manageable levels  Description: INTERVENTIONS:  1. Administer medication as ordered  2. Teach and rehearse alternative coping skills  3. Provide emotional support with 1:1 interaction with staff  10/31/2023 2336 by Eula Arnold RN  Outcome: Progressing  10/31/2023 [address] by Yash Asif RN  Outcome: Progressing     Problem: Coping  Goal: Pt/Family able to verbalize concerns and demonstrate effective coping strategies  Description: INTERVENTIONS:  1. Assist patient/family to identify coping skills, available support systems and cultural and spiritual values  2. Provide emotional support, including active listening and acknowledgement of concerns of patient and caregivers  3. Reduce environmental stimuli, as able  4. Instruct patient/family in relaxation techniques, as appropriate  5. Assess for spiritual pain/suffering and initiate Spiritual Care, Psychosocial Clinical Specialist consults as needed  10/31/2023 2336 by Eula Arnold RN  Outcome: Progressing  10/31/2023 [address] by Yash Asif RN  Outcome: Progressing     Problem: Behavior  Goal: Pt/Family maintain appropriate behavior and adhere to behavioral management agreement, if implemented  Description: INTERVENTIONS:  1. Assess patient/family's coping skills and  non-compliant behavior (including use of illegal substances)  2.  Notify security of behavior or suspected illegal substances which indicate the need for search of the family and/or belongings  3. Encourage verbalization of thoughts and concerns in a socially appropriate manner  4. Utilize positive, consistent limit setting strategies supporting safety of patient, staff and others  5. Encourage participation in the decision making process about the behavioral management agreement  6. If a visitor's behavior poses a threat to safety call refer to organization policy. 7. Initiate consult with , Psychosocial CNS, Spiritual Care as appropriate  10/31/2023 2336 by Ruth Ashraf RN  Outcome: Progressing  10/31/2023 [address] by Glen Quinteros RN  Outcome: Progressing     Problem: Depression/Self Harm  Goal: Effect of psychiatric condition will be minimized and patient will be protected from self harm  Description: INTERVENTIONS:  1. Assess impact of patient's symptoms on level of functioning, self care needs and offer support as indicated  2. Assess patient/family knowledge of depression, impact on illness and need for teaching  3. Provide emotional support, presence and reassurance  4. Assess for possible suicidal thoughts or ideation. If patient expresses suicidal thoughts or statements do not leave alone, initiate Suicide Precautions, move to a room close to the nursing station and obtain sitter  5. Initiate consults as appropriate with Mental Health Professional, Spiritual Care, Psychosocial CNS, and consider a recommendation to the [facility] for a Psychiatric Consultation  10/31/2023 2336 by Ruth Ashraf RN  Outcome: Progressing  10/31/2023 [address] by Glen Quinteros RN  Outcome: Progressing     Problem: Involuntary Admit  Goal: Will cooperate with staff recommendations and doctor's orders and will demonstrate appropriate behavior  Description: INTERVENTIONS:  1. Treat underlying conditions and offer medication as ordered  2. Educate regarding involuntary admission procedures and rules  3.  Contain excessive/inappropriate behavior per unit and hospital

## 2023-11-01 NOTE — PLAN OF CARE
Problem: Chronic Conditions and Co-morbidities  Goal: Patient's chronic conditions and co-morbidity symptoms are monitored and maintained or improved  11/1/2023 0936 by Violeta Dumont RN  Outcome: Progressing     Problem: Risk for Elopement  Goal: Patient will not exit the unit/facility without proper excort  11/1/2023 0936 by Violeta Dumont RN  Outcome: Progressing     Problem: Anxiety  Goal: Will report anxiety at manageable levels  Description: INTERVENTIONS:  1. Administer medication as ordered  2. Teach and rehearse alternative coping skills  3. Provide emotional support with 1:1 interaction with staff  11/1/2023 0936 by Violeta Dumont RN  Outcome: Progressing     Problem: Coping  Goal: Pt/Family able to verbalize concerns and demonstrate effective coping strategies  Description: INTERVENTIONS:  1. Assist patient/family to identify coping skills, available support systems and cultural and spiritual values  2. Provide emotional support, including active listening and acknowledgement of concerns of patient and caregivers  3. Reduce environmental stimuli, as able  4. Instruct patient/family in relaxation techniques, as appropriate  5. Assess for spiritual pain/suffering and initiate Spiritual Care, Psychosocial Clinical Specialist consults as needed  11/1/2023 0936 by Violeta Dumont RN  Outcome: Progressing     Problem: Behavior  Goal: Pt/Family maintain appropriate behavior and adhere to behavioral management agreement, if implemented  Description: INTERVENTIONS:  1. Assess patient/family's coping skills and  non-compliant behavior (including use of illegal substances)  2. Notify security of behavior or suspected illegal substances which indicate the need for search of the family and/or belongings  3. Encourage verbalization of thoughts and concerns in a socially appropriate manner  4. Utilize positive, consistent limit setting strategies supporting safety of patient, staff and others  5.

## 2023-11-01 NOTE — BH NOTE
Patient cooperative during assessment. Patient denies all psychiatric symptoms which is incongruent with patient's behaviors. Patient is flat on approach. Appears labile, guarded and easily agitated. Patient spoke to son on the phone and became irritable with loud, pressured speech. Appears intrusive, confabulates, and displays poverty of content. Patient is out on the unit and social with select peers. Patient refused scheduled HS Lovenox stating \"my   because Lovenox gave him blood clots. My doctor knows I've been refusing as long as I walk. \" Attempted to educate patient on importance of  anticoagulant therapy. Patient non receptive. Compliant with all other scheduled medications. Patient c/o constant aching pain in R shoulder, PRN Ibuprofen administered per patient request. No unit problems reported. Q 15 minute rounds maintained.

## 2023-11-01 NOTE — CARE COORDINATION
SW intern called Mercedes and spoke with [facility]. [facility] stated she is only active with a PCP at [facility].  ERICKA intern scheduled an appointment for Tuesday November 7 at 9:30 with Carlos Hall, but pt needs to arrive at 9:15 for paperwork enter through door A. ERICKA intern was informed that pt has a PCP appointment on 11/7 at 1 PM.

## 2023-11-01 NOTE — DISCHARGE SUMMARY
DISCHARGE SUMMARY      Patient ID:  Jimbo Horta  [id number]  53 y.o.  1975    Admit date: 10/28/2023    Discharge date and time: 2023    Admitting Physician: Brenton Nathan MD     Discharge Physician: Dr Melvin Barrera MD    Discharge Diagnoses:   Patient Active Problem List   Diagnosis    Morbid obesity ([facility])    Hypothyroidism    Depression with anxiety    Obstructive sleep apnea    Diabetes mellitus ([facility])    Asthma    Arteriosclerosis of coronary artery    Hypertension    Generalized abdominal pain    PCOS (polycystic ovarian syndrome)    Lymphedema of both lower extremities    Irritable bowel syndrome with diarrhea    Hyperlipidemia    Gastroesophageal reflux disease with esophagitis    Acute asthma exacerbation    Acute respiratory failure with hypoxia (HCC)    Nausea vomiting and diarrhea    Gastroenteritis    Ileus (HCC)    Diarrhea    Intentional drug overdose, initial encounter ([address] [place] [address])    Mood disorder ([address] [place] [address])    Acute psychosis ([address] [place] [address])    Borderline personality disorder ([address] [place] [address])    Drug abuse ([address] [place] [address])       Admission Condition: poor    Discharged Condition: stable    Admission Circumstance:   Presented to [facility] emergency department after overdose. Pink slip for suicide attempt with overdose. \"Nick took numerous hydroxyzine pills in attempt to harm herself. Maribell Lazcano made mention of wanting to be with her  . \"          PAST MEDICAL/PSYCHIATRIC HISTORY:   Past Medical History:   Diagnosis Date    Acute renal injury ([facility]) 2013    Analgesic overuse headache 2018    Anxiety     Arthritis     Asthma     CAD (coronary artery disease)     Depression 2013    Diabetes mellitus ([address] [place] [address])     A1C=6.7 on 14    Drug abuse ([address] [place] [address]) 10/31/2023    Fracture of tooth 2018    GERD (gastroesophageal reflux disease)     Glaucoma     Hyperlipidemia 2021    Hypertension     Hypertensive urgency 2013    Hypothyroidism     Irritable bowel syndrome     Morbid obesity due to excess Where to Get Your Medications        These medications were sent to [address] [place] [address],[facility], [address] [address]      Phone: [phone]   amLODIPine 5 MG tablet  ARIPiprazole 10 MG tablet  metoprolol succinate 50 MG extended release tablet  OXcarbazepine 150 MG tablet     NOTE: This report was transcribed using voice recognition software. Every effort was made to ensure accuracy; however, inadvertent computerized transcription errors may be present.       TIME SPEND - 35 MINUTES TO COMPLETE THE EVALUATION, DISCHARGE SUMMARY, MEDICATION RECONCILIATION AND FOLLOW UP CARE     Signed:  GEOVANI De La Vega CNP  11/1/2023  10:14 AM

## 2023-11-01 NOTE — BH NOTE
Patient is resting quietly in bed with eyes closed at this time. No signs of distress or discomfort noted. No PRN medications given at this time. Safety needs met. No unit problems reported. Q 15 minute rounding continued.

## 2023-11-01 NOTE — PROGRESS NOTES
Hospitalist Progress Note      Synopsis: Patient admitted on 10/28/2023 The patient is a 53 y.o. female patient of Starla Evans MD who presents with intentional hydroxyzine overdose. Pt wanted to be with her  . Patient apparently took over 70 tablets of vistaril. In the ED workup was significant for UDS positive for amphetamines. Patient was initially admitted in internal medicine and transferred to psychiatry for further evaluation and treatment of depression and a suicide attempt. Internal medicine is consulted for medical management. Pt denies uncontrolled pain. No chest pain, trouble breathing, vomiting, or diarrhea. No fevers or chills. Patient states  chronic medical problems are well controlled. ASSESSMENT:    Principal Problem:    Acute psychosis ([address])  Active Problems: Morbid obesity ([address])    Diabetes mellitus ([address])    Asthma    Intentional drug overdose, initial encounter ([address])    Mood disorder ([address])    Borderline personality disorder ([address])    Drug abuse ([address])  Resolved Problems:    * No resolved hospital problems. *       PLAN:    Psychiatric care per primary service  Notes and labs reviewed  Medical management evaluated and adjusted     Type 2 diabetes, patient states he is not taking insulin anymore due to causing low blood sugars. Sugars have been well controlled maximum 136  A1c 6.2 2023 consistent with well-controlled diabetes continue diet control     unControlled hypertension blood pressures were elevated as high as 202/112, improved today metoprolol, amlodipine, lisinopril     Shoulder pain-patient states she has difficulty in her right shoulder due to pain. Been ongoing for 2 weeks. Continue Tylenol and ibuprofen. Check plain film x-ray to rule out fracture dislocation which I believe is unlikely.      Untreated sleep apnea-patient states she has sleep apnea but does not tolerate BiPAP discussed importance of treating sleep apnea recommend OXcarbazepine  150 mg Oral BID    amLODIPine  5 mg Oral Nightly    nicotine  1 patch TransDERmal Daily    metoprolol succinate  50 mg Oral Daily    potassium bicarb-citric acid  40 mEq Oral Once    sodium chloride flush  5-40 mL IntraVENous 2 times per day    enoxaparin  40 mg SubCUTAneous BID    aspirin  81 mg Oral Daily    lisinopril  40 mg Oral Daily     PRN Meds: magnesium hydroxide, aluminum & magnesium hydroxide-simethicone, melatonin, ibuprofen, labetalol, sodium chloride flush, sodium chloride, polyethylene glycol, albuterol, glucose, dextrose bolus **OR** dextrose bolus, glucagon (rDNA), dextrose, acetaminophen    I/O  No intake or output data in the 24 hours ending 11/01/23 1034    Labs:   Recent Labs     10/30/23  0605   WBC 6.6   HGB 12.7   HCT 38.8          Recent Labs     10/30/23  0605      K 3.5      CO2 30*   BUN 13   CREATININE 1.1*   CALCIUM 8.8       No results for input(s): \"PROT\", \"ALB\", \"ALKPHOS\", \"ALT\", \"AST\", \"BILITOT\", \"AMYLASE\", \"LIPASE\" in the last 72 hours. No results for input(s): \"INR\" in the last 72 hours. No results for input(s): \"CKTOTAL\", \"TROPONINI\" in the last 72 hours. Chronic labs:  Lab Results   Component Value Date    CHOL 114 03/02/2023    TRIG 97 03/02/2023    HDL 39 03/02/2023    LDLCALC 56 03/02/2023    TSH 2.160 04/10/2022    INR 1.1 02/28/2023    LABA1C 6.2 (H) 02/28/2023       Radiology:  Imaging studies reviewed today.        +++++++++++++++++++++++++++++++++++++++++++++++++  Fercho Oates MD   [facility].  +++++++++++++++++++++++++++++++++++++++++++++++++  NOTE: This report was transcribed using voice recognition software. Every effort was made to ensure accuracy; however, inadvertent computerized transcription errors may be present.

## 2023-11-01 NOTE — PROGRESS NOTES
[facility]  Discharge Note    Pt discharged with followings belongings:   Dental Appliances: None  Vision - Corrective Lenses: Eyeglasses  Hearing Aid: None  Clothing: Shirt, Undergarments, Pants, Other (Comment) (black purse)   Valuables returned to patient. Patient educated on aftercare instructions: yes  Information faxed to n/a by n/a  at 12:48 PM .Patient verbalize understanding of AVS:  yes. Status EXAM upon discharge:  Mental Status and Behavioral Exam  Normal: No  Level of Assistance: Independent/Self  Facial Expression: Flat  Affect: Blunt  Level of Consciousness: Alert  Frequency of Checks: 4 times per hour, close  Mood:Normal: No  Mood: Anxious  Motor Activity:Normal: Yes  Motor Activity: Other (comment) (impaired mobility in R shoulder)  Eye Contact: Fair  Observed Behavior: Guarded  Sexual Misconduct History: Current - no  Preception: [place] to person, [place] to time, [place] to place, [place] to situation  Attention:Normal: Yes  Attention: Distractible  Thought Processes: Circumstantial  Thought Content:Normal: Yes  Thought Content: Preoccupations, Poverty of content  Depression Symptoms: No problems reported or observed. Anxiety Symptoms: Generalized  Zenia Symptoms: No problems reported or observed. Hallucinations: None  Delusions: No  Delusions:  Other (comment)  Memory:Normal: Yes  Memory: Confabulation, Poor recent  Insight and Judgment: No  Insight and Judgment: Poor insight, Poor judgment    Tobacco Screening:  Practical Counseling, on admission, lilibeth X, if applicable and completed (first 3 are required if patient doesn't refuse):            ( ) Recognizing danger situations (included triggers and roadblocks)                    ( ) Coping skills (new ways to manage stress,relaxation techniques, changing routine, distraction)                                                           ( ) Basic information about quitting (benefits of quitting, techniques in how to quit, available

## 2023-11-01 NOTE — PROGRESS NOTES
[address]     UMPY:  Patient Name: Ria Patricio  MRN: [id number]  : 1975  Room: [address] [place] [address]-A      Evaluating OT: Kris Walden OTR/L; [id number]    Referring Provider[de-identified] Birdie Rider MD     Specific Provider Orders/Date: OT evaluation and treatment 10/29/23    AM-PAC Daily Activity Raw Score:     Recommended Adaptive Equipment: None at this time     Diagnosis:    1. Overdose of medication, intentional self-harm, initial encounter ([address])    2. Hypokalemia    3.  Prolonged Q-T interval on ECG       Patient Active Problem List   Diagnosis    Morbid obesity ([address])    Hypothyroidism    Depression with anxiety    Obstructive sleep apnea    Diabetes mellitus ([address])    Asthma    Arteriosclerosis of coronary artery    Hypertension    Generalized abdominal pain    PCOS (polycystic ovarian syndrome)    Lymphedema of both lower extremities    Irritable bowel syndrome with diarrhea    Hyperlipidemia    Gastroesophageal reflux disease with esophagitis    Acute asthma exacerbation    Acute respiratory failure with hypoxia (HCC)    Nausea vomiting and diarrhea    Gastroenteritis    Ileus (HCC)    Diarrhea    Intentional drug overdose, initial encounter ([address])    Mood disorder ([address])    Acute psychosis ([address])    Borderline personality disorder ([address])    Drug abuse ([address])      Pertinent Medical History:   Past Medical History:   Diagnosis Date    Acute renal injury ([address]) 2013    Analgesic overuse headache 2018    Anxiety     Arthritis     Asthma     CAD (coronary artery disease)     Depression 2013    Diabetes mellitus ([facility])     A1C=6.7 on 14    Drug abuse ([address]) 10/31/2023    Fracture of tooth 2018    GERD (gastroesophageal reflux disease)     Glaucoma     Hyperlipidemia 2021    Hypertension     Hypertensive urgency 2013    Hypothyroidism Irritable bowel syndrome     Morbid obesity due to excess calories (HCC)     Obesity     Obstructive sleep apnea     Pneumonia     Polycystic ovarian syndrome     Postcholecystectomy syndrome 12/19/2018    Spinal headache     Suicidal ideation 03/18/2016      Precautions:  Falls, suicide precautions     Assessment of current deficits N/A  [] Functional mobility   []ADLs  [] Strength               []Cognition   [] Functional transfers   [] IADLs         [] Safety Awareness   []Endurance   [] Fine Coordination              [] Balance      [] Vision/perception   []Sensation    []Gross Motor Coordination  [] ROM  [] Delirium                   [] Motor Control     OT PLAN OF CARE   OT POC based on physician orders, patient diagnosis and results of clinical assessment    Frequency/Duration : NA  Specific OT Treatment to include: NA      Home Living: Pt lives w/ roommates in a 2 story with 2 step(s) to enter and 2 rail(s); bed/bath on 1st floor   Bathroom setup: [address] [place] [address] owned: None  Prior Level of Function: Independent  with ADLs , Independent  with IADLs; using no AD for functional mobility. Pain Level: Pt w/ no c/o pain during session  Cognition: A&O: 4/4; Follows multi step directions. Flat affect. Memory:  good    Sequencing:  good    Problem solving:  good    Judgement/safety:  good      Functional Assessment:   Initial Eval Status  Date: 11/1/23   Feeding Independent    Grooming Independent    UB Dressing Independent    LB Dressing Independent    Bathing Independent   Toileting Independent    Bed Mobility  Supine to sit: Independent   Sit to supine: Independent    Functional Transfers Sit to stand: Independent   Stand to sit: Independent   Stand pivot: Independent    Functional Mobility Indep with no AD   Balance Sitting:     Static:  wfl    Dynamic:wfl  Standing: wfl   Activity Tolerance wfl   Visual/  Perceptual Visual tracking/perception WFL.         UE ROM: RUE:  WFL  LUE:  WFL  Strength: RUE:

## 2023-11-01 NOTE — PROGRESS NOTES
[employer] message sent to [employer] regarding PRN order for HTN management and to inform of possible discharge today. Requested med rec be completed.

## 2023-11-01 NOTE — PROGRESS NOTES
CLINICAL PHARMACY NOTE: MEDS TO BEDS    Total # of Prescriptions Filled: 5   The following medications were delivered to the patient:  Metoprolol succinate 50 mg  Oxcarbazepine 150 mg  Aripiprazole 10 mg  Hydrochlorothiazide 12.5 mg tabs  Amlodipine besylate 5 mg    Additional Documentation:   Delivered to RN

## 2023-11-01 NOTE — BH NOTE
Patient would like to speak to social work regarding ride home on discharge. Patient would prefer to take the bus instead of having her son pick her up on discharge.  Advised patient to discuss with SW in the morning and this RN would update oncoming shift of request. RHEUMATOLOGY NEW PATIENT VISIT    2022      Patient Name: Delvin Ramesh  : 1946  Medical Record: [id number]      [facility])    Acute exacerbation of chronic obstructive pulmonary disease (COPD) ([facility])    RICK (acute kidney injury) ([facility])    Dizziness    Troponin I above reference range    Joint pain    Atrial fibrillation with RVR ([facility]    Fall at home, initial encounter    COVID-19    A-fib ([facility]    Rheumatoid arthritis ([facility]    [facility])    [facility] [address]     H/O echocardiogram 10/02/2019    EF 55-60%, Mild PHTN. Asymmetrical moderate septal hypertrophy.     Hypertension     MS (multiple sclerosis) ([facility]     Pneumonia     Rheumatoid arthritis ([facility] Dallas Goodell MD

## 2023-11-01 NOTE — GROUP NOTE
Group Therapy Note    Date: 11/1/2023    Group Start Time: 4819  Group End Time: 7519  Group Topic: Psychoeducation    [facility] ACUTE [facility] 1    Jose Dueñas                                                                        Group Therapy Note    Date: 11/1/2023    Type of Group: Psychoeducation    Wellness Binder Information  Module Name:  coping skills for stress management     Patient's Goal:  patient teresa be able to id how coping skills can help manage stress levels. Notes:  Pleasant and engaged in group, able to share what has work for him/her in the past.     Status After Intervention:  Improved    Participation Level:  Active Listener and Interactive    Participation Quality: Appropriate, Attentive, and Sharing      Speech:  normal      Thought Process/Content: Logical      Affective Functioning: Congruent      Mood: euthymic      Level of consciousness:  Alert, Oriented x4, and Attentive      Response to Learning: Able to verbalize/acknowledge new learning, Able to retain information, and Progressing to goal      Endings: None Reported    Modes of Intervention: Education, Support, Socialization, and Problem-solving      Discipline Responsible: Psychoeducational Specialist      Signature:  Jose Dueñas    Group Therapy Note    Attendees: 6

## 2023-11-01 NOTE — CARE COORDINATION
ERICKA met with pt to discuss discharge for today. Pt is alert and oriented x4. Pt's mood is neutral, affect is congruent. Pt's speech is clear, rate and volume is normal. Pt's insight and judgement is improved. Pt denied depression and anxiety. Pt denied SI, HI, AVH. Pt reported that she will be discharging to her brothers and her brother will be picking her up from the hospital and he is on his way to come to get her. Pt stated that she is looking forward to being out of the hospital and sleeping in a real bed. Pt reported to feeling good today. Pt stated that they are going to get their medications filled at [facility] and if there is a copay on the medications she will be able to pay it however her insurance always covers copays on medications. ERICKA called pt's brother Jonny Siddiqui [phone] (JONAS signed) to discuss discharge planning. ERICKA spoke with lilibeth who stated that he has no concerns about pt's discharge today and that is the hospital's decision to discharge her. Jonny Siddiqui stated that the pt will stay with him and he is coming to pick her up, he stated that he is waiting for the bus now to come to get the pt. Brother denied access to any guns/weapons. Brother denied any questions or concerns. Pt has follow up appointments scheduled at [place]. Pt has a medication management appointment on 11/7 and a PCP appointment on 11/7.     In order to ensure appropriate transition and discharge planning is in place, the following documents have been transmitted to [place], as the new outpatient provider:    The d/c diagnosis was transmitted to the next care provider  The reason for hospitalization was transmitted to the next care provider  The d/c medications (dosage and indication) were transmitted to the next care provider   The continuing care plan was transmitted to the next care provider

## 2023-11-02 PROCEDURE — 1240000000 HC EMOTIONAL WELLNESS R&B

## 2023-11-02 NOTE — DISCHARGE INSTRUCTIONS
Go to [facility] on 2023; You have a mental health medication management appointment on  at 9:15 AM with Red Parker. Go to [facility] on 2023; You have a Primary Care Provider appointment on  at 1 PM.  Attending Provider: Rae Hurst Md    If you have any questions and need to contact this individual please call the unit at [phone]. [facility] Provider will be available on call  and during holidays. Reason for Admission: Presented to [facility] emergency department after overdose. Pink slip for suicide attempt with overdose. \"Nick took numerous hydroxyzine pills in attempt to harm herself. Liliana Benton made mention of wanting to be with her  . \"

## 2023-11-02 NOTE — CARE COORDINATION
SW attempted to contact pt post discharge for follow up call. Pt was unable to be reached at this time.

## 2023-11-09 PROBLEM — F31.9 BIPOLAR DISORDER (HCC): Status: ACTIVE | Noted: 2023-11-09

## 2023-12-07 NOTE — TELEPHONE ENCOUNTER
Author Annamarie Velasquez 64 Staff (supporting Monica Peter MD) 4 days ago     Did my test come back normal and can I get clearance for my weight loss surgery Duplicate refill request for estradiol (ESTRACE) 2 MG tablet . Medication filled today 12/7/23. Declining duplicate request.   Beverly Harrison RN

## 2023-12-13 RX ORDER — DULAGLUTIDE 4.5 MG/.5ML
INJECTION, SOLUTION SUBCUTANEOUS
Qty: 2 ML | Refills: 5 | OUTPATIENT
Start: 2023-12-13

## 2024-01-08 RX ORDER — LISINOPRIL 40 MG/1
40 TABLET ORAL DAILY
Qty: 30 TABLET | Refills: 0 | OUTPATIENT
Start: 2024-01-08

## 2024-01-10 RX ORDER — LISINOPRIL 40 MG/1
40 TABLET ORAL DAILY
Qty: 30 TABLET | Refills: 0 | OUTPATIENT
Start: 2024-01-10

## 2024-01-12 RX ORDER — DULAGLUTIDE 4.5 MG/.5ML
INJECTION, SOLUTION SUBCUTANEOUS
Qty: 2 ML | Refills: 5 | Status: SHIPPED | OUTPATIENT
Start: 2024-01-12

## 2024-02-17 ENCOUNTER — HOSPITAL ENCOUNTER (EMERGENCY)
Age: 49
Discharge: HOME | End: 2024-02-17
Attending: FAMILY MEDICINE
Payer: MEDICAID

## 2024-02-17 VITALS
TEMPERATURE: 97 F | SYSTOLIC BLOOD PRESSURE: 182 MMHG | WEIGHT: 166 LBS | DIASTOLIC BLOOD PRESSURE: 108 MMHG | OXYGEN SATURATION: 96 % | RESPIRATION RATE: 16 BRPM | BODY MASS INDEX: 26.79 KG/M2 | HEART RATE: 78 BPM

## 2024-02-17 DIAGNOSIS — Z71.1 CONCERN ABOUT STD IN FEMALE WITHOUT DIAGNOSIS: Primary | ICD-10-CM

## 2024-02-17 LAB
BACTERIA URNS QL MICRO: ABNORMAL
BILIRUB UR QL STRIP: NEGATIVE
CLARITY UR: CLEAR
COLOR UR: YELLOW
EPI CELLS #/AREA URNS HPF: ABNORMAL /HPF
GLUCOSE UR STRIP-MCNC: NEGATIVE MG/DL
HGB UR QL STRIP.AUTO: NEGATIVE
KETONES UR STRIP-MCNC: NEGATIVE MG/DL
LEUKOCYTE ESTERASE UR QL STRIP: NEGATIVE
NITRITE UR QL STRIP: NEGATIVE
PH UR STRIP: 5.5 [PH] (ref 5–9)
PROT UR STRIP-MCNC: NEGATIVE MG/DL
RBC #/AREA URNS HPF: ABNORMAL /HPF
SP GR UR STRIP: >1.03 (ref 1–1.03)
UROBILINOGEN UR STRIP-ACNC: 0.2 EU/DL (ref 0–1)
WBC #/AREA URNS HPF: ABNORMAL /HPF

## 2024-02-17 PROCEDURE — 99283 EMERGENCY DEPT VISIT LOW MDM: CPT

## 2024-02-17 PROCEDURE — 87491 CHLMYD TRACH DNA AMP PROBE: CPT

## 2024-02-17 PROCEDURE — 87591 N.GONORRHOEAE DNA AMP PROB: CPT

## 2024-02-17 PROCEDURE — 81001 URINALYSIS AUTO W/SCOPE: CPT
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
## 2024-02-20 LAB
CHLAMYDIA DNA UR QL NAA+PROBE: NEGATIVE
N GONORRHOEA DNA UR QL NAA+PROBE: NEGATIVE
SPECIMEN DESCRIPTION: NORMAL

## 2024-03-19 DIAGNOSIS — E11.65 UNCONTROLLED TYPE 2 DIABETES MELLITUS WITH HYPERGLYCEMIA (HCC): ICD-10-CM

## 2024-03-19 RX ORDER — INSULIN LISPRO 100 [IU]/ML
INJECTION, SOLUTION INTRAVENOUS; SUBCUTANEOUS
Qty: 30 ML | Refills: 5 | OUTPATIENT
Start: 2024-03-19

## 2024-04-09 DIAGNOSIS — E11.65 UNCONTROLLED TYPE 2 DIABETES MELLITUS WITH HYPERGLYCEMIA (HCC): ICD-10-CM

## 2024-04-09 RX ORDER — PEN NEEDLE, DIABETIC 32GX 5/32"
NEEDLE, DISPOSABLE MISCELLANEOUS
Qty: 100 EACH | Refills: 11 | Status: SHIPPED | OUTPATIENT
Start: 2024-04-09

## 2024-04-18 DIAGNOSIS — E11.65 UNCONTROLLED TYPE 2 DIABETES MELLITUS WITH HYPERGLYCEMIA (HCC): ICD-10-CM

## 2024-04-18 RX ORDER — INSULIN LISPRO 100 [IU]/ML
INJECTION, SOLUTION INTRAVENOUS; SUBCUTANEOUS
Qty: 30 ML | Refills: 5 | OUTPATIENT
Start: 2024-04-18

## 2024-05-23 ENCOUNTER — HOSPITAL ENCOUNTER (EMERGENCY)
Age: 49
Discharge: HOME | End: 2024-05-23
Payer: MEDICAID

## 2024-05-23 ENCOUNTER — APPOINTMENT (OUTPATIENT)
Dept: GENERAL RADIOLOGY | Age: 49
End: 2024-05-23
Payer: MEDICAID

## 2024-05-23 VITALS
RESPIRATION RATE: 19 BRPM | OXYGEN SATURATION: 97 % | TEMPERATURE: 97.7 F | WEIGHT: 266 LBS | HEART RATE: 74 BPM | BODY MASS INDEX: 42.93 KG/M2 | SYSTOLIC BLOOD PRESSURE: 164 MMHG | DIASTOLIC BLOOD PRESSURE: 107 MMHG

## 2024-05-23 DIAGNOSIS — S40.011A CONTUSION OF RIGHT SHOULDER, INITIAL ENCOUNTER: Primary | ICD-10-CM

## 2024-05-23 PROCEDURE — 6360000002 HC RX W HCPCS: Performed by: PHYSICIAN ASSISTANT

## 2024-05-23 PROCEDURE — 73030 X-RAY EXAM OF SHOULDER: CPT

## 2024-05-23 PROCEDURE — 96372 THER/PROPH/DIAG INJ SC/IM: CPT

## 2024-05-23 PROCEDURE — 6370000000 HC RX 637 (ALT 250 FOR IP): Performed by: PHYSICIAN ASSISTANT

## 2024-05-23 PROCEDURE — 99284 EMERGENCY DEPT VISIT MOD MDM: CPT

## 2024-05-23 RX ORDER — LISINOPRIL 10 MG/1
40 TABLET ORAL ONCE
Status: COMPLETED | OUTPATIENT
Start: 2024-05-23 | End: 2024-05-23

## 2024-05-23 RX ORDER — METHOCARBAMOL 750 MG/1
750 TABLET, FILM COATED ORAL 4 TIMES DAILY
Qty: 40 TABLET | Refills: 0 | Status: SHIPPED | OUTPATIENT
Start: 2024-05-23 | End: 2024-06-02

## 2024-05-23 RX ORDER — KETOROLAC TROMETHAMINE 30 MG/ML
30 INJECTION, SOLUTION INTRAMUSCULAR; INTRAVENOUS ONCE
Status: COMPLETED | OUTPATIENT
Start: 2024-05-23 | End: 2024-05-23

## 2024-05-23 RX ORDER — PREDNISONE 20 MG/1
60 TABLET ORAL ONCE
Status: COMPLETED | OUTPATIENT
Start: 2024-05-23 | End: 2024-05-23

## 2024-05-23 RX ORDER — AMLODIPINE BESYLATE 5 MG/1
5 TABLET ORAL ONCE
Status: COMPLETED | OUTPATIENT
Start: 2024-05-23 | End: 2024-05-23

## 2024-05-23 RX ORDER — PREDNISONE 20 MG/1
60 TABLET ORAL DAILY
Qty: 15 TABLET | Refills: 0 | Status: SHIPPED | OUTPATIENT
Start: 2024-05-23 | End: 2024-05-28

## 2024-05-23 RX ADMIN — KETOROLAC TROMETHAMINE 30 MG: 30 INJECTION, SOLUTION INTRAMUSCULAR at 19:35

## 2024-05-23 RX ADMIN — LISINOPRIL 40 MG: 10 TABLET ORAL at 19:33

## 2024-05-23 RX ADMIN — PREDNISONE 60 MG: 20 TABLET ORAL at 20:50

## 2024-05-23 RX ADMIN — AMLODIPINE BESYLATE 5 MG: 5 TABLET ORAL at 19:33

## 2024-05-23 NOTE — ED PROVIDER NOTES
Independent ROSMERY Visit.        HPI:  5/23/24, Time: 7:19 PM EDT         Eliana Romero is a 49 y.o. female presenting to the ED for right shoulder pain, beginning several hours ago after slipping on the wet floor and falling onto her shoulder.  The complaint has been persistent, moderate in severity, and worsened by movement of right shoulder.  Patient reports that she is currently 25 days clean off of cocaine and methamphetamines.  States that she was having cravings to use again last night therefore she was try to keep yourself busy while mopping the floor.  She accidentally walked on the wet floor causing her to fall on her right shoulder.  She did not hit her head or lose consciousness.  No chest pain or shortness of breath.  No numbness or tingling to the right upper extremity.  Does have a history of a fracture of the right shoulder in the past.  Afebrile without recent travel or sick contacts.  Patient denies all other symptoms and injuries at this time.    Review of Systems:   A complete review of systems was performed and pertinent positives and negatives are stated within HPI, all other systems reviewed and are negative.          --------------------------------------------- PAST HISTORY ---------------------------------------------  Past Medical History:  has a past medical history of Acute renal injury ([facility]), Analgesic overuse headache, Anxiety, Arthritis, Asthma, CAD (coronary artery disease), Depression, Diabetes mellitus ([facility]), Drug abuse ([facility]), Fracture of tooth, GERD (gastroesophageal reflux disease), Glaucoma, Hyperlipidemia, Hypertension, Hypertensive urgency, Hypothyroidism, Irritable bowel syndrome, Morbid obesity due to excess calories ([facility]), Obesity, Obstructive sleep apnea, Pneumonia, Polycystic ovarian syndrome, Postcholecystectomy syndrome, Spinal headache, and Suicidal ideation.    Past Surgical History:  has a past surgical history that includes Tonsillectomy (1980s); Cholecystectomy,

## 2024-08-22 ENCOUNTER — HOSPITAL ENCOUNTER (EMERGENCY)
Age: 49
Discharge: HOME | End: 2024-08-22
Attending: EMERGENCY MEDICINE
Payer: MEDICAID

## 2024-08-22 VITALS
SYSTOLIC BLOOD PRESSURE: 178 MMHG | TEMPERATURE: 98.3 F | DIASTOLIC BLOOD PRESSURE: 87 MMHG | OXYGEN SATURATION: 95 % | RESPIRATION RATE: 20 BRPM | HEART RATE: 69 BPM | WEIGHT: 266 LBS | BODY MASS INDEX: 42.93 KG/M2

## 2024-08-22 DIAGNOSIS — T14.8XXA MUSCLE STRAIN: Primary | ICD-10-CM

## 2024-08-22 LAB
ANION GAP SERPL CALCULATED.3IONS-SCNC: 6 MMOL/L (ref 7–16)
BASOPHILS # BLD: 0.03 K/UL (ref 0–0.2)
BASOPHILS NFR BLD: 0 % (ref 0–2)
BUN SERPL-MCNC: 11 MG/DL (ref 6–20)
CALCIUM SERPL-MCNC: 8.9 MG/DL (ref 8.6–10.2)
CHLORIDE SERPL-SCNC: 102 MMOL/L (ref 98–107)
CO2 SERPL-SCNC: 30 MMOL/L (ref 22–29)
CREAT SERPL-MCNC: 0.8 MG/DL (ref 0.5–1)
EKG ATRIAL RATE: 66 BPM
EKG P AXIS: 48 DEGREES
EKG P-R INTERVAL: 190 MS
EKG Q-T INTERVAL: 434 MS
EKG QRS DURATION: 76 MS
EKG QTC CALCULATION (BAZETT): 454 MS
EKG R AXIS: -14 DEGREES
EKG T AXIS: 46 DEGREES
EKG VENTRICULAR RATE: 66 BPM
EOSINOPHIL # BLD: 0.09 K/UL (ref 0.05–0.5)
EOSINOPHILS RELATIVE PERCENT: 1 % (ref 0–6)
ERYTHROCYTE [DISTWIDTH] IN BLOOD BY AUTOMATED COUNT: 12.6 % (ref 11.5–15)
GFR, ESTIMATED: >90 ML/MIN/1.73M2
GLUCOSE SERPL-MCNC: 93 MG/DL (ref 74–99)
HCT VFR BLD AUTO: 39.5 % (ref 34–48)
HGB BLD-MCNC: 13.3 G/DL (ref 11.5–15.5)
IMM GRANULOCYTES # BLD AUTO: <0.03 K/UL (ref 0–0.58)
IMM GRANULOCYTES NFR BLD: 0 % (ref 0–5)
LYMPHOCYTES NFR BLD: 2.1 K/UL (ref 1.5–4)
LYMPHOCYTES RELATIVE PERCENT: 31 % (ref 20–42)
MAGNESIUM SERPL-MCNC: 1.8 MG/DL (ref 1.6–2.6)
MCH RBC QN AUTO: 30.8 PG (ref 26–35)
MCHC RBC AUTO-ENTMCNC: 33.7 G/DL (ref 32–34.5)
MCV RBC AUTO: 91.4 FL (ref 80–99.9)
MONOCYTES NFR BLD: 0.53 K/UL (ref 0.1–0.95)
MONOCYTES NFR BLD: 8 % (ref 2–12)
NEUTROPHILS NFR BLD: 59 % (ref 43–80)
NEUTS SEG NFR BLD: 4.04 K/UL (ref 1.8–7.3)
PLATELET # BLD AUTO: 177 K/UL (ref 130–450)
PMV BLD AUTO: 11.4 FL (ref 7–12)
POTASSIUM SERPL-SCNC: 3.3 MMOL/L (ref 3.5–5)
RBC # BLD AUTO: 4.32 M/UL (ref 3.5–5.5)
SODIUM SERPL-SCNC: 138 MMOL/L (ref 132–146)
TROPONIN I SERPL HS-MCNC: 10 NG/L (ref 0–9)
TROPONIN I SERPL HS-MCNC: 9 NG/L (ref 0–9)
WBC OTHER # BLD: 6.8 K/UL (ref 4.5–11.5)

## 2024-08-22 PROCEDURE — 80048 BASIC METABOLIC PNL TOTAL CA: CPT

## 2024-08-22 PROCEDURE — 85025 COMPLETE CBC W/AUTO DIFF WBC: CPT

## 2024-08-22 PROCEDURE — 84484 ASSAY OF TROPONIN QUANT: CPT

## 2024-08-22 PROCEDURE — 83735 ASSAY OF MAGNESIUM: CPT

## 2024-08-22 PROCEDURE — 93005 ELECTROCARDIOGRAM TRACING: CPT | Performed by: EMERGENCY MEDICINE

## 2024-08-22 PROCEDURE — 36415 COLL VENOUS BLD VENIPUNCTURE: CPT

## 2024-08-22 PROCEDURE — 99284 EMERGENCY DEPT VISIT MOD MDM: CPT

## 2024-08-22 PROCEDURE — 93010 ELECTROCARDIOGRAM REPORT: CPT | Performed by: INTERNAL MEDICINE

## 2024-08-22 RX ORDER — NALTREXONE 380 MG
380 KIT INTRAMUSCULAR
COMMUNITY

## 2024-08-22 ASSESSMENT — ENCOUNTER SYMPTOMS
ABDOMINAL PAIN: 0
SORE THROAT: 0
NAUSEA: 0
BACK PAIN: 0
VOMITING: 0
CHEST TIGHTNESS: 0
COUGH: 0
DIARRHEA: 0
WHEEZING: 0
SHORTNESS OF BREATH: 0

## 2024-08-22 NOTE — ED PROVIDER NOTES
Chief complaint:  Chest wall strain      HPI history provided by the patient  Patient presents here stating that she picked up or moved her a quick with her right arm to do something with her child and pulled something in her shoulder that shot of pain in her shoulder and scapula and into her upper chest.  It has not resolved.  It happened when she moves her arm.  She states she is just worried because her family member or  recently  of a heart attack so she wanted her heart checked out because it was a chest pain.  She has no symptoms right now.  She feels fine.  She contemplated not having a workup but because she has to wait for a ride to come get her she stated that she would like to have her heart checked out.  No recent traveling or surgeries, no leg pain or swelling, no history of blood clots, no current chest pain.  No shortness of breath.  It was a fleeting event.    Review of Systems   Constitutional:  Negative for chills, diaphoresis, fatigue and fever.   HENT:  Negative for congestion and sore throat.    Respiratory:  Negative for cough, chest tightness, shortness of breath and wheezing.    Cardiovascular:  Positive for chest pain. Negative for palpitations and leg swelling.   Gastrointestinal:  Negative for abdominal pain, diarrhea, nausea and vomiting.   Genitourinary:  Negative for dysuria, flank pain, frequency and urgency.   Musculoskeletal:  Negative for arthralgias, back pain, gait problem, joint swelling, myalgias, neck pain and neck stiffness.   Skin:  Negative for rash and wound.   Neurological:  Negative for dizziness, seizures, syncope, weakness, light-headedness, numbness and headaches.   All other systems reviewed and are negative.       Physical Exam  Vitals and nursing note reviewed.   Constitutional:       General: She is awake. She is not in acute distress.     Appearance: She is well-developed. She is not ill-appearing, toxic-appearing or diaphoretic.   HENT:      Head:

## 2024-09-25 RX ORDER — INSULIN LISPRO 100 [IU]/ML
INJECTION, SOLUTION INTRAVENOUS; SUBCUTANEOUS
Qty: 30 ML | Refills: 5 | OUTPATIENT
Start: 2024-09-25

## 2024-10-10 ENCOUNTER — APPOINTMENT (OUTPATIENT)
Dept: GENERAL RADIOLOGY | Age: 49
End: 2024-10-10
Payer: MEDICAID

## 2024-10-10 ENCOUNTER — HOSPITAL ENCOUNTER (EMERGENCY)
Age: 49
Discharge: HOME | End: 2024-10-10
Attending: EMERGENCY MEDICINE
Payer: MEDICAID

## 2024-10-10 VITALS
WEIGHT: 266 LBS | RESPIRATION RATE: 18 BRPM | BODY MASS INDEX: 42.93 KG/M2 | HEART RATE: 67 BPM | DIASTOLIC BLOOD PRESSURE: 101 MMHG | OXYGEN SATURATION: 97 % | SYSTOLIC BLOOD PRESSURE: 223 MMHG

## 2024-10-10 DIAGNOSIS — I10 ESSENTIAL HYPERTENSION: ICD-10-CM

## 2024-10-10 DIAGNOSIS — J06.9 ACUTE UPPER RESPIRATORY INFECTION: Primary | ICD-10-CM

## 2024-10-10 PROCEDURE — 6370000000 HC RX 637 (ALT 250 FOR IP): Performed by: EMERGENCY MEDICINE

## 2024-10-10 PROCEDURE — 99284 EMERGENCY DEPT VISIT MOD MDM: CPT

## 2024-10-10 PROCEDURE — 94640 AIRWAY INHALATION TREATMENT: CPT

## 2024-10-10 PROCEDURE — 6360000002 HC RX W HCPCS: Performed by: EMERGENCY MEDICINE

## 2024-10-10 PROCEDURE — 96374 THER/PROPH/DIAG INJ IV PUSH: CPT

## 2024-10-10 PROCEDURE — 71046 X-RAY EXAM CHEST 2 VIEWS: CPT

## 2024-10-10 RX ORDER — ALBUTEROL SULFATE 90 UG/1
2 INHALANT RESPIRATORY (INHALATION) EVERY 6 HOURS PRN
Qty: 18 G | Refills: 0 | Status: SHIPPED | OUTPATIENT
Start: 2024-10-10 | End: 2024-10-27

## 2024-10-10 RX ORDER — IPRATROPIUM BROMIDE AND ALBUTEROL SULFATE 2.5; .5 MG/3ML; MG/3ML
1 SOLUTION RESPIRATORY (INHALATION) ONCE
Status: COMPLETED | OUTPATIENT
Start: 2024-10-10 | End: 2024-10-10

## 2024-10-10 RX ORDER — HYDRALAZINE HYDROCHLORIDE 20 MG/ML
10 INJECTION INTRAMUSCULAR; INTRAVENOUS ONCE
Status: COMPLETED | OUTPATIENT
Start: 2024-10-10 | End: 2024-10-10

## 2024-10-10 RX ADMIN — IPRATROPIUM BROMIDE AND ALBUTEROL SULFATE 1 DOSE: .5; 2.5 SOLUTION RESPIRATORY (INHALATION) at 14:57

## 2024-10-10 RX ADMIN — HYDRALAZINE HYDROCHLORIDE 10 MG: 20 INJECTION INTRAMUSCULAR; INTRAVENOUS at 16:35

## 2024-10-10 ASSESSMENT — ENCOUNTER SYMPTOMS
RHINORRHEA: 1
SHORTNESS OF BREATH: 0
TROUBLE SWALLOWING: 0
DIARRHEA: 0
WHEEZING: 1
NAUSEA: 0
SINUS PRESSURE: 0
SORE THROAT: 0
VOMITING: 0
BACK PAIN: 0
ABDOMINAL PAIN: 0
COUGH: 1

## 2024-10-10 ASSESSMENT — PAIN - FUNCTIONAL ASSESSMENT: PAIN_FUNCTIONAL_ASSESSMENT: NONE - DENIES PAIN

## 2024-10-10 NOTE — ED PROVIDER NOTES
Chief complaint:  URI      HPI history provided by the patient  The patient presents here complaining of several days of URI symptoms with nasal congestion, runny nose, postnasal drainage, coughing and wheezing and fatigue.  Similar symptoms with her family members last week with exposure to the patient.  No specific chest pain or palpitations or shortness of breath.  No stiff neck, no headache and no abdominal pain or vomiting.    Review of Systems   Constitutional:  Positive for fatigue. Negative for chills, diaphoresis and fever.   HENT:  Positive for congestion, postnasal drip and rhinorrhea. Negative for ear pain, sinus pressure, sore throat and trouble swallowing.    Respiratory:  Positive for cough and wheezing. Negative for shortness of breath.    Cardiovascular:  Negative for chest pain, palpitations and leg swelling.   Gastrointestinal:  Negative for abdominal pain, diarrhea, nausea and vomiting.   Genitourinary:  Negative for dysuria, flank pain and frequency.   Musculoskeletal:  Negative for arthralgias, back pain, gait problem, joint swelling, myalgias, neck pain and neck stiffness.   Skin:  Negative for rash and wound.   Neurological:  Negative for dizziness, seizures, syncope, weakness, light-headedness, numbness and headaches.   All other systems reviewed and are negative.       Physical Exam  Vitals and nursing note reviewed.   Constitutional:       General: She is awake. She is not in acute distress.     Appearance: She is well-developed. She is morbidly obese. She is not ill-appearing, toxic-appearing or diaphoretic.   HENT:      Head: Normocephalic and atraumatic.      Right Ear: Tympanic membrane, ear canal and external ear normal.      Left Ear: Tympanic membrane, ear canal and external ear normal.      Nose: Mucosal edema, congestion and rhinorrhea present.      Mouth/Throat:      Pharynx: Postnasal drip present. No pharyngeal swelling, oropharyngeal exudate, posterior oropharyngeal erythema

## 2024-10-29 RX ORDER — INSULIN LISPRO 100 [IU]/ML
INJECTION, SOLUTION INTRAVENOUS; SUBCUTANEOUS
Qty: 30 ML | Refills: 5 | OUTPATIENT
Start: 2024-10-29

## 2024-10-29 NOTE — TELEPHONE ENCOUNTER
Patient is not using the pump and states someone else is managing her sugars and took her off insulin

## 2025-01-03 NOTE — DISCHARGE INSTR - COC
[facility] E11.65    Hematoma following angiography [id number]    Asthma J45.909    Arteriosclerosis of coronary artery I25.10    Atypical nevus D22.9    Infection with methicillin-resistant Staphylococcus aureus A49.02    Unspecified vitamin D deficiency E55.9    Essential hypertension I10       Isolation/Infection:   Isolation          No Isolation            Nurse Assessment:  Last Vital Signs: BP (!) 180/98 Comment: states she did not take BP meds today  Pulse 80   Temp 98.9 °F (37.2 °C) (Oral)   Resp 16   Ht 5' 6\" (1.676 m)   Wt (!) 350 lb (158.8 kg)   LMP 10/01/2012 (Approximate) Comment:   SpO2 97%   BMI 56.49 kg/m²     Last documented pain score (0-10 scale): Pain Level: 7  Last Weight:   Wt Readings from Last 1 Encounters:   19 (!) 350 lb (158.8 kg)     Mental Status:  {IP PT MENTAL STATUS:21595}    IV Access:  { DIONISIO IV ACCESS:707341405}    Nursing Mobility/ADLs:  Walking   {CHP DME UKZV:223674032}  Transfer  {CHP DME UFFN:078321489}  Bathing  {CHP DME DEFK:088943076}  Dressing  {CHP DME SLCW:874245686}  Toileting  {CHP DME WAYF:533090412}  Feeding  {CHP DME RVXD:671491450}  Med Admin  {CHP DME QYL}  Med Delivery   { DIONISIO MED Delivery:936578591}    Wound Care Documentation and Therapy:  Wound 16 Blister Toe (Comment  which one) Anterior;Right (Active)   Number of days: 1115        Elimination:  Continence:   · Bowel: {YES / BB:76402}  · Bladder: {YES / OF:17967}  Urinary Catheter: {Urinary Catheter:272048108}   Colostomy/Ileostomy/Ileal Conduit: {YES / QA:93652}       Date of Last BM: ***  No intake or output data in the 24 hours ending 19 1533  No intake/output data recorded.     Safety Concerns:     [employer] Safety Concerns:838618400}    Impairments/Disabilities:      [employer] Impairments/Disabilities:461374740}    Nutrition Therapy:  Current Nutrition Therapy:   [address] [employer] Diet List:494087849}    Routes of Feeding: {CHP DME Other Feedings:558786350}  Liquids: {Slp No

## 2025-03-17 ENCOUNTER — HOSPITAL ENCOUNTER (EMERGENCY)
Age: 50
Discharge: HOME | End: 2025-03-17
Attending: EMERGENCY MEDICINE
Payer: MEDICAID

## 2025-03-17 VITALS
DIASTOLIC BLOOD PRESSURE: 112 MMHG | TEMPERATURE: 97.9 F | OXYGEN SATURATION: 98 % | HEART RATE: 68 BPM | RESPIRATION RATE: 16 BRPM | SYSTOLIC BLOOD PRESSURE: 212 MMHG

## 2025-03-17 DIAGNOSIS — R53.82 CHRONIC FATIGUE: Primary | ICD-10-CM

## 2025-03-17 PROCEDURE — 99282 EMERGENCY DEPT VISIT SF MDM: CPT

## 2025-03-17 RX ORDER — SERTRALINE HYDROCHLORIDE 25 MG/1
25 TABLET, FILM COATED ORAL DAILY
COMMUNITY

## 2025-03-17 ASSESSMENT — PAIN - FUNCTIONAL ASSESSMENT: PAIN_FUNCTIONAL_ASSESSMENT: NONE - DENIES PAIN

## 2025-03-17 NOTE — ED PROVIDER NOTES
HPI:  3/17/25,   Time: 4:41 PM EDT         Eliana Romero is a 50 y.o. female presenting to the ED for chief complaint: Patient presents to facility stating that she has had chronic fatigue for over 1 year.  [facility] and the clerical staff there was concern for her and told her to go and get checked.  Hence she presented to our facility.  Patient is known to be diabetic and high blood pressure, taking medications and sees her physician from on care demand.    ROS:   Pertinent positives and negatives are stated within HPI, all other systems reviewed and are negative.  --------------------------------------------- PAST HISTORY ---------------------------------------------  Past Medical History:  has a past medical history of Acute renal injury, Analgesic overuse headache, Anxiety, Arthritis, Asthma, CAD (coronary artery disease), Depression, Diabetes mellitus (HCC), Drug abuse (HCC), Fracture of tooth, GERD (gastroesophageal reflux disease), Glaucoma, Hyperlipidemia, Hypertension, Hypertensive urgency, Hypothyroidism, Irritable bowel syndrome, Morbid obesity due to excess calories, Obesity, Obstructive sleep apnea, Pneumonia, Polycystic ovarian syndrome, Postcholecystectomy syndrome, Spinal headache, and Suicidal ideation.    Past Surgical History:  has a past surgical history that includes Tonsillectomy (); Cholecystectomy, laparoscopic ();  section (); Foot surgery (); Ovary removal (); Dental surgery (10/06/2011); Total abdominal hysterectomy w/ bilateral salpingoophorectomy (3/19/2013); hernia repair (9/3/13); Hysterectomy; Echo Complete (2014); Knee cartilage surgery; Endoscopy, colon, diagnostic; Colonoscopy; and Upper gastrointestinal endoscopy (N/A, 2022).    Social History:  reports that she has never smoked. She has never used smokeless tobacco. She reports that she does not currently use drugs. She reports that she does not drink alcohol.    Family

## 2025-05-27 NOTE — ED NOTES
Discharge instructions given. Patient verbalizes understanding. No other noted or stated problems at this time. Patient will follow up with primary care.      Ethan Fabian RN  05/03/19 2146 No.

## 2025-08-18 ENCOUNTER — TRANSCRIBE ORDERS (OUTPATIENT)
Dept: ADMINISTRATIVE | Age: 50
End: 2025-08-18

## 2025-08-18 DIAGNOSIS — I12.9 RENAL HYPERTENSION: ICD-10-CM

## 2025-08-18 DIAGNOSIS — N18.2 CHRONIC KIDNEY DISEASE, STAGE II (MILD): Primary | ICD-10-CM

## (undated) DEVICE — FORCEPS BX L240CM JAW DIA2.8MM L CAP W/ NDL MIC MESH TOOTH

## (undated) DEVICE — MASK,FACE,MAXFLUIDPROTECT,SHIELD/ERLPS: Brand: MEDLINE

## (undated) DEVICE — LUBRICANT SURG JELLY ST BACTER TUBE 4.25OZ

## (undated) DEVICE — GOWN ISOLATN REG YEL M WT MULTIPLY SIDETIE LEV 2

## (undated) DEVICE — MEDI-VAC YANKAUER SUCTION HANDLE W/BULBOUS TIP: Brand: CARDINAL HEALTH

## (undated) DEVICE — KENDALL 450 SERIES MONITORING FOAM ELECTRODE - RECTANGULAR SHAPE ( 3/PK): Brand: KENDALL

## (undated) DEVICE — VALVE SUCTION AIR H2O HYDR H2O JET CONN STRL ORCA POD + DISP

## (undated) DEVICE — BLOCK BITE 60FR CAREGUARD

## (undated) DEVICE — 6 X 9  1.75MIL 4-WALL LABGUARD: Brand: MINIGRIP COMMERCIAL LLC

## (undated) DEVICE — MEDI-VAC NON-CONDUCTIVE SUCTION TUBING: Brand: CARDINAL HEALTH

## (undated) DEVICE — KIT BEDSIDE REVITAL OX 500ML

## (undated) DEVICE — SPONGE GZ 4IN 4IN 4 PLY N WVN AVANT

## (undated) DEVICE — CONTAINER SPEC COLL 960ML POLYPR TRIANG GRAD INTAKE/OUTPUT